# Patient Record
Sex: FEMALE | Race: WHITE | NOT HISPANIC OR LATINO | Employment: UNEMPLOYED | URBAN - METROPOLITAN AREA
[De-identification: names, ages, dates, MRNs, and addresses within clinical notes are randomized per-mention and may not be internally consistent; named-entity substitution may affect disease eponyms.]

---

## 2018-09-18 ENCOUNTER — TELEPHONE (OUTPATIENT)
Dept: SURGERY | Facility: CLINIC | Age: 33
End: 2018-09-18

## 2018-09-18 NOTE — TELEPHONE ENCOUNTER
Spoke with patient and informed her that an appointment is scheduled with Dr. Dominguez on 9/24.  She will bring a disc with the ct scan that was done in [place].

## 2018-09-18 NOTE — TELEPHONE ENCOUNTER
----- Message from Alana Ocampo sent at 9/18/2018  8:47 AM CDT -----  Contact: pt#[phone]  Needs Advice    Reason for call:She has two hernia pushing on stomach and 7cm cyst in lower abdomen,. Pt wants a sooner appt        Communication Preference:callback     Additional Information:

## 2018-09-21 ENCOUNTER — TELEPHONE (OUTPATIENT)
Dept: SURGERY | Facility: CLINIC | Age: 33
End: 2018-09-21

## 2018-09-21 NOTE — TELEPHONE ENCOUNTER
Spoke with patient and informed her that an appointment is scheduled with Dr. Lockett after she sees Dr. Dominguez.

## 2018-09-21 NOTE — TELEPHONE ENCOUNTER
----- Message from Elodia Cantrell RN sent at 9/20/2018  5:31 PM CDT -----  Contact: Pt:[phone]      ----- Message -----  From: Aline Garcia  Sent: 9/20/2018   3:51 PM  To: Angelica VILLEDA Staff    .Patient Returning Call from [facility]    Who Left Message for Patient:Elodia Morrison RN  Communication Preference:[phone]  Additional Information:Pt states she missed a call from  nurse.

## 2018-09-24 ENCOUNTER — OFFICE VISIT (OUTPATIENT)
Dept: SURGERY | Facility: CLINIC | Age: 33
End: 2018-09-24
Payer: COMMERCIAL

## 2018-09-24 VITALS
SYSTOLIC BLOOD PRESSURE: 175 MMHG | DIASTOLIC BLOOD PRESSURE: 104 MMHG | TEMPERATURE: 98 F | BODY MASS INDEX: 38.65 KG/M2 | HEIGHT: 68 IN | HEART RATE: 91 BPM

## 2018-09-24 VITALS
BODY MASS INDEX: 38.65 KG/M2 | HEART RATE: 102 BPM | DIASTOLIC BLOOD PRESSURE: 110 MMHG | HEIGHT: 68 IN | SYSTOLIC BLOOD PRESSURE: 201 MMHG

## 2018-09-24 DIAGNOSIS — K43.5 PARASTOMAL HERNIA WITHOUT OBSTRUCTION OR GANGRENE: ICD-10-CM

## 2018-09-24 DIAGNOSIS — K43.2 RECURRENT INCISIONAL HERNIA: Primary | ICD-10-CM

## 2018-09-24 DIAGNOSIS — K43.9 VENTRAL HERNIA WITHOUT OBSTRUCTION OR GANGRENE: Primary | ICD-10-CM

## 2018-09-24 PROCEDURE — 99999 PR PBB SHADOW E&M-EST. PATIENT-LVL III: CPT | Mod: PBBFAC,,, | Performed by: COLON & RECTAL SURGERY

## 2018-09-24 PROCEDURE — 3008F BODY MASS INDEX DOCD: CPT | Mod: CPTII,S$GLB,, | Performed by: COLON & RECTAL SURGERY

## 2018-09-24 PROCEDURE — 99999 PR PBB SHADOW E&M-EST. PATIENT-LVL III: CPT | Mod: PBBFAC,,, | Performed by: SURGERY

## 2018-09-24 PROCEDURE — 99213 OFFICE O/P EST LOW 20 MIN: CPT | Mod: S$GLB,,, | Performed by: COLON & RECTAL SURGERY

## 2018-09-24 PROCEDURE — 3008F BODY MASS INDEX DOCD: CPT | Mod: CPTII,S$GLB,, | Performed by: SURGERY

## 2018-09-24 PROCEDURE — 99204 OFFICE O/P NEW MOD 45 MIN: CPT | Mod: S$GLB,,, | Performed by: SURGERY

## 2018-09-24 RX ORDER — CYANOCOBALAMIN 1000 UG/ML
1000 INJECTION, SOLUTION INTRAMUSCULAR; SUBCUTANEOUS
COMMUNITY

## 2018-09-24 RX ORDER — OXYCODONE HCL 10 MG/1
10 TABLET, FILM COATED, EXTENDED RELEASE ORAL EVERY 12 HOURS PRN
COMMUNITY
End: 2023-08-03 | Stop reason: CLARIF

## 2018-09-24 NOTE — PROGRESS NOTES
[facility] - General Surgery  General Surgery  History & Physical    Patient Name: Georgette Abrams  MRN: [id number]  Attending Physician: Pastor Lockett MD  Primary Care Provider: Primary Doctor No    Patient information was obtained from patient and past medical records.     Subjective:     Chief Complaint/Reason for Admission: hernia    History of Present Illness:  Patient is a 33 y.o. female presents with painful incisional and parastomal hernias. She has a complicated surgical history. 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 Foley, this was followed by multiple complications and multiple surgeries. She later developed a parastomal hernia which was repair with biologic mesh in 2012. At that time she also had a hysterectomy. She later developed bowel infection and obstruction due to migration of mesh; the mesh was removed at [facility], [place], operated by [facility] in [place] in 2016. She had initially been doing well since then; however, she visited OSH recently and went to ED for infection, worsening pain and development of hernia. Has severe pain, bulging, nausea, and vomiting. Output from ostomy has increased, become more watery. She notices pain in particular when she lifts her two children. She takes 10 mg oxycodone q12hr for pain and 4 mg Zofran PRN nausea.     Current Outpatient Medications on File Prior to Visit   Medication Sig    clonazepam (KLONOPIN) 1 MG tablet Take 1 mg by mouth 2 (two) times daily as needed.      cyanocobalamin 1,000 mcg/mL injection 1,000 mcg every 28 days.    ondansetron (ZOFRAN) 4 MG tablet     ondansetron (ZOFRAN-ODT) 8 MG TbDL Take 4 mg by mouth once.      oxyCODONE (OXYCONTIN) 10 mg 12 hr tablet Take 10 mg by mouth every 12 (twelve) hours as needed for Pain.    progesterone (PROMETRIUM) 100 MG capsule Take 300 mg by mouth once daily.    tizanidine (ZANAFLEX) 2 MG tablet Take 2 mg by mouth every 6 (six) hours as needed.    venlafaxine  (EFFEXOR) 100 MG Tab Take 175 mg by mouth every evening.     [DISCONTINUED] furosemide (LASIX) 20 MG tablet Take 20 mg by mouth 2 (two) times daily.      [DISCONTINUED] hyoscyamine (LEVSIN/SL) 0.125 mg Subl Place 1 tablet (0.125 mg total) under the tongue every 4 (four) hours as needed.    [DISCONTINUED] metaxalone (SKELAXIN) 800 MG tablet Take 800 mg by mouth 3 (three) times daily.    [DISCONTINUED] oxycodone-acetaminophen 5-325 mg (PERCOCET) 5-325 mg per tablet Take 1 tablet by mouth every 4 (four) hours as needed.       No current facility-administered medications on file prior to visit.        Review of patient's allergies indicates:   Allergen Reactions    Hydrocodone-acetaminophen Itching    Levofloxacin Nausea Only    Morphine Other (See Comments)    Sulfa (sulfonamide antibiotics) Nausea Only       Past Medical History:   Diagnosis Date    Anxiety     Familial polyposis     S/P total colectomy      Past Surgical History:   Procedure Laterality Date    Davinci Assisted Bilateral Ovarian Cystectomy, with extension  SEAN   8/2011    HYSTERECTOMY  8/23/2012    DAVINCI     Illeotomy Creation  2009    OOPHORECTOMY  8/23/2012    DAvinci removal with DLH     TOTAL COLECTOMY      Iloanal pouch creation     Family History     Problem Relation (Age of Onset)    Colon cancer Maternal Grandmother    Lung cancer Maternal Grandfather        Tobacco Use    Smoking status: Former Smoker    Smokeless tobacco: Never Used   Substance and Sexual Activity    Alcohol use: Yes     Alcohol/week: 0.0 oz     Comment: seldom    Drug use: No    Sexual activity: Yes     Partners: Male     Birth control/protection: None     Comment: , lives in [place]     Review of Systems   Constitutional: Positive for appetite change and unexpected weight change. Negative for chills and fever.   HENT: Negative for hearing loss and trouble swallowing.    Eyes: Negative for visual disturbance.   Respiratory: Positive for  shortness of breath. Negative for cough.    Cardiovascular: Positive for chest pain and leg swelling. Negative for palpitations.   Gastrointestinal: Positive for abdominal distention, abdominal pain, nausea and vomiting.   Genitourinary: Positive for frequency. Negative for difficulty urinating and hematuria.   Musculoskeletal: Positive for arthralgias.   Skin: Negative for rash.   Neurological: Negative for numbness.     Objective:     Vital Signs (Most Recent):  Temp: 98.2 °F (36.8 °C) (09/24/18 1428)  Pulse: 91 (09/24/18 1428)  BP: (!) 175/104 (09/24/18 1428) Vital Signs (24h Range):  [unfilled]        Body mass index is 38.65 kg/m².    Physical Exam   Constitutional: She is oriented to person, place, and time. She appears well-developed and well-nourished.   HENT:   Head: Normocephalic and atraumatic.   Eyes: Conjunctivae are normal.   Cardiovascular: Normal rate, regular rhythm and normal heart sounds.   Pulmonary/Chest: Effort normal and breath sounds normal.   Abdominal: Soft. She exhibits no distension.       Neurological: She is alert and oriented to person, place, and time.   Skin: Skin is warm and dry.       Significant Labs:  No labs within 24 hours    Significant Diagnostics:  CT abdomen/pelvis -  Multiple midline and parastomal hernias, no signs of obstruction    Assessment/Plan:     Parastomal and incisional hernias  - will obtain records from [facility] in [place] pertaining to 2016 surgery  - will discuss surgical approach with Colorectal Surgery.  Will mosl likely plan on open approach and possible component separation for wide based hernia   - recommend weight loss if possible prior to hernia repair    Esau Gutierrez MD  General Surgery  [facility] - General Surgery    I have personally taken the history and examined this patient and agree with the resident's note as stated above.         Pastor Lockett MD

## 2018-09-24 NOTE — LETTER
September 24, 2018      Subhash Dominguez MD  [address] [facility] [address]           [facility] - General Surgery  [address]  [place] [address]  Phone: [phone]          Patient: Georgette Abrams   MR Number: [id number]   YOB: 1985   Date of Visit: 9/24/2018       Dear Dr. Subhash Dominguez:    Thank you for referring Georgette Abrams to me for evaluation. Attached you will find relevant portions of my assessment and plan of care.    If you have questions, please do not hesitate to call me. I look forward to following Georgette Abrams along with you.    Sincerely,    Pastor Lockett Jr., MD    Enclosure  CC:  No Recipients    If you would like to receive this communication electronically, please contact [email or web address] or [phone] to request more information on [facility] Link access.    For providers and/or their staff who would like to refer a patient to [facility], please contact us through our one-stop-shop provider referral line, [facility] , at [phone].    If you feel you have received this communication in error or would no longer like to receive these types of communications, please e-mail [email or web address]

## 2018-09-30 NOTE — PROGRESS NOTES
Subjective:       Patient ID: Georgette Abrams is a 33 y.o. female.    Chief Complaint: Incisional Hernia    HPI  Established pt.  Known to [facility] from proctocolectomy (polyposis) - failed IPAA - end ileostomy - parastomal hernia repair 2012 - mesh excision 2016.  Hospitalization recently with high volume watery ileostomy output with abdominal pain and n/v.       Review of Systems   Constitutional: Negative for chills and fever.   Respiratory: Negative for cough and shortness of breath.    Cardiovascular: Negative for chest pain and palpitations.   Gastrointestinal: Negative for nausea and vomiting.   Genitourinary: Negative for dysuria and urgency.   Neurological: Negative for seizures and numbness.       Objective:      Physical Exam   Constitutional: She is oriented to person, place, and time. She appears well-developed and well-nourished.   Eyes: Conjunctivae and EOM are normal.   Pulmonary/Chest: Effort normal. No respiratory distress.   Abdominal: Soft. She exhibits no distension.   RLQ ileostomy.  abd - soft nontender   Musculoskeletal: Normal range of motion. She exhibits no edema.   Neurological: She is alert and oriented to person, place, and time.   Skin: Skin is warm and dry.   Psychiatric: She has a normal mood and affect. Her behavior is normal.         CT reviewed - parastomal and ventral hernias.  Assessment:       1. Ventral hernia without obstruction or gangrene    2. Parastomal hernia without obstruction or gangrene        Plan:       I discussed with Ms. Abrams referral to Dr. Lockett for complex/high risk for recurrence hernia - evaluation and treatment.  He is willing to see her same day as this visit.

## 2018-10-03 ENCOUNTER — TELEPHONE (OUTPATIENT)
Dept: SURGERY | Facility: CLINIC | Age: 33
End: 2018-10-03

## 2018-10-03 NOTE — TELEPHONE ENCOUNTER
Spoke with pt and she wanted to know if Dr. Dominguez has talked to Dr. Lockett yet about her care. Informed pt I will send a message to Dr. Dominguez and let her know tomorrow.

## 2018-10-03 NOTE — TELEPHONE ENCOUNTER
----- Message from Aline Garcia sent at 10/3/2018  1:15 PM CDT -----  Contact: Pt:[phone]  .Needs Advice    Reason for call:Pt called and states she would like to speak with the nurse in regards to getting the status of her case.         Communication Preference:Pt:[phone]    Additional Information:

## 2019-01-18 ENCOUNTER — TELEPHONE (OUTPATIENT)
Dept: SURGERY | Facility: CLINIC | Age: 34
End: 2019-01-18

## 2019-01-18 NOTE — TELEPHONE ENCOUNTER
----- Message from Pastor Lockett Jr., MD sent at 1/17/2019  1:34 PM CST -----  Contact: [phone]  We can have her come back in to see us.    ----- Message -----  From: Enid Gunderson RN  Sent: 1/11/2019   4:29 PM  To: Pastor Lockett Jr., MD    In the media tab  ----- Message -----  From: Pastor Lockett Jr., MD  Sent: 1/11/2019   4:02 PM  To: Enid Gunderson RN    Did we get her records from [facility]?    ----- Message -----  From: Enid Gunderson RN  Sent: 1/8/2019   1:09 PM  To: Pastor Lockett Jr., MD    What is the plan for this patient?    ----- Message -----  From: Elodia Cantrell RN  Sent: 1/7/2019   5:32 PM  To: Enid Gunderson RN    This patient was seen by Dr. Lockett back in October.  Can you check with him and see what the plan was for her please?  She said that nobody had called her back with any information.    Thank you,  Elodia Morrison   ----- Message -----  From: Jennifer Salter MA  Sent: 1/7/2019  10:45 AM  To: Angelica VILLEDA Staff    Needs Advice    Reason for call: Pt said that its been several months that she was  told Dr Dominguez was going to speak to a surgeon re: her case and she has not heard back from anyone since then         Communication Preference: [phone]    Additional Information: please call

## 2019-01-24 ENCOUNTER — TELEPHONE (OUTPATIENT)
Dept: SURGERY | Facility: CLINIC | Age: 34
End: 2019-01-24

## 2019-01-24 NOTE — TELEPHONE ENCOUNTER
----- Message from Sharon Agarwal sent at 1/24/2019  3:19 PM CST -----  Contact: pt   Patient Returning Call from [facility]    Who Left Message for Patient: Enid   Communication Preference: can you please call pt at [phone]  Additional Information: none    WOLF

## 2020-12-08 ENCOUNTER — PATIENT MESSAGE (OUTPATIENT)
Dept: WOUND CARE | Facility: CLINIC | Age: 35
End: 2020-12-08

## 2023-06-27 ENCOUNTER — TELEPHONE (OUTPATIENT)
Dept: SURGERY | Facility: CLINIC | Age: 38
End: 2023-06-27
Payer: COMMERCIAL

## 2023-06-27 NOTE — TELEPHONE ENCOUNTER
Pt called stating that she just got out of the hospital and she is having a lot of health challenges at this time and she is looking to reestablish care with CRS and she also needs to have 3 hernias repaired.  Pt is a previous pt of Dr. Foley, but has also seen Dr. Dominguez in the past.  RN will check with Dr. Rudd re: recommendations for hernia repair and message will also be sent to Dr. Dominguez regarding pt having a colostomy revision.  RN will touch base with pt again once she receives some guidance on next steps and arranging all appts needed when she comes to [place].  Pt verbalized understanding to all.

## 2023-07-03 ENCOUNTER — TELEPHONE (OUTPATIENT)
Dept: SURGERY | Facility: CLINIC | Age: 38
End: 2023-07-03
Payer: COMMERCIAL

## 2023-07-03 NOTE — TELEPHONE ENCOUNTER
Called pt to update her after hearing from Dr. Dominguez.  RN let pt know that Dr. Dominguez recommended that I speak with Magaly Tracy and Tobin re: sx for pt.  RN messaged both docs who are out this week to see about next steps for pt.  Pt is aware.  RN also asked pt about getting her records together and her scans placed on a disk before her visit to [facility].  Pt stated that she would go to the hospital herself and ask for them  RN will update pt once she hears back from each doc.  Pt also made aware that Dr. Lockett is out of the office until July 26.  Pt stated that she is hoping to have a sooner appt. with CRS.  Pt verbalized understanding to all.

## 2023-07-11 ENCOUNTER — TELEPHONE (OUTPATIENT)
Dept: SURGERY | Facility: CLINIC | Age: 38
End: 2023-07-11
Payer: COMMERCIAL

## 2023-07-11 NOTE — TELEPHONE ENCOUNTER
Attempted to call pt on Friday 7/7 at 1300 in the afternoon and today to coordinate a visit for her to meet with Dr. Tracy re: her parastomal hernia repairs and ostomy revision.  Left pt a vmail on Friday and today to speak with her.  Left office number for pt to give us a call back when convenient.

## 2023-07-12 ENCOUNTER — TELEPHONE (OUTPATIENT)
Dept: SURGERY | Facility: CLINIC | Age: 38
End: 2023-07-12
Payer: COMMERCIAL

## 2023-07-12 NOTE — TELEPHONE ENCOUNTER
Called pt back to get her scheduled with Dr. Tracy.  Pt stated that she is having a lot of health issues and has been in a lot of pain since discharge and is willing to drive in from [place]. as soon as possible for an appt to discuss her parastomal hernias and ostomy revision.  Pt also stated that she has her hospital records and scans on a disc that she can bring with her.  RN set up an appt for pt to see Dr. Tracy on 7/14 at 2:40pm.  Appt letter will be emailed to pt.  Pt stated that she is very familiar with the location of appt from previous appts with Dr. Foley.  Pt verbalized understanding to all.

## 2023-07-14 ENCOUNTER — LAB VISIT (OUTPATIENT)
Dept: LAB | Facility: HOSPITAL | Age: 38
End: 2023-07-14
Attending: COLON & RECTAL SURGERY
Payer: COMMERCIAL

## 2023-07-14 ENCOUNTER — OFFICE VISIT (OUTPATIENT)
Dept: SURGERY | Facility: CLINIC | Age: 38
End: 2023-07-14
Payer: COMMERCIAL

## 2023-07-14 VITALS
BODY MASS INDEX: 38.65 KG/M2 | HEIGHT: 68 IN | HEART RATE: 78 BPM | DIASTOLIC BLOOD PRESSURE: 87 MMHG | SYSTOLIC BLOOD PRESSURE: 125 MMHG

## 2023-07-14 DIAGNOSIS — K94.13 ILEOSTOMY DYSFUNCTION: ICD-10-CM

## 2023-07-14 DIAGNOSIS — Z83.718 FAMILY HISTORY OF FAMILIAL POLYPOSIS: ICD-10-CM

## 2023-07-14 DIAGNOSIS — K43.0 INCISIONAL HERNIA WITH OBSTRUCTION BUT NO GANGRENE: Primary | ICD-10-CM

## 2023-07-14 DIAGNOSIS — K43.0 INCISIONAL HERNIA WITH OBSTRUCTION BUT NO GANGRENE: ICD-10-CM

## 2023-07-14 LAB
ALBUMIN SERPL BCP-MCNC: 4.5 G/DL (ref 3.5–5.2)
ALP SERPL-CCNC: 46 U/L (ref 55–135)
ALT SERPL W/O P-5'-P-CCNC: 26 U/L (ref 10–44)
ANION GAP SERPL CALC-SCNC: 12 MMOL/L (ref 8–16)
AST SERPL-CCNC: 20 U/L (ref 10–40)
BASOPHILS # BLD AUTO: 0.07 K/UL (ref 0–0.2)
BASOPHILS NFR BLD: 0.8 % (ref 0–1.9)
BILIRUB SERPL-MCNC: 1.3 MG/DL (ref 0.1–1)
BUN SERPL-MCNC: 5 MG/DL (ref 6–20)
CALCIUM SERPL-MCNC: 9.7 MG/DL (ref 8.7–10.5)
CHLORIDE SERPL-SCNC: 103 MMOL/L (ref 95–110)
CO2 SERPL-SCNC: 23 MMOL/L (ref 23–29)
CREAT SERPL-MCNC: 0.8 MG/DL (ref 0.5–1.4)
DIFFERENTIAL METHOD: ABNORMAL
EOSINOPHIL # BLD AUTO: 0.1 K/UL (ref 0–0.5)
EOSINOPHIL NFR BLD: 1.1 % (ref 0–8)
ERYTHROCYTE [DISTWIDTH] IN BLOOD BY AUTOMATED COUNT: 13 % (ref 11.5–14.5)
EST. GFR  (NO RACE VARIABLE): >60 ML/MIN/1.73 M^2
GLUCOSE SERPL-MCNC: 99 MG/DL (ref 70–110)
HCT VFR BLD AUTO: 47.6 % (ref 37–48.5)
HGB BLD-MCNC: 15.8 G/DL (ref 12–16)
IMM GRANULOCYTES # BLD AUTO: 0.04 K/UL (ref 0–0.04)
IMM GRANULOCYTES NFR BLD AUTO: 0.4 % (ref 0–0.5)
LYMPHOCYTES # BLD AUTO: 1.5 K/UL (ref 1–4.8)
LYMPHOCYTES NFR BLD: 16.6 % (ref 18–48)
MCH RBC QN AUTO: 30.6 PG (ref 27–31)
MCHC RBC AUTO-ENTMCNC: 33.2 G/DL (ref 32–36)
MCV RBC AUTO: 92 FL (ref 82–98)
MONOCYTES # BLD AUTO: 0.5 K/UL (ref 0.3–1)
MONOCYTES NFR BLD: 5.7 % (ref 4–15)
NEUTROPHILS # BLD AUTO: 6.7 K/UL (ref 1.8–7.7)
NEUTROPHILS NFR BLD: 75.4 % (ref 38–73)
NRBC BLD-RTO: 0 /100 WBC
PLATELET # BLD AUTO: 248 K/UL (ref 150–450)
PMV BLD AUTO: 11.7 FL (ref 9.2–12.9)
POTASSIUM SERPL-SCNC: 4.1 MMOL/L (ref 3.5–5.1)
PROT SERPL-MCNC: 7.5 G/DL (ref 6–8.4)
RBC # BLD AUTO: 5.16 M/UL (ref 4–5.4)
SODIUM SERPL-SCNC: 138 MMOL/L (ref 136–145)
WBC # BLD AUTO: 8.92 K/UL (ref 3.9–12.7)

## 2023-07-14 PROCEDURE — 1160F RVW MEDS BY RX/DR IN RCRD: CPT | Mod: CPTII,S$GLB,, | Performed by: COLON & RECTAL SURGERY

## 2023-07-14 PROCEDURE — 3008F PR BODY MASS INDEX (BMI) DOCUMENTED: ICD-10-PCS | Mod: CPTII,S$GLB,, | Performed by: COLON & RECTAL SURGERY

## 2023-07-14 PROCEDURE — 4010F PR ACE/ARB THEARPY RXD/TAKEN: ICD-10-PCS | Mod: CPTII,S$GLB,, | Performed by: COLON & RECTAL SURGERY

## 2023-07-14 PROCEDURE — 3079F PR MOST RECENT DIASTOLIC BLOOD PRESSURE 80-89 MM HG: ICD-10-PCS | Mod: CPTII,S$GLB,, | Performed by: COLON & RECTAL SURGERY

## 2023-07-14 PROCEDURE — 99205 OFFICE O/P NEW HI 60 MIN: CPT | Mod: S$GLB,,, | Performed by: COLON & RECTAL SURGERY

## 2023-07-14 PROCEDURE — 4010F ACE/ARB THERAPY RXD/TAKEN: CPT | Mod: CPTII,S$GLB,, | Performed by: COLON & RECTAL SURGERY

## 2023-07-14 PROCEDURE — 1159F MED LIST DOCD IN RCRD: CPT | Mod: CPTII,S$GLB,, | Performed by: COLON & RECTAL SURGERY

## 2023-07-14 PROCEDURE — 99999 PR PBB SHADOW E&M-EST. PATIENT-LVL IV: ICD-10-PCS | Mod: PBBFAC,,, | Performed by: COLON & RECTAL SURGERY

## 2023-07-14 PROCEDURE — 3079F DIAST BP 80-89 MM HG: CPT | Mod: CPTII,S$GLB,, | Performed by: COLON & RECTAL SURGERY

## 2023-07-14 PROCEDURE — 85025 COMPLETE CBC W/AUTO DIFF WBC: CPT | Performed by: COLON & RECTAL SURGERY

## 2023-07-14 PROCEDURE — 80053 COMPREHEN METABOLIC PANEL: CPT | Performed by: COLON & RECTAL SURGERY

## 2023-07-14 PROCEDURE — 36415 COLL VENOUS BLD VENIPUNCTURE: CPT | Performed by: COLON & RECTAL SURGERY

## 2023-07-14 PROCEDURE — 1160F PR REVIEW ALL MEDS BY PRESCRIBER/CLIN PHARMACIST DOCUMENTED: ICD-10-PCS | Mod: CPTII,S$GLB,, | Performed by: COLON & RECTAL SURGERY

## 2023-07-14 PROCEDURE — 99205 PR OFFICE/OUTPT VISIT, NEW, LEVL V, 60-74 MIN: ICD-10-PCS | Mod: S$GLB,,, | Performed by: COLON & RECTAL SURGERY

## 2023-07-14 PROCEDURE — 99999 PR PBB SHADOW E&M-EST. PATIENT-LVL IV: CPT | Mod: PBBFAC,,, | Performed by: COLON & RECTAL SURGERY

## 2023-07-14 PROCEDURE — 3074F SYST BP LT 130 MM HG: CPT | Mod: CPTII,S$GLB,, | Performed by: COLON & RECTAL SURGERY

## 2023-07-14 PROCEDURE — 3008F BODY MASS INDEX DOCD: CPT | Mod: CPTII,S$GLB,, | Performed by: COLON & RECTAL SURGERY

## 2023-07-14 PROCEDURE — 3074F PR MOST RECENT SYSTOLIC BLOOD PRESSURE < 130 MM HG: ICD-10-PCS | Mod: CPTII,S$GLB,, | Performed by: COLON & RECTAL SURGERY

## 2023-07-14 PROCEDURE — 1159F PR MEDICATION LIST DOCUMENTED IN MEDICAL RECORD: ICD-10-PCS | Mod: CPTII,S$GLB,, | Performed by: COLON & RECTAL SURGERY

## 2023-07-14 RX ORDER — LOSARTAN POTASSIUM 100 MG/1
100 TABLET ORAL
COMMUNITY

## 2023-07-14 NOTE — PROGRESS NOTES
CRS Office Visit History and Physical    Referring Md:   Subhash Dominguez Md  [address] [place] [address]    SUBJECTIVE:     Chief Complaint: parastomal hernia    History of Present Illness:  The patient is a new patient to this practice.   She presents with 59 pages of outside hospital records that were reviewed as well as multiple CT scans.  She is had 66 hospital admission since 2009.  Course is as follows:  Patient is a 37 y.o. female presents with parastomal hernia.    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  She therefore underwent repeat exploratory laparotomy with small-bowel resection in June 2010.  Ultimately, she had pouch failure requiring pouch excision in September 2010.  She underwent combined hysterectomy and ventral hernia repair with mesh placement in 2012.  This was complicated by mesh infection requiring mesh excision in 2016.  Her last abdominal operation was in 2016.    Since 2016, she is developed recurrent multiple midline hernias with increasing obstructive symptoms.  She is been hospitalized multiple times.  Normally, she empties her bag 7 times per day without Imodium.  Over the past 14 months, she is had increasing obstructive symptoms frequently requiring her to lay down and press on her hernias or be admitted to the hospital.  Nonsmoker.  Weight has been stable.  Nondiabetic.    She is been seen at multiple outside hospitals including [facility] regarding ventral and parastomal hernia repair but has not been offered repair previously.    Socially, she is going through a divorce.  She does live near her family and has a good social support system at home.  She has anxiety which is well controlled with her home medications.      Review of patient's allergies indicates:   Allergen Reactions    Hydrocodone-acetaminophen Itching    Levofloxacin  "Nausea Only    Morphine Other (See Comments)    Sulfa (sulfonamide antibiotics) Nausea Only       Past Medical History:   Diagnosis Date    Anxiety     Familial polyposis     S/P total colectomy      Past Surgical History:   Procedure Laterality Date    Davinci Assisted Bilateral Ovarian Cystectomy, with extension  SEAN   8/2011    HYSTERECTOMY  8/23/2012    DAVINCI     Illeotomy Creation  2009    OOPHORECTOMY  8/23/2012    DAvinci removal with DLH     TOTAL COLECTOMY      Iloanal pouch creation     Family History   Problem Relation Age of Onset    Colon cancer Maternal Grandmother         near late 60's    Lung cancer Maternal Grandfather         lung cancer     Social History     Tobacco Use    Smoking status: Former    Smokeless tobacco: Never   Substance Use Topics    Alcohol use: Yes     Alcohol/week: 0.0 standard drinks     Comment: seldom    Drug use: No        Review of Systems:  Review of Systems   Constitutional:  Negative for chills, diaphoresis, fever, malaise/fatigue and weight loss.   HENT:  Negative for congestion.    Respiratory:  Negative for shortness of breath.    Cardiovascular:  Negative for chest pain and leg swelling.   Gastrointestinal:  Positive for abdominal pain, blood in stool and diarrhea. Negative for constipation, nausea and vomiting.   Genitourinary:  Negative for dysuria.   Musculoskeletal:  Negative for back pain and myalgias.   Skin:  Negative for rash.   Neurological:  Negative for dizziness and weakness.   Endo/Heme/Allergies:  Does not bruise/bleed easily.   Psychiatric/Behavioral:  Negative for depression. The patient is nervous/anxious.      OBJECTIVE:     Vital Signs (Most Recent)  /87 (BP Location: Left arm, Patient Position: Sitting, BP Method: Small (Automatic))   Pulse 78   Ht 5' 8" (1.727 m)   BMI 38.65 kg/m²     Physical Exam:  General: White female in no distress   Neuro: alert and oriented x 4.  Moves all extremities.     HEENT: no icterus.  Trachea " midline  Respiratory: respirations are even and unlabored  Cardiac: regular rate  Abdomen:  Large midline incision with multiple large hernias that are tense.  Stoma in right lower quadrant is pink.  Lower midline T incision with multiple skin irregularities.  Distended.  Mildly tender.  Extremities: Warm dry and intact  Skin: no rashes  Anorectal:  Deferred    Labs:  H&H 15 and 48.  Albumin 4.5.  Normal renal function.    Imaging:   CT abdomen pelvis 06/25/2023 reviewed and demonstrates right lower quadrant ileostomy with parastomal hernia as well as a left paramedian ventral hernia and a right paramedian supraumbilical hernia.  Small-bowel follow-through on 06/25/2023 demonstrates passage of contrast to the ostomy bag after 2 hours.  CT abdomen pelvis 06/22/2023 reviewed.  Left-sided rectus is intact.  Right-sided rectus with stoma through the inferior portion.  TAR plane appears intact    ASSESSMENT/PLAN:     Diagnoses and all orders for this visit:    Incisional hernia with obstruction but no gangrene  -     Case Request Operating Room: REPAIR, HERNIA, VENTRAL, RECURRENT, ERAS low, REVISION, ILEOSTOMY, LYSIS, ADHESIONS  -     Comprehensive Metabolic Panel; Future  -     CBC Auto Differential; Future    Ileostomy dysfunction  -     Case Request Operating Room: REPAIR, HERNIA, VENTRAL, RECURRENT, ERAS low, REVISION, ILEOSTOMY, LYSIS, ADHESIONS  -     Comprehensive Metabolic Panel; Future  -     CBC Auto Differential; Future    Family history of familial polyposis  -     Case Request Operating Room: REPAIR, HERNIA, VENTRAL, RECURRENT, ERAS low, REVISION, ILEOSTOMY, LYSIS, ADHESIONS  -     Comprehensive Metabolic Panel; Future  -     CBC Auto Differential; Future        37 y.o. female with history of FAP status post failed pouch with multiple complex abdominal wall hernias and a peristomal hernia.  She is having obstructive symptoms with multiple hospitalizations.  Obstructive symptoms interfering with her ability  to function normally.  Although she is a very complex surgical history, medically, she remains active with normal nutrition, no weight change, nonsmoker, nondiabetic.  Discussed that she is certainly at high risk for perioperative complication given her extensive past surgical history.  It appears that she would benefit from complex abdominal wall reconstruction as well as relocation of her ileostomy to the upper abdomen given that she currently has pouch complications from pouching in her lower abdomen due to skin folds.  Discussed this would involve open scar excision, extensive adhesiolysis, takedown of her prior ileostomy, excision of hernias, abdominal wall reconstruction likely with a transverse abdominis release, tunneling of a new ileostomy, and placement of resorbable mesh.  Discussed the risk infection, hernia recurrence, damage to surrounding structures, need for bowel resection, seroma formation, pain.  She unfortunately has limited other options and therefore wishes to proceed surgery.  Consents were signed today and all questions were answered.  She is had significant pain following surgery in the past.  Therefore, we will plan for epidural placement prior to her surgery for pain control.    KRISH Tracy MD, FACS, FASCRS  Staff Surgeon  Colon & Rectal Surgery    Total of 75 minutes was spent on evaluation of past records, CT, planning, discussion with the patient.  Over 50% in face-to-face discussion with the patient.

## 2023-07-14 NOTE — H&P (VIEW-ONLY)
CRS Office Visit History and Physical    Referring Md:   Subhash Dominguez Md  [address] [place] [address]    SUBJECTIVE:     Chief Complaint: parastomal hernia    History of Present Illness:  The patient is a new patient to this practice.   She presents with 59 pages of outside hospital records that were reviewed as well as multiple CT scans.  She is had 66 hospital admission since 2009.  Course is as follows:  Patient is a 37 y.o. female presents with parastomal hernia.    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  She therefore underwent repeat exploratory laparotomy with small-bowel resection in June 2010.  Ultimately, she had pouch failure requiring pouch excision in September 2010.  She underwent combined hysterectomy and ventral hernia repair with mesh placement in 2012.  This was complicated by mesh infection requiring mesh excision in 2016.  Her last abdominal operation was in 2016.    Since 2016, she is developed recurrent multiple midline hernias with increasing obstructive symptoms.  She is been hospitalized multiple times.  Normally, she empties her bag 7 times per day without Imodium.  Over the past 14 months, she is had increasing obstructive symptoms frequently requiring her to lay down and press on her hernias or be admitted to the hospital.  Nonsmoker.  Weight has been stable.  Nondiabetic.    She is been seen at multiple outside hospitals including [place] regarding ventral and parastomal hernia repair but has not been offered repair previously.    Socially, she is going through a divorce.  She does live near her family and has a good social support system at home.  She has anxiety which is well controlled with her home medications.      Review of patient's allergies indicates:   Allergen Reactions    Hydrocodone-acetaminophen Itching    Levofloxacin  "Nausea Only    Morphine Other (See Comments)    Sulfa (sulfonamide antibiotics) Nausea Only       Past Medical History:   Diagnosis Date    Anxiety     Familial polyposis     S/P total colectomy      Past Surgical History:   Procedure Laterality Date    Davinci Assisted Bilateral Ovarian Cystectomy, with extension  SEAN   8/2011    HYSTERECTOMY  8/23/2012    DAVINCI     Illeotomy Creation  2009    OOPHORECTOMY  8/23/2012    DAvinci removal with DLH     TOTAL COLECTOMY      Iloanal pouch creation     Family History   Problem Relation Age of Onset    Colon cancer Maternal Grandmother         near late 60's    Lung cancer Maternal Grandfather         lung cancer     Social History     Tobacco Use    Smoking status: Former    Smokeless tobacco: Never   Substance Use Topics    Alcohol use: Yes     Alcohol/week: 0.0 standard drinks     Comment: seldom    Drug use: No        Review of Systems:  Review of Systems   Constitutional:  Negative for chills, diaphoresis, fever, malaise/fatigue and weight loss.   HENT:  Negative for congestion.    Respiratory:  Negative for shortness of breath.    Cardiovascular:  Negative for chest pain and leg swelling.   Gastrointestinal:  Positive for abdominal pain, blood in stool and diarrhea. Negative for constipation, nausea and vomiting.   Genitourinary:  Negative for dysuria.   Musculoskeletal:  Negative for back pain and myalgias.   Skin:  Negative for rash.   Neurological:  Negative for dizziness and weakness.   Endo/Heme/Allergies:  Does not bruise/bleed easily.   Psychiatric/Behavioral:  Negative for depression. The patient is nervous/anxious.      OBJECTIVE:     Vital Signs (Most Recent)  /87 (BP Location: Left arm, Patient Position: Sitting, BP Method: Small (Automatic))   Pulse 78   Ht 5' 8" (1.727 m)   BMI 38.65 kg/m²     Physical Exam:  General: White female in no distress   Neuro: alert and oriented x 4.  Moves all extremities.     HEENT: no icterus.  Trachea " midline  Respiratory: respirations are even and unlabored  Cardiac: regular rate  Abdomen:  Large midline incision with multiple large hernias that are tense.  Stoma in right lower quadrant is pink.  Lower midline T incision with multiple skin irregularities.  Distended.  Mildly tender.  Extremities: Warm dry and intact  Skin: no rashes  Anorectal:  Deferred    Labs:  H&H 15 and 48.  Albumin 4.5.  Normal renal function.    Imaging:   CT abdomen pelvis 06/25/2023 reviewed and demonstrates right lower quadrant ileostomy with parastomal hernia as well as a left paramedian ventral hernia and a right paramedian supraumbilical hernia.  Small-bowel follow-through on 06/25/2023 demonstrates passage of contrast to the ostomy bag after 2 hours.  CT abdomen pelvis 06/22/2023 reviewed.  Left-sided rectus is intact.  Right-sided rectus with stoma through the inferior portion.  TAR plane appears intact    ASSESSMENT/PLAN:     Diagnoses and all orders for this visit:    Incisional hernia with obstruction but no gangrene  -     Case Request Operating Room: REPAIR, HERNIA, VENTRAL, RECURRENT, ERAS low, REVISION, ILEOSTOMY, LYSIS, ADHESIONS  -     Comprehensive Metabolic Panel; Future  -     CBC Auto Differential; Future    Ileostomy dysfunction  -     Case Request Operating Room: REPAIR, HERNIA, VENTRAL, RECURRENT, ERAS low, REVISION, ILEOSTOMY, LYSIS, ADHESIONS  -     Comprehensive Metabolic Panel; Future  -     CBC Auto Differential; Future    Family history of familial polyposis  -     Case Request Operating Room: REPAIR, HERNIA, VENTRAL, RECURRENT, ERAS low, REVISION, ILEOSTOMY, LYSIS, ADHESIONS  -     Comprehensive Metabolic Panel; Future  -     CBC Auto Differential; Future        37 y.o. female with history of FAP status post failed pouch with multiple complex abdominal wall hernias and a peristomal hernia.  She is having obstructive symptoms with multiple hospitalizations.  Obstructive symptoms interfering with her ability  to function normally.  Although she is a very complex surgical history, medically, she remains active with normal nutrition, no weight change, nonsmoker, nondiabetic.  Discussed that she is certainly at high risk for perioperative complication given her extensive past surgical history.  It appears that she would benefit from complex abdominal wall reconstruction as well as relocation of her ileostomy to the upper abdomen given that she currently has pouch complications from pouching in her lower abdomen due to skin folds.  Discussed this would involve open scar excision, extensive adhesiolysis, takedown of her prior ileostomy, excision of hernias, abdominal wall reconstruction likely with a transverse abdominis release, tunneling of a new ileostomy, and placement of resorbable mesh.  Discussed the risk infection, hernia recurrence, damage to surrounding structures, need for bowel resection, seroma formation, pain.  She unfortunately has limited other options and therefore wishes to proceed surgery.  Consents were signed today and all questions were answered.  She is had significant pain following surgery in the past.  Therefore, we will plan for epidural placement prior to her surgery for pain control.    KRISH Tracy MD, FACS, FASCRS  Staff Surgeon  Colon & Rectal Surgery    Total of 75 minutes was spent on evaluation of past records, CT, planning, discussion with the patient.  Over 50% in face-to-face discussion with the patient.

## 2023-07-17 RX ORDER — GABAPENTIN 300 MG/1
300 CAPSULE ORAL 3 TIMES DAILY
Status: CANCELLED | OUTPATIENT
Start: 2023-07-17

## 2023-07-19 ENCOUNTER — DOCUMENTATION ONLY (OUTPATIENT)
Dept: HEMATOLOGY/ONCOLOGY | Facility: CLINIC | Age: 38
End: 2023-07-19
Payer: COMMERCIAL

## 2023-07-19 NOTE — PROGRESS NOTES
In basket message received re: patient that is looking for lodging options for upcoming surgery. Attempted to call pt., no answer, VM left requesting a return call. Will follow.

## 2023-07-20 ENCOUNTER — DOCUMENTATION ONLY (OUTPATIENT)
Dept: HEMATOLOGY/ONCOLOGY | Facility: CLINIC | Age: 38
End: 2023-07-20
Payer: COMMERCIAL

## 2023-07-20 NOTE — PROGRESS NOTES
Followed up with this patient in regards to lodging options for upcoming procedure. Offered list of local hotels who will offer discount. She declined. She said she was able to work out a reasonable rate with the [facility].

## 2023-08-03 ENCOUNTER — TELEPHONE (OUTPATIENT)
Dept: SURGERY | Facility: CLINIC | Age: 38
End: 2023-08-03
Payer: COMMERCIAL

## 2023-08-03 ENCOUNTER — ANESTHESIA EVENT (OUTPATIENT)
Dept: SURGERY | Facility: HOSPITAL | Age: 38
DRG: 330 | End: 2023-08-03
Payer: COMMERCIAL

## 2023-08-03 NOTE — ANESTHESIA PREPROCEDURE EVALUATION
08/03/2023  Georgette Abrams is a 38 y.o., female.      Pre-op Assessment          Review of Systems  Anesthesia Hx:  Personal Hx of Anesthesia complications, Post-Operative Nausea/Vomiting, with every anesthetic, treatment not known   Social:  Former Smoker    Hepatic/GI:    Familial polyposis  S/P total colectomy      ileostomy   Endocrine:  Obesity / BMI > 30  Psych:   anxiety          Physical Exam  General: Cooperative, Alert and Oriented    Airway:  Mouth Opening: Normal  TM Distance: Normal          Anesthesia Plan  Type of Anesthesia, risks & benefits discussed:    Anesthesia Type: Gen ETT  Intra-op Monitoring Plan: Standard ASA Monitors  Post Op Pain Control Plan: multimodal analgesia  Induction:  IV  Informed Consent: Informed consent signed with the Patient and all parties understand the risks and agree with anesthesia plan.  All questions answered.   ASA Score: 2  Day of Surgery Review of History & Physical: H&P Update referred to the surgeon/provider.    Ready For Surgery From Anesthesia Perspective.     .

## 2023-08-03 NOTE — PRE-PROCEDURE INSTRUCTIONS
PreOp Instructions given:   - Verbal medication information (what to hold and what to take)   - NPO guidelines/Bowel prep instructions given per CRS Dept  - Arrival place directions given; time to be given the day before procedure by the   Surgeon's Office DOSC  - Bathing with antibacterial soap   - Don't wear any jewelry or bring any valuables AM of surgery   - No makeup or moisturizer to face   - No perfume/cologne, powder, lotions or aftershave   Pt. verbalized understanding.   Pt reports h/o PONV    Patient does not know arrival time.  Explained that this information comes from the surgeon's office and if they haven't heard from them by 2 or 3 pm to call the office.  Patient stated an understanding.

## 2023-08-04 ENCOUNTER — TELEPHONE (OUTPATIENT)
Dept: SURGERY | Facility: CLINIC | Age: 38
End: 2023-08-04
Payer: COMMERCIAL

## 2023-08-07 ENCOUNTER — ANESTHESIA (OUTPATIENT)
Dept: SURGERY | Facility: HOSPITAL | Age: 38
DRG: 330 | End: 2023-08-07
Payer: COMMERCIAL

## 2023-08-07 ENCOUNTER — HOSPITAL ENCOUNTER (INPATIENT)
Facility: HOSPITAL | Age: 38
LOS: 9 days | Discharge: HOME HEALTH | DRG: 330 | End: 2023-08-16
Attending: COLON & RECTAL SURGERY | Admitting: COLON & RECTAL SURGERY
Payer: COMMERCIAL

## 2023-08-07 DIAGNOSIS — Z90.49 S/P COLECTOMY: ICD-10-CM

## 2023-08-07 DIAGNOSIS — Z43.2 ATTENTION TO ILEOSTOMY: Primary | ICD-10-CM

## 2023-08-07 DIAGNOSIS — K43.0 INCISIONAL HERNIA WITH OBSTRUCTION BUT NO GANGRENE: ICD-10-CM

## 2023-08-07 DIAGNOSIS — K56.609 SMALL BOWEL OBSTRUCTION: ICD-10-CM

## 2023-08-07 PROBLEM — K94.13 ILEOSTOMY DYSFUNCTION: Status: ACTIVE | Noted: 2023-08-07

## 2023-08-07 LAB
ABO + RH BLD: NORMAL
BLD GP AB SCN CELLS X3 SERPL QL: NORMAL
SPECIMEN OUTDATE: NORMAL

## 2023-08-07 PROCEDURE — D9220A PRA ANESTHESIA: Mod: ANES,,, | Performed by: ANESTHESIOLOGY

## 2023-08-07 PROCEDURE — 44314 REVISION OF ILEOSTOMY: CPT | Mod: 22,,, | Performed by: COLON & RECTAL SURGERY

## 2023-08-07 PROCEDURE — 63600175 PHARM REV CODE 636 W HCPCS: Performed by: NURSE ANESTHETIST, CERTIFIED REGISTERED

## 2023-08-07 PROCEDURE — 71000033 HC RECOVERY, INTIAL HOUR: Performed by: COLON & RECTAL SURGERY

## 2023-08-07 PROCEDURE — 71000015 HC POSTOP RECOV 1ST HR: Performed by: COLON & RECTAL SURGERY

## 2023-08-07 PROCEDURE — 27201423 OPTIME MED/SURG SUP & DEVICES STERILE SUPPLY: Performed by: COLON & RECTAL SURGERY

## 2023-08-07 PROCEDURE — 88302 TISSUE EXAM BY PATHOLOGIST: CPT | Performed by: PATHOLOGY

## 2023-08-07 PROCEDURE — D9220A PRA ANESTHESIA: Mod: CRNA,,, | Performed by: NURSE ANESTHETIST, CERTIFIED REGISTERED

## 2023-08-07 PROCEDURE — 25000003 PHARM REV CODE 250: Performed by: SURGERY

## 2023-08-07 PROCEDURE — 88304 TISSUE EXAM BY PATHOLOGIST: CPT | Mod: 26,,, | Performed by: PATHOLOGY

## 2023-08-07 PROCEDURE — 36000708 HC OR TIME LEV III 1ST 15 MIN: Performed by: COLON & RECTAL SURGERY

## 2023-08-07 PROCEDURE — C1765 ADHESION BARRIER: HCPCS | Performed by: COLON & RECTAL SURGERY

## 2023-08-07 PROCEDURE — 49999: ICD-10-PCS | Mod: ,,, | Performed by: COLON & RECTAL SURGERY

## 2023-08-07 PROCEDURE — 25000003 PHARM REV CODE 250: Performed by: NURSE ANESTHETIST, CERTIFIED REGISTERED

## 2023-08-07 PROCEDURE — 71000016 HC POSTOP RECOV ADDL HR: Performed by: COLON & RECTAL SURGERY

## 2023-08-07 PROCEDURE — 63600175 PHARM REV CODE 636 W HCPCS: Performed by: SURGERY

## 2023-08-07 PROCEDURE — 36000709 HC OR TIME LEV III EA ADD 15 MIN: Performed by: COLON & RECTAL SURGERY

## 2023-08-07 PROCEDURE — C1781 MESH (IMPLANTABLE): HCPCS | Performed by: COLON & RECTAL SURGERY

## 2023-08-07 PROCEDURE — 63600175 PHARM REV CODE 636 W HCPCS: Performed by: ANESTHESIOLOGY

## 2023-08-07 PROCEDURE — 44314 PR REVISION OF ILEOSTOMY,COMPLICATED: ICD-10-PCS | Mod: 22,,, | Performed by: COLON & RECTAL SURGERY

## 2023-08-07 PROCEDURE — D9220A PRA ANESTHESIA: ICD-10-PCS | Mod: CRNA,,, | Performed by: NURSE ANESTHETIST, CERTIFIED REGISTERED

## 2023-08-07 PROCEDURE — 88304 TISSUE EXAM BY PATHOLOGIST: CPT | Performed by: PATHOLOGY

## 2023-08-07 PROCEDURE — 63600175 PHARM REV CODE 636 W HCPCS: Performed by: NURSE PRACTITIONER

## 2023-08-07 PROCEDURE — 88302 PR  SURG PATH,LEVEL II: ICD-10-PCS | Mod: 26,,, | Performed by: PATHOLOGY

## 2023-08-07 PROCEDURE — 88304 PR  SURG PATH,LEVEL III: ICD-10-PCS | Mod: 26,,, | Performed by: PATHOLOGY

## 2023-08-07 PROCEDURE — D9220A PRA ANESTHESIA: ICD-10-PCS | Mod: ANES,,, | Performed by: ANESTHESIOLOGY

## 2023-08-07 PROCEDURE — 86900 BLOOD TYPING SEROLOGIC ABO: CPT | Performed by: NURSE PRACTITIONER

## 2023-08-07 PROCEDURE — 49999 UNLISTED PX ABD PERTM&OMN: CPT | Mod: ,,, | Performed by: COLON & RECTAL SURGERY

## 2023-08-07 PROCEDURE — 36415 COLL VENOUS BLD VENIPUNCTURE: CPT | Performed by: COLON & RECTAL SURGERY

## 2023-08-07 PROCEDURE — 37000009 HC ANESTHESIA EA ADD 15 MINS: Performed by: COLON & RECTAL SURGERY

## 2023-08-07 PROCEDURE — 37000008 HC ANESTHESIA 1ST 15 MINUTES: Performed by: COLON & RECTAL SURGERY

## 2023-08-07 PROCEDURE — 49622 PR REPAIR PARASTOMAL HERNIA INITIAL/RECURR INCARC/STRANG: ICD-10-PCS | Mod: 51,,, | Performed by: COLON & RECTAL SURGERY

## 2023-08-07 PROCEDURE — 25000003 PHARM REV CODE 250: Performed by: NURSE PRACTITIONER

## 2023-08-07 PROCEDURE — 88302 TISSUE EXAM BY PATHOLOGIST: CPT | Mod: 26,,, | Performed by: PATHOLOGY

## 2023-08-07 PROCEDURE — 20600001 HC STEP DOWN PRIVATE ROOM

## 2023-08-07 PROCEDURE — 49622 RPR PARASTOMAL HRNA NCR/STRN: CPT | Mod: 51,,, | Performed by: COLON & RECTAL SURGERY

## 2023-08-07 DEVICE — MESH HERNIA PHASIX 8X10IN RND: Type: IMPLANTABLE DEVICE | Site: ABDOMEN | Status: FUNCTIONAL

## 2023-08-07 DEVICE — BARRIER SEPRAFILM ADHESION: Type: IMPLANTABLE DEVICE | Site: ABDOMEN | Status: FUNCTIONAL

## 2023-08-07 RX ORDER — VENLAFAXINE 75 MG/1
150 TABLET ORAL DAILY
Status: DISCONTINUED | OUTPATIENT
Start: 2023-08-08 | End: 2023-08-16 | Stop reason: HOSPADM

## 2023-08-07 RX ORDER — IBUPROFEN 400 MG/1
800 TABLET ORAL EVERY 8 HOURS
Status: DISCONTINUED | OUTPATIENT
Start: 2023-08-08 | End: 2023-08-16 | Stop reason: HOSPADM

## 2023-08-07 RX ORDER — ACETAMINOPHEN 650 MG/20.3ML
975 LIQUID ORAL
Status: COMPLETED | OUTPATIENT
Start: 2023-08-07 | End: 2023-08-07

## 2023-08-07 RX ORDER — MIDAZOLAM HYDROCHLORIDE 1 MG/ML
INJECTION INTRAMUSCULAR; INTRAVENOUS
Status: DISCONTINUED | OUTPATIENT
Start: 2023-08-07 | End: 2023-08-07

## 2023-08-07 RX ORDER — ACETAMINOPHEN 500 MG
1000 TABLET ORAL EVERY 8 HOURS
Status: DISCONTINUED | OUTPATIENT
Start: 2023-08-08 | End: 2023-08-16 | Stop reason: HOSPADM

## 2023-08-07 RX ORDER — PROCHLORPERAZINE EDISYLATE 5 MG/ML
INJECTION INTRAMUSCULAR; INTRAVENOUS
Status: DISCONTINUED | OUTPATIENT
Start: 2023-08-07 | End: 2023-08-07

## 2023-08-07 RX ORDER — HYDROMORPHONE HCL IN 0.9% NACL 6 MG/30 ML
PATIENT CONTROLLED ANALGESIA SYRINGE INTRAVENOUS CONTINUOUS
Status: DISCONTINUED | OUTPATIENT
Start: 2023-08-07 | End: 2023-08-08

## 2023-08-07 RX ORDER — ONDANSETRON 2 MG/ML
4 INJECTION INTRAMUSCULAR; INTRAVENOUS EVERY 12 HOURS PRN
Status: DISCONTINUED | OUTPATIENT
Start: 2023-08-07 | End: 2023-08-16 | Stop reason: HOSPADM

## 2023-08-07 RX ORDER — LABETALOL HYDROCHLORIDE 5 MG/ML
INJECTION, SOLUTION INTRAVENOUS
Status: DISCONTINUED | OUTPATIENT
Start: 2023-08-07 | End: 2023-08-07

## 2023-08-07 RX ORDER — PROPOFOL 10 MG/ML
VIAL (ML) INTRAVENOUS
Status: DISCONTINUED | OUTPATIENT
Start: 2023-08-07 | End: 2023-08-07

## 2023-08-07 RX ORDER — FENTANYL CITRATE 50 UG/ML
INJECTION, SOLUTION INTRAMUSCULAR; INTRAVENOUS
Status: DISCONTINUED | OUTPATIENT
Start: 2023-08-07 | End: 2023-08-07

## 2023-08-07 RX ORDER — MUPIROCIN 20 MG/G
OINTMENT TOPICAL 2 TIMES DAILY
Status: COMPLETED | OUTPATIENT
Start: 2023-08-07 | End: 2023-08-12

## 2023-08-07 RX ORDER — LIDOCAINE HYDROCHLORIDE ANHYDROUS AND DEXTROSE MONOHYDRATE .8; 5 G/100ML; G/100ML
INJECTION, SOLUTION INTRAVENOUS CONTINUOUS PRN
Status: DISCONTINUED | OUTPATIENT
Start: 2023-08-07 | End: 2023-08-07

## 2023-08-07 RX ORDER — DEXAMETHASONE SODIUM PHOSPHATE 4 MG/ML
INJECTION, SOLUTION INTRA-ARTICULAR; INTRALESIONAL; INTRAMUSCULAR; INTRAVENOUS; SOFT TISSUE
Status: DISCONTINUED | OUTPATIENT
Start: 2023-08-07 | End: 2023-08-07

## 2023-08-07 RX ORDER — TRIPROLIDINE/PSEUDOEPHEDRINE 2.5MG-60MG
600 TABLET ORAL
Status: COMPLETED | OUTPATIENT
Start: 2023-08-07 | End: 2023-08-07

## 2023-08-07 RX ORDER — ONDANSETRON 2 MG/ML
INJECTION INTRAMUSCULAR; INTRAVENOUS
Status: DISCONTINUED | OUTPATIENT
Start: 2023-08-07 | End: 2023-08-07

## 2023-08-07 RX ORDER — SODIUM CHLORIDE 0.9 % (FLUSH) 0.9 %
10 SYRINGE (ML) INJECTION
Status: DISCONTINUED | OUTPATIENT
Start: 2023-08-07 | End: 2023-08-16 | Stop reason: HOSPADM

## 2023-08-07 RX ORDER — SODIUM CHLORIDE 9 MG/ML
INJECTION, SOLUTION INTRAVENOUS CONTINUOUS
Status: DISCONTINUED | OUTPATIENT
Start: 2023-08-07 | End: 2023-08-09

## 2023-08-07 RX ORDER — CLONAZEPAM 0.5 MG/1
0.5 TABLET ORAL 2 TIMES DAILY PRN
Status: DISCONTINUED | OUTPATIENT
Start: 2023-08-07 | End: 2023-08-16 | Stop reason: HOSPADM

## 2023-08-07 RX ORDER — DEXMEDETOMIDINE HYDROCHLORIDE 100 UG/ML
INJECTION, SOLUTION INTRAVENOUS
Status: DISCONTINUED | OUTPATIENT
Start: 2023-08-07 | End: 2023-08-07

## 2023-08-07 RX ORDER — LIDOCAINE HYDROCHLORIDE 10 MG/ML
1 INJECTION, SOLUTION EPIDURAL; INFILTRATION; INTRACAUDAL; PERINEURAL
Status: DISPENSED | OUTPATIENT
Start: 2023-08-07

## 2023-08-07 RX ORDER — ROCURONIUM BROMIDE 10 MG/ML
INJECTION, SOLUTION INTRAVENOUS
Status: DISCONTINUED | OUTPATIENT
Start: 2023-08-07 | End: 2023-08-07

## 2023-08-07 RX ORDER — SCOLOPAMINE TRANSDERMAL SYSTEM 1 MG/1
1 PATCH, EXTENDED RELEASE TRANSDERMAL
Status: ACTIVE | OUTPATIENT
Start: 2023-08-07 | End: 2023-08-10

## 2023-08-07 RX ORDER — HEPARIN SODIUM 5000 [USP'U]/ML
5000 INJECTION, SOLUTION INTRAVENOUS; SUBCUTANEOUS EVERY 8 HOURS
Status: COMPLETED | OUTPATIENT
Start: 2023-08-07 | End: 2023-08-07

## 2023-08-07 RX ORDER — NALOXONE HCL 0.4 MG/ML
0.02 VIAL (ML) INJECTION
Status: DISCONTINUED | OUTPATIENT
Start: 2023-08-07 | End: 2023-08-08

## 2023-08-07 RX ORDER — SODIUM CHLORIDE 9 MG/ML
INJECTION, SOLUTION INTRAVENOUS CONTINUOUS
Status: DISCONTINUED | OUTPATIENT
Start: 2023-08-07 | End: 2023-08-08

## 2023-08-07 RX ORDER — SODIUM CHLORIDE 9 MG/ML
INJECTION, SOLUTION INTRAVENOUS
Status: COMPLETED | OUTPATIENT
Start: 2023-08-07 | End: 2023-08-08

## 2023-08-07 RX ORDER — GABAPENTIN 300 MG/1
300 CAPSULE ORAL
Status: COMPLETED | OUTPATIENT
Start: 2023-08-07 | End: 2023-08-07

## 2023-08-07 RX ORDER — HALOPERIDOL 5 MG/ML
INJECTION INTRAMUSCULAR
Status: DISCONTINUED | OUTPATIENT
Start: 2023-08-07 | End: 2023-08-07

## 2023-08-07 RX ORDER — METRONIDAZOLE 500 MG/100ML
500 INJECTION, SOLUTION INTRAVENOUS
Status: COMPLETED | OUTPATIENT
Start: 2023-08-07 | End: 2023-08-07

## 2023-08-07 RX ORDER — MUPIROCIN 20 MG/G
1 OINTMENT TOPICAL
Status: COMPLETED | OUTPATIENT
Start: 2023-08-07 | End: 2023-08-07

## 2023-08-07 RX ORDER — ACETAMINOPHEN 10 MG/ML
1000 INJECTION, SOLUTION INTRAVENOUS EVERY 8 HOURS
Status: COMPLETED | OUTPATIENT
Start: 2023-08-07 | End: 2023-08-08

## 2023-08-07 RX ORDER — LIDOCAINE HYDROCHLORIDE 10 MG/ML
INJECTION, SOLUTION INTRAVENOUS
Status: DISCONTINUED | OUTPATIENT
Start: 2023-08-07 | End: 2023-08-07

## 2023-08-07 RX ORDER — KETAMINE HCL IN 0.9 % NACL 50 MG/5 ML
SYRINGE (ML) INTRAVENOUS
Status: DISCONTINUED | OUTPATIENT
Start: 2023-08-07 | End: 2023-08-07

## 2023-08-07 RX ORDER — FENTANYL CITRATE 50 UG/ML
25 INJECTION, SOLUTION INTRAMUSCULAR; INTRAVENOUS EVERY 5 MIN PRN
Status: DISCONTINUED | OUTPATIENT
Start: 2023-08-07 | End: 2023-08-07 | Stop reason: HOSPADM

## 2023-08-07 RX ORDER — GABAPENTIN 300 MG/1
300 CAPSULE ORAL 3 TIMES DAILY
Status: DISCONTINUED | OUTPATIENT
Start: 2023-08-07 | End: 2023-08-16 | Stop reason: HOSPADM

## 2023-08-07 RX ADMIN — DEXAMETHASONE SODIUM PHOSPHATE 8 MG: 4 INJECTION, SOLUTION INTRAMUSCULAR; INTRAVENOUS at 12:08

## 2023-08-07 RX ADMIN — SODIUM CHLORIDE, SODIUM ACETATE ANHYDROUS, SODIUM GLUCONATE, POTASSIUM CHLORIDE, AND MAGNESIUM CHLORIDE: 526; 222; 502; 37; 30 INJECTION, SOLUTION INTRAVENOUS at 12:08

## 2023-08-07 RX ADMIN — CEFTRIAXONE SODIUM 2 G: 2 INJECTION, POWDER, FOR SOLUTION INTRAMUSCULAR; INTRAVENOUS at 12:08

## 2023-08-07 RX ADMIN — LIDOCAINE HYDROCHLORIDE ANHYDROUS AND DEXTROSE MONOHYDRATE 0.02 MG/KG/MIN: .8; 5 INJECTION, SOLUTION INTRAVENOUS at 12:08

## 2023-08-07 RX ADMIN — PROCHLORPERAZINE EDISYLATE 2.5 MG: 5 INJECTION INTRAMUSCULAR; INTRAVENOUS at 05:08

## 2023-08-07 RX ADMIN — FENTANYL CITRATE 100 MCG: 50 INJECTION, SOLUTION INTRAMUSCULAR; INTRAVENOUS at 12:08

## 2023-08-07 RX ADMIN — Medication: at 06:08

## 2023-08-07 RX ADMIN — Medication 20 MG: at 12:08

## 2023-08-07 RX ADMIN — FENTANYL CITRATE 50 MCG: 50 INJECTION, SOLUTION INTRAMUSCULAR; INTRAVENOUS at 12:08

## 2023-08-07 RX ADMIN — ONDANSETRON 4 MG: 2 INJECTION INTRAMUSCULAR; INTRAVENOUS at 05:08

## 2023-08-07 RX ADMIN — LIDOCAINE HYDROCHLORIDE 80 MG: 10 INJECTION, SOLUTION INTRAVENOUS at 12:08

## 2023-08-07 RX ADMIN — IBUPROFEN 800 MG: 800 INJECTION INTRAVENOUS at 10:08

## 2023-08-07 RX ADMIN — METRONIDAZOLE 500 MG: 500 INJECTION, SOLUTION INTRAVENOUS at 12:08

## 2023-08-07 RX ADMIN — FENTANYL CITRATE 50 MCG: 50 INJECTION, SOLUTION INTRAMUSCULAR; INTRAVENOUS at 01:08

## 2023-08-07 RX ADMIN — DEXMEDETOMIDINE 12 MCG: 100 INJECTION, SOLUTION, CONCENTRATE INTRAVENOUS at 05:08

## 2023-08-07 RX ADMIN — FENTANYL CITRATE 100 MCG: 50 INJECTION, SOLUTION INTRAMUSCULAR; INTRAVENOUS at 01:08

## 2023-08-07 RX ADMIN — ROCURONIUM BROMIDE 50 MG: 10 INJECTION, SOLUTION INTRAVENOUS at 12:08

## 2023-08-07 RX ADMIN — LABETALOL HYDROCHLORIDE 5 MG: 5 INJECTION, SOLUTION INTRAVENOUS at 01:08

## 2023-08-07 RX ADMIN — SODIUM CHLORIDE: 0.9 INJECTION, SOLUTION INTRAVENOUS at 11:08

## 2023-08-07 RX ADMIN — GABAPENTIN 300 MG: 300 CAPSULE ORAL at 11:08

## 2023-08-07 RX ADMIN — HEPARIN SODIUM 5000 UNITS: 5000 INJECTION INTRAVENOUS; SUBCUTANEOUS at 11:08

## 2023-08-07 RX ADMIN — Medication 30 MG: at 12:08

## 2023-08-07 RX ADMIN — HALOPERIDOL LACTATE 1 MG: 5 INJECTION, SOLUTION INTRAMUSCULAR at 05:08

## 2023-08-07 RX ADMIN — IBUPROFEN 600 MG: 100 SUSPENSION ORAL at 11:08

## 2023-08-07 RX ADMIN — DEXMEDETOMIDINE 8 MCG: 100 INJECTION, SOLUTION, CONCENTRATE INTRAVENOUS at 04:08

## 2023-08-07 RX ADMIN — SUGAMMADEX 200 MG: 100 INJECTION, SOLUTION INTRAVENOUS at 05:08

## 2023-08-07 RX ADMIN — SODIUM CHLORIDE: 9 INJECTION, SOLUTION INTRAVENOUS at 06:08

## 2023-08-07 RX ADMIN — MIDAZOLAM HYDROCHLORIDE 2 MG: 2 INJECTION, SOLUTION INTRAMUSCULAR; INTRAVENOUS at 11:08

## 2023-08-07 RX ADMIN — ROCURONIUM BROMIDE 30 MG: 10 INJECTION, SOLUTION INTRAVENOUS at 02:08

## 2023-08-07 RX ADMIN — MUPIROCIN: 20 OINTMENT TOPICAL at 10:08

## 2023-08-07 RX ADMIN — MUPIROCIN 1 G: 20 OINTMENT TOPICAL at 11:08

## 2023-08-07 RX ADMIN — ROCURONIUM BROMIDE 25 MG: 10 INJECTION, SOLUTION INTRAVENOUS at 04:08

## 2023-08-07 RX ADMIN — ACETAMINOPHEN 1000 MG: 10 INJECTION, SOLUTION INTRAVENOUS at 07:08

## 2023-08-07 RX ADMIN — ACETAMINOPHEN 976.6 MG: 650 SOLUTION ORAL at 11:08

## 2023-08-07 RX ADMIN — ROCURONIUM BROMIDE 20 MG: 10 INJECTION, SOLUTION INTRAVENOUS at 03:08

## 2023-08-07 RX ADMIN — PROPOFOL 200 MG: 10 INJECTION, EMULSION INTRAVENOUS at 12:08

## 2023-08-07 NOTE — TRANSFER OF CARE
"Anesthesia Transfer of Care Note    Patient: Georgette Abrams    Procedure(s) Performed: Procedure(s) (LRB):  REPAIR, HERNIA, VENTRAL, RECURRENT, ERAS low (N/A)  REVISION, ILEOSTOMY (N/A)  LYSIS, ADHESIONS (N/A)    Patient location: PACU    Anesthesia Type: general    Transport from OR: Transported from OR on 6-10 L/min O2 by face mask with adequate spontaneous ventilation    Post pain: adequate analgesia    Post assessment: no apparent anesthetic complications    Post vital signs: stable    Level of consciousness: awake and sedated    Nausea/Vomiting: no nausea/vomiting    Complications: none    Transfer of care protocol was followed      Last vitals:   Visit Vitals  BP (!) 140/79   Pulse 92   Temp 36.8 °C (98.2 °F) (Temporal)   Resp 20   Ht 5' 8" (1.727 m)   Wt 123.9 kg (273 lb 2.4 oz)   LMP 08/07/2012   SpO2 100%   Breastfeeding No   BMI 41.53 kg/m²     "

## 2023-08-07 NOTE — BRIEF OP NOTE
[facility] - Surgery ([facility])  Brief Operative Note    SUMMARY     Surgery Date: 8/7/2023     Surgeon(s) and Role:     * KRISH Houston MD - Primary    Assisting Surgeon: None    Pre-op Diagnosis:  Incisional hernia with obstruction but no gangrene [K43.0]  Ileostomy dysfunction [K94.13]  Family history of familial polyposis [Z83.71]    Post-op Diagnosis:  Post-Op Diagnosis Codes:     * Incisional hernia with obstruction but no gangrene [K43.0]     * Ileostomy dysfunction [K94.13]     * Family history of familial polyposis [Z83.71]    Procedure(s) (LRB):  REPAIR, HERNIA, VENTRAL, RECURRENT, ERAS low (N/A)  REVISION, ILEOSTOMY (N/A)  LYSIS, ADHESIONS (N/A)    Anesthesia: Epidural    Operative Findings: see op note    Estimated Blood Loss: * No values recorded between 8/7/2023 12:21 PM and 8/7/2023  6:02 PM *    Estimated Blood Loss has been documented. See op note         Specimens:   Specimen (24h ago, onward)       Start     Ordered    08/07/23 1721  Specimen to Pathology, Surgery Gastrointestinal tract  Once        Comments: Pre-op Diagnosis: Incisional hernia with obstruction but no gangrene [K43.0]Ileostomy dysfunction [K94.13]Family history of familial polyposis [Z83.71]Procedure(s):REPAIR, HERNIA, VENTRAL, RECURRENT, ERAS lowREVISION, ILEOSTOMYLYSIS, ADHESIONS Number of specimens: 2Name of specimens: 1.) ileostomy- permanent2.) hernia- permanent     References:    Click here for ordering Quick Tip   Question Answer Comment   Procedure Type: Gastrointestinal tract    Specimen Class: Routine/Screening    Which provider would you like to cc? KRISH HOUSTON    Release to patient Immediate        08/07/23 1720                    [id number]

## 2023-08-07 NOTE — ANESTHESIA PROCEDURE NOTES
Intubation    Date/Time: 8/7/2023 12:03 PM    Performed by: Deng Lynch CRNA  Authorized by: Deng Orta MD    Intubation:     Induction:  Intravenous    Intubated:  Postinduction    Mask Ventilation:  Easy mask    Attempts:  1    Attempted By:  CRNA    Method of Intubation:  Video laryngoscopy    Blade:  Stafford 3    Laryngeal View Grade: Grade I - full view of cords      Difficult Airway Encountered?: No      Complications:  None    Airway Device:  Oral endotracheal tube    Airway Device Size:  7.5    Style/Cuff Inflation:  Cuffed (inflated to minimal occlusive pressure)    Tube secured:  22    Secured at:  The lips    Placement Verified By:  Capnometry    Complicating Factors:  None    Findings Post-Intubation:  BS equal bilateral and atraumatic/condition of teeth unchanged

## 2023-08-07 NOTE — OP NOTE
SHANI CHEEK  [id number]  [id number]    OPERATIVE NOTE:    DATE OF OPERATION: 08/07/2023    PREOPERATIVE DIAGNOSIS:  Ileostomy dysfunction.  History of FAP status post total proctocolectomy.  Multiple small-bowel obstructions.  Multiple incisional and parastomal hernias    POSTOPERATIVE DIAGNOSIS: Ileostomy dysfunction.  History of FAP status post total proctocolectomy.  Multiple small-bowel obstructions.  Multiple incisional and parastomal hernias    PROCEDURE PERFORMED:   Ileostomy revision with relocation of the ileostomy and small-bowel resection  Extensive lysis of adhesions  Omental pedicle flap to the pelvis  Ventral hernia repair with retrorectus mesh placement    ATTENDING SURGEON: KRISH Tracy MD    RESIDENT/FELLOW SURGEON: Robert Cruz MD    ANESTHESIA:  General    ESTIMATED BLOOD LOSS:  200 mL    FINDINGS:  1. Large midline incisional hernia as well as parastomal hernia with incarcerated small bowel.  Multiple loops of small bowel densely adherent to pelvis along the sacrum.  Lysis of adhesions was performed in order to divide all of the small bowel loops and free the small bowel from the pelvis.  The incarcerated hernia was reduced.  5 cm of the terminal ileum was resected in order to form a new ostomy.  Incisional hernia x2 and peristomal hernia were repaired.  Prior stoma defect closed in 2 layers with anterior posterior rectus sheath closed individually.  New ostomy was tunneled into the right upper quadrant.  Retrorectus ventral hernia repair was performed with Phasix absorbable mesh placement.  Omental pedicle flap freed and based off of the left gastroepiploic placed down into the pelvis to fill the pelvis to avoid future obstructive symptoms from the small bowel becoming adherent to the sacral promontory    SPECIMENS:   Ileostomy  Hernia sacs    COMPLICATIONS:  None apparent    DRAINS:  Drain above the fascia in the subcutaneous space    DISPOSITION: PACU    CONDITION:  good    INDICATION FOR PROCEDURE:  Georgette Abrams is a 38 y.o. female with history of FAP status post failed proctocolectomy who presents with recurrent small-bowel obstruction, incarcerated parastomal hernia, and ileostomy dysfunction.    DESCRIPTION OF PROCEDURE:   After informed consent was reviewed, the patient was taken to the operating room and transferred to the OR table.  she was placed under general anesthesia.  A gonzalez catheter was sterilely inserted.  Ceftriaxone and flagyl were given for preoperative antibiotics.    She was placed into the supine position with her arms out.  Her prior stoma was sewn closed.  The anterior abdominal wall was prepped and draped in the usual sterile fashion.  A time-out was performed.  Abdomen is entered through her prior midline incision.  Her prior scar was excised.  The abdomen was entered sharply.  There were no adhesions up to the anterior abdominal wall in the upper abdomen.  Dissection then continued inferiorly in order to open the entirety of her abdomen.  Adhesions to the anterior abdominal wall were taken down sharply.  Two midline hernias were encountered and reduced.  Her parastomal hernia was unable to be reduced initially.  Extensive lysis of adhesions was then performed for 2 hours.  All of the adhesions out to the anterior abdominal wall were taken down.  All of the interloop adhesions were then taken down.  There were multiple adhesions to the sacral promontory with a decompressed loop of small bowel into the pelvis.  These were gently freed.  There were no injuries to the small bowel.  All dissection was performed sharply.  With all of the adhesions free to the level of the ostomy, saline was injected around the ostomy and the mucocutaneous junction was incised with electrocautery.  Sharp dissection was then used to come from the ostomy down to the fascia.  The incarcerated loops were freed and gently reduced back into the abdomen.  Further lysis of  adhesions was performed in order to free all the interloop adhesions from the ligament of Treitz to the terminal ileum.  With the pelvis clear, I elected to make an omental pedicle flap in order to fill the pelvis.  The omental pedicle flap was based off the left gastroepiploic.  The right side of the omentum was divided between clamps and ties and mobilized in order to reach the pelvis.  Hemo blast was placed into the pelvis as a topical hemostatic agent 1st.  Once hemostasis was assured, the omental pedicle flap was placed into the pelvis to fill the pelvis.  Attention then turned towards abdominal wall reconstruction.  The rectus muscles on either side were freed from the overlying hernia and all hernia sacs as well as midline scar tissue was excised.  The posterior rectus sheath was divided from the rectus muscle in the retrorectus space was entered.  Dissection continued out laterally to the linea semilunaris.  The prior ileostomy site in the right lower quadrant was closed in layers.  Both the anterior posterior rectus fascia were isolated and closed individually with a 0 PDS running suture in transverse fashion.  The last 5 cm of the ileostomy were isolated and the terminal ileum was divided with a DONALD 75 mm stapler with a blue load.  A new defect was made in the skin and rectus muscle in the right upper quadrant and the terminal ileum was passed through the defect to sit above the level of the skin.  The posterior rectus sheath was then closed with 0 running PDS suture with interrupted 0 PDS internal retention sutures.  There was moderate difficulty getting the posterior layer to close due to the distention of the small bowel.  A 20 cm x 25 cm piece of Phasix mesh was then brought onto the field.  This was oriented and diagonal position and secured with transfascial sutures to the anterior rectus.  A slit was cut into the mesh to accommodate the new ileostomy.  The mesh sat flat against the posterior rectus  sheath.  The anterior rectus sheath was then closed with a #1 looped PDS suture.  The midline came together easily.  Skin and subcutaneous tissues were irrigated.  A 19 round Nav drain was inserted through a left upper quadrant stab incision and placed into the subcutaneous space from all of the dissection from her prior hernias.  3-0 deep dermal sutures were then placed.  The skin was closed with a running 4-0 Vicryl in subcuticular fashion.  The prior ileostomy site was closed with a 3-0 PDS pursestring.  The ileostomy was then matured.  The staple line was removed and the ileostomy was matured in Marian fashion with 3-0 Vicryl sutures.  A Prevena negative pressure wound system was then applied.  The patient tolerated the procedure well.  There were no apparent complications.  All sponge, needle, and instruments counts were correct x 2.  she was then extubated and taken to PACU in stable condition.        KRISH Tracy MD, FACS, FASCRS  Staff Surgeon  Colon & Rectal Surgery

## 2023-08-08 LAB
ANION GAP SERPL CALC-SCNC: 17 MMOL/L (ref 8–16)
ANISOCYTOSIS BLD QL SMEAR: SLIGHT
BASOPHILS # BLD AUTO: 0.04 K/UL (ref 0–0.2)
BASOPHILS NFR BLD: 0.2 % (ref 0–1.9)
BUN SERPL-MCNC: 8 MG/DL (ref 6–20)
CALCIUM SERPL-MCNC: 8.7 MG/DL (ref 8.7–10.5)
CHLORIDE SERPL-SCNC: 104 MMOL/L (ref 95–110)
CO2 SERPL-SCNC: 16 MMOL/L (ref 23–29)
CREAT SERPL-MCNC: 0.8 MG/DL (ref 0.5–1.4)
DIFFERENTIAL METHOD: ABNORMAL
EOSINOPHIL # BLD AUTO: 0 K/UL (ref 0–0.5)
EOSINOPHIL NFR BLD: 0 % (ref 0–8)
ERYTHROCYTE [DISTWIDTH] IN BLOOD BY AUTOMATED COUNT: 13.2 % (ref 11.5–14.5)
EST. GFR  (NO RACE VARIABLE): >60 ML/MIN/1.73 M^2
GLUCOSE SERPL-MCNC: 243 MG/DL (ref 70–110)
HCT VFR BLD AUTO: 45.6 % (ref 37–48.5)
HGB BLD-MCNC: 15.1 G/DL (ref 12–16)
HYPOCHROMIA BLD QL SMEAR: ABNORMAL
IMM GRANULOCYTES # BLD AUTO: 0.16 K/UL (ref 0–0.04)
IMM GRANULOCYTES NFR BLD AUTO: 0.7 % (ref 0–0.5)
LYMPHOCYTES # BLD AUTO: 0.6 K/UL (ref 1–4.8)
LYMPHOCYTES NFR BLD: 2.8 % (ref 18–48)
MAGNESIUM SERPL-MCNC: 1.6 MG/DL (ref 1.6–2.6)
MCH RBC QN AUTO: 30.1 PG (ref 27–31)
MCHC RBC AUTO-ENTMCNC: 33.1 G/DL (ref 32–36)
MCV RBC AUTO: 91 FL (ref 82–98)
MONOCYTES # BLD AUTO: 1.6 K/UL (ref 0.3–1)
MONOCYTES NFR BLD: 7.1 % (ref 4–15)
NEUTROPHILS # BLD AUTO: 20.2 K/UL (ref 1.8–7.7)
NEUTROPHILS NFR BLD: 89.2 % (ref 38–73)
NRBC BLD-RTO: 0 /100 WBC
OVALOCYTES BLD QL SMEAR: ABNORMAL
PHOSPHATE SERPL-MCNC: 2.1 MG/DL (ref 2.7–4.5)
PLATELET # BLD AUTO: 235 K/UL (ref 150–450)
PMV BLD AUTO: 12.1 FL (ref 9.2–12.9)
POIKILOCYTOSIS BLD QL SMEAR: SLIGHT
POLYCHROMASIA BLD QL SMEAR: ABNORMAL
POTASSIUM SERPL-SCNC: 4.1 MMOL/L (ref 3.5–5.1)
RBC # BLD AUTO: 5.01 M/UL (ref 4–5.4)
SODIUM SERPL-SCNC: 137 MMOL/L (ref 136–145)
SPHEROCYTES BLD QL SMEAR: ABNORMAL
WBC # BLD AUTO: 22.67 K/UL (ref 3.9–12.7)

## 2023-08-08 PROCEDURE — 36415 COLL VENOUS BLD VENIPUNCTURE: CPT | Performed by: SURGERY

## 2023-08-08 PROCEDURE — 80048 BASIC METABOLIC PNL TOTAL CA: CPT | Performed by: SURGERY

## 2023-08-08 PROCEDURE — 25000003 PHARM REV CODE 250: Performed by: NURSE PRACTITIONER

## 2023-08-08 PROCEDURE — 25000003 PHARM REV CODE 250: Performed by: SURGERY

## 2023-08-08 PROCEDURE — 63600175 PHARM REV CODE 636 W HCPCS: Performed by: SURGERY

## 2023-08-08 PROCEDURE — 20600001 HC STEP DOWN PRIVATE ROOM

## 2023-08-08 PROCEDURE — 94799 UNLISTED PULMONARY SVC/PX: CPT

## 2023-08-08 PROCEDURE — 83735 ASSAY OF MAGNESIUM: CPT | Performed by: SURGERY

## 2023-08-08 PROCEDURE — C9113 INJ PANTOPRAZOLE SODIUM, VIA: HCPCS | Performed by: SURGERY

## 2023-08-08 PROCEDURE — 99900035 HC TECH TIME PER 15 MIN (STAT)

## 2023-08-08 PROCEDURE — 85025 COMPLETE CBC W/AUTO DIFF WBC: CPT | Performed by: SURGERY

## 2023-08-08 PROCEDURE — 84100 ASSAY OF PHOSPHORUS: CPT | Performed by: SURGERY

## 2023-08-08 RX ORDER — OXYCODONE HYDROCHLORIDE 5 MG/1
5 TABLET ORAL EVERY 4 HOURS PRN
Status: DISCONTINUED | OUTPATIENT
Start: 2023-08-08 | End: 2023-08-10

## 2023-08-08 RX ORDER — MAG HYDROX/ALUMINUM HYD/SIMETH 200-200-20
30 SUSPENSION, ORAL (FINAL DOSE FORM) ORAL EVERY 6 HOURS PRN
Status: DISCONTINUED | OUTPATIENT
Start: 2023-08-08 | End: 2023-08-16 | Stop reason: HOSPADM

## 2023-08-08 RX ORDER — HYDROMORPHONE HYDROCHLORIDE 2 MG/ML
2 INJECTION, SOLUTION INTRAMUSCULAR; INTRAVENOUS; SUBCUTANEOUS
Status: DISCONTINUED | OUTPATIENT
Start: 2023-08-08 | End: 2023-08-08

## 2023-08-08 RX ORDER — METHOCARBAMOL 500 MG/1
1000 TABLET, FILM COATED ORAL 4 TIMES DAILY
Status: DISCONTINUED | OUTPATIENT
Start: 2023-08-08 | End: 2023-08-16 | Stop reason: HOSPADM

## 2023-08-08 RX ORDER — HYDROMORPHONE HYDROCHLORIDE 1 MG/ML
1 INJECTION, SOLUTION INTRAMUSCULAR; INTRAVENOUS; SUBCUTANEOUS
Status: DISCONTINUED | OUTPATIENT
Start: 2023-08-08 | End: 2023-08-08

## 2023-08-08 RX ORDER — SODIUM CHLORIDE 9 MG/ML
INJECTION, SOLUTION INTRAVENOUS CONTINUOUS
Status: DISCONTINUED | OUTPATIENT
Start: 2023-08-08 | End: 2023-08-08

## 2023-08-08 RX ORDER — ENOXAPARIN SODIUM 100 MG/ML
40 INJECTION SUBCUTANEOUS EVERY 12 HOURS
Status: DISCONTINUED | OUTPATIENT
Start: 2023-08-08 | End: 2023-08-16 | Stop reason: HOSPADM

## 2023-08-08 RX ORDER — OXYCODONE HYDROCHLORIDE 10 MG/1
10 TABLET ORAL EVERY 4 HOURS PRN
Status: DISCONTINUED | OUTPATIENT
Start: 2023-08-08 | End: 2023-08-10

## 2023-08-08 RX ORDER — HYDROMORPHONE HYDROCHLORIDE 1 MG/ML
1 INJECTION, SOLUTION INTRAMUSCULAR; INTRAVENOUS; SUBCUTANEOUS
Status: DISCONTINUED | OUTPATIENT
Start: 2023-08-08 | End: 2023-08-10

## 2023-08-08 RX ORDER — CALCIUM CARBONATE 200(500)MG
1000 TABLET,CHEWABLE ORAL 3 TIMES DAILY PRN
Status: DISCONTINUED | OUTPATIENT
Start: 2023-08-08 | End: 2023-08-16 | Stop reason: HOSPADM

## 2023-08-08 RX ORDER — ENOXAPARIN SODIUM 100 MG/ML
40 INJECTION SUBCUTANEOUS EVERY 24 HOURS
Status: DISCONTINUED | OUTPATIENT
Start: 2023-08-08 | End: 2023-08-08

## 2023-08-08 RX ORDER — PANTOPRAZOLE SODIUM 40 MG/10ML
40 INJECTION, POWDER, LYOPHILIZED, FOR SOLUTION INTRAVENOUS DAILY
Status: DISCONTINUED | OUTPATIENT
Start: 2023-08-08 | End: 2023-08-14

## 2023-08-08 RX ADMIN — ONDANSETRON 4 MG: 2 INJECTION INTRAMUSCULAR; INTRAVENOUS at 08:08

## 2023-08-08 RX ADMIN — IBUPROFEN 800 MG: 800 INJECTION INTRAVENOUS at 06:08

## 2023-08-08 RX ADMIN — VENLAFAXINE 150 MG: 75 TABLET ORAL at 05:08

## 2023-08-08 RX ADMIN — METHOCARBAMOL 1000 MG: 500 TABLET ORAL at 01:08

## 2023-08-08 RX ADMIN — IBUPROFEN 800 MG: 400 TABLET ORAL at 09:08

## 2023-08-08 RX ADMIN — ACETAMINOPHEN 1000 MG: 500 TABLET ORAL at 05:08

## 2023-08-08 RX ADMIN — OXYCODONE HYDROCHLORIDE 10 MG: 10 TABLET ORAL at 03:08

## 2023-08-08 RX ADMIN — IBUPROFEN 800 MG: 800 INJECTION INTRAVENOUS at 05:08

## 2023-08-08 RX ADMIN — GABAPENTIN 300 MG: 300 CAPSULE ORAL at 07:08

## 2023-08-08 RX ADMIN — ALUMINUM HYDROXIDE, MAGNESIUM HYDROXIDE, AND SIMETHICONE 30 ML: 200; 200; 20 SUSPENSION ORAL at 09:08

## 2023-08-08 RX ADMIN — HYDROMORPHONE HYDROCHLORIDE 1 MG: 1 INJECTION, SOLUTION INTRAMUSCULAR; INTRAVENOUS; SUBCUTANEOUS at 08:08

## 2023-08-08 RX ADMIN — ENOXAPARIN SODIUM 40 MG: 40 INJECTION SUBCUTANEOUS at 09:08

## 2023-08-08 RX ADMIN — HYDROMORPHONE HYDROCHLORIDE 1 MG: 1 INJECTION, SOLUTION INTRAMUSCULAR; INTRAVENOUS; SUBCUTANEOUS at 10:08

## 2023-08-08 RX ADMIN — METHOCARBAMOL 1000 MG: 500 TABLET ORAL at 09:08

## 2023-08-08 RX ADMIN — MUPIROCIN: 20 OINTMENT TOPICAL at 09:08

## 2023-08-08 RX ADMIN — PANTOPRAZOLE SODIUM 40 MG: 40 INJECTION, POWDER, FOR SOLUTION INTRAVENOUS at 07:08

## 2023-08-08 RX ADMIN — ACETAMINOPHEN 1000 MG: 10 INJECTION, SOLUTION INTRAVENOUS at 10:08

## 2023-08-08 RX ADMIN — OXYCODONE HYDROCHLORIDE 10 MG: 10 TABLET ORAL at 07:08

## 2023-08-08 RX ADMIN — SODIUM CHLORIDE: 9 INJECTION, SOLUTION INTRAVENOUS at 01:08

## 2023-08-08 RX ADMIN — Medication: at 05:08

## 2023-08-08 RX ADMIN — OXYCODONE HYDROCHLORIDE 10 MG: 10 TABLET ORAL at 09:08

## 2023-08-08 RX ADMIN — Medication: at 12:08

## 2023-08-08 RX ADMIN — MUPIROCIN: 20 OINTMENT TOPICAL at 07:08

## 2023-08-08 RX ADMIN — METHOCARBAMOL 1000 MG: 500 TABLET ORAL at 07:08

## 2023-08-08 RX ADMIN — METHOCARBAMOL 1000 MG: 500 TABLET ORAL at 05:08

## 2023-08-08 RX ADMIN — HYDROMORPHONE HYDROCHLORIDE 1 MG: 1 INJECTION, SOLUTION INTRAMUSCULAR; INTRAVENOUS; SUBCUTANEOUS at 03:08

## 2023-08-08 RX ADMIN — ACETAMINOPHEN 1000 MG: 10 INJECTION, SOLUTION INTRAVENOUS at 01:08

## 2023-08-08 RX ADMIN — CLONAZEPAM 0.5 MG: 0.5 TABLET ORAL at 01:08

## 2023-08-08 RX ADMIN — ALUMINUM HYDROXIDE, MAGNESIUM HYDROXIDE, AND SIMETHICONE 30 ML: 200; 200; 20 SUSPENSION ORAL at 05:08

## 2023-08-08 NOTE — NURSING
"0930- Patient c/o "severe" indigestion; MD notified, orders received    1000- assisted top bathroom to void then up to chair, norma well    1250- patient R hand PIV hurting, flushes without difficulty; second IV in RAC also flushes well, no redness or swelling noted, but uncomfortable for patient; midline consult placed; patient would like to transition from PCA to IVP meds as as ETCO2 monitor is very uncomfortable and interfering with her ability to rest, MD notified      1400- PCA to be changed to IVP, however, patient states that 1mg q 3 is "not enough" to control pain; notified MD, awaiting order change  "

## 2023-08-08 NOTE — CONSULTS
"20G 1-3/4" placed to RFA using US guidance.  " Spoke with Mother , [place] and was vomiting , on Sunday no vomiting but is having 3-4 diarrhea stools following milk bottle ,less appetite , felt warm but no fever taken , today taking fluids and brat diet , unsure how to treat .  Discussed with parent expected course of AGE S/S including prevention and S/S of dehydration. Child  may have Pedialyte in small amounts if vomiting, but not for more that 12 hours. After 12 hours introduce Lactofree milk in small amount 1-2 oz's gradually increasing in amounts until no diarrhea for 2-3 days then resume regular milk. May give rice ,cereal , applesauce , brat foods.. Parent to monitor for fever and give only Tylenol either PO or RI every 4 hours  Medication administration is  reviewed . Child is to return to office  if no improvement is noted, has fever that is recurrent or does not improve, or if diarrhea last more than ten days or if has more than ten stools a day.Mother voices understanding PB

## 2023-08-08 NOTE — ASSESSMENT & PLAN NOTE
OR 8/7 ventral hernia repair with repair of ileosotmy    -await return of bowel function, lfd  -continue multi modality for pain control, pca  -encourage ambulation and use of IS  -moni hose and SCD  -prophalactic lovenox and PPI  -dc gonzalez, await void

## 2023-08-08 NOTE — NURSING TRANSFER
Nursing Transfer Note    Nursing Transfer Note        Reason patient is being transferred: post op     Transfer To: 1044    Transfer via bed    Transfer with wound vac and supplies     Transported by transport     Medicines sent: none    Chart send with patient: Yes    Notified: family via text messaging system    Patient reassessed at: 8/7/2023 2000

## 2023-08-08 NOTE — ANESTHESIA POSTPROCEDURE EVALUATION
Anesthesia Post Evaluation    Patient: Georgette Abrams    Procedure(s) Performed: Procedure(s) (LRB):  REPAIR, HERNIA, VENTRAL, RECURRENT, ERAS low (N/A)  REVISION, ILEOSTOMY (N/A)  LYSIS, ADHESIONS (N/A)    Final Anesthesia Type: general      Patient location during evaluation: PACU  Patient participation: Yes- Able to Participate  Level of consciousness: awake and alert and oriented  Post-procedure vital signs: reviewed and stable  Pain management: adequate  Airway patency: patent    PONV status at discharge: No PONV  Anesthetic complications: no      Cardiovascular status: blood pressure returned to baseline and hemodynamically stable  Respiratory status: unassisted, spontaneous ventilation and room air  Hydration status: euvolemic  Follow-up not needed.          Vitals Value Taken Time   /96 08/08/23 1128   Temp 36.3 °C (97.4 °F) 08/08/23 1128   Pulse 109 08/08/23 1128   Resp 26 08/08/23 1212   SpO2 96 % 08/08/23 1128         Event Time   Out of Recovery 18:30:00         Pain/Abhijit Score: Pain Rating Prior to Med Admin: 8 (8/8/2023  9:57 AM)  Abhijit Score: 10 (8/7/2023  6:30 PM)

## 2023-08-08 NOTE — NURSING
[facility] Plan of Care Note  Dx: Incisional Hernia w/ obstruction     Shift Events: PCA stopped, changed to Dilaudid IV 1mg q2hr prn, although when pt was available for 1750 dose, RN held due to pt respirations and intermittent falling asleep.     Goals of Care: Pain control     Neuro: A/Ox4, but has periods of drowsiness     Vital Signs: Stable     Respiratory: Decreased respirations, but on RA, all lung fields clear     Diet: Clears     Is patient tolerating current diet? Yes     GTTS: No     Urine Output/Bowel Movement: No      Drains/Tubes/Tube Feeds (include total output/shift): LLQ Bulb Suction drain & Ileostomy     Lines: L 18 AC, L Hand PIV, R Arm Single Midline     Accuchecks:No     Skin: Abdomen: ML incision and Ileostomy     Fall Risk Score: 13     Activity level? Standby Assist     Any scheduled procedures? No     Any safety concerns? Falls, pain control but also concerned with over-sedation     Other: Family aware of POC

## 2023-08-08 NOTE — PROGRESS NOTES
[facility]  Colorectal Surgery  Progress Note    Patient Name: Georgette Abrams  MRN: [id number]  Admission Date: 8/7/2023  Hospital Length of Stay: 1 days  Attending Physician: KRISH Tracy MD    Subjective:     Interval History: no acute events overnite, adequate pain control with pca, denies n/v, ileosotmy with some output    Post-Op Info:  Procedure(s) (LRB):  REPAIR, HERNIA, VENTRAL, RECURRENT, ERAS low (N/A)  REVISION, ILEOSTOMY (N/A)  LYSIS, ADHESIONS (N/A)   1 Day Post-Op      Medications:  Continuous Infusions:   sodium chloride 0.9%      sodium chloride 0.9% 40 mL/hr at 08/07/23 1855    hydromorphone in 0.9 % NaCl 6 mg/30 ml       Scheduled Meds:   acetaminophen  1,000 mg Oral Q8H    enoxparin  40 mg Subcutaneous Q12H (prophylaxis, 0900/2100)    gabapentin  300 mg Oral TID    ibuprofen  800 mg Intravenous Q8H    Followed by    ibuprofen  800 mg Oral Q8H    methocarbamoL  1,000 mg Oral QID    mupirocin   Nasal BID    pantoprazole  40 mg Intravenous Daily    scopolamine  1 patch Transdermal Q3 Days    venlafaxine  150 mg Oral Daily     PRN Meds:   sodium chloride 0.9%    aluminum-magnesium hydroxide-simethicone    calcium carbonate    clonazePAM    LIDOcaine (PF) 10 mg/ml (1%)    naloxone    ondansetron    oxyCODONE    oxyCODONE    sodium chloride 0.9%        Objective:     Vital Signs (Most Recent):  Temp: 97.4 °F (36.3 °C) (08/08/23 1128)  Pulse: 109 (08/08/23 1128)  Resp: (!) (P) 26 (08/08/23 1212)  BP: (!) 171/96 (08/08/23 1128)  SpO2: 96 % (08/08/23 1128) Vital Signs (24h Range):  Temp:  [97.2 °F (36.2 °C)-98.4 °F (36.9 °C)] 97.4 °F (36.3 °C)  Pulse:  [] 109  Resp:  [18-33] (P) 26  SpO2:  [91 %-100 %] 96 %  BP: (120-171)/(70-96) 171/96     Intake/Output - Last 3 Shifts         08/06 0700 08/07 0659 08/07 0700 08/08 0659 08/08 0700 08/09 0659    I.V. (mL/kg)  1000 (8.1)     IV Piggyback  700     Total Intake(mL/kg)  1700 (13.7)     Urine (mL/kg/hr)  1350      Drains  30     Stool  150     Total Output  1530     Net  +170                     Physical Exam  Vitals and nursing note reviewed.   Constitutional:       Appearance: She is well-developed.   HENT:      Head: Normocephalic.   Eyes:      Pupils: Pupils are equal, round, and reactive to light.   Cardiovascular:      Rate and Rhythm: Normal rate and regular rhythm.      Heart sounds: Normal heart sounds.   Pulmonary:      Effort: Pulmonary effort is normal. No respiratory distress.      Breath sounds: Normal breath sounds. No wheezing or rales.   Abdominal:      Palpations: Abdomen is soft. There is no mass.      Tenderness: There is no guarding or rebound.      Comments: Abd inc line healing well  CONSTANZA bloody drainage  Ileosotmy functional   Skin:     General: Skin is warm and dry.   Neurological:      Mental Status: She is alert and oriented to person, place, and time.                Significant Labs:  BMP (Last 3 Results):   Recent Labs   Lab 08/08/23  0610   *      K 4.1      CO2 16*   BUN 8   CREATININE 0.8   CALCIUM 8.7   MG 1.6     CBC (Last 3 Results):   Recent Labs   Lab 08/08/23  0610   WBC 22.67*   RBC 5.01   HGB 15.1   HCT 45.6      MCV 91   MCH 30.1   MCHC 33.1       Significant Diagnostics:  None    Assessment/Plan:     * Incisional hernia with obstruction but no gangrene  OR 8/7 ventral hernia repair with repair of ileosotmy    -await return of bowel function, lfd  -continue multi modality for pain control, pca  -encourage ambulation and use of IS  -moni hose and SCD  -prophalactic lovenox and PPI  -chela gonzalez, await void          Nell Gaytan NP  Colorectal Surgery  [facility]

## 2023-08-08 NOTE — PT/OT/SLP PROGRESS
Physical Therapy  Consult    Patient Name:  Georgette Abrams   MRN:  [id number]  Admitting Diagnosis:  Incisional hernia with obstruction but no gangrene   Recent Surgery: Procedure(s) (LRB):  REPAIR, HERNIA, VENTRAL, RECURRENT, ERAS low (N/A)  REVISION, ILEOSTOMY (N/A)  LYSIS, ADHESIONS (N/A) 1 Day Post-Op  Admit Date: 8/7/2023  Length of Stay: 1 days    Physical Therapy orders received and acknowledged. Patient not seen today due to pt fatigue. RN stated pt was resting at this time and requested to let pt sleep. Pt ambulated in room and to bathroom with RN this AM with no assist. PT unable to make additional attempts this PM. PT to attempt when Georgette Abrams is medically appropriate for progressive mobility.    Gosia Cantrell, PT, DPT  8/8/2023  Pager: [phone]

## 2023-08-08 NOTE — PROGRESS NOTES
Wound vac alerting for leak. Surgical resident on call at bedside to assess. Seal appears to still be intact per MD. LESLEY.

## 2023-08-08 NOTE — PLAN OF CARE
[facility]  Initial Discharge Assessment       Primary Care Provider: No, Primary Doctor    Admission Diagnosis: Incisional hernia with obstruction but no gangrene [K43.0]  Ileostomy dysfunction [K94.13]  Family history of familial polyposis [Z83.71]  Small bowel obstruction [K56.609]    Admission Date: 8/7/2023  Expected Discharge Date: 8/13/2023    Transition of Care Barriers: None    Payor: [employer] / Plan: [facility] CHOICE PLUS / Product Type: Commercial /     Extended Emergency Contact Information  Primary Emergency Contact: Keyon Abrams  Address: [address]           [place] [id number] [facility]  Home Phone: [phone]  Mobile Phone: [phone]  Relation: Spouse    Discharge Plan A: Home  Discharge Plan B: Home Health (Used [facility]  in the past in [place])      [facility] [place] [place] [facility] [address]  [address] [address]  Phone: [phone] Fax: [phone]    [facility] [place], [place] - [address]  [address] [place] [address]  Phone: [phone] Fax: [phone]      Initial Assessment (most recent)       Adult Discharge Assessment - 08/08/23 1148          Discharge Assessment    Assessment Type Discharge Planning Assessment     Confirmed/corrected address, phone number and insurance Yes     Confirmed Demographics Correct on Facesheet     Source of Information patient     When was your last doctors appointment? 07/18/23   PCP Dr. Les Leigh in [place], [place]    Communicated DANNY with patient/caregiver Date not available/Unable to determine     People in Home child(grayson), dependent   Lives with sons ages 6 &10    Do you expect to return to your current living situation? Yes     Do you have help at home or someone to help you manage your care at home? No     Prior to hospitilization cognitive status: Alert/Oriented     Current cognitive status: Alert/Oriented     Equipment Currently Used at Home none     Readmission  within 30 days? No     Patient currently being followed by outpatient case management? No     Do you currently have service(s) that help you manage your care at home? No     Do you take prescription medications? Yes     Do you have prescription coverage? Yes     Do you have any problems affording any of your prescribed medications? No     Is the patient taking medications as prescribed? yes     Who is going to help you get home at discharge? Parents who are staying a the [facility]     How do you get to doctors appointments? car, drives self     Are you on dialysis? No     Do you take coumadin? No     Discharge Plan A Home     Discharge Plan B Home Health   Used [facility] in the past in [place]    DME Needed Upon Discharge  none     Discharge Plan discussed with: Patient     Transition of Care Barriers None        Physical Activity    On average, how many days per week do you engage in moderate to strenuous exercise (like a brisk walk)? 4 days        Financial Resource Strain    How hard is it for you to pay for the very basics like food, housing, medical care, and heating? Not very hard        Housing Stability    In the last 12 months, was there a time when you were not able to pay the mortgage or rent on time? No     In the last 12 months, was there a time when you did not have a steady place to sleep or slept in a shelter (including now)? No        Transportation Needs    In the past 12 months, has lack of transportation kept you from medical appointments or from getting medications? No     In the past 12 months, has lack of transportation kept you from meetings, work, or from getting things needed for daily living? No        Food Insecurity    Within the past 12 months, you worried that your food would run out before you got the money to buy more. Never true     Within the past 12 months, the food you bought just didn't last and you didn't have money to get more. Never true        Social Connections    In a  typical week, how many times do you talk on the phone with family, friends, or neighbors? Patient refused     How often do you get together with friends or relatives? Patient refused     How often do you attend Restorationist or Jewish services? 1 to 4 times per year     Do you belong to any clubs or organizations such as Restorationist groups, unions, fraternal or athletic groups, or school groups? No     How often do you attend meetings of the clubs or organizations you belong to? Never     Are you , , , , never , or living with a partner?         Alcohol Use    Q1: How often do you have a drink containing alcohol? Patient refused     Q2: How many drinks containing alcohol do you have on a typical day when you are drinking? Patient refused     Q3: How often do you have six or more drinks on one occasion? Patient refused                      Pt lives with her children ages 6 and 10 in [place]. Her parents are staying at the [facility] and plan to transport her home upon dc. She is not on Dialysis or Coumadin.    Sharon Herr RN    [facility]  [phone]

## 2023-08-08 NOTE — SUBJECTIVE & OBJECTIVE
Subjective:     Interval History: no acute events overnite, adequate pain control with pca, denies n/v, ileosotmy with some output    Post-Op Info:  Procedure(s) (LRB):  REPAIR, HERNIA, VENTRAL, RECURRENT, ERAS low (N/A)  REVISION, ILEOSTOMY (N/A)  LYSIS, ADHESIONS (N/A)   1 Day Post-Op      Medications:  Continuous Infusions:   sodium chloride 0.9%      sodium chloride 0.9% 40 mL/hr at 08/07/23 1855    hydromorphone in 0.9 % NaCl 6 mg/30 ml       Scheduled Meds:   acetaminophen  1,000 mg Oral Q8H    enoxparin  40 mg Subcutaneous Q12H (prophylaxis, 0900/2100)    gabapentin  300 mg Oral TID    ibuprofen  800 mg Intravenous Q8H    Followed by    ibuprofen  800 mg Oral Q8H    methocarbamoL  1,000 mg Oral QID    mupirocin   Nasal BID    pantoprazole  40 mg Intravenous Daily    scopolamine  1 patch Transdermal Q3 Days    venlafaxine  150 mg Oral Daily     PRN Meds:   sodium chloride 0.9%    aluminum-magnesium hydroxide-simethicone    calcium carbonate    clonazePAM    LIDOcaine (PF) 10 mg/ml (1%)    naloxone    ondansetron    oxyCODONE    oxyCODONE    sodium chloride 0.9%        Objective:     Vital Signs (Most Recent):  Temp: 97.4 °F (36.3 °C) (08/08/23 1128)  Pulse: 109 (08/08/23 1128)  Resp: (!) (P) 26 (08/08/23 1212)  BP: (!) 171/96 (08/08/23 1128)  SpO2: 96 % (08/08/23 1128) Vital Signs (24h Range):  Temp:  [97.2 °F (36.2 °C)-98.4 °F (36.9 °C)] 97.4 °F (36.3 °C)  Pulse:  [] 109  Resp:  [18-33] (P) 26  SpO2:  [91 %-100 %] 96 %  BP: (120-171)/(70-96) 171/96     Intake/Output - Last 3 Shifts         08/06 0700 08/07 0659 08/07 0700 08/08 0659 08/08 0700 08/09 0659    I.V. (mL/kg)  1000 (8.1)     IV Piggyback  700     Total Intake(mL/kg)  1700 (13.7)     Urine (mL/kg/hr)  1350     Drains  30     Stool  150     Total Output  1530     Net  +170                     Physical Exam  Vitals and nursing note reviewed.   Constitutional:       Appearance: She is well-developed.   HENT:      Head: Normocephalic.   Eyes:       Pupils: Pupils are equal, round, and reactive to light.   Cardiovascular:      Rate and Rhythm: Normal rate and regular rhythm.      Heart sounds: Normal heart sounds.   Pulmonary:      Effort: Pulmonary effort is normal. No respiratory distress.      Breath sounds: Normal breath sounds. No wheezing or rales.   Abdominal:      Palpations: Abdomen is soft. There is no mass.      Tenderness: There is no guarding or rebound.      Comments: Abd inc line healing well  CONSTANZA bloody drainage  Ileosotmy functional   Skin:     General: Skin is warm and dry.   Neurological:      Mental Status: She is alert and oriented to person, place, and time.                Significant Labs:  BMP (Last 3 Results):   Recent Labs   Lab 08/08/23  0610   *      K 4.1      CO2 16*   BUN 8   CREATININE 0.8   CALCIUM 8.7   MG 1.6     CBC (Last 3 Results):   Recent Labs   Lab 08/08/23  0610   WBC 22.67*   RBC 5.01   HGB 15.1   HCT 45.6      MCV 91   MCH 30.1   MCHC 33.1       Significant Diagnostics:  None

## 2023-08-09 PROCEDURE — 63600175 PHARM REV CODE 636 W HCPCS: Performed by: SURGERY

## 2023-08-09 PROCEDURE — 97530 THERAPEUTIC ACTIVITIES: CPT

## 2023-08-09 PROCEDURE — 25000003 PHARM REV CODE 250: Performed by: NURSE PRACTITIONER

## 2023-08-09 PROCEDURE — 25000003 PHARM REV CODE 250: Performed by: SURGERY

## 2023-08-09 PROCEDURE — 97161 PT EVAL LOW COMPLEX 20 MIN: CPT

## 2023-08-09 PROCEDURE — C9113 INJ PANTOPRAZOLE SODIUM, VIA: HCPCS | Performed by: SURGERY

## 2023-08-09 PROCEDURE — 20600001 HC STEP DOWN PRIVATE ROOM

## 2023-08-09 RX ORDER — HYDROMORPHONE HYDROCHLORIDE 1 MG/ML
0.5 INJECTION, SOLUTION INTRAMUSCULAR; INTRAVENOUS; SUBCUTANEOUS ONCE
Status: COMPLETED | OUTPATIENT
Start: 2023-08-09 | End: 2023-08-09

## 2023-08-09 RX ORDER — DEXTROSE MONOHYDRATE, SODIUM CHLORIDE, AND POTASSIUM CHLORIDE 50; 1.49; 4.5 G/1000ML; G/1000ML; G/1000ML
INJECTION, SOLUTION INTRAVENOUS CONTINUOUS
Status: DISCONTINUED | OUTPATIENT
Start: 2023-08-09 | End: 2023-08-14

## 2023-08-09 RX ADMIN — HYDROMORPHONE HYDROCHLORIDE 1 MG: 1 INJECTION, SOLUTION INTRAMUSCULAR; INTRAVENOUS; SUBCUTANEOUS at 02:08

## 2023-08-09 RX ADMIN — MUPIROCIN: 20 OINTMENT TOPICAL at 09:08

## 2023-08-09 RX ADMIN — HYDROMORPHONE HYDROCHLORIDE 0.5 MG: 1 INJECTION, SOLUTION INTRAMUSCULAR; INTRAVENOUS; SUBCUTANEOUS at 07:08

## 2023-08-09 RX ADMIN — GABAPENTIN 300 MG: 300 CAPSULE ORAL at 09:08

## 2023-08-09 RX ADMIN — METHOCARBAMOL 1000 MG: 500 TABLET ORAL at 09:08

## 2023-08-09 RX ADMIN — OXYCODONE HYDROCHLORIDE 10 MG: 10 TABLET ORAL at 09:08

## 2023-08-09 RX ADMIN — HYDROMORPHONE HYDROCHLORIDE 1 MG: 1 INJECTION, SOLUTION INTRAMUSCULAR; INTRAVENOUS; SUBCUTANEOUS at 11:08

## 2023-08-09 RX ADMIN — OXYCODONE HYDROCHLORIDE 10 MG: 10 TABLET ORAL at 01:08

## 2023-08-09 RX ADMIN — VENLAFAXINE 150 MG: 75 TABLET ORAL at 05:08

## 2023-08-09 RX ADMIN — IBUPROFEN 800 MG: 400 TABLET ORAL at 01:08

## 2023-08-09 RX ADMIN — HYDROMORPHONE HYDROCHLORIDE 1 MG: 1 INJECTION, SOLUTION INTRAMUSCULAR; INTRAVENOUS; SUBCUTANEOUS at 05:08

## 2023-08-09 RX ADMIN — ENOXAPARIN SODIUM 40 MG: 40 INJECTION SUBCUTANEOUS at 09:08

## 2023-08-09 RX ADMIN — PANTOPRAZOLE SODIUM 40 MG: 40 INJECTION, POWDER, FOR SOLUTION INTRAVENOUS at 09:08

## 2023-08-09 RX ADMIN — HYDROMORPHONE HYDROCHLORIDE 1 MG: 1 INJECTION, SOLUTION INTRAMUSCULAR; INTRAVENOUS; SUBCUTANEOUS at 07:08

## 2023-08-09 RX ADMIN — CLONAZEPAM 0.5 MG: 0.5 TABLET ORAL at 01:08

## 2023-08-09 RX ADMIN — CALCIUM CARBONATE (ANTACID) CHEW TAB 500 MG 1000 MG: 500 CHEW TAB at 09:08

## 2023-08-09 RX ADMIN — HYDROMORPHONE HYDROCHLORIDE 1 MG: 1 INJECTION, SOLUTION INTRAMUSCULAR; INTRAVENOUS; SUBCUTANEOUS at 04:08

## 2023-08-09 RX ADMIN — METHOCARBAMOL 1000 MG: 500 TABLET ORAL at 01:08

## 2023-08-09 RX ADMIN — HYDROMORPHONE HYDROCHLORIDE 1 MG: 1 INJECTION, SOLUTION INTRAMUSCULAR; INTRAVENOUS; SUBCUTANEOUS at 09:08

## 2023-08-09 RX ADMIN — IBUPROFEN 800 MG: 400 TABLET ORAL at 06:08

## 2023-08-09 RX ADMIN — HYDROMORPHONE HYDROCHLORIDE 1 MG: 1 INJECTION, SOLUTION INTRAMUSCULAR; INTRAVENOUS; SUBCUTANEOUS at 01:08

## 2023-08-09 RX ADMIN — IBUPROFEN 800 MG: 400 TABLET ORAL at 09:08

## 2023-08-09 RX ADMIN — OXYCODONE HYDROCHLORIDE 10 MG: 10 TABLET ORAL at 06:08

## 2023-08-09 RX ADMIN — DEXTROSE, SODIUM CHLORIDE, AND POTASSIUM CHLORIDE: 5; .45; .15 INJECTION INTRAVENOUS at 07:08

## 2023-08-09 RX ADMIN — CLONAZEPAM 0.5 MG: 0.5 TABLET ORAL at 09:08

## 2023-08-09 RX ADMIN — DEXTROSE, SODIUM CHLORIDE, AND POTASSIUM CHLORIDE: 5; .45; .15 INJECTION INTRAVENOUS at 09:08

## 2023-08-09 RX ADMIN — ONDANSETRON 4 MG: 2 INJECTION INTRAMUSCULAR; INTRAVENOUS at 01:08

## 2023-08-09 RX ADMIN — ACETAMINOPHEN 1000 MG: 500 TABLET ORAL at 01:08

## 2023-08-09 RX ADMIN — ACETAMINOPHEN 1000 MG: 500 TABLET ORAL at 09:08

## 2023-08-09 RX ADMIN — ACETAMINOPHEN 1000 MG: 500 TABLET ORAL at 05:08

## 2023-08-09 RX ADMIN — METHOCARBAMOL 1000 MG: 500 TABLET ORAL at 05:08

## 2023-08-09 NOTE — PROGRESS NOTES
[facility]  Colorectal Surgery  Progress Note    Patient Name: Georgette Abrams  MRN: [id number]  Admission Date: 8/7/2023  Hospital Length of Stay: 2 days  Attending Physician: KRISH Tracy MD    Subjective:     Interval History: some emesis last night after oral pain meds, pos for ileosotmy output, pain not controlled with oral meds    Post-Op Info:  Procedure(s) (LRB):  REPAIR, HERNIA, VENTRAL, RECURRENT, ERAS low (N/A)  REVISION, ILEOSTOMY (N/A)  LYSIS, ADHESIONS (N/A)   2 Days Post-Op      Medications:  Continuous Infusions:   dextrose 5 % and 0.45 % NaCl with KCl 20 mEq 75 mL/hr at 08/09/23 0753     Scheduled Meds:   acetaminophen  1,000 mg Oral Q8H    enoxparin  40 mg Subcutaneous Q12H (prophylaxis, 0900/2100)    gabapentin  300 mg Oral TID    ibuprofen  800 mg Oral Q8H    methocarbamoL  1,000 mg Oral QID    mupirocin   Nasal BID    pantoprazole  40 mg Intravenous Daily    scopolamine  1 patch Transdermal Q3 Days    venlafaxine  150 mg Oral Daily     PRN Meds:   aluminum-magnesium hydroxide-simethicone    calcium carbonate    clonazePAM    HYDROmorphone    LIDOcaine (PF) 10 mg/ml (1%)    ondansetron    oxyCODONE    oxyCODONE    sodium chloride 0.9%        Objective:     Vital Signs (Most Recent):  Temp: 98.4 °F (36.9 °C) (08/09/23 0518)  Pulse: 110 (08/09/23 0518)  Resp: 18 (08/09/23 1130)  BP: (!) 158/77 (08/09/23 0518)  SpO2: (!) 94 % (08/09/23 0518) Vital Signs (24h Range):  Temp:  [97.5 °F (36.4 °C)-98.4 °F (36.9 °C)] 98.4 °F (36.9 °C)  Pulse:  [110-120] 110  Resp:  [11-26] 18  SpO2:  [92 %-94 %] 94 %  BP: (137-158)/(77-98) 158/77     Intake/Output - Last 3 Shifts         08/07 0700 08/08 0659 08/08 0700 08/09 0659 08/09 0700  08/10 0659    P.O.  400     I.V. (mL/kg) 1000 (8.1)      IV Piggyback 700      Total Intake(mL/kg) 1700 (13.7) 400 (3.2)     Urine (mL/kg/hr) 1350      Drains 30 115     Stool 150 150     Total Output 1530 265     Net +170 +135            Stool  Occurrence  1 x              Physical Exam  Vitals and nursing note reviewed.   Constitutional:       Appearance: She is well-developed.   HENT:      Head: Normocephalic.   Eyes:      Pupils: Pupils are equal, round, and reactive to light.   Cardiovascular:      Rate and Rhythm: Normal rate and regular rhythm.      Heart sounds: Normal heart sounds.   Pulmonary:      Effort: Pulmonary effort is normal. No respiratory distress.      Breath sounds: Normal breath sounds. No wheezing or rales.   Abdominal:      Palpations: Abdomen is soft. There is no mass.      Tenderness: There is no guarding or rebound.      Comments: Abd inc line healing well  Ileosotmy functional  CONSTANZA serous drainage   Skin:     General: Skin is warm and dry.   Neurological:      Mental Status: She is alert and oriented to person, place, and time.                Significant Labs:  BMP (Last 3 Results):   Recent Labs   Lab 08/08/23  0610   *      K 4.1      CO2 16*   BUN 8   CREATININE 0.8   CALCIUM 8.7   MG 1.6     CBC (Last 3 Results):   Recent Labs   Lab 08/08/23  0610   WBC 22.67*   RBC 5.01   HGB 15.1   HCT 45.6      MCV 91   MCH 30.1   MCHC 33.1       Significant Diagnostics:  None    Assessment/Plan:     * Incisional hernia with obstruction but no gangrene  OR 8/7 ventral hernia repair with repair of ileosotmy    -await return of bowel function,emesis, cld  -continue multi modality for pain control,dc pca, adjust pain management plan  -encourage ambulation and use of IS  -moni hose and SCD  -prophalactic lovenox and PPI  -dc carlos,  jil Gaytan NP  Colorectal Surgery  [facility]

## 2023-08-09 NOTE — SUBJECTIVE & OBJECTIVE
Subjective:     Interval History: some emesis last night after oral pain meds, pos for ileosotmy output, pain not controlled with oral meds    Post-Op Info:  Procedure(s) (LRB):  REPAIR, HERNIA, VENTRAL, RECURRENT, ERAS low (N/A)  REVISION, ILEOSTOMY (N/A)  LYSIS, ADHESIONS (N/A)   2 Days Post-Op      Medications:  Continuous Infusions:   dextrose 5 % and 0.45 % NaCl with KCl 20 mEq 75 mL/hr at 08/09/23 0753     Scheduled Meds:   acetaminophen  1,000 mg Oral Q8H    enoxparin  40 mg Subcutaneous Q12H (prophylaxis, 0900/2100)    gabapentin  300 mg Oral TID    ibuprofen  800 mg Oral Q8H    methocarbamoL  1,000 mg Oral QID    mupirocin   Nasal BID    pantoprazole  40 mg Intravenous Daily    scopolamine  1 patch Transdermal Q3 Days    venlafaxine  150 mg Oral Daily     PRN Meds:   aluminum-magnesium hydroxide-simethicone    calcium carbonate    clonazePAM    HYDROmorphone    LIDOcaine (PF) 10 mg/ml (1%)    ondansetron    oxyCODONE    oxyCODONE    sodium chloride 0.9%        Objective:     Vital Signs (Most Recent):  Temp: 98.4 °F (36.9 °C) (08/09/23 0518)  Pulse: 110 (08/09/23 0518)  Resp: 18 (08/09/23 1130)  BP: (!) 158/77 (08/09/23 0518)  SpO2: (!) 94 % (08/09/23 0518) Vital Signs (24h Range):  Temp:  [97.5 °F (36.4 °C)-98.4 °F (36.9 °C)] 98.4 °F (36.9 °C)  Pulse:  [110-120] 110  Resp:  [11-26] 18  SpO2:  [92 %-94 %] 94 %  BP: (137-158)/(77-98) 158/77     Intake/Output - Last 3 Shifts         08/07 0700  08/08 0659 08/08 0700  08/09 0659 08/09 0700  08/10 0659    P.O.  400     I.V. (mL/kg) 1000 (8.1)      IV Piggyback 700      Total Intake(mL/kg) 1700 (13.7) 400 (3.2)     Urine (mL/kg/hr) 1350      Drains 30 115     Stool 150 150     Total Output 1530 265     Net +170 +135            Stool Occurrence  1 x              Physical Exam  Vitals and nursing note reviewed.   Constitutional:       Appearance: She is well-developed.   HENT:      Head: Normocephalic.   Eyes:      Pupils: Pupils are equal, round, and reactive  to light.   Cardiovascular:      Rate and Rhythm: Normal rate and regular rhythm.      Heart sounds: Normal heart sounds.   Pulmonary:      Effort: Pulmonary effort is normal. No respiratory distress.      Breath sounds: Normal breath sounds. No wheezing or rales.   Abdominal:      Palpations: Abdomen is soft. There is no mass.      Tenderness: There is no guarding or rebound.      Comments: Abd inc line healing well  Ileosotmy functional  CONSTANZA serous drainage   Skin:     General: Skin is warm and dry.   Neurological:      Mental Status: She is alert and oriented to person, place, and time.                Significant Labs:  BMP (Last 3 Results):   Recent Labs   Lab 08/08/23  0610   *      K 4.1      CO2 16*   BUN 8   CREATININE 0.8   CALCIUM 8.7   MG 1.6     CBC (Last 3 Results):   Recent Labs   Lab 08/08/23  0610   WBC 22.67*   RBC 5.01   HGB 15.1   HCT 45.6      MCV 91   MCH 30.1   MCHC 33.1       Significant Diagnostics:  None

## 2023-08-09 NOTE — NURSING
Patient had one episode of emesis during shift change. Patient reported every since she vomited her pain is not tolerable. Pt requests more dilaudid. Pain meds are held d/t low respirations and pt falling asleep. Once patient wakes up she requests more pain meds. Notified on call MD for patient's request although she keeps falling asleep before administering the 1mg dilaudid ordered. Pt states she told the attending she has a high pain level tolerance and 1mg of dilaudid will not work. Pt is resting comfortable in bed without an increase to meds. Patient is able to ambulate to bathroom with stand by assist. Pt requested IV in left hand to be d/c d/t getting new IV in right lower forearm.

## 2023-08-09 NOTE — PLAN OF CARE
Problem: Physical Therapy  Goal: Physical Therapy Goal  Description: Goals to be met by:      Patient will increase functional independence with mobility by performin. Supine to sit with Modified Kimball  2. Sit to supine with Modified Kimball  3. Sit to stand transfer with Modified Kimball  4. Gait  x 300 feet with Modified Kimball using no AD.     Outcome: Ongoing, Progressing   Evaluation completed, initiated plan of care.   Amira Mac, PT  2023

## 2023-08-09 NOTE — ASSESSMENT & PLAN NOTE
OR 8/7 ventral hernia repair with repair of ileosotmy    -await return of bowel function,emesis, cld  -continue multi modality for pain control,dc pca, adjust pain management plan  -encourage ambulation and use of IS  -moni hose and SCD  -prophalactic lovenox and PPI  -dc gonzalez,  voiding

## 2023-08-09 NOTE — PT/OT/SLP EVAL
"Physical Therapy Evaluation    Patient Name:  Georgette Abrams   MRN:  [id number]    Recommendations:     Discharge Recommendations: home   Discharge Equipment Recommendations: none   Barriers to discharge: None    Assessment:     Georgette Abrams is a 38 y.o. female admitted with a medical diagnosis of Incisional hernia with obstruction but no gangrene.  She presents with the following impairments/functional limitations: impaired balance, weakness, impaired endurance, impaired functional mobility, gait instability, pain. The patient presents with post-op pain, but good functional strength. She stood with contact guard assist and ambulated 140' with no AD, but reaching out for external support for stability with gait.     Rehab Prognosis: Good; patient would benefit from acute skilled PT services to address these deficits and reach maximum level of function.    Recent Surgery: Procedure(s) (LRB):  REPAIR, HERNIA, VENTRAL, RECURRENT, ERAS low (N/A)  REVISION, ILEOSTOMY (N/A)  LYSIS, ADHESIONS (N/A) 2 Days Post-Op    Plan:     During this hospitalization, patient to be seen 3 x/week to address the identified rehab impairments via gait training, therapeutic activities, therapeutic exercises, neuromuscular re-education and progress toward the following goals:    Plan of Care Expires:  09/08/23    Subjective     Chief Complaint: "My stomach looks so much better, I can't wait to wear a bikini!"  Patient/Family Comments/goals: return home  Pain/Comfort:  Pain Rating 1: 7/10  Location - Orientation 1: generalized  Location 1: abdomen  Pain Addressed 1: Reposition, Distraction, Pre-medicate for activity  Pain Rating Post-Intervention 1: 7/10    Patients cultural, spiritual, Congregational conflicts given the current situation: no    Living Environment:  The patient lives in [place], [place] with her two young sons (6 and 10 years old) in a [facility], [employer], works as a medium.   Prior to admission, patients level of " function was independent.  Equipment used at home: none.  DME owned (not currently used): none.  Upon discharge, patient will have assistance from parents, family.    Objective:     Communicated with RN prior to session.  Patient found HOB elevated with peripheral IV, wound vac, colostomy, CONSTANZA drain  upon PT entry to room.    General Precautions: Standard, fall  Orthopedic Precautions:N/A   Braces: N/A  Respiratory Status: Room air    Exams:    Cognitive Exam  Patient is A&O x4 and follows 100% of one -step commands    Fine Motor Coordination   -       WNL     Postural Exam Patient presented with the following abnormalities:    -       Rounded shoulders  -       Forward head  -       Kyphosis  -       Posterior pelvic tilt     Sensation    -       Light touch intact NICOL LE   Skin Integrity/Edema     -       Skin integrity: incision dressed, clean and dry  -       Edema: Mild NICOL legs    R LE ROM WNL   R LE Strength WNL   L LE ROM WNL   L LE Strength  WNL          Functional Mobility:    Bed Mobility  Supine to Sit on the L side:  contact guard assist ,increased time, HOB elevated   Transfers Sit to Stand:  stand by assistance from EOB no AD   Gait  Gait Distance: 120 ft with no AD, reaching out for external support  Assistance Level: contact guard assist   Description: wide BALDEMAR, holding onto wall rail and walls in spite of cues, slow deliberate strides, decreased foot clearance          AM-PAC 6 CLICK MOBILITY  Total Score:20       Treatment & Education:  Patient educated on:  -role of therapy  -goals of session  -PT POC  -benefits of out of bed mobility and consequences of immobility  -calling for staff assist to mobilize safely  Patient agreeable to mobilize with therapy.      Gait training: cued for upright posture, cued to avoid reaching out for external support    Patient ambulated to bathroom, urinated on toilet. Stood to brush teeth at sink, no UE support, wide BALDEMAR, weight shifting within BALDEMAR, contact guard  assist.     Patient encouraged to ambulate, sit up in chair 3x/day to prevent deconditioning during hospitalization. Patient verbalized understanding and agreement to mobilize only with RN assist for safety.     The patient is safe to ambulate with RN assist x1.     Patient left up in chair with all lines intact and call button in reach.    GOALS:   Multidisciplinary Problems       Physical Therapy Goals          Problem: Physical Therapy    Goal Priority Disciplines Outcome Goal Variances Interventions   Physical Therapy Goal     PT, PT/OT Ongoing, Progressing     Description: Goals to be met by:      Patient will increase functional independence with mobility by performin. Supine to sit with Modified [place]  2. Sit to supine with Modified [place]  3. Sit to stand transfer with Modified [place]  4. Gait  x 300 feet with Modified [place] using no AD.                          History:     Past Medical History:   Diagnosis Date    Anxiety     Familial polyposis     S/P total colectomy        Past Surgical History:   Procedure Laterality Date    Davinci Assisted Bilateral Ovarian Cystectomy, with extension  SEAN   2011    HYSTERECTOMY  2012    DAVINCI     ILEOSTOMY REVISION N/A 2023    Procedure: REVISION, ILEOSTOMY;  Surgeon: KRISH Tracy MD;  [facility];  Service: Colon and Rectal;  Laterality: N/A;    Illeotomy Creation      LYSIS OF ADHESIONS N/A 2023    Procedure: LYSIS, ADHESIONS;  Surgeon: KRISH Tracy MD;  [facility];  Service: Colon and Rectal;  Laterality: N/A;    OOPHORECTOMY  2012    DAvinci removal with DLH     REPAIR OF RECURRENT VENTRAL HERNIA N/A 2023    Procedure: REPAIR, HERNIA, VENTRAL, RECURRENT, ERAS low;  Surgeon: KRISH Tracy MD;  [facility];  Service: Colon and Rectal;  Laterality: N/A;  w/ mesh and omental pedical flap    TOTAL COLECTOMY      Iloanal pouch creation        Time Tracking:     PT Received On: 08/09/23  PT Start Time: 1005     PT Stop Time: 1030  PT Total Time (min): 25 min     Billable Minutes: Evaluation 10 and Therapeutic Activity 15      08/09/2023

## 2023-08-10 LAB
CRP SERPL-MCNC: 225.1 MG/L (ref 0–8.2)
FINAL PATHOLOGIC DIAGNOSIS: NORMAL
GROSS: NORMAL
Lab: NORMAL

## 2023-08-10 PROCEDURE — 25000003 PHARM REV CODE 250: Performed by: SURGERY

## 2023-08-10 PROCEDURE — 20600001 HC STEP DOWN PRIVATE ROOM

## 2023-08-10 PROCEDURE — 63600175 PHARM REV CODE 636 W HCPCS: Performed by: SURGERY

## 2023-08-10 PROCEDURE — 63600175 PHARM REV CODE 636 W HCPCS: Performed by: COLON & RECTAL SURGERY

## 2023-08-10 PROCEDURE — 86140 C-REACTIVE PROTEIN: CPT | Performed by: SURGERY

## 2023-08-10 PROCEDURE — C9113 INJ PANTOPRAZOLE SODIUM, VIA: HCPCS | Performed by: SURGERY

## 2023-08-10 PROCEDURE — 36415 COLL VENOUS BLD VENIPUNCTURE: CPT | Performed by: SURGERY

## 2023-08-10 RX ORDER — HYDROMORPHONE HYDROCHLORIDE 1 MG/ML
1.5 INJECTION, SOLUTION INTRAMUSCULAR; INTRAVENOUS; SUBCUTANEOUS
Status: DISCONTINUED | OUTPATIENT
Start: 2023-08-10 | End: 2023-08-13

## 2023-08-10 RX ORDER — OXYCODONE HYDROCHLORIDE 10 MG/1
10 TABLET ORAL EVERY 4 HOURS PRN
Status: DISCONTINUED | OUTPATIENT
Start: 2023-08-10 | End: 2023-08-14

## 2023-08-10 RX ADMIN — HYDROMORPHONE HYDROCHLORIDE 1 MG: 1 INJECTION, SOLUTION INTRAMUSCULAR; INTRAVENOUS; SUBCUTANEOUS at 07:08

## 2023-08-10 RX ADMIN — VENLAFAXINE 150 MG: 75 TABLET ORAL at 04:08

## 2023-08-10 RX ADMIN — OXYCODONE HYDROCHLORIDE 15 MG: 10 TABLET ORAL at 12:08

## 2023-08-10 RX ADMIN — ENOXAPARIN SODIUM 40 MG: 40 INJECTION SUBCUTANEOUS at 08:08

## 2023-08-10 RX ADMIN — OXYCODONE HYDROCHLORIDE 10 MG: 10 TABLET ORAL at 05:08

## 2023-08-10 RX ADMIN — OXYCODONE HYDROCHLORIDE 15 MG: 10 TABLET ORAL at 09:08

## 2023-08-10 RX ADMIN — METHOCARBAMOL 1000 MG: 500 TABLET ORAL at 08:08

## 2023-08-10 RX ADMIN — MUPIROCIN: 20 OINTMENT TOPICAL at 08:08

## 2023-08-10 RX ADMIN — HYDROMORPHONE HYDROCHLORIDE 1.5 MG: 1 INJECTION, SOLUTION INTRAMUSCULAR; INTRAVENOUS; SUBCUTANEOUS at 10:08

## 2023-08-10 RX ADMIN — ACETAMINOPHEN 1000 MG: 500 TABLET ORAL at 02:08

## 2023-08-10 RX ADMIN — HYDROMORPHONE HYDROCHLORIDE 1 MG: 1 INJECTION, SOLUTION INTRAMUSCULAR; INTRAVENOUS; SUBCUTANEOUS at 02:08

## 2023-08-10 RX ADMIN — HYDROMORPHONE HYDROCHLORIDE 1.5 MG: 1 INJECTION, SOLUTION INTRAMUSCULAR; INTRAVENOUS; SUBCUTANEOUS at 08:08

## 2023-08-10 RX ADMIN — ACETAMINOPHEN 1000 MG: 500 TABLET ORAL at 10:08

## 2023-08-10 RX ADMIN — METHOCARBAMOL 1000 MG: 500 TABLET ORAL at 04:08

## 2023-08-10 RX ADMIN — HYDROMORPHONE HYDROCHLORIDE 1.5 MG: 1 INJECTION, SOLUTION INTRAMUSCULAR; INTRAVENOUS; SUBCUTANEOUS at 06:08

## 2023-08-10 RX ADMIN — IBUPROFEN 800 MG: 400 TABLET ORAL at 03:08

## 2023-08-10 RX ADMIN — HYDROMORPHONE HYDROCHLORIDE 1.5 MG: 1 INJECTION, SOLUTION INTRAMUSCULAR; INTRAVENOUS; SUBCUTANEOUS at 03:08

## 2023-08-10 RX ADMIN — HYDROMORPHONE HYDROCHLORIDE 1 MG: 1 INJECTION, SOLUTION INTRAMUSCULAR; INTRAVENOUS; SUBCUTANEOUS at 08:08

## 2023-08-10 RX ADMIN — MUPIROCIN: 20 OINTMENT TOPICAL at 09:08

## 2023-08-10 RX ADMIN — HYDROMORPHONE HYDROCHLORIDE 1 MG: 1 INJECTION, SOLUTION INTRAMUSCULAR; INTRAVENOUS; SUBCUTANEOUS at 04:08

## 2023-08-10 RX ADMIN — ACETAMINOPHEN 1000 MG: 500 TABLET ORAL at 06:08

## 2023-08-10 RX ADMIN — PANTOPRAZOLE SODIUM 40 MG: 40 INJECTION, POWDER, FOR SOLUTION INTRAVENOUS at 08:08

## 2023-08-10 RX ADMIN — IBUPROFEN 800 MG: 400 TABLET ORAL at 05:08

## 2023-08-10 RX ADMIN — DEXTROSE, SODIUM CHLORIDE, AND POTASSIUM CHLORIDE: 5; .45; .15 INJECTION INTRAVENOUS at 04:08

## 2023-08-10 RX ADMIN — METHOCARBAMOL 1000 MG: 500 TABLET ORAL at 12:08

## 2023-08-10 RX ADMIN — IBUPROFEN 800 MG: 400 TABLET ORAL at 09:08

## 2023-08-10 RX ADMIN — OXYCODONE HYDROCHLORIDE 15 MG: 10 TABLET ORAL at 04:08

## 2023-08-10 NOTE — CARE UPDATE
RAPID RESPONSE NURSE ROUND       Rounding completed with charge RNWill for tachycardia reports pt stable at this time. No additional concerns verbalized at this time. Instructed to call [phone] for further concerns or assistance.

## 2023-08-10 NOTE — ASSESSMENT & PLAN NOTE
OR 8/7 ventral hernia repair with repair of ileosotmy    -await return of bowel function,emesis, lfd  -continue multi modality for pain control,dc pca, adjust pain management plan  -encourage ambulation and use of IS  -moni hose and SCD  -prophalactic lovenox and PPI  -dc gonzalez,  voiding

## 2023-08-10 NOTE — PLAN OF CARE
[facility] Plan of Care Note  Dx Incisional hernia w/obs repaired    Shift Events none    Goals of Care: pain control    Neuro: A & O x 4    Vital Signs: stable    Respiratory: WDl    Diet: regular / L fiber    Is patient tolerating current diet? yes    GTTS: NS @ 40    Urine Output/Bowel Movement: WDL independently    Drains/Tubes/Tube Feeds (include total output/shift): colostomy & CONSTANZA    Lines: RFA 20g LAC 18g    Accuchecks: none    Skin: clear other than incisions    Fall Risk Score: 13    Activity level? independent    Any scheduled procedures? none    Any safety concerns? none    Other: none    Biopsy Photograph Reviewed: Yes

## 2023-08-10 NOTE — SUBJECTIVE & OBJECTIVE
Subjective:     Interval History: no acute events overnite, no n/v, still complaining of pain, ileosotmy functional    Post-Op Info:  Procedure(s) (LRB):  REPAIR, HERNIA, VENTRAL, RECURRENT, ERAS low (N/A)  REVISION, ILEOSTOMY (N/A)  LYSIS, ADHESIONS (N/A)   3 Days Post-Op      Medications:  Continuous Infusions:   dextrose 5 % and 0.45 % NaCl with KCl 20 mEq 40 mL/hr at 08/10/23 1006     Scheduled Meds:   acetaminophen  1,000 mg Oral Q8H    enoxparin  40 mg Subcutaneous Q12H (prophylaxis, 0900/2100)    gabapentin  300 mg Oral TID    ibuprofen  800 mg Oral Q8H    methocarbamoL  1,000 mg Oral QID    mupirocin   Nasal BID    pantoprazole  40 mg Intravenous Daily    scopolamine  1 patch Transdermal Q3 Days    venlafaxine  150 mg Oral Daily     PRN Meds:   aluminum-magnesium hydroxide-simethicone    calcium carbonate    clonazePAM    HYDROmorphone    LIDOcaine (PF) 10 mg/ml (1%)    ondansetron    oxyCODONE    oxyCODONE    sodium chloride 0.9%        Objective:     Vital Signs (Most Recent):  Temp: 97.5 °F (36.4 °C) (08/10/23 1136)  Pulse: 109 (08/10/23 1136)  Resp: 18 (08/10/23 1136)  BP: (!) 142/73 (08/10/23 1136)  SpO2: (!) 93 % (08/10/23 1136) Vital Signs (24h Range):  Temp:  [97.3 °F (36.3 °C)-98 °F (36.7 °C)] 97.5 °F (36.4 °C)  Pulse:  [] 109  Resp:  [16-22] 18  SpO2:  [93 %-99 %] 93 %  BP: (139-168)/(73-94) 142/73     Intake/Output - Last 3 Shifts         08/08 0700  08/09 0659 08/09 0700  08/10 0659 08/10 0700 08/11 0659    P.O. 400      I.V. (mL/kg)       IV Piggyback       Total Intake(mL/kg) 400 (3.2)      Urine (mL/kg/hr)       Drains 115 130     Stool 150 0     Total Output 265 130     Net +135 -130            Stool Occurrence 1 x 1 x              Physical Exam  Vitals and nursing note reviewed.   Constitutional:       Appearance: She is well-developed.   HENT:      Head: Normocephalic.   Eyes:      Pupils: Pupils are equal, round, and reactive to light.   Cardiovascular:      Rate and Rhythm:  Normal rate and regular rhythm.      Heart sounds: Normal heart sounds.   Pulmonary:      Effort: Pulmonary effort is normal. No respiratory distress.      Breath sounds: Normal breath sounds. No wheezing or rales.   Abdominal:      Palpations: Abdomen is soft. There is no mass.      Tenderness: There is no guarding or rebound.      Comments: Abd inc line healing well  CONSTANZA serous drainage  Ileosotmy functional   Skin:     General: Skin is warm and dry.   Neurological:      Mental Status: She is alert and oriented to person, place, and time.                Significant Labs:  BMP (Last 3 Results):   Recent Labs   Lab 08/08/23  0610   *      K 4.1      CO2 16*   BUN 8   CREATININE 0.8   CALCIUM 8.7   MG 1.6     CBC (Last 3 Results):   Recent Labs   Lab 08/08/23  0610   WBC 22.67*   RBC 5.01   HGB 15.1   HCT 45.6      MCV 91   MCH 30.1   MCHC 33.1       Significant Diagnostics:  None

## 2023-08-10 NOTE — PROGRESS NOTES
[facility]  Colorectal Surgery  Progress Note    Patient Name: Georgette Abrams  MRN: [id number]  Admission Date: 8/7/2023  Hospital Length of Stay: 3 days  Attending Physician: KRISH Tracy MD    Subjective:     Interval History: no acute events overnite, no n/v, still complaining of pain, ileosotmy functional    Post-Op Info:  Procedure(s) (LRB):  REPAIR, HERNIA, VENTRAL, RECURRENT, ERAS low (N/A)  REVISION, ILEOSTOMY (N/A)  LYSIS, ADHESIONS (N/A)   3 Days Post-Op      Medications:  Continuous Infusions:   dextrose 5 % and 0.45 % NaCl with KCl 20 mEq 40 mL/hr at 08/10/23 1006     Scheduled Meds:   acetaminophen  1,000 mg Oral Q8H    enoxparin  40 mg Subcutaneous Q12H (prophylaxis, 0900/2100)    gabapentin  300 mg Oral TID    ibuprofen  800 mg Oral Q8H    methocarbamoL  1,000 mg Oral QID    mupirocin   Nasal BID    pantoprazole  40 mg Intravenous Daily    scopolamine  1 patch Transdermal Q3 Days    venlafaxine  150 mg Oral Daily     PRN Meds:   aluminum-magnesium hydroxide-simethicone    calcium carbonate    clonazePAM    HYDROmorphone    LIDOcaine (PF) 10 mg/ml (1%)    ondansetron    oxyCODONE    oxyCODONE    sodium chloride 0.9%        Objective:     Vital Signs (Most Recent):  Temp: 97.5 °F (36.4 °C) (08/10/23 1136)  Pulse: 109 (08/10/23 1136)  Resp: 18 (08/10/23 1136)  BP: (!) 142/73 (08/10/23 1136)  SpO2: (!) 93 % (08/10/23 1136) Vital Signs (24h Range):  Temp:  [97.3 °F (36.3 °C)-98 °F (36.7 °C)] 97.5 °F (36.4 °C)  Pulse:  [] 109  Resp:  [16-22] 18  SpO2:  [93 %-99 %] 93 %  BP: (139-168)/(73-94) 142/73     Intake/Output - Last 3 Shifts         08/08 0700  08/09 0659 08/09 0700  08/10 0659 08/10 0700 08/11 0659    P.O. 400      I.V. (mL/kg)       IV Piggyback       Total Intake(mL/kg) 400 (3.2)      Urine (mL/kg/hr)       Drains 115 130     Stool 150 0     Total Output 265 130     Net +135 -130            Stool Occurrence 1 x 1 x              Physical Exam  Vitals and  nursing note reviewed.   Constitutional:       Appearance: She is well-developed.   HENT:      Head: Normocephalic.   Eyes:      Pupils: Pupils are equal, round, and reactive to light.   Cardiovascular:      Rate and Rhythm: Normal rate and regular rhythm.      Heart sounds: Normal heart sounds.   Pulmonary:      Effort: Pulmonary effort is normal. No respiratory distress.      Breath sounds: Normal breath sounds. No wheezing or rales.   Abdominal:      Palpations: Abdomen is soft. There is no mass.      Tenderness: There is no guarding or rebound.      Comments: Abd inc line healing well  CONSTANZA serous drainage  Ileosotmy functional   Skin:     General: Skin is warm and dry.   Neurological:      Mental Status: She is alert and oriented to person, place, and time.                Significant Labs:  BMP (Last 3 Results):   Recent Labs   Lab 08/08/23  0610   *      K 4.1      CO2 16*   BUN 8   CREATININE 0.8   CALCIUM 8.7   MG 1.6     CBC (Last 3 Results):   Recent Labs   Lab 08/08/23  0610   WBC 22.67*   RBC 5.01   HGB 15.1   HCT 45.6      MCV 91   MCH 30.1   MCHC 33.1       Significant Diagnostics:  None    Assessment/Plan:     * Incisional hernia with obstruction but no gangrene  OR 8/7 ventral hernia repair with repair of ileosotmy    -await return of bowel function,emesis, lfd  -continue multi modality for pain control,dc pca, adjust pain management plan  -encourage ambulation and use of IS  -moni hose and SCD  -prophalactic lovenox and PPI  -dc gonzalez,  jil Gaytan NP  Colorectal Surgery  [facility]

## 2023-08-11 LAB — CRP SERPL-MCNC: 186.1 MG/L (ref 0–8.2)

## 2023-08-11 PROCEDURE — 36415 COLL VENOUS BLD VENIPUNCTURE: CPT | Performed by: SURGERY

## 2023-08-11 PROCEDURE — 63600175 PHARM REV CODE 636 W HCPCS: Performed by: SURGERY

## 2023-08-11 PROCEDURE — 97116 GAIT TRAINING THERAPY: CPT | Mod: CQ

## 2023-08-11 PROCEDURE — 86140 C-REACTIVE PROTEIN: CPT | Performed by: SURGERY

## 2023-08-11 PROCEDURE — 63600175 PHARM REV CODE 636 W HCPCS: Performed by: COLON & RECTAL SURGERY

## 2023-08-11 PROCEDURE — C1751 CATH, INF, PER/CENT/MIDLINE: HCPCS

## 2023-08-11 PROCEDURE — 76937 US GUIDE VASCULAR ACCESS: CPT

## 2023-08-11 PROCEDURE — 36410 VNPNXR 3YR/> PHY/QHP DX/THER: CPT

## 2023-08-11 PROCEDURE — 20600001 HC STEP DOWN PRIVATE ROOM

## 2023-08-11 PROCEDURE — 25000003 PHARM REV CODE 250: Performed by: SURGERY

## 2023-08-11 PROCEDURE — C9113 INJ PANTOPRAZOLE SODIUM, VIA: HCPCS | Performed by: SURGERY

## 2023-08-11 PROCEDURE — 97530 THERAPEUTIC ACTIVITIES: CPT | Mod: CQ

## 2023-08-11 RX ORDER — LOSARTAN POTASSIUM 50 MG/1
100 TABLET ORAL DAILY
Status: DISCONTINUED | OUTPATIENT
Start: 2023-08-11 | End: 2023-08-16 | Stop reason: HOSPADM

## 2023-08-11 RX ORDER — PANTOPRAZOLE SODIUM 40 MG/1
40 TABLET, DELAYED RELEASE ORAL DAILY
Status: CANCELLED | OUTPATIENT
Start: 2023-08-12

## 2023-08-11 RX ADMIN — IBUPROFEN 800 MG: 400 TABLET ORAL at 02:08

## 2023-08-11 RX ADMIN — VENLAFAXINE 150 MG: 75 TABLET ORAL at 05:08

## 2023-08-11 RX ADMIN — METHOCARBAMOL 1000 MG: 500 TABLET ORAL at 05:08

## 2023-08-11 RX ADMIN — ACETAMINOPHEN 1000 MG: 500 TABLET ORAL at 07:08

## 2023-08-11 RX ADMIN — ACETAMINOPHEN 1000 MG: 500 TABLET ORAL at 09:08

## 2023-08-11 RX ADMIN — ONDANSETRON 4 MG: 2 INJECTION INTRAMUSCULAR; INTRAVENOUS at 04:08

## 2023-08-11 RX ADMIN — OXYCODONE HYDROCHLORIDE 15 MG: 10 TABLET ORAL at 02:08

## 2023-08-11 RX ADMIN — METHOCARBAMOL 1000 MG: 500 TABLET ORAL at 12:08

## 2023-08-11 RX ADMIN — METHOCARBAMOL 1000 MG: 500 TABLET ORAL at 09:08

## 2023-08-11 RX ADMIN — OXYCODONE HYDROCHLORIDE 15 MG: 10 TABLET ORAL at 11:08

## 2023-08-11 RX ADMIN — MUPIROCIN: 20 OINTMENT TOPICAL at 08:08

## 2023-08-11 RX ADMIN — HYDROMORPHONE HYDROCHLORIDE 1.5 MG: 1 INJECTION, SOLUTION INTRAMUSCULAR; INTRAVENOUS; SUBCUTANEOUS at 08:08

## 2023-08-11 RX ADMIN — HYDROMORPHONE HYDROCHLORIDE 1.5 MG: 1 INJECTION, SOLUTION INTRAMUSCULAR; INTRAVENOUS; SUBCUTANEOUS at 07:08

## 2023-08-11 RX ADMIN — ENOXAPARIN SODIUM 40 MG: 40 INJECTION SUBCUTANEOUS at 09:08

## 2023-08-11 RX ADMIN — OXYCODONE HYDROCHLORIDE 15 MG: 10 TABLET ORAL at 09:08

## 2023-08-11 RX ADMIN — HYDROMORPHONE HYDROCHLORIDE 1.5 MG: 1 INJECTION, SOLUTION INTRAMUSCULAR; INTRAVENOUS; SUBCUTANEOUS at 05:08

## 2023-08-11 RX ADMIN — LOSARTAN POTASSIUM 100 MG: 50 TABLET, FILM COATED ORAL at 12:08

## 2023-08-11 RX ADMIN — METHOCARBAMOL 1000 MG: 500 TABLET ORAL at 08:08

## 2023-08-11 RX ADMIN — DEXTROSE, SODIUM CHLORIDE, AND POTASSIUM CHLORIDE: 5; .45; .15 INJECTION INTRAVENOUS at 04:08

## 2023-08-11 RX ADMIN — MUPIROCIN: 20 OINTMENT TOPICAL at 09:08

## 2023-08-11 RX ADMIN — IBUPROFEN 800 MG: 400 TABLET ORAL at 09:08

## 2023-08-11 RX ADMIN — HYDROMORPHONE HYDROCHLORIDE 1.5 MG: 1 INJECTION, SOLUTION INTRAMUSCULAR; INTRAVENOUS; SUBCUTANEOUS at 12:08

## 2023-08-11 RX ADMIN — ENOXAPARIN SODIUM 40 MG: 40 INJECTION SUBCUTANEOUS at 08:08

## 2023-08-11 RX ADMIN — HYDROMORPHONE HYDROCHLORIDE 1.5 MG: 1 INJECTION, SOLUTION INTRAMUSCULAR; INTRAVENOUS; SUBCUTANEOUS at 04:08

## 2023-08-11 RX ADMIN — OXYCODONE HYDROCHLORIDE 15 MG: 10 TABLET ORAL at 03:08

## 2023-08-11 RX ADMIN — HYDROMORPHONE HYDROCHLORIDE 1.5 MG: 1 INJECTION, SOLUTION INTRAMUSCULAR; INTRAVENOUS; SUBCUTANEOUS at 02:08

## 2023-08-11 RX ADMIN — OXYCODONE HYDROCHLORIDE 15 MG: 10 TABLET ORAL at 06:08

## 2023-08-11 RX ADMIN — ACETAMINOPHEN 1000 MG: 500 TABLET ORAL at 02:08

## 2023-08-11 RX ADMIN — IBUPROFEN 800 MG: 400 TABLET ORAL at 06:08

## 2023-08-11 RX ADMIN — PANTOPRAZOLE SODIUM 40 MG: 40 INJECTION, POWDER, FOR SOLUTION INTRAVENOUS at 08:08

## 2023-08-11 RX ADMIN — HYDROMORPHONE HYDROCHLORIDE 1.5 MG: 1 INJECTION, SOLUTION INTRAMUSCULAR; INTRAVENOUS; SUBCUTANEOUS at 10:08

## 2023-08-11 NOTE — PROGRESS NOTES
[facility]  Colorectal Surgery  Progress Note    Patient Name: Georgette Abrams  MRN: [id number]  Admission Date: 8/7/2023  Hospital Length of Stay: 4 days  Attending Physician: KRISH Tracy MD    Subjective:     Interval History: RADHA, she reports today has been the best day from a pain control standpoint but still rates pain at a 6/10. Tolerating PO, minimal intake from poor appetite    Post-Op Info:  Procedure(s) (LRB):  REPAIR, HERNIA, VENTRAL, RECURRENT, ERAS low (N/A)  REVISION, ILEOSTOMY (N/A)  LYSIS, ADHESIONS (N/A)   4 Days Post-Op      Medications:  Continuous Infusions:   dextrose 5 % and 0.45 % NaCl with KCl 20 mEq 40 mL/hr at 08/10/23 1639     Scheduled Meds:   acetaminophen  1,000 mg Oral Q8H    enoxparin  40 mg Subcutaneous Q12H (prophylaxis, 0900/2100)    gabapentin  300 mg Oral TID    ibuprofen  800 mg Oral Q8H    methocarbamoL  1,000 mg Oral QID    mupirocin   Nasal BID    pantoprazole  40 mg Intravenous Daily    venlafaxine  150 mg Oral Daily     PRN Meds:   aluminum-magnesium hydroxide-simethicone    calcium carbonate    clonazePAM    HYDROmorphone    LIDOcaine (PF) 10 mg/ml (1%)    ondansetron    oxyCODONE    oxyCODONE    sodium chloride 0.9%        Objective:     Vital Signs (Most Recent):  Temp: 97.7 °F (36.5 °C) (08/11/23 0734)  Pulse: 98 (08/11/23 0734)  Resp: 16 (08/11/23 0814)  BP: (!) 141/91 (08/11/23 0734)  SpO2: (!) 94 % (08/11/23 0734) Vital Signs (24h Range):  Temp:  [97.3 °F (36.3 °C)-98 °F (36.7 °C)] 97.7 °F (36.5 °C)  Pulse:  [] 98  Resp:  [16-19] 16  SpO2:  [89 %-96 %] 94 %  BP: (141-182)/(73-91) 141/91     Intake/Output - Last 3 Shifts         08/09 0700  08/10 0659 08/10 0700  08/11 0659 08/11 0700 08/12 0659    P.O.  240     Total Intake(mL/kg)  240 (1.9)     Urine (mL/kg/hr)  0 (0)     Drains 130 70     Stool 0      Total Output 130 70     Net -130 +170            Urine Occurrence  5 x     Stool Occurrence 1 x               Physical  Exam  Nursing note reviewed.   Constitutional:       Appearance: She is well-developed. She is obese.   HENT:      Head: Normocephalic and atraumatic.      Nose: Nose normal.   Eyes:      Conjunctiva/sclera: Conjunctivae normal.   Cardiovascular:      Rate and Rhythm: Normal rate and regular rhythm.      Heart sounds: Normal heart sounds.   Pulmonary:      Effort: Pulmonary effort is normal.      Breath sounds: Normal breath sounds.   Abdominal:      Palpations: Abdomen is soft.      Comments: Wound vac to surgical incision. Ostomy functional   Musculoskeletal:         General: Normal range of motion.      Cervical back: Normal range of motion and neck supple.   Skin:     General: Skin is warm and dry.      Capillary Refill: Capillary refill takes less than 2 seconds.   Neurological:      Mental Status: She is alert and oriented to person, place, and time.   Psychiatric:         Behavior: Behavior normal.         Judgment: Judgment normal.          Significant Labs:  BMP:   Recent Labs   Lab 08/08/23  0610   *      K 4.1      CO2 16*   BUN 8   CREATININE 0.8   CALCIUM 8.7   MG 1.6     CBC:   Recent Labs   Lab 08/08/23  0610   WBC 22.67*   RBC 5.01   HGB 15.1   HCT 45.6      MCV 91   MCH 30.1   MCHC 33.1     CMP:   Recent Labs   Lab 08/08/23  0610   *   CALCIUM 8.7      K 4.1   CO2 16*      BUN 8   CREATININE 0.8       Significant Diagnostics:  None    Assessment/Plan:     * Incisional hernia with obstruction but no gangrene  OR 8/7 ventral hernia repair with repair of ileosotmy    -continue multi modality for pain control  -encourage ambulation and use of IS  -mnoi hose and SCD  -prophalactic lovenox and PPI  - voiding          Rhina Saldana NP  Colorectal Surgery  [facility]

## 2023-08-11 NOTE — NURSING
[facility] Plan of Care Note  Dx Final diagnoses:  [K56.609] Small bowel obstruction     Shift Events: midline placed, attempting to get pain under control    Goals of Care: pain control, vac management, safety     Neuro: A&O4    Vital Signs: VSS    Respiratory: RA    Diet: low fiber    Is patient tolerating current diet? yes    GTTS: D5 1/2 NS kcl 20meq @ 40mL/hr    Urine Output/Bowel Movement: urine output adequate, ileostomy     Drains/Tubes/Tube Feeds (include total output/shift): ileostomy, wound vac (0mL output) CONSTANZA (see flowsheet)    Lines: midline       Accuchecks:none    Skin: wound vac     Fall Risk Score: 11    Activity level? OOB sba     Any scheduled procedures? no    Any safety concerns? no    Other: n/a

## 2023-08-11 NOTE — PT/OT/SLP PROGRESS
Physical Therapy Treatment    Patient Name:  Georgette Abrams   MRN:  [id number]    Recommendations:     Discharge Recommendations: home  Discharge Equipment Recommendations: none  Barriers to discharge: None    Assessment:     Georgette Abrams is a 38 y.o. female admitted with a medical diagnosis of Incisional hernia with obstruction but no gangrene.  She presents with the following impairments/functional limitations: impaired balance, weakness, impaired endurance, impaired functional mobility, gait instability, pain .. Pt Progressing with PT Intervention. Pt Progressing with improving gait distance. Pt would continue to benefit from skilled PT to address overall functional mobility, goals , and to return to functional baseline.  Goals remain appropriate.    Rehab Prognosis: Good; patient would benefit from acute skilled PT services to address these deficits and reach maximum level of function.    Recent Surgery: Procedure(s) (LRB):  REPAIR, HERNIA, VENTRAL, RECURRENT, ERAS low (N/A)  REVISION, ILEOSTOMY (N/A)  LYSIS, ADHESIONS (N/A) 4 Days Post-Op    Plan:     During this hospitalization, patient to be seen 3 x/week to address the identified rehab impairments via gait training, therapeutic activities, therapeutic exercises, neuromuscular re-education and progress toward the following goals:    Plan of Care Expires:  09/08/23    Subjective     Chief Complaint: abd pain   Patient/Family Comments/goals: It hurts when I cough  Pain/Comfort:  Pain Rating 1: 7/10  Location - Orientation 1: generalized  Location 1: abdomen  Pain Addressed 1: Reposition, Distraction, Pre-medicate for activity      Objective:     Communicated with RN prior to session.  Patient found HOB elevated with  (peripheral IV; wound vac; colostomy; CONSTANZA drain) upon PT entry to room.     General Precautions: Standard, fall  Orthopedic Precautions: N/A  Braces: N/A  Respiratory Status: Room air     Functional Mobility:  Bed Mobility:     Rolling  Left:  stand by assistance  Scooting: contact guard assistance  Supine to Sit: contact guard assistance  Transfers:     Sit to Stand:  stand by assistance with no AD  Gait: pt ambulated 170 ft with no aD SBA/CGA.reaching out for external support  Pt demo  wide BALDEMAR, holding onto wall rail and walls in spite of cues, slow marge decreased foot clearance    AM-PAC 6 CLICK MOBILITY  Turning over in bed (including adjusting bedclothes, sheets and blankets)?: 4  Sitting down on and standing up from a chair with arms (e.g., wheelchair, bedside commode, etc.): 4  Moving from lying on back to sitting on the side of the bed?: 3  Moving to and from a bed to a chair (including a wheelchair)?: 3  Need to walk in hospital room?: 3  Climbing 3-5 steps with a railing?: 3  Basic Mobility Total Score: 20       Treatment & Education:  Therapist provided instruction and educated of  patient on progress, safety,d/c,PT POC,   proper body mechanics, energy conservation, and fall prevention strategies during tasks listed above, on the effects of prolonged immobility and the importance of performing OOB activity and exercises to promote healing and reduce recovery time      Updated white board with appropriate PT mobility information for medical team notification     Donned an extra gown     Call nursing/pct to transfer to chair/use bathroom. Pt stated understanding      Bedside table in front of patient and area set up for function, convenience, and safety. RN aware of patient's mobility needs and status. Questions/concerns addressed within PTA scope of practice; patient  with no further questions. Time was provided for active listening, discussion of health disposition, and discussion of safe discharge. Pt?verbalized?agreement .    Patient left up in chair with all lines intact, call button in reach, nsg notified, and family present..    GOALS:   Multidisciplinary Problems       Physical Therapy Goals          Problem: Physical Therapy     Goal Priority Disciplines Outcome Goal Variances Interventions   Physical Therapy Goal     PT, PT/OT Ongoing, Progressing     Description: Goals to be met by:      Patient will increase functional independence with mobility by performin. Supine to sit with Modified [place]  2. Sit to supine with Modified [place]  3. Sit to stand transfer with Modified [place]  4. Gait  x 300 feet with Modified [place] using no AD.                          Time Tracking:     PT Received On: 23  PT Start Time: 1032      PT Stop Time: 1058  PT Total Time (min): 25 min     Billable Minutes: Gait Training 15 and Therapeutic Activity 10    Treatment Type: Treatment  PT/PTA: PTA     Number of PTA visits since last PT visit: 2023

## 2023-08-11 NOTE — SUBJECTIVE & OBJECTIVE
Subjective:     Interval History: RADHA, she reports today has been the best day from a pain control standpoint but still rates pain at a 6/10. Tolerating PO, minimal intake from poor appetite    Post-Op Info:  Procedure(s) (LRB):  REPAIR, HERNIA, VENTRAL, RECURRENT, ERAS low (N/A)  REVISION, ILEOSTOMY (N/A)  LYSIS, ADHESIONS (N/A)   4 Days Post-Op      Medications:  Continuous Infusions:   dextrose 5 % and 0.45 % NaCl with KCl 20 mEq 40 mL/hr at 08/10/23 1639     Scheduled Meds:   acetaminophen  1,000 mg Oral Q8H    enoxparin  40 mg Subcutaneous Q12H (prophylaxis, 0900/2100)    gabapentin  300 mg Oral TID    ibuprofen  800 mg Oral Q8H    methocarbamoL  1,000 mg Oral QID    mupirocin   Nasal BID    pantoprazole  40 mg Intravenous Daily    venlafaxine  150 mg Oral Daily     PRN Meds:   aluminum-magnesium hydroxide-simethicone    calcium carbonate    clonazePAM    HYDROmorphone    LIDOcaine (PF) 10 mg/ml (1%)    ondansetron    oxyCODONE    oxyCODONE    sodium chloride 0.9%        Objective:     Vital Signs (Most Recent):  Temp: 97.7 °F (36.5 °C) (08/11/23 0734)  Pulse: 98 (08/11/23 0734)  Resp: 16 (08/11/23 0814)  BP: (!) 141/91 (08/11/23 0734)  SpO2: (!) 94 % (08/11/23 0734) Vital Signs (24h Range):  Temp:  [97.3 °F (36.3 °C)-98 °F (36.7 °C)] 97.7 °F (36.5 °C)  Pulse:  [] 98  Resp:  [16-19] 16  SpO2:  [89 %-96 %] 94 %  BP: (141-182)/(73-91) 141/91     Intake/Output - Last 3 Shifts         08/09 0700  08/10 0659 08/10 0700  08/11 0659 08/11 0700  08/12 0659    P.O.  240     Total Intake(mL/kg)  240 (1.9)     Urine (mL/kg/hr)  0 (0)     Drains 130 70     Stool 0      Total Output 130 70     Net -130 +170            Urine Occurrence  5 x     Stool Occurrence 1 x               Physical Exam  Nursing note reviewed.   Constitutional:       Appearance: She is well-developed. She is obese.   HENT:      Head: Normocephalic and atraumatic.      Nose: Nose normal.   Eyes:      Conjunctiva/sclera: Conjunctivae normal.    Cardiovascular:      Rate and Rhythm: Normal rate and regular rhythm.      Heart sounds: Normal heart sounds.   Pulmonary:      Effort: Pulmonary effort is normal.      Breath sounds: Normal breath sounds.   Abdominal:      Palpations: Abdomen is soft.      Comments: Wound vac to surgical incision. Ostomy functional   Musculoskeletal:         General: Normal range of motion.      Cervical back: Normal range of motion and neck supple.   Skin:     General: Skin is warm and dry.      Capillary Refill: Capillary refill takes less than 2 seconds.   Neurological:      Mental Status: She is alert and oriented to person, place, and time.   Psychiatric:         Behavior: Behavior normal.         Judgment: Judgment normal.          Significant Labs:  BMP:   Recent Labs   Lab 08/08/23  0610   *      K 4.1      CO2 16*   BUN 8   CREATININE 0.8   CALCIUM 8.7   MG 1.6     CBC:   Recent Labs   Lab 08/08/23  0610   WBC 22.67*   RBC 5.01   HGB 15.1   HCT 45.6      MCV 91   MCH 30.1   MCHC 33.1     CMP:   Recent Labs   Lab 08/08/23  0610   *   CALCIUM 8.7      K 4.1   CO2 16*      BUN 8   CREATININE 0.8       Significant Diagnostics:  None

## 2023-08-11 NOTE — CONSULTS
RACHID placed 18g, 10 cm Midline in left basilic vein using u/s guidance.  Max dwell date 9/9/2023.  Lot # [id number]     MIDLINE inserted for long term PVA

## 2023-08-11 NOTE — PLAN OF CARE
[facility] Plan of Care Note    Dx: Incisional hernia repair     Shift Events: None     Goals of Care: Pain control     Neuro: AAO     Vital Signs: SBP elevated     Respiratory: RA     Diet: Low fiber/ low residue     Is patient tolerating current diet? yes     GTTS: D5 1/2 NS + K     Urine Output/Bowel Movement: WDL independently     Drains/Tubes/Tube Feeds (include total output/shift): ileostomy, CONSTANZA x1     Lines: LAC 18g     Accuchecks: None     Skin: WDL ex incisions, ileostomy     Fall Risk Score: Per charting     Activity level? Independent     Any scheduled procedures? None     Any safety concerns? None     Other: None

## 2023-08-11 NOTE — ASSESSMENT & PLAN NOTE
OR 8/7 ventral hernia repair with repair of ileosotmy    -continue multi modality for pain control  -encourage ambulation and use of IS  -moni hose and SCD  -prophalactic lovenox and PPI  - voiding

## 2023-08-11 NOTE — PLAN OF CARE
08/11/23 1606   Discharge Reassessment   Assessment Type Discharge Planning Reassessment   Did the patient's condition or plan change since previous assessment? No   Discharge Plan discussed with: Patient   Discharge Plan A Home with family   Discharge Plan B Home Health   DME Needed Upon Discharge  none   Transition of Care Barriers None   Why the patient remains in the hospital Requires continued medical care   Post-Acute Status   Discharge Delays None known at this time     Pt plans to go home. She has dependent children. Family will transport her home.    Sharon Herr, RN    [facility]  [phone]

## 2023-08-12 LAB — CRP SERPL-MCNC: 117.8 MG/L (ref 0–8.2)

## 2023-08-12 PROCEDURE — 86140 C-REACTIVE PROTEIN: CPT | Performed by: SURGERY

## 2023-08-12 PROCEDURE — 36415 COLL VENOUS BLD VENIPUNCTURE: CPT | Performed by: SURGERY

## 2023-08-12 PROCEDURE — 20600001 HC STEP DOWN PRIVATE ROOM

## 2023-08-12 PROCEDURE — 63600175 PHARM REV CODE 636 W HCPCS: Performed by: COLON & RECTAL SURGERY

## 2023-08-12 PROCEDURE — C9113 INJ PANTOPRAZOLE SODIUM, VIA: HCPCS | Performed by: SURGERY

## 2023-08-12 PROCEDURE — 63600175 PHARM REV CODE 636 W HCPCS: Performed by: SURGERY

## 2023-08-12 PROCEDURE — 25000003 PHARM REV CODE 250: Performed by: SURGERY

## 2023-08-12 RX ADMIN — METHOCARBAMOL 1000 MG: 500 TABLET ORAL at 05:08

## 2023-08-12 RX ADMIN — IBUPROFEN 800 MG: 400 TABLET ORAL at 01:08

## 2023-08-12 RX ADMIN — HYDROMORPHONE HYDROCHLORIDE 1.5 MG: 1 INJECTION, SOLUTION INTRAMUSCULAR; INTRAVENOUS; SUBCUTANEOUS at 06:08

## 2023-08-12 RX ADMIN — ACETAMINOPHEN 1000 MG: 500 TABLET ORAL at 09:08

## 2023-08-12 RX ADMIN — OXYCODONE HYDROCHLORIDE 15 MG: 10 TABLET ORAL at 05:08

## 2023-08-12 RX ADMIN — MUPIROCIN: 20 OINTMENT TOPICAL at 09:08

## 2023-08-12 RX ADMIN — HYDROMORPHONE HYDROCHLORIDE 1.5 MG: 1 INJECTION, SOLUTION INTRAMUSCULAR; INTRAVENOUS; SUBCUTANEOUS at 01:08

## 2023-08-12 RX ADMIN — METHOCARBAMOL 1000 MG: 500 TABLET ORAL at 01:08

## 2023-08-12 RX ADMIN — HYDROMORPHONE HYDROCHLORIDE 1.5 MG: 1 INJECTION, SOLUTION INTRAMUSCULAR; INTRAVENOUS; SUBCUTANEOUS at 08:08

## 2023-08-12 RX ADMIN — VENLAFAXINE 150 MG: 75 TABLET ORAL at 05:08

## 2023-08-12 RX ADMIN — IBUPROFEN 800 MG: 400 TABLET ORAL at 10:08

## 2023-08-12 RX ADMIN — HYDROMORPHONE HYDROCHLORIDE 1.5 MG: 1 INJECTION, SOLUTION INTRAMUSCULAR; INTRAVENOUS; SUBCUTANEOUS at 12:08

## 2023-08-12 RX ADMIN — ENOXAPARIN SODIUM 40 MG: 40 INJECTION SUBCUTANEOUS at 09:08

## 2023-08-12 RX ADMIN — ACETAMINOPHEN 1000 MG: 500 TABLET ORAL at 01:08

## 2023-08-12 RX ADMIN — ACETAMINOPHEN 1000 MG: 500 TABLET ORAL at 05:08

## 2023-08-12 RX ADMIN — LOSARTAN POTASSIUM 100 MG: 50 TABLET, FILM COATED ORAL at 08:08

## 2023-08-12 RX ADMIN — IBUPROFEN 800 MG: 400 TABLET ORAL at 05:08

## 2023-08-12 RX ADMIN — HYDROMORPHONE HYDROCHLORIDE 1.5 MG: 1 INJECTION, SOLUTION INTRAMUSCULAR; INTRAVENOUS; SUBCUTANEOUS at 10:08

## 2023-08-12 RX ADMIN — HYDROMORPHONE HYDROCHLORIDE 1.5 MG: 1 INJECTION, SOLUTION INTRAMUSCULAR; INTRAVENOUS; SUBCUTANEOUS at 02:08

## 2023-08-12 RX ADMIN — PANTOPRAZOLE SODIUM 40 MG: 40 INJECTION, POWDER, FOR SOLUTION INTRAVENOUS at 08:08

## 2023-08-12 RX ADMIN — METHOCARBAMOL 1000 MG: 500 TABLET ORAL at 08:08

## 2023-08-12 RX ADMIN — OXYCODONE HYDROCHLORIDE 15 MG: 10 TABLET ORAL at 09:08

## 2023-08-12 RX ADMIN — ENOXAPARIN SODIUM 40 MG: 40 INJECTION SUBCUTANEOUS at 08:08

## 2023-08-12 RX ADMIN — HYDROMORPHONE HYDROCHLORIDE 1.5 MG: 1 INJECTION, SOLUTION INTRAMUSCULAR; INTRAVENOUS; SUBCUTANEOUS at 04:08

## 2023-08-12 RX ADMIN — HYDROMORPHONE HYDROCHLORIDE 1.5 MG: 1 INJECTION, SOLUTION INTRAMUSCULAR; INTRAVENOUS; SUBCUTANEOUS at 03:08

## 2023-08-12 RX ADMIN — OXYCODONE HYDROCHLORIDE 15 MG: 10 TABLET ORAL at 10:08

## 2023-08-12 RX ADMIN — OXYCODONE HYDROCHLORIDE 15 MG: 10 TABLET ORAL at 01:08

## 2023-08-12 RX ADMIN — DEXTROSE, SODIUM CHLORIDE, AND POTASSIUM CHLORIDE: 5; .45; .15 INJECTION INTRAVENOUS at 05:08

## 2023-08-12 NOTE — NURSING
[facility] Plan of Care Note  Dx:  Incisional Heria w/Obstruction     Shift Events:  Requiring frequent medication for pain control.  Ambulated in hallway.       Goals of Care:  Pain control     Neuro:  AAOx4              Vital Signs:  VSS       Respiratory:  Clear, room air     Diet: Low Fiber/low residue     Is patient tolerating current diet?  Yes     GTTS: D5 1/2 NS w/20 KCL @ 40 mL/hr     Urine Output/Bowel Movement: Voids/Ileostomy    Drains/Tubes/Tube Feeds (include total output/shift):  Ileostomy/Wound vac abd     Lines: L midline     Accuchecks:  None     Skin: Incision Abd, generalized bruising     Fall Risk Score:  11    Activity:  Up adlib

## 2023-08-12 NOTE — SUBJECTIVE & OBJECTIVE
Subjective:     Interval History: RADHA. Says she is having more abdominal soreness than pain this am. Tolerating diet without n/v but says she is not eating very much. Ileostomy with 50cc recorded output.     Post-Op Info:  Procedure(s) (LRB):  REPAIR, HERNIA, VENTRAL, RECURRENT, ERAS low (N/A)  REVISION, ILEOSTOMY (N/A)  LYSIS, ADHESIONS (N/A)   5 Days Post-Op      Medications:  Continuous Infusions:   dextrose 5 % and 0.45 % NaCl with KCl 20 mEq 40 mL/hr at 08/11/23 1600     Scheduled Meds:   acetaminophen  1,000 mg Oral Q8H    enoxparin  40 mg Subcutaneous Q12H (prophylaxis, 0900/2100)    gabapentin  300 mg Oral TID    ibuprofen  800 mg Oral Q8H    losartan  100 mg Oral Daily    methocarbamoL  1,000 mg Oral QID    mupirocin   Nasal BID    pantoprazole  40 mg Intravenous Daily    venlafaxine  150 mg Oral Daily     PRN Meds:   aluminum-magnesium hydroxide-simethicone    calcium carbonate    clonazePAM    HYDROmorphone    LIDOcaine (PF) 10 mg/ml (1%)    ondansetron    oxyCODONE    oxyCODONE    sodium chloride 0.9%        Objective:     Vital Signs (Most Recent):  Temp: 97.9 °F (36.6 °C) (08/12/23 0734)  Pulse: 86 (08/12/23 0734)  Resp: 18 (08/12/23 0734)  BP: 138/76 (08/12/23 0734)  SpO2: 96 % (08/12/23 0734) Vital Signs (24h Range):  Temp:  [97.6 °F (36.4 °C)-98 °F (36.7 °C)] 97.9 °F (36.6 °C)  Pulse:  [] 86  Resp:  [16-20] 18  SpO2:  [94 %-99 %] 96 %  BP: (133-188)/(73-85) 138/76     Intake/Output - Last 3 Shifts         08/10 0700  08/11 0659 08/11 0700  08/12 0659 08/12 0700  08/13 0659    P.O. 240 444     I.V. (mL/kg)  160 (1.3)     Total Intake(mL/kg) 240 (1.9) 604 (4.9)     Urine (mL/kg/hr) 0 (0) 0 (0)     Drains 70 110     Stool  50     Total Output 70 160     Net +170 +444            Urine Occurrence 5 x 2 x     Stool Occurrence  0 x              Physical Exam  Nursing note reviewed.   Constitutional:       Appearance: She is well-developed. She is obese.   HENT:      Head: Normocephalic and  atraumatic.      Nose: Nose normal.   Eyes:      Conjunctiva/sclera: Conjunctivae normal.   Cardiovascular:      Rate and Rhythm: Normal rate and regular rhythm.      Heart sounds: Normal heart sounds.   Pulmonary:      Effort: Pulmonary effort is normal.      Breath sounds: Normal breath sounds.   Abdominal:      Palpations: Abdomen is soft.      Comments: Prevena to surgical incision. Ostomy functional   Musculoskeletal:         General: Normal range of motion.      Cervical back: Normal range of motion and neck supple.   Skin:     General: Skin is warm and dry.      Capillary Refill: Capillary refill takes less than 2 seconds.   Neurological:      Mental Status: She is alert and oriented to person, place, and time.   Psychiatric:         Behavior: Behavior normal.         Judgment: Judgment normal.          Significant Labs:  BMP:   Recent Labs   Lab 08/08/23  0610   *      K 4.1      CO2 16*   BUN 8   CREATININE 0.8   CALCIUM 8.7   MG 1.6       CBC:   Recent Labs   Lab 08/08/23  0610   WBC 22.67*   RBC 5.01   HGB 15.1   HCT 45.6      MCV 91   MCH 30.1   MCHC 33.1       CMP:   Recent Labs   Lab 08/08/23  0610   *   CALCIUM 8.7      K 4.1   CO2 16*      BUN 8   CREATININE 0.8         Significant Diagnostics:  None

## 2023-08-12 NOTE — PLAN OF CARE
Problem: Fall Injury Risk  Goal: Absence of Fall and Fall-Related Injury  Outcome: Ongoing, Progressing  Intervention: Identify and Manage Contributors  Flowsheets (Taken 8/12/2023 1018)  Self-Care Promotion:   independence encouraged   BADL personal objects within reach  Medication Review/Management: medications reviewed     Problem: Pain Acute  Goal: Acceptable Pain Control and Functional Ability  Outcome: Ongoing, Progressing  Intervention: Develop Pain Management Plan  Flowsheets (Taken 8/12/2023 1019)  Pain Management Interventions: pain management plan reviewed with patient/caregiver  Intervention: Prevent or Manage Pain  Flowsheets (Taken 8/12/2023 1019)  Medication Review/Management: medications reviewed

## 2023-08-12 NOTE — ASSESSMENT & PLAN NOTE
OR 8/7 ventral hernia repair with repair of ileostomy    - multimodal pain control  - dvt ppx  - prn nausea meds  - continue trending CRP  - continue to monitor ostomy output  - LRD

## 2023-08-12 NOTE — PROGRESS NOTES
[facility]  Colorectal Surgery  Progress Note    Patient Name: Georgette Abrams  MRN: [id number]  Admission Date: 8/7/2023  Hospital Length of Stay: 5 days  Attending Physician: KRISH Tracy MD    Subjective:     Interval History: NAEON. Says she is having more abdominal soreness than pain this am. Tolerating diet without n/v but says she is not eating very much. Ileostomy with 50cc recorded output.     Post-Op Info:  Procedure(s) (LRB):  REPAIR, HERNIA, VENTRAL, RECURRENT, ERAS low (N/A)  REVISION, ILEOSTOMY (N/A)  LYSIS, ADHESIONS (N/A)   5 Days Post-Op      Medications:  Continuous Infusions:   dextrose 5 % and 0.45 % NaCl with KCl 20 mEq 40 mL/hr at 08/11/23 1600     Scheduled Meds:   acetaminophen  1,000 mg Oral Q8H    enoxparin  40 mg Subcutaneous Q12H (prophylaxis, 0900/2100)    gabapentin  300 mg Oral TID    ibuprofen  800 mg Oral Q8H    losartan  100 mg Oral Daily    methocarbamoL  1,000 mg Oral QID    mupirocin   Nasal BID    pantoprazole  40 mg Intravenous Daily    venlafaxine  150 mg Oral Daily     PRN Meds:   aluminum-magnesium hydroxide-simethicone    calcium carbonate    clonazePAM    HYDROmorphone    LIDOcaine (PF) 10 mg/ml (1%)    ondansetron    oxyCODONE    oxyCODONE    sodium chloride 0.9%        Objective:     Vital Signs (Most Recent):  Temp: 97.9 °F (36.6 °C) (08/12/23 0734)  Pulse: 86 (08/12/23 0734)  Resp: 18 (08/12/23 0734)  BP: 138/76 (08/12/23 0734)  SpO2: 96 % (08/12/23 0734) Vital Signs (24h Range):  Temp:  [97.6 °F (36.4 °C)-98 °F (36.7 °C)] 97.9 °F (36.6 °C)  Pulse:  [] 86  Resp:  [16-20] 18  SpO2:  [94 %-99 %] 96 %  BP: (133-188)/(73-85) 138/76     Intake/Output - Last 3 Shifts         08/10 0700  08/11 0659 08/11 0700 08/12 0659 08/12 0700 08/13 0659    P.O. 240 444     I.V. (mL/kg)  160 (1.3)     Total Intake(mL/kg) 240 (1.9) 604 (4.9)     Urine (mL/kg/hr) 0 (0) 0 (0)     Drains 70 110     Stool  50     Total Output 70 160     Net +170  +444            Urine Occurrence 5 x 2 x     Stool Occurrence  0 x              Physical Exam  Nursing note reviewed.   Constitutional:       Appearance: She is well-developed. She is obese.   HENT:      Head: Normocephalic and atraumatic.      Nose: Nose normal.   Eyes:      Conjunctiva/sclera: Conjunctivae normal.   Cardiovascular:      Rate and Rhythm: Normal rate and regular rhythm.      Heart sounds: Normal heart sounds.   Pulmonary:      Effort: Pulmonary effort is normal.      Breath sounds: Normal breath sounds.   Abdominal:      Palpations: Abdomen is soft.      Comments: Prevena to surgical incision. Ostomy functional   Musculoskeletal:         General: Normal range of motion.      Cervical back: Normal range of motion and neck supple.   Skin:     General: Skin is warm and dry.      Capillary Refill: Capillary refill takes less than 2 seconds.   Neurological:      Mental Status: She is alert and oriented to person, place, and time.   Psychiatric:         Behavior: Behavior normal.         Judgment: Judgment normal.          Significant Labs:  BMP:   Recent Labs   Lab 08/08/23  0610   *      K 4.1      CO2 16*   BUN 8   CREATININE 0.8   CALCIUM 8.7   MG 1.6       CBC:   Recent Labs   Lab 08/08/23  0610   WBC 22.67*   RBC 5.01   HGB 15.1   HCT 45.6      MCV 91   MCH 30.1   MCHC 33.1       CMP:   Recent Labs   Lab 08/08/23  0610   *   CALCIUM 8.7      K 4.1   CO2 16*      BUN 8   CREATININE 0.8         Significant Diagnostics:  None    Assessment/Plan:     * Incisional hernia with obstruction but no gangrene  OR 8/7 ventral hernia repair with repair of ileostomy    - multimodal pain control  - dvt ppx  - prn nausea meds  - continue trending CRP  - continue to monitor ostomy output  - LRD      Ora Pierre MD  Colorectal Surgery  [facility]

## 2023-08-12 NOTE — NURSING
[facility] Plan of Care Note  Dx small bowel obstruction    Shift Events n/a    Goals of Care: pain control    Neuro: AAOx4    Vital Signs: stable    Respiratory: room air    Diet: low fiber low residue    Is patient tolerating current diet? yes    GTTS: D51/2NS with 20KCL @40    Urine Output/Bowel Movement:     Drains/Tubes/Tube Feeds (include total output/shift): ileostomy had 50ml output left CONSTANZA drain had 70ml output provera wound vac    Lines: midline      Accuchecks:n/a    Skin: incision     Fall Risk Score: 13    Activity level? independent    Any scheduled procedures? N/a    Any safety concerns? N/a    Other: pain control

## 2023-08-13 LAB — CRP SERPL-MCNC: 69.4 MG/L (ref 0–8.2)

## 2023-08-13 PROCEDURE — 63600175 PHARM REV CODE 636 W HCPCS: Performed by: COLON & RECTAL SURGERY

## 2023-08-13 PROCEDURE — 63600175 PHARM REV CODE 636 W HCPCS: Performed by: SURGERY

## 2023-08-13 PROCEDURE — C9113 INJ PANTOPRAZOLE SODIUM, VIA: HCPCS | Performed by: SURGERY

## 2023-08-13 PROCEDURE — 86140 C-REACTIVE PROTEIN: CPT | Performed by: SURGERY

## 2023-08-13 PROCEDURE — 20600001 HC STEP DOWN PRIVATE ROOM

## 2023-08-13 PROCEDURE — 36415 COLL VENOUS BLD VENIPUNCTURE: CPT | Performed by: SURGERY

## 2023-08-13 PROCEDURE — 63600175 PHARM REV CODE 636 W HCPCS: Performed by: STUDENT IN AN ORGANIZED HEALTH CARE EDUCATION/TRAINING PROGRAM

## 2023-08-13 PROCEDURE — 25000003 PHARM REV CODE 250: Performed by: SURGERY

## 2023-08-13 RX ORDER — HYDROMORPHONE HYDROCHLORIDE 1 MG/ML
1.5 INJECTION, SOLUTION INTRAMUSCULAR; INTRAVENOUS; SUBCUTANEOUS
Status: DISCONTINUED | OUTPATIENT
Start: 2023-08-13 | End: 2023-08-14

## 2023-08-13 RX ORDER — HYDROMORPHONE HYDROCHLORIDE 1 MG/ML
1.5 INJECTION, SOLUTION INTRAMUSCULAR; INTRAVENOUS; SUBCUTANEOUS EVERY 4 HOURS PRN
Status: DISCONTINUED | OUTPATIENT
Start: 2023-08-13 | End: 2023-08-13

## 2023-08-13 RX ADMIN — OXYCODONE HYDROCHLORIDE 15 MG: 10 TABLET ORAL at 05:08

## 2023-08-13 RX ADMIN — HYDROMORPHONE HYDROCHLORIDE 1.5 MG: 1 INJECTION, SOLUTION INTRAMUSCULAR; INTRAVENOUS; SUBCUTANEOUS at 09:08

## 2023-08-13 RX ADMIN — OXYCODONE HYDROCHLORIDE 15 MG: 10 TABLET ORAL at 09:08

## 2023-08-13 RX ADMIN — METHOCARBAMOL 1000 MG: 500 TABLET ORAL at 09:08

## 2023-08-13 RX ADMIN — OXYCODONE HYDROCHLORIDE 15 MG: 10 TABLET ORAL at 07:08

## 2023-08-13 RX ADMIN — METHOCARBAMOL 1000 MG: 500 TABLET ORAL at 01:08

## 2023-08-13 RX ADMIN — IBUPROFEN 800 MG: 400 TABLET ORAL at 05:08

## 2023-08-13 RX ADMIN — VENLAFAXINE 150 MG: 75 TABLET ORAL at 04:08

## 2023-08-13 RX ADMIN — LOSARTAN POTASSIUM 100 MG: 50 TABLET, FILM COATED ORAL at 09:08

## 2023-08-13 RX ADMIN — DEXTROSE, SODIUM CHLORIDE, AND POTASSIUM CHLORIDE: 5; .45; .15 INJECTION INTRAVENOUS at 06:08

## 2023-08-13 RX ADMIN — GABAPENTIN 300 MG: 300 CAPSULE ORAL at 09:08

## 2023-08-13 RX ADMIN — OXYCODONE HYDROCHLORIDE 15 MG: 10 TABLET ORAL at 03:08

## 2023-08-13 RX ADMIN — ACETAMINOPHEN 1000 MG: 500 TABLET ORAL at 02:08

## 2023-08-13 RX ADMIN — HYDROMORPHONE HYDROCHLORIDE 1.5 MG: 1 INJECTION, SOLUTION INTRAMUSCULAR; INTRAVENOUS; SUBCUTANEOUS at 01:08

## 2023-08-13 RX ADMIN — HYDROMORPHONE HYDROCHLORIDE 1.5 MG: 1 INJECTION, SOLUTION INTRAMUSCULAR; INTRAVENOUS; SUBCUTANEOUS at 10:08

## 2023-08-13 RX ADMIN — IBUPROFEN 800 MG: 400 TABLET ORAL at 09:08

## 2023-08-13 RX ADMIN — HYDROMORPHONE HYDROCHLORIDE 1.5 MG: 1 INJECTION, SOLUTION INTRAMUSCULAR; INTRAVENOUS; SUBCUTANEOUS at 07:08

## 2023-08-13 RX ADMIN — IBUPROFEN 800 MG: 400 TABLET ORAL at 01:08

## 2023-08-13 RX ADMIN — METHOCARBAMOL 1000 MG: 500 TABLET ORAL at 04:08

## 2023-08-13 RX ADMIN — HYDROMORPHONE HYDROCHLORIDE 1.5 MG: 1 INJECTION, SOLUTION INTRAMUSCULAR; INTRAVENOUS; SUBCUTANEOUS at 04:08

## 2023-08-13 RX ADMIN — ENOXAPARIN SODIUM 40 MG: 40 INJECTION SUBCUTANEOUS at 09:08

## 2023-08-13 RX ADMIN — ACETAMINOPHEN 1000 MG: 500 TABLET ORAL at 10:08

## 2023-08-13 RX ADMIN — GABAPENTIN 300 MG: 300 CAPSULE ORAL at 03:08

## 2023-08-13 RX ADMIN — HYDROMORPHONE HYDROCHLORIDE 1.5 MG: 1 INJECTION, SOLUTION INTRAMUSCULAR; INTRAVENOUS; SUBCUTANEOUS at 12:08

## 2023-08-13 RX ADMIN — PANTOPRAZOLE SODIUM 40 MG: 40 INJECTION, POWDER, FOR SOLUTION INTRAVENOUS at 09:08

## 2023-08-13 RX ADMIN — ACETAMINOPHEN 1000 MG: 500 TABLET ORAL at 06:08

## 2023-08-13 RX ADMIN — HYDROMORPHONE HYDROCHLORIDE 1.5 MG: 1 INJECTION, SOLUTION INTRAMUSCULAR; INTRAVENOUS; SUBCUTANEOUS at 03:08

## 2023-08-13 NOTE — SUBJECTIVE & OBJECTIVE
Subjective:     Interval History: NAEON. Primary complaint is muscle cramping. She is tolerating diet without n/v but is still not eating much. Ileostomy with 50cc recorded output.     Post-Op Info:  Procedure(s) (LRB):  REPAIR, HERNIA, VENTRAL, RECURRENT, ERAS low (N/A)  REVISION, ILEOSTOMY (N/A)  LYSIS, ADHESIONS (N/A)   6 Days Post-Op      Medications:  Continuous Infusions:   dextrose 5 % and 0.45 % NaCl with KCl 20 mEq 40 mL/hr at 08/12/23 1746     Scheduled Meds:   acetaminophen  1,000 mg Oral Q8H    enoxparin  40 mg Subcutaneous Q12H (prophylaxis, 0900/2100)    gabapentin  300 mg Oral TID    ibuprofen  800 mg Oral Q8H    losartan  100 mg Oral Daily    methocarbamoL  1,000 mg Oral QID    pantoprazole  40 mg Intravenous Daily    venlafaxine  150 mg Oral Daily     PRN Meds:   aluminum-magnesium hydroxide-simethicone    calcium carbonate    clonazePAM    HYDROmorphone    LIDOcaine (PF) 10 mg/ml (1%)    ondansetron    oxyCODONE    oxyCODONE    sodium chloride 0.9%        Objective:     Vital Signs (Most Recent):  Temp: 98.1 °F (36.7 °C) (08/13/23 0800)  Pulse: 87 (08/13/23 0800)  Resp: 18 (08/13/23 0800)  BP: 120/61 (08/13/23 0800)  SpO2: 95 % (08/13/23 0800) Vital Signs (24h Range):  Temp:  [97.3 °F (36.3 °C)-98.4 °F (36.9 °C)] 98.1 °F (36.7 °C)  Pulse:  [] 87  Resp:  [18-20] 18  SpO2:  [91 %-98 %] 95 %  BP: (120-160)/(59-73) 120/61     Intake/Output - Last 3 Shifts         08/11 0700  08/12 0659 08/12 0700 08/13 0659 08/13 0700  08/14 0659    P.O. 444      I.V. (mL/kg) 160 (1.3)      Total Intake(mL/kg) 604 (4.9)      Urine (mL/kg/hr) 0 (0) 0 (0)     Drains 110 70     Stool 50 50     Total Output 160 120     Net +444 -120            Urine Occurrence 2 x 4 x     Stool Occurrence 0 x 0 x              Physical Exam  Nursing note reviewed.   Constitutional:       Appearance: She is well-developed. She is obese.   HENT:      Head: Normocephalic and atraumatic.      Nose: Nose normal.   Eyes:       Conjunctiva/sclera: Conjunctivae normal.   Cardiovascular:      Rate and Rhythm: Normal rate and regular rhythm.      Heart sounds: Normal heart sounds.   Pulmonary:      Effort: Pulmonary effort is normal.      Breath sounds: Normal breath sounds.   Abdominal:      Palpations: Abdomen is soft.      Comments: Prevena to surgical incision. Ostomy functional   Musculoskeletal:         General: Normal range of motion.      Cervical back: Normal range of motion and neck supple.   Skin:     General: Skin is warm and dry.      Capillary Refill: Capillary refill takes less than 2 seconds.   Neurological:      Mental Status: She is alert and oriented to person, place, and time.   Psychiatric:         Behavior: Behavior normal.         Judgment: Judgment normal.          Significant Labs:  BMP:   Recent Labs   Lab 08/08/23  0610   *      K 4.1      CO2 16*   BUN 8   CREATININE 0.8   CALCIUM 8.7   MG 1.6       CBC:   Recent Labs   Lab 08/08/23  0610   WBC 22.67*   RBC 5.01   HGB 15.1   HCT 45.6      MCV 91   MCH 30.1   MCHC 33.1       CMP:   Recent Labs   Lab 08/08/23  0610   *   CALCIUM 8.7      K 4.1   CO2 16*      BUN 8   CREATININE 0.8         Significant Diagnostics:  None

## 2023-08-13 NOTE — ASSESSMENT & PLAN NOTE
OR 8/7 ventral hernia repair with repair of ileostomy    - multimodal pain control; will try to start weaning opioids  - dvt ppx  - prn nausea meds  - continue trending CRP  - continue to monitor ostomy output  - LRD

## 2023-08-13 NOTE — NURSING
[facility] Plan of Care Note  Dx small bowel obstruction     Shift Events n/a     Goals of Care: pain control     Neuro: AAOx4     Vital Signs: stable     Respiratory: room air     Diet: low fiber low residue     Is patient tolerating current diet? yes     GTTS: D51/2NS with 20KCL @40     Urine Output/Bowel Movement:      Drains/Tubes/Tube Feeds (include total output/shift): ileostomy had 50ml output left CONSTANZA drain had 30ml output provera wound vac     Lines: midline        Accuchecks:n/a     Skin: incision      Fall Risk Score: 13     Activity level? independent     Any scheduled procedures? N/a     Any safety concerns? N/a     Other: pain control

## 2023-08-13 NOTE — NURSING
[facility] Plan of Care Note  Dx small bowel obstruction     Shift Events n/a     Goals of Care: pain control     Neuro: AAOx4     Vital Signs: stable     Respiratory: room air     Diet: low fiber low residue     Is patient tolerating current diet? yes     GTTS: D51/2NS with 20KCL @40     Urine Output/Bowel Movement:      Drains/Tubes/Tube Feeds (include total output/shift): ileostomy-no measurable output left CONSTANZA drain 70 mls wound vac     Lines: midline and transverse abd with wound vac        Accuchecks:n/a     Skin: incision      Fall Risk Score: see flowsheet     Activity level? independent     Any scheduled procedures? N/a

## 2023-08-13 NOTE — PROGRESS NOTES
[facility]  Colorectal Surgery  Progress Note    Patient Name: Georgette Abrams  MRN: [id number]  Admission Date: 8/7/2023  Hospital Length of Stay: 6 days  Attending Physician: KRISH Tracy MD    Subjective:     Interval History: NAEON. Primary complaint is muscle cramping. She is tolerating diet without n/v but is still not eating much. Ileostomy with 50cc recorded output.     Post-Op Info:  Procedure(s) (LRB):  REPAIR, HERNIA, VENTRAL, RECURRENT, ERAS low (N/A)  REVISION, ILEOSTOMY (N/A)  LYSIS, ADHESIONS (N/A)   6 Days Post-Op      Medications:  Continuous Infusions:   dextrose 5 % and 0.45 % NaCl with KCl 20 mEq 40 mL/hr at 08/12/23 1746     Scheduled Meds:   acetaminophen  1,000 mg Oral Q8H    enoxparin  40 mg Subcutaneous Q12H (prophylaxis, 0900/2100)    gabapentin  300 mg Oral TID    ibuprofen  800 mg Oral Q8H    losartan  100 mg Oral Daily    methocarbamoL  1,000 mg Oral QID    pantoprazole  40 mg Intravenous Daily    venlafaxine  150 mg Oral Daily     PRN Meds:   aluminum-magnesium hydroxide-simethicone    calcium carbonate    clonazePAM    HYDROmorphone    LIDOcaine (PF) 10 mg/ml (1%)    ondansetron    oxyCODONE    oxyCODONE    sodium chloride 0.9%        Objective:     Vital Signs (Most Recent):  Temp: 98.1 °F (36.7 °C) (08/13/23 0800)  Pulse: 87 (08/13/23 0800)  Resp: 18 (08/13/23 0800)  BP: 120/61 (08/13/23 0800)  SpO2: 95 % (08/13/23 0800) Vital Signs (24h Range):  Temp:  [97.3 °F (36.3 °C)-98.4 °F (36.9 °C)] 98.1 °F (36.7 °C)  Pulse:  [] 87  Resp:  [18-20] 18  SpO2:  [91 %-98 %] 95 %  BP: (120-160)/(59-73) 120/61     Intake/Output - Last 3 Shifts         08/11 0700 08/12 0659 08/12 0700 08/13 0659 08/13 0700 08/14 0659    P.O. 444      I.V. (mL/kg) 160 (1.3)      Total Intake(mL/kg) 604 (4.9)      Urine (mL/kg/hr) 0 (0) 0 (0)     Drains 110 70     Stool 50 50     Total Output 160 120     Net +444 -120            Urine Occurrence 2 x 4 x     Stool Occurrence  0 x 0 x              Physical Exam  Nursing note reviewed.   Constitutional:       Appearance: She is well-developed. She is obese.   HENT:      Head: Normocephalic and atraumatic.      Nose: Nose normal.   Eyes:      Conjunctiva/sclera: Conjunctivae normal.   Cardiovascular:      Rate and Rhythm: Normal rate and regular rhythm.      Heart sounds: Normal heart sounds.   Pulmonary:      Effort: Pulmonary effort is normal.      Breath sounds: Normal breath sounds.   Abdominal:      Palpations: Abdomen is soft.      Comments: Prevena to surgical incision. Ostomy functional   Musculoskeletal:         General: Normal range of motion.      Cervical back: Normal range of motion and neck supple.   Skin:     General: Skin is warm and dry.      Capillary Refill: Capillary refill takes less than 2 seconds.   Neurological:      Mental Status: She is alert and oriented to person, place, and time.   Psychiatric:         Behavior: Behavior normal.         Judgment: Judgment normal.          Significant Labs:  BMP:   Recent Labs   Lab 08/08/23  0610   *      K 4.1      CO2 16*   BUN 8   CREATININE 0.8   CALCIUM 8.7   MG 1.6       CBC:   Recent Labs   Lab 08/08/23  0610   WBC 22.67*   RBC 5.01   HGB 15.1   HCT 45.6      MCV 91   MCH 30.1   MCHC 33.1       CMP:   Recent Labs   Lab 08/08/23  0610   *   CALCIUM 8.7      K 4.1   CO2 16*      BUN 8   CREATININE 0.8         Significant Diagnostics:  None    Assessment/Plan:     * Incisional hernia with obstruction but no gangrene  OR 8/7 ventral hernia repair with repair of ileostomy    - multimodal pain control; will try to start weaning opioids  - dvt ppx  - prn nausea meds  - continue trending CRP  - continue to monitor ostomy output  - LRD          Ora Pierre MD  Colorectal Surgery  [facility]

## 2023-08-14 LAB — CRP SERPL-MCNC: 73.9 MG/L (ref 0–8.2)

## 2023-08-14 PROCEDURE — 86140 C-REACTIVE PROTEIN: CPT | Performed by: SURGERY

## 2023-08-14 PROCEDURE — 25000003 PHARM REV CODE 250: Performed by: SURGERY

## 2023-08-14 PROCEDURE — 25000003 PHARM REV CODE 250: Performed by: COLON & RECTAL SURGERY

## 2023-08-14 PROCEDURE — 63600175 PHARM REV CODE 636 W HCPCS: Performed by: SURGERY

## 2023-08-14 PROCEDURE — 63600175 PHARM REV CODE 636 W HCPCS: Performed by: STUDENT IN AN ORGANIZED HEALTH CARE EDUCATION/TRAINING PROGRAM

## 2023-08-14 PROCEDURE — 63600175 PHARM REV CODE 636 W HCPCS: Performed by: COLON & RECTAL SURGERY

## 2023-08-14 PROCEDURE — 36415 COLL VENOUS BLD VENIPUNCTURE: CPT | Performed by: SURGERY

## 2023-08-14 PROCEDURE — 97116 GAIT TRAINING THERAPY: CPT | Mod: CQ

## 2023-08-14 PROCEDURE — C9113 INJ PANTOPRAZOLE SODIUM, VIA: HCPCS | Performed by: SURGERY

## 2023-08-14 PROCEDURE — 20600001 HC STEP DOWN PRIVATE ROOM

## 2023-08-14 RX ORDER — OXYCODONE HYDROCHLORIDE 10 MG/1
20 TABLET ORAL EVERY 4 HOURS PRN
Status: DISCONTINUED | OUTPATIENT
Start: 2023-08-14 | End: 2023-08-16 | Stop reason: HOSPADM

## 2023-08-14 RX ORDER — PANTOPRAZOLE SODIUM 40 MG/1
40 TABLET, DELAYED RELEASE ORAL DAILY
Status: DISCONTINUED | OUTPATIENT
Start: 2023-08-15 | End: 2023-08-16 | Stop reason: HOSPADM

## 2023-08-14 RX ORDER — HYDROMORPHONE HYDROCHLORIDE 2 MG/ML
2 INJECTION, SOLUTION INTRAMUSCULAR; INTRAVENOUS; SUBCUTANEOUS
Status: DISCONTINUED | OUTPATIENT
Start: 2023-08-14 | End: 2023-08-16 | Stop reason: HOSPADM

## 2023-08-14 RX ADMIN — HYDROMORPHONE HYDROCHLORIDE 2 MG: 2 INJECTION, SOLUTION INTRAMUSCULAR; INTRAVENOUS; SUBCUTANEOUS at 11:08

## 2023-08-14 RX ADMIN — ACETAMINOPHEN 1000 MG: 500 TABLET ORAL at 10:08

## 2023-08-14 RX ADMIN — HYDROMORPHONE HYDROCHLORIDE 1.5 MG: 1 INJECTION, SOLUTION INTRAMUSCULAR; INTRAVENOUS; SUBCUTANEOUS at 12:08

## 2023-08-14 RX ADMIN — METHOCARBAMOL 1000 MG: 500 TABLET ORAL at 08:08

## 2023-08-14 RX ADMIN — HYDROMORPHONE HYDROCHLORIDE 1.5 MG: 1 INJECTION, SOLUTION INTRAMUSCULAR; INTRAVENOUS; SUBCUTANEOUS at 08:08

## 2023-08-14 RX ADMIN — LOSARTAN POTASSIUM 100 MG: 50 TABLET, FILM COATED ORAL at 08:08

## 2023-08-14 RX ADMIN — GABAPENTIN 300 MG: 300 CAPSULE ORAL at 02:08

## 2023-08-14 RX ADMIN — IBUPROFEN 800 MG: 400 TABLET ORAL at 09:08

## 2023-08-14 RX ADMIN — OXYCODONE HYDROCHLORIDE 15 MG: 10 TABLET ORAL at 10:08

## 2023-08-14 RX ADMIN — IBUPROFEN 800 MG: 400 TABLET ORAL at 05:08

## 2023-08-14 RX ADMIN — GABAPENTIN 300 MG: 300 CAPSULE ORAL at 08:08

## 2023-08-14 RX ADMIN — OXYCODONE HYDROCHLORIDE 15 MG: 10 TABLET ORAL at 02:08

## 2023-08-14 RX ADMIN — METHOCARBAMOL 1000 MG: 500 TABLET ORAL at 09:08

## 2023-08-14 RX ADMIN — VENLAFAXINE 150 MG: 75 TABLET ORAL at 05:08

## 2023-08-14 RX ADMIN — IBUPROFEN 800 MG: 400 TABLET ORAL at 02:08

## 2023-08-14 RX ADMIN — HYDROMORPHONE HYDROCHLORIDE 2 MG: 2 INJECTION, SOLUTION INTRAMUSCULAR; INTRAVENOUS; SUBCUTANEOUS at 07:08

## 2023-08-14 RX ADMIN — ENOXAPARIN SODIUM 40 MG: 40 INJECTION SUBCUTANEOUS at 09:08

## 2023-08-14 RX ADMIN — ACETAMINOPHEN 1000 MG: 500 TABLET ORAL at 05:08

## 2023-08-14 RX ADMIN — ENOXAPARIN SODIUM 40 MG: 40 INJECTION SUBCUTANEOUS at 08:08

## 2023-08-14 RX ADMIN — PANTOPRAZOLE SODIUM 40 MG: 40 INJECTION, POWDER, FOR SOLUTION INTRAVENOUS at 08:08

## 2023-08-14 RX ADMIN — METHOCARBAMOL 1000 MG: 500 TABLET ORAL at 05:08

## 2023-08-14 RX ADMIN — METHOCARBAMOL 1000 MG: 500 TABLET ORAL at 02:08

## 2023-08-14 RX ADMIN — HYDROMORPHONE HYDROCHLORIDE 1.5 MG: 1 INJECTION, SOLUTION INTRAMUSCULAR; INTRAVENOUS; SUBCUTANEOUS at 05:08

## 2023-08-14 RX ADMIN — OXYCODONE HYDROCHLORIDE 15 MG: 10 TABLET ORAL at 04:08

## 2023-08-14 RX ADMIN — HYDROMORPHONE HYDROCHLORIDE 2 MG: 2 INJECTION, SOLUTION INTRAMUSCULAR; INTRAVENOUS; SUBCUTANEOUS at 03:08

## 2023-08-14 RX ADMIN — ACETAMINOPHEN 1000 MG: 500 TABLET ORAL at 01:08

## 2023-08-14 RX ADMIN — OXYCODONE HYDROCHLORIDE 20 MG: 10 TABLET ORAL at 09:08

## 2023-08-14 NOTE — PLAN OF CARE
[facility]  Discharge Reassessment    Primary Care Provider: Coretta, Primary Doctor    Expected Discharge Date: 8/16/2023    Patient is not medically ready for discharge at this time.       Interval History: no cute events overnite, still requesting IV dilaudid for pain control, does not want to change to oral   Post-Op Info:  Procedure(s) (LRB):  REPAIR, HERNIA, VENTRAL, RECURRENT, ERAS low (N/A)  REVISION, ILEOSTOMY (N/A)  LYSIS, ADHESIONS (N/A)   7 Days Post-Op        Reassessment (most recent)       Discharge Reassessment - 08/14/23 1830          Discharge Reassessment    Assessment Type Discharge Planning Reassessment     Did the patient's condition or plan change since previous assessment? No     Discharge Plan A Home with family     Discharge Plan B Home Health     DME Needed Upon Discharge  none     Transition of Care Barriers None     Why the patient remains in the hospital Requires continued medical care

## 2023-08-14 NOTE — PROGRESS NOTES
[facility]  Colorectal Surgery  Progress Note    Patient Name: Georgette Abrams  MRN: [id number]  Admission Date: 8/7/2023  Hospital Length of Stay: 7 days  Attending Physician: KRISH Tracy MD    Subjective:     Interval History: no cute events overnite, still requesting IV dilaudid for pain control, does not want to change to oral   Post-Op Info:  Procedure(s) (LRB):  REPAIR, HERNIA, VENTRAL, RECURRENT, ERAS low (N/A)  REVISION, ILEOSTOMY (N/A)  LYSIS, ADHESIONS (N/A)   7 Days Post-Op      Medications:  Continuous Infusions:   dextrose 5 % and 0.45 % NaCl with KCl 20 mEq 40 mL/hr at 08/13/23 1806     Scheduled Meds:   acetaminophen  1,000 mg Oral Q8H    enoxparin  40 mg Subcutaneous Q12H (prophylaxis, 0900/2100)    gabapentin  300 mg Oral TID    ibuprofen  800 mg Oral Q8H    losartan  100 mg Oral Daily    methocarbamoL  1,000 mg Oral QID    pantoprazole  40 mg Intravenous Daily    venlafaxine  150 mg Oral Daily     PRN Meds:   aluminum-magnesium hydroxide-simethicone    calcium carbonate    clonazePAM    HYDROmorphone    LIDOcaine (PF) 10 mg/ml (1%)    ondansetron    oxyCODONE    oxyCODONE    sodium chloride 0.9%        Objective:     Vital Signs (Most Recent):  Temp: 97.9 °F (36.6 °C) (08/14/23 0728)  Pulse: 81 (08/14/23 0728)  Resp: 16 (08/14/23 1032)  BP: (!) 142/71 (08/14/23 0842)  SpO2: 98 % (08/14/23 0728) Vital Signs (24h Range):  Temp:  [97.4 °F (36.3 °C)-98.4 °F (36.9 °C)] 97.9 °F (36.6 °C)  Pulse:  [] 81  Resp:  [16-20] 16  SpO2:  [97 %-100 %] 98 %  BP: (142-165)/(60-80) 142/71     Intake/Output - Last 3 Shifts         08/12 0700  08/13 0659 08/13 0700 08/14 0659 08/14 0700  08/15 0659    P.O.  960     I.V. (mL/kg)       Total Intake(mL/kg)  960 (7.7)     Urine (mL/kg/hr) 0 (0) 0 (0)     Drains 70 90     Stool 50 0     Total Output 120 90     Net -120 +870            Urine Occurrence 4 x 3 x     Stool Occurrence 0 x 2 x 0 x             Physical Exam  Vitals and  nursing note reviewed.   Constitutional:       Appearance: She is well-developed.   HENT:      Head: Normocephalic.   Eyes:      Pupils: Pupils are equal, round, and reactive to light.   Cardiovascular:      Rate and Rhythm: Normal rate and regular rhythm.      Heart sounds: Normal heart sounds.   Pulmonary:      Effort: Pulmonary effort is normal. No respiratory distress.      Breath sounds: Normal breath sounds. No wheezing or rales.   Abdominal:      Palpations: Abdomen is soft. There is no mass.      Tenderness: There is no guarding or rebound.      Comments: Abd inc line healing well   Skin:     General: Skin is warm and dry.   Neurological:      Mental Status: She is alert and oriented to person, place, and time.            Significant Labs:  BMP (Last 3 Results):   Recent Labs   Lab 08/08/23  0610   *      K 4.1      CO2 16*   BUN 8   CREATININE 0.8   CALCIUM 8.7   MG 1.6     CBC (Last 3 Results):   Recent Labs   Lab 08/08/23  0610   WBC 22.67*   RBC 5.01   HGB 15.1   HCT 45.6      MCV 91   MCH 30.1   MCHC 33.1       Significant Diagnostics:  None    Assessment/Plan:     * Incisional hernia with obstruction but no gangrene  OR 8/7 ventral hernia repair with repair of ileostomy    - multimodal pain control; will try to start weaning opioids, only wants IV dilaudid, will have Dr. Tracy discuss with pt  - dvt ppx  - prn nausea meds  - continue trending CRP  - continue to monitor ostomy output  - LRD          Nell Gaytan NP  Colorectal Surgery  [facility]

## 2023-08-14 NOTE — PT/OT/SLP PROGRESS
Physical Therapy Treatment    Patient Name:  Georgette Abrams   MRN:  [id number]    Recommendations:     Discharge Recommendations: other (see comments)  Discharge Equipment Recommendations: none  Barriers to discharge: None    Assessment:     Georgette Abrams is a 38 y.o. female admitted with a medical diagnosis of Incisional hernia with obstruction but no gangrene.  She presents with the following impairments/functional limitations: impaired balance, weakness, impaired endurance, impaired functional mobility, gait instability, pain . Pt continues to remain motivated and cooperative with treatment session. Pt Progressing with PT Intervention. Pt would continue to benefit from skilled PT to address overall functional mobility, goals , and to return to functional baseline.  Goals remain appropriate.      Rehab Prognosis: Good; patient would benefit from acute skilled PT services to address these deficits and reach maximum level of function.    Recent Surgery: Procedure(s) (LRB):  REPAIR, HERNIA, VENTRAL, RECURRENT, ERAS low (N/A)  REVISION, ILEOSTOMY (N/A)  LYSIS, ADHESIONS (N/A) 7 Days Post-Op    Plan:     During this hospitalization, patient to be seen 3 x/week to address the identified rehab impairments via gait training, therapeutic activities, therapeutic exercises, neuromuscular re-education and progress toward the following goals:    Plan of Care Expires:  09/08/23    Subjective     Chief Complaint: abd soreness  Patient/Family Comments/goals: I am walking more now  Pain/Comfort:  Pain Rating 1: 5/10  Location - Orientation 1: generalized  Location 1: abdomen  Pain Addressed 1: Reposition, Distraction, Cessation of Activity      Objective:     Communicated with RN prior to session.  Patient found ambulatory in room/mendoza with  (peripheral IV; wound vac; colostomy; CONSTANZA drain) upon PT entry to room.     General Precautions: Standard, fall  Orthopedic Precautions: N/A  Braces: N/A  Respiratory Status: Room  air     Functional Mobility:  Transfers:     Sit to Stand:  stand by assistance with no AD  Gait: pt ambulated 100 ft with no aD SBA/CGA.reaching out for external support  Pt demo  wide [place], holding onto wall rail and walls in spite of cues, slow marge decreased foot clearance      AM-PAC 6 CLICK MOBILITY  Turning over in bed (including adjusting bedclothes, sheets and blankets)?: 4  Sitting down on and standing up from a chair with arms (e.g., wheelchair, bedside commode, etc.): 4  Moving from lying on back to sitting on the side of the bed?: 3  Moving to and from a bed to a chair (including a wheelchair)?: 3  Need to walk in hospital room?: 3  Climbing 3-5 steps with a railing?: 3  Basic Mobility Total Score: 20       Treatment & Education:  Therapist provided instruction and educated of  patient on progress, safety,d/c,PT POC,   proper body mechanics, energy conservation, and fall prevention strategies during tasks listed above, on the effects of prolonged immobility and the importance of performing OOB activity and exercises to promote healing and reduce recovery time        Updated white board with appropriate PT mobility information for medical team notification      Donned an extra gown      Call nursing/pct to transfer to chair/use bathroom. Pt stated understanding        Bedside table in front of patient and area set up for function, convenience, and safety. RN aware of patient's mobility needs and status. Questions/concerns addressed within PTA scope of practice; patient  with no further questions. Time was provided for active listening, discussion of health disposition, and discussion of safe discharge. Pt?verbalized?agreement .     Patient left up in chair with all lines intact, call button in reach, nsg notified, and family present..    GOALS:   Multidisciplinary Problems       Physical Therapy Goals          Problem: Physical Therapy    Goal Priority Disciplines Outcome Goal Variances Interventions    Physical Therapy Goal     PT, PT/OT Ongoing, Progressing     Description: Goals to be met by:      Patient will increase functional independence with mobility by performin. Supine to sit with Modified [place]  2. Sit to supine with Modified [place]  3. Sit to stand transfer with Modified [place]  4. Gait  x 300 feet with Modified [place] using no AD.                          Time Tracking:     PT Received On: 23  PT Start Time: 1140     PT Stop Time: 1154  PT Total Time (min): 14 min     Billable Minutes: Gait Training 14    Treatment Type: Treatment  PT/PTA: PTA     Number of PTA visits since last PT visit: 2     2023

## 2023-08-14 NOTE — PLAN OF CARE
Problem: Adult Inpatient Plan of Care  Goal: Plan of Care Review  8/14/2023 0626 by Isabel Kay RN  Outcome: Ongoing, Progressing  8/14/2023 0626 by Isabel Kay RN  Outcome: Ongoing, Progressing  Goal: Patient-Specific Goal (Individualized)  8/14/2023 0626 by Isabel Kay RN  Outcome: Ongoing, Progressing  8/14/2023 0626 by Isabel Kay RN  Outcome: Ongoing, Progressing  Goal: Absence of Hospital-Acquired Illness or Injury  8/14/2023 0626 by Isabel Kay RN  Outcome: Ongoing, Progressing  8/14/2023 0626 by Isabel Kay RN  Outcome: Ongoing, Progressing  Goal: Optimal Comfort and Wellbeing  8/14/2023 0626 by Isabel Kay RN  Outcome: Ongoing, Progressing  8/14/2023 0626 by Isabel Kay RN  Outcome: Ongoing, Progressing  Goal: Readiness for Transition of Care  8/14/2023 0626 by Isabel Kay RN  Outcome: Ongoing, Progressing  8/14/2023 0626 by Isabel Kay RN  Outcome: Ongoing, Progressing     Problem: Bariatric Environmental Safety  Goal: Safety Maintained with Care  8/14/2023 0626 by Isabel Kay RN  Outcome: Ongoing, Progressing  8/14/2023 0626 by Isabel Kay RN  Outcome: Ongoing, Progressing     Problem: Infection  Goal: Absence of Infection Signs and Symptoms  8/14/2023 0626 by Isabel Kay RN  Outcome: Ongoing, Progressing  8/14/2023 0626 by Isabel Kay RN  Outcome: Ongoing, Progressing     Problem: Fall Injury Risk  Goal: Absence of Fall and Fall-Related Injury  8/14/2023 0626 by Isabel Kay RN  Outcome: Ongoing, Progressing  8/14/2023 0626 by Isabel Kay RN  Outcome: Ongoing, Progressing     Problem: Pain Acute  Goal: Acceptable Pain Control and Functional Ability  8/14/2023 0626 by Isabel Kay RN  Outcome: Ongoing, Progressing  8/14/2023 0626 by Isabel Kay RN  Outcome: Ongoing, Progressing

## 2023-08-14 NOTE — ASSESSMENT & PLAN NOTE
OR 8/7 ventral hernia repair with repair of ileostomy    - multimodal pain control; will try to start weaning opioids, only wants IV dilaudid, will have Dr. Tracy discuss with pt  - dvt ppx  - prn nausea meds  - continue trending CRP  - continue to monitor ostomy output  - LRD

## 2023-08-14 NOTE — SUBJECTIVE & OBJECTIVE
Subjective:     Interval History: no cute events overnite, still requesting IV dilaudid for pain control, does not want to change to oral   Post-Op Info:  Procedure(s) (LRB):  REPAIR, HERNIA, VENTRAL, RECURRENT, ERAS low (N/A)  REVISION, ILEOSTOMY (N/A)  LYSIS, ADHESIONS (N/A)   7 Days Post-Op      Medications:  Continuous Infusions:   dextrose 5 % and 0.45 % NaCl with KCl 20 mEq 40 mL/hr at 08/13/23 1806     Scheduled Meds:   acetaminophen  1,000 mg Oral Q8H    enoxparin  40 mg Subcutaneous Q12H (prophylaxis, 0900/2100)    gabapentin  300 mg Oral TID    ibuprofen  800 mg Oral Q8H    losartan  100 mg Oral Daily    methocarbamoL  1,000 mg Oral QID    pantoprazole  40 mg Intravenous Daily    venlafaxine  150 mg Oral Daily     PRN Meds:   aluminum-magnesium hydroxide-simethicone    calcium carbonate    clonazePAM    HYDROmorphone    LIDOcaine (PF) 10 mg/ml (1%)    ondansetron    oxyCODONE    oxyCODONE    sodium chloride 0.9%        Objective:     Vital Signs (Most Recent):  Temp: 97.9 °F (36.6 °C) (08/14/23 0728)  Pulse: 81 (08/14/23 0728)  Resp: 16 (08/14/23 1032)  BP: (!) 142/71 (08/14/23 0842)  SpO2: 98 % (08/14/23 0728) Vital Signs (24h Range):  Temp:  [97.4 °F (36.3 °C)-98.4 °F (36.9 °C)] 97.9 °F (36.6 °C)  Pulse:  [] 81  Resp:  [16-20] 16  SpO2:  [97 %-100 %] 98 %  BP: (142-165)/(60-80) 142/71     Intake/Output - Last 3 Shifts         08/12 0700 08/13 0659 08/13 0700 08/14 0659 08/14 0700  08/15 0659    P.O.  960     I.V. (mL/kg)       Total Intake(mL/kg)  960 (7.7)     Urine (mL/kg/hr) 0 (0) 0 (0)     Drains 70 90     Stool 50 0     Total Output 120 90     Net -120 +870            Urine Occurrence 4 x 3 x     Stool Occurrence 0 x 2 x 0 x             Physical Exam  Vitals and nursing note reviewed.   Constitutional:       Appearance: She is well-developed.   HENT:      Head: Normocephalic.   Eyes:      Pupils: Pupils are equal, round, and reactive to light.   Cardiovascular:      Rate and Rhythm: Normal  rate and regular rhythm.      Heart sounds: Normal heart sounds.   Pulmonary:      Effort: Pulmonary effort is normal. No respiratory distress.      Breath sounds: Normal breath sounds. No wheezing or rales.   Abdominal:      Palpations: Abdomen is soft. There is no mass.      Tenderness: There is no guarding or rebound.      Comments: Abd inc line healing well   Skin:     General: Skin is warm and dry.   Neurological:      Mental Status: She is alert and oriented to person, place, and time.            Significant Labs:  BMP (Last 3 Results):   Recent Labs   Lab 08/08/23  0610   *      K 4.1      CO2 16*   BUN 8   CREATININE 0.8   CALCIUM 8.7   MG 1.6     CBC (Last 3 Results):   Recent Labs   Lab 08/08/23  0610   WBC 22.67*   RBC 5.01   HGB 15.1   HCT 45.6      MCV 91   MCH 30.1   MCHC 33.1       Significant Diagnostics:  None

## 2023-08-14 NOTE — NURSING
[facility] Plan of Care Note  Dx small bowel obstruction     Shift Events n/a     Goals of Care: pain control     Neuro: AAOx4     Vital Signs: stable     Respiratory: room air     Diet: low fiber low residue     Is patient tolerating current diet? yes     GTTS: n/a     Urine Output/Bowel Movement:      Drains/Tubes/Tube Feeds (include total output/shift): ileostomy-no measurable output left CONSTANZA drain 50 mls wound vac d/c'd     Lines: midline and transverse abd DENNISE        Accuchecks:n/a     Skin: incision      Fall Risk Score: see flowsheet     Activity level? independent     Any scheduled procedures? N/a

## 2023-08-15 LAB — CRP SERPL-MCNC: 55.9 MG/L (ref 0–8.2)

## 2023-08-15 PROCEDURE — 63600175 PHARM REV CODE 636 W HCPCS: Performed by: COLON & RECTAL SURGERY

## 2023-08-15 PROCEDURE — 25000003 PHARM REV CODE 250: Performed by: SURGERY

## 2023-08-15 PROCEDURE — 20600001 HC STEP DOWN PRIVATE ROOM

## 2023-08-15 PROCEDURE — 86140 C-REACTIVE PROTEIN: CPT | Performed by: SURGERY

## 2023-08-15 PROCEDURE — 25000003 PHARM REV CODE 250: Performed by: NURSE PRACTITIONER

## 2023-08-15 PROCEDURE — 94761 N-INVAS EAR/PLS OXIMETRY MLT: CPT

## 2023-08-15 PROCEDURE — 36415 COLL VENOUS BLD VENIPUNCTURE: CPT | Performed by: SURGERY

## 2023-08-15 PROCEDURE — 25000003 PHARM REV CODE 250: Performed by: COLON & RECTAL SURGERY

## 2023-08-15 PROCEDURE — 97116 GAIT TRAINING THERAPY: CPT | Mod: CQ

## 2023-08-15 PROCEDURE — 63600175 PHARM REV CODE 636 W HCPCS: Performed by: SURGERY

## 2023-08-15 RX ORDER — FLUCONAZOLE 150 MG/1
150 TABLET ORAL ONCE
Status: COMPLETED | OUTPATIENT
Start: 2023-08-15 | End: 2023-08-15

## 2023-08-15 RX ADMIN — IBUPROFEN 800 MG: 400 TABLET ORAL at 09:08

## 2023-08-15 RX ADMIN — IBUPROFEN 800 MG: 400 TABLET ORAL at 02:08

## 2023-08-15 RX ADMIN — HYDROMORPHONE HYDROCHLORIDE 2 MG: 2 INJECTION, SOLUTION INTRAMUSCULAR; INTRAVENOUS; SUBCUTANEOUS at 06:08

## 2023-08-15 RX ADMIN — IBUPROFEN 800 MG: 400 TABLET ORAL at 05:08

## 2023-08-15 RX ADMIN — ENOXAPARIN SODIUM 40 MG: 40 INJECTION SUBCUTANEOUS at 08:08

## 2023-08-15 RX ADMIN — GABAPENTIN 300 MG: 300 CAPSULE ORAL at 08:08

## 2023-08-15 RX ADMIN — METHOCARBAMOL 1000 MG: 500 TABLET ORAL at 10:08

## 2023-08-15 RX ADMIN — METHOCARBAMOL 1000 MG: 500 TABLET ORAL at 02:08

## 2023-08-15 RX ADMIN — ACETAMINOPHEN 1000 MG: 500 TABLET ORAL at 05:08

## 2023-08-15 RX ADMIN — ENOXAPARIN SODIUM 40 MG: 40 INJECTION SUBCUTANEOUS at 09:08

## 2023-08-15 RX ADMIN — HYDROMORPHONE HYDROCHLORIDE 2 MG: 2 INJECTION, SOLUTION INTRAMUSCULAR; INTRAVENOUS; SUBCUTANEOUS at 10:08

## 2023-08-15 RX ADMIN — OXYCODONE HYDROCHLORIDE 20 MG: 10 TABLET ORAL at 05:08

## 2023-08-15 RX ADMIN — VENLAFAXINE 150 MG: 75 TABLET ORAL at 05:08

## 2023-08-15 RX ADMIN — ACETAMINOPHEN 1000 MG: 500 TABLET ORAL at 10:08

## 2023-08-15 RX ADMIN — METHOCARBAMOL 1000 MG: 500 TABLET ORAL at 05:08

## 2023-08-15 RX ADMIN — PANTOPRAZOLE SODIUM 40 MG: 40 TABLET, DELAYED RELEASE ORAL at 08:08

## 2023-08-15 RX ADMIN — METHOCARBAMOL 1000 MG: 500 TABLET ORAL at 08:08

## 2023-08-15 RX ADMIN — HYDROMORPHONE HYDROCHLORIDE 2 MG: 2 INJECTION, SOLUTION INTRAMUSCULAR; INTRAVENOUS; SUBCUTANEOUS at 03:08

## 2023-08-15 RX ADMIN — ACETAMINOPHEN 1000 MG: 500 TABLET ORAL at 02:08

## 2023-08-15 RX ADMIN — HYDROMORPHONE HYDROCHLORIDE 2 MG: 2 INJECTION, SOLUTION INTRAMUSCULAR; INTRAVENOUS; SUBCUTANEOUS at 07:08

## 2023-08-15 RX ADMIN — OXYCODONE HYDROCHLORIDE 20 MG: 10 TABLET ORAL at 10:08

## 2023-08-15 RX ADMIN — OXYCODONE HYDROCHLORIDE 20 MG: 10 TABLET ORAL at 02:08

## 2023-08-15 RX ADMIN — GABAPENTIN 300 MG: 300 CAPSULE ORAL at 02:08

## 2023-08-15 RX ADMIN — OXYCODONE HYDROCHLORIDE 20 MG: 10 TABLET ORAL at 01:08

## 2023-08-15 RX ADMIN — FLUCONAZOLE 150 MG: 150 TABLET ORAL at 10:08

## 2023-08-15 RX ADMIN — LOSARTAN POTASSIUM 100 MG: 50 TABLET, FILM COATED ORAL at 08:08

## 2023-08-15 RX ADMIN — OXYCODONE HYDROCHLORIDE 20 MG: 10 TABLET ORAL at 08:08

## 2023-08-15 RX ADMIN — HYDROMORPHONE HYDROCHLORIDE 2 MG: 2 INJECTION, SOLUTION INTRAMUSCULAR; INTRAVENOUS; SUBCUTANEOUS at 02:08

## 2023-08-15 NOTE — SUBJECTIVE & OBJECTIVE
Subjective:     Interval History: no acute events overnite, requiring an increase in IV dilauid for pain control, norma diet, VS stable, appears to be comfortable  Post-Op Info:  Procedure(s) (LRB):  REPAIR, HERNIA, VENTRAL, RECURRENT, ERAS low (N/A)  REVISION, ILEOSTOMY (N/A)  LYSIS, ADHESIONS (N/A)   8 Days Post-Op      Medications:  Continuous Infusions:  Scheduled Meds:   acetaminophen  1,000 mg Oral Q8H    enoxparin  40 mg Subcutaneous Q12H (prophylaxis, 0900/2100)    gabapentin  300 mg Oral TID    ibuprofen  800 mg Oral Q8H    losartan  100 mg Oral Daily    methocarbamoL  1,000 mg Oral QID    pantoprazole  40 mg Oral Daily    venlafaxine  150 mg Oral Daily     PRN Meds:   aluminum-magnesium hydroxide-simethicone    calcium carbonate    clonazePAM    HYDROmorphone    LIDOcaine (PF) 10 mg/ml (1%)    ondansetron    oxyCODONE    oxyCODONE    sodium chloride 0.9%        Objective:     Vital Signs (Most Recent):  Temp: 97.6 °F (36.4 °C) (08/15/23 0803)  Pulse: 74 (08/15/23 0803)  Resp: 16 (08/15/23 1053)  BP: 122/75 (08/15/23 0814)  SpO2: (!) 94 % (08/15/23 0945) Vital Signs (24h Range):  Temp:  [97.5 °F (36.4 °C)-97.8 °F (36.6 °C)] 97.6 °F (36.4 °C)  Pulse:  [74-94] 74  Resp:  [16-20] 16  SpO2:  [93 %-100 %] 94 %  BP: (122-171)/(61-86) 122/75     Intake/Output - Last 3 Shifts         08/13 0700  08/14 0659 08/14 0700  08/15 0659 08/15 0700  08/16 0659    P.O. 960 1080     Total Intake(mL/kg) 960 (7.7) 1080 (8.7)     Urine (mL/kg/hr) 0 (0) 0 (0)     Drains 90 100     Stool 0 0     Total Output 90 100     Net +870 +980            Urine Occurrence 3 x 3 x     Stool Occurrence 2 x 1 x 0 x             Physical Exam  Vitals and nursing note reviewed.   Constitutional:       Appearance: She is well-developed.   HENT:      Head: Normocephalic.   Eyes:      Pupils: Pupils are equal, round, and reactive to light.   Cardiovascular:      Rate and Rhythm: Normal rate and regular rhythm.      Heart sounds: Normal heart sounds.    Pulmonary:      Effort: Pulmonary effort is normal. No respiratory distress.      Breath sounds: Normal breath sounds. No wheezing or rales.   Abdominal:      Palpations: Abdomen is soft. There is no mass.      Tenderness: There is no guarding or rebound.      Comments: Inc line healign well  Ileostomy functiional  CONSTANZA serous drainage   Skin:     General: Skin is warm and dry.   Neurological:      Mental Status: She is alert and oriented to person, place, and time.                    Significant Diagnostics:  None

## 2023-08-15 NOTE — CONSULTS
[facility]  Wound Care    Patient Name:  Georgette Abrmas   MRN:  [id number]  Date: 8/15/2023  Diagnosis: Incisional hernia with obstruction but no gangrene    History:     Past Medical History:   Diagnosis Date    Anxiety     Familial polyposis     S/P total colectomy        Social History     Socioeconomic History    Marital status:    Tobacco Use    Smoking status: Former     Current packs/day: 0.00    Smokeless tobacco: Never   Substance and Sexual Activity    Alcohol use: Yes     Alcohol/week: 0.0 standard drinks of alcohol     Comment: seldom    Drug use: No    Sexual activity: Yes     Partners: Male     Birth control/protection: None     Comment: , lives in [place]     Social Determinants of Health     Financial Resource Strain: Low Risk  (8/8/2023)    Overall Financial Resource Strain (CARDIA)     Difficulty of Paying Living Expenses: Not very hard   Food Insecurity: No Food Insecurity (8/8/2023)    Hunger Vital Sign     Worried About Running Out of Food in the Last Year: Never true     Ran Out of Food in the Last Year: Never true   Transportation Needs: No Transportation Needs (8/8/2023)    PRAPARE - Transportation     Lack of Transportation (Medical): No     Lack of Transportation (Non-Medical): No   Physical Activity: Unknown (8/8/2023)    Exercise Vital Sign     Days of Exercise per Week: 4 days   Social Connections: Unknown (8/8/2023)    Social Connection and Isolation Panel [NHANES]     Frequency of Communication with Friends and Family: Patient refused     Frequency of Social Gatherings with Friends and Family: Patient refused     Attends Hoah[employer] Services: 1 to 4 times per year     Active Member of Clubs or Organizations: No     Attends Club or Organization Meetings: Never     Marital Status:    Housing Stability: Unknown (8/8/2023)    Housing Stability Vital Sign     Unable to Pay for Housing in the Last Year: No     Unstable Housing in the Last Year: No        Precautions:     Allergies as of 07/14/2023 - Reviewed 07/14/2023   Allergen Reaction Noted    Hydrocodone-acetaminophen Itching 08/14/2012    Levofloxacin Nausea Only 08/14/2012    Morphine Other (See Comments) 08/14/2012    Sulfa (sulfonamide antibiotics) Nausea Only 08/14/2012       WO Assessment Details/Treatment     Patient seen for wound care consultation for ostomy.   Reviewed chart for this encounter.   See Flow Sheet for findings.    Pt just getting out of shower at time of my arrival, agreeable to care at this time. Pt asked for dressing change to ostomy takedown site and CONSTANZA drain - both dressings removed. Cleansed takedown site w/ NS and patted dry, applied clean island boarder dressing. Split gauze dressing placed to L abd CONSTANZA drain. Extra ostomy supplies given to pt, no needs w/ stoma care at this time. Pt independent.       Nursing to continue care.  Nursing to maintain pressure injury prevention interventions.           08/15/23 1001   WOCN Assessment   WOCN Total Time (mins) 25   Visit Date 08/15/23   Visit Time 1001   Consult Type New   WOCN Speciality Wound   Intervention assessed;changed;applied;chart review;coordination of care   Teaching on-going

## 2023-08-15 NOTE — NURSING
[facility] Plan of Care Note  Dx small bowel obstruction     Shift Events n/a     Goals of Care: pain control     Neuro: AAOx4     Vital Signs: stable     Respiratory: room air     Diet: low fiber low residue     Is patient tolerating current diet? yes     GTTS: n/a     Urine Output/Bowel Movement: adequate urine output     Drains/Tubes/Tube Feeds (include total output/shift): ileostomy-50 ml left CONSTANZA drain 50 mls      Lines: midline and transverse abd DENNISE        Accuchecks:n/a     Skin: incision      Fall Risk Score: see flowsheet     Activity level? independent     Any scheduled procedures? N/a

## 2023-08-15 NOTE — PT/OT/SLP PROGRESS
Physical Therapy Treatment    Patient Name:  Georgette Abrams   MRN:  [id number]    Recommendations:     Discharge Recommendations: other (see comments)  Discharge Equipment Recommendations: none  Barriers to discharge: None    Assessment:     Georgette Abrams is a 38 y.o. female admitted with a medical diagnosis of Incisional hernia with obstruction but no gangrene.  She presents with the following impairments/functional limitations: impaired balance, weakness, impaired endurance, impaired functional mobility, gait instability, pain .Pt  tolerated treatment  well and is progressing  with mobility.  Patient remains appropriate for continued skilled services within the acute environment and goals remain appropriate.      Rehab Prognosis: Good; patient would benefit from acute skilled PT services to address these deficits and reach maximum level of function.    Recent Surgery: Procedure(s) (LRB):  REPAIR, HERNIA, VENTRAL, RECURRENT, ERAS low (N/A)  REVISION, ILEOSTOMY (N/A)  LYSIS, ADHESIONS (N/A) 8 Days Post-Op    Plan:     During this hospitalization, patient to be seen 3 x/week to address the identified rehab impairments via gait training, therapeutic activities, therapeutic exercises, neuromuscular re-education and progress toward the following goals:    Plan of Care Expires:  09/08/23    Subjective     Chief Complaint: abd soreness  Patient/Family Comments/goals: I am going to go take a shower  Pain/Comfort:  Pain Rating 1: 5/10  Location - Orientation 1: generalized  Location 1: abdomen  Pain Addressed 1: Reposition, Distraction, Cessation of Activity      Objective:     Communicated with RN prior to session.  Patient found HOB elevated with  (peripheral IV; wound vac; colostomy; CONSTANZA drain) upon PT entry to room.     General Precautions: Standard, fall  Orthopedic Precautions: N/A  Braces: N/A  Respiratory Status: Room air     Functional Mobility:  Bed Mobility:     Rolling Left:  stand by  assistance  Scooting: supervision  Supine to Sit: stand by assistance  Transfers:     Sit to Stand:  stand by assistance with no AD  Gait: pt ambulated 250 ft with no SD SBA.reaching out for external support  Pt demo  wide BALDE[place], holding onto wall rail and walls, slow marge decreased foot clearance    AM-PAC 6 CLICK MOBILITY  Turning over in bed (including adjusting bedclothes, sheets and blankets)?: 4  Sitting down on and standing up from a chair with arms (e.g., wheelchair, bedside commode, etc.): 4  Moving from lying on back to sitting on the side of the bed?: 3  Moving to and from a bed to a chair (including a wheelchair)?: 3  Need to walk in hospital room?: 3  Climbing 3-5 steps with a railing?: 3  Basic Mobility Total Score: 20       Treatment & Education:  Therapist provided instruction and educated of  patient on progress, safety,d/c,PT POC,   proper body mechanics, energy conservation, and fall prevention strategies during tasks listed above, on the effects of prolonged immobility and the importance of performing OOB activity and exercises to promote healing and reduce recovery time        Updated white board with appropriate PT mobility information for medical team notification  Call nursing/pct to transfer to chair/use bathroom. Pt stated understanding        Bedside table in front of patient and area set up for function, convenience, and safety. RN aware of patient's mobility needs and status. Questions/concerns addressed within PTA scope of practice; patient  with no further questions. Time was provided for active listening, discussion of health disposition, and discussion of safe discharge. Pt?verbalized?agreement .     Patient left up in chair with all lines intact, call button in reach, nsg notified    GOALS:   Multidisciplinary Problems       Physical Therapy Goals          Problem: Physical Therapy    Goal Priority Disciplines Outcome Goal Variances Interventions   Physical Therapy Goal     PT,  PT/OT Ongoing, Progressing     Description: Goals to be met by:      Patient will increase functional independence with mobility by performin. Supine to sit with Modified [place]  2. Sit to supine with Modified [place]  3. Sit to stand transfer with Modified [place]  4. Gait  x 300 feet with Modified [place] using no AD.                          Time Tracking:     PT Received On: 08/15/23  PT Start Time: 856     PT Stop Time: 909  PT Total Time (min): 13 min     Billable Minutes: Gait Training 13    Treatment Type: Treatment  PT/PTA: PTA     Number of PTA visits since last PT visit: 3     08/15/2023

## 2023-08-15 NOTE — PROGRESS NOTES
[facility]  Colorectal Surgery  Progress Note    Patient Name: Georgette Abrams  MRN: [id number]  Admission Date: 8/7/2023  Hospital Length of Stay: 8 days  Attending Physician: KRISH Tracy MD    Subjective:     Interval History: no acute events overnite, requiring an increase in IV dilauid for pain control, norma diet, VS stable, appears to be comfortable  Post-Op Info:  Procedure(s) (LRB):  REPAIR, HERNIA, VENTRAL, RECURRENT, ERAS low (N/A)  REVISION, ILEOSTOMY (N/A)  LYSIS, ADHESIONS (N/A)   8 Days Post-Op      Medications:  Continuous Infusions:  Scheduled Meds:   acetaminophen  1,000 mg Oral Q8H    enoxparin  40 mg Subcutaneous Q12H (prophylaxis, 0900/2100)    gabapentin  300 mg Oral TID    ibuprofen  800 mg Oral Q8H    losartan  100 mg Oral Daily    methocarbamoL  1,000 mg Oral QID    pantoprazole  40 mg Oral Daily    venlafaxine  150 mg Oral Daily     PRN Meds:   aluminum-magnesium hydroxide-simethicone    calcium carbonate    clonazePAM    HYDROmorphone    LIDOcaine (PF) 10 mg/ml (1%)    ondansetron    oxyCODONE    oxyCODONE    sodium chloride 0.9%        Objective:     Vital Signs (Most Recent):  Temp: 97.6 °F (36.4 °C) (08/15/23 0803)  Pulse: 74 (08/15/23 0803)  Resp: 16 (08/15/23 1053)  BP: 122/75 (08/15/23 0814)  SpO2: (!) 94 % (08/15/23 0945) Vital Signs (24h Range):  Temp:  [97.5 °F (36.4 °C)-97.8 °F (36.6 °C)] 97.6 °F (36.4 °C)  Pulse:  [74-94] 74  Resp:  [16-20] 16  SpO2:  [93 %-100 %] 94 %  BP: (122-171)/(61-86) 122/75     Intake/Output - Last 3 Shifts         08/13 0700  08/14 0659 08/14 0700  08/15 0659 08/15 0700  08/16 0659    P.O. 960 1080     Total Intake(mL/kg) 960 (7.7) 1080 (8.7)     Urine (mL/kg/hr) 0 (0) 0 (0)     Drains 90 100     Stool 0 0     Total Output 90 100     Net +870 +980            Urine Occurrence 3 x 3 x     Stool Occurrence 2 x 1 x 0 x             Physical Exam  Vitals and nursing note reviewed.   Constitutional:       Appearance: She is  well-developed.   HENT:      Head: Normocephalic.   Eyes:      Pupils: Pupils are equal, round, and reactive to light.   Cardiovascular:      Rate and Rhythm: Normal rate and regular rhythm.      Heart sounds: Normal heart sounds.   Pulmonary:      Effort: Pulmonary effort is normal. No respiratory distress.      Breath sounds: Normal breath sounds. No wheezing or rales.   Abdominal:      Palpations: Abdomen is soft. There is no mass.      Tenderness: There is no guarding or rebound.      Comments: Inc line healign well  Ileostomy functiional  CONSTANZA serous drainage   Skin:     General: Skin is warm and dry.   Neurological:      Mental Status: She is alert and oriented to person, place, and time.                    Significant Diagnostics:  None    Assessment/Plan:     * Incisional hernia with obstruction but no gangrene  OR 8/7 ventral hernia repair with repair of ileostomy    - multimodal pain control; will try to start weaning opioids, only wants IV dilaudid, will have Dr. Tracy discuss with pt  - dvt ppx  - prn nausea meds  - continue trending CRP  - continue to monitor ostomy output  - LRD          Nell Gaytan NP  Colorectal Surgery  [facility]

## 2023-08-15 NOTE — PLAN OF CARE
[facility] Plan of Care Note  Dx Incisional hernia with obstruction but no gangrene    Shift Events N/A    Goals of Care: Pain management    Neuro: AOx4    Vital Signs: VSS    Respiratory: RA    Diet: Low fiber low residue    Is patient tolerating current diet? yes    GTTS: N/A    Urine Output/Bowel Movement: Up to bathroom    Drains/Tubes/Tube Feeds (include total output/shift): CONSTANZA drain, ileostomy    Lines: midline LFA      Accuchecks:N/A    Skin: midline incision DENNISE    Fall Risk Score: YES    Activity level? Independent    Any scheduled procedures? no

## 2023-08-16 VITALS
SYSTOLIC BLOOD PRESSURE: 137 MMHG | DIASTOLIC BLOOD PRESSURE: 76 MMHG | HEART RATE: 85 BPM | BODY MASS INDEX: 41.39 KG/M2 | HEIGHT: 68 IN | OXYGEN SATURATION: 97 % | WEIGHT: 273.13 LBS | TEMPERATURE: 98 F | RESPIRATION RATE: 16 BRPM

## 2023-08-16 LAB — CRP SERPL-MCNC: 51.2 MG/L (ref 0–8.2)

## 2023-08-16 PROCEDURE — 63600175 PHARM REV CODE 636 W HCPCS: Performed by: SURGERY

## 2023-08-16 PROCEDURE — 25000003 PHARM REV CODE 250: Performed by: NURSE PRACTITIONER

## 2023-08-16 PROCEDURE — 63600175 PHARM REV CODE 636 W HCPCS: Performed by: COLON & RECTAL SURGERY

## 2023-08-16 PROCEDURE — 25000003 PHARM REV CODE 250: Performed by: SURGERY

## 2023-08-16 PROCEDURE — 97530 THERAPEUTIC ACTIVITIES: CPT | Mod: CQ

## 2023-08-16 PROCEDURE — 25000003 PHARM REV CODE 250: Performed by: COLON & RECTAL SURGERY

## 2023-08-16 PROCEDURE — 36415 COLL VENOUS BLD VENIPUNCTURE: CPT | Performed by: SURGERY

## 2023-08-16 PROCEDURE — 86140 C-REACTIVE PROTEIN: CPT | Performed by: SURGERY

## 2023-08-16 RX ORDER — GABAPENTIN 300 MG/1
300 CAPSULE ORAL 3 TIMES DAILY
Qty: 90 CAPSULE | Refills: 0 | Status: SHIPPED | OUTPATIENT
Start: 2023-08-16 | End: 2023-09-26

## 2023-08-16 RX ORDER — IBUPROFEN 800 MG/1
800 TABLET ORAL EVERY 8 HOURS
Qty: 30 TABLET | Refills: 3 | Status: SHIPPED | OUTPATIENT
Start: 2023-08-16 | End: 2023-08-31 | Stop reason: SDUPTHER

## 2023-08-16 RX ORDER — OXYCODONE HYDROCHLORIDE 20 MG/1
20 TABLET ORAL EVERY 4 HOURS PRN
Qty: 30 TABLET | Refills: 0 | Status: SHIPPED | OUTPATIENT
Start: 2023-08-16 | End: 2023-09-06

## 2023-08-16 RX ORDER — METHOCARBAMOL 750 MG/1
750 TABLET, FILM COATED ORAL 4 TIMES DAILY
Qty: 40 TABLET | Refills: 0 | Status: SHIPPED | OUTPATIENT
Start: 2023-08-16 | End: 2023-08-25 | Stop reason: SDUPTHER

## 2023-08-16 RX ADMIN — LOSARTAN POTASSIUM 100 MG: 50 TABLET, FILM COATED ORAL at 08:08

## 2023-08-16 RX ADMIN — HYDROMORPHONE HYDROCHLORIDE 2 MG: 2 INJECTION, SOLUTION INTRAMUSCULAR; INTRAVENOUS; SUBCUTANEOUS at 03:08

## 2023-08-16 RX ADMIN — ENOXAPARIN SODIUM 40 MG: 40 INJECTION SUBCUTANEOUS at 08:08

## 2023-08-16 RX ADMIN — OXYCODONE HYDROCHLORIDE 20 MG: 10 TABLET ORAL at 08:08

## 2023-08-16 RX ADMIN — HYDROMORPHONE HYDROCHLORIDE 2 MG: 2 INJECTION, SOLUTION INTRAMUSCULAR; INTRAVENOUS; SUBCUTANEOUS at 10:08

## 2023-08-16 RX ADMIN — IBUPROFEN 800 MG: 400 TABLET ORAL at 05:08

## 2023-08-16 RX ADMIN — HYDROMORPHONE HYDROCHLORIDE 2 MG: 2 INJECTION, SOLUTION INTRAMUSCULAR; INTRAVENOUS; SUBCUTANEOUS at 12:08

## 2023-08-16 RX ADMIN — ACETAMINOPHEN 1000 MG: 500 TABLET ORAL at 10:08

## 2023-08-16 RX ADMIN — PANTOPRAZOLE SODIUM 40 MG: 40 TABLET, DELAYED RELEASE ORAL at 08:08

## 2023-08-16 RX ADMIN — HYDROMORPHONE HYDROCHLORIDE 2 MG: 2 INJECTION, SOLUTION INTRAMUSCULAR; INTRAVENOUS; SUBCUTANEOUS at 06:08

## 2023-08-16 RX ADMIN — OXYCODONE HYDROCHLORIDE 20 MG: 10 TABLET ORAL at 02:08

## 2023-08-16 RX ADMIN — METHOCARBAMOL 1000 MG: 500 TABLET ORAL at 08:08

## 2023-08-16 NOTE — HOSPITAL COURSE
Georgette Abrams is a 38 y.o. female with history of FAP status post failed proctocolectomy who presents with recurrent small-bowel obstruction, incarcerated parastomal hernia, and ileostomy dysfunction.  She had a somewhat prolonged hospital stay, mostly due to pain control.  Initially Post op she required a PCA pump for pain control for several days, diet was advanced and ileosotmy functional.  Difficult to transition from PCA to oral pain meds, rrequired several more days of IV  dilaudid along with oxy 20 mgm.  Several discussion with pt in regards to pain control and it seemed she had unrealistic goals of pain control (I do not want to feel any pain).  On day of discharge pt is norma regular diet, inc line healing well, ileosotomy functional, ambulating in [place]za without difficulty, adequate pain control with oral medication, VS stable and afebrile.   Home with CONSTANZA drain in place, home health ordered and fu with Dr. Tracy 2 weeks for poss removal of CONSTANZA drain

## 2023-08-16 NOTE — PT/OT/SLP PROGRESS
Physical Therapy Treatment    Patient Name:  Georgette Abrams   MRN:  [id number]    Recommendations:     Discharge Recommendations: other (see comments)  Discharge Equipment Recommendations: none  Barriers to discharge: None    Assessment:     Georgette Abrams is a 38 y.o. female admitted with a medical diagnosis of Incisional hernia with obstruction but no gangrene.  She presents with the following impairments/functional limitations: impaired balance, weakness, impaired endurance, impaired functional mobility, gait instability, pain . Treatment session focused on education, HEP and discussion of safe discharge. Patient remains appropriate for continued skilled services within the acute environment and goals remain appropriate.    Rehab Prognosis: Good; patient would benefit from acute skilled PT services to address these deficits and reach maximum level of function.    Recent Surgery: Procedure(s) (LRB):  REPAIR, HERNIA, VENTRAL, RECURRENT, ERAS low (N/A)  REVISION, ILEOSTOMY (N/A)  LYSIS, ADHESIONS (N/A) 9 Days Post-Op    Plan:     During this hospitalization, patient to be seen 3 x/week to address the identified rehab impairments via gait training, therapeutic activities, therapeutic exercises, neuromuscular re-education and progress toward the following goals:    Plan of Care Expires:  09/08/23    Subjective     Patient/Family Comments/goals: I am leaving today  Pain/Comfort:  Pain Rating 1:  (not rated)  Location - Orientation 1: generalized  Location 1: abdomen      Objective:     Communicated with RN prior to session.  Patient found HOB elevated with  (peripheral IV;  colostomy; CONSTANZA drain) upon PT entry to room.     General Precautions: Standard, fall  Orthopedic Precautions: N/A  Braces: N/A  Respiratory Status: Room air     Functional Mobility:  Pt declined mobility due to just getting back from bathroom and awaiting hospital d/c this date      AM-PAC 6 CLICK MOBILITY  Turning over in bed (including  adjusting bedclothes, sheets and blankets)?: 4  Sitting down on and standing up from a chair with arms (e.g., wheelchair, bedside commode, etc.): 4  Moving from lying on back to sitting on the side of the bed?: 3  Moving to and from a bed to a chair (including a wheelchair)?: 3  Need to walk in hospital room?: 3  Climbing 3-5 steps with a railing?: 3  Basic Mobility Total Score: 20       Treatment & Education:  Therapist provided instruction and educated of  patient on progress, safety,d/c,PT POC,   proper body mechanics, energy conservation, and fall prevention strategies during tasks listed above, on the effects of prolonged immobility and the importance of performing OOB activity and exercises to promote healing and reduce recovery time        Updated white board with appropriate PT mobility information for medical team notification  Call nursing/pct to transfer to chair/use bathroom. Pt stated understanding        Bedside table in front of patient and area set up for function, convenience, and safety. RN aware of patient's mobility needs and status. Questions/concerns addressed within PTA scope of practice; patient  with no further questions. Time was provided for active listening, discussion of health disposition, and discussion of safe discharge. Pt?verbalized?agreement   Pt issued and instructed to perform  supine and seated HEP 2-3 times daily, with verabal understanding      Patient left HOB elevated with all lines intact, call button in reach, and nsg notified..    GOALS:   Multidisciplinary Problems       Physical Therapy Goals          Problem: Physical Therapy    Goal Priority Disciplines Outcome Goal Variances Interventions   Physical Therapy Goal     PT, PT/OT Ongoing, Progressing     Description: Goals to be met by:      Patient will increase functional independence with mobility by performin. Supine to sit with Modified [place]  2. Sit to supine with Modified [place]  3. Sit to  stand transfer with Modified [place]  4. Gait  x 300 feet with Modified [place] using no AD.                          Time Tracking:     PT Received On: 08/16/23  PT Start Time: 1022     PT Stop Time: 1045  PT Total Time (min): 23 min     Billable Minutes: Therapeutic Activity 23    Treatment Type: Treatment  PT/PTA: PTA     Number of PTA visits since last PT visit: 4     08/16/2023   no

## 2023-08-16 NOTE — DISCHARGE SUMMARY
[facility]  Colorectal Surgery  Discharge Summary      Patient Name: Georgette Abrams  MRN: [id number]  Admission Date: 8/7/2023  Hospital Length of Stay: 9 days  Discharge Date and Time: 8/16/2023 12:20 PM  Attending Physician: Coretta att. providers found   Discharging Provider: Nell Gaytan NP  Primary Care Provider: Coretta, Primary Doctor     HPI:  No notes on file    Procedure(s) (LRB):  REPAIR, HERNIA, VENTRAL, RECURRENT, ERAS low (N/A)  REVISION, ILEOSTOMY (N/A)  LYSIS, ADHESIONS (N/A)     Hospital Course:  Georgette Abrams is a 38 y.o. female with history of FAP status post failed proctocolectomy who presents with recurrent small-bowel obstruction, incarcerated parastomal hernia, and ileostomy dysfunction.  She had a somewhat prolonged hospital stay, mostly due to pain control.  Initially Post op she required a PCA pump for pain control for several days, diet was advanced and ileosotmy functional.  Difficult to transition from PCA to oral pain meds, rrequired several more days of IV  dilaudid along with oxy 20 mgm.  Several discussion with pt in regards to pain control and it seemed she had unrealistic goals of pain control (I do not want to feel any pain).  On day of discharge pt is norma regular diet, inc line healing well, ileosotomy functional, ambulating in [place] without difficulty, adequate pain control with oral medication, VS stable and afebrile.   Home with CONSTANZA drain in place, home health ordered and fu with Dr. Houston 2 weeks for poss removal of CONSTANZA drain          Goals of Care Treatment Preferences:  Code Status: Full Code      Consults (From admission, onward)        Status Ordering Provider     Inpatient consult to Midline team  Once        Provider:  (Not yet assigned)    Completed KRISH HOUSTON     Inpatient consult to Midline team  Once        Provider:  (Not yet assigned)    Completed KRISH HOUSTON          Significant Diagnostic Studies: Labs: BMP: No results for  "input(s): "GLU", "NA", "K", "CL", "CO2", "BUN", "CREATININE", "CALCIUM", "MG" in the last 48 hours. and CBC No results for input(s): "WBC", "HGB", "HCT", "PLT" in the last 48 hours.    Pending Diagnostic Studies:     None        Final Active Diagnoses:    Diagnosis Date Noted POA    PRINCIPAL PROBLEM:  Incisional hernia with obstruction but no gangrene [K43.0] 08/07/2023 Yes    Ileostomy dysfunction [K94.13] 08/07/2023 Yes      Problems Resolved During this Admission:      Discharged Condition: good    Disposition: Home-Health Care [facility]    Follow Up:   Follow-up Information     KRISH Tracy MD Follow up in 2 week(s).    Specialty: Colon and Rectal Surgery  Contact information:  [facility] [facility] [address]  [phone]                       Patient Instructions:      Diet Adult Regular   Order Comments: Low fiber, no fresh fruit or fresh vegetables, no nuts, grapes, popcorn or raisins     Lifting restrictions   Order Comments: No lifting anything greater than 5 pounds     No dressing needed   Order Comments: May shower, no tub bath     Notify your health care provider if you experience any of the following:  temperature >100.4     Notify your health care provider if you experience any of the following:  persistent nausea and vomiting or diarrhea     Notify your health care provider if you experience any of the following:  severe uncontrolled pain     Notify your health care provider if you experience any of the following:  redness, tenderness, or signs of infection (pain, swelling, redness, odor or green/yellow discharge around incision site)     Notify your health care provider if you experience any of the following:  difficulty breathing or increased cough     Notify your health care provider if you experience any of the following:  severe persistent headache     Notify your health care provider if you experience any of the following:  worsening rash     Notify your health care provider " if you experience any of the following:  persistent dizziness, light-headedness, or visual disturbances     Notify your health care provider if you experience any of the following:  increased confusion or weakness     Medications:  Reconciled Home Medications:      Medication List      START taking these medications    gabapentin 300 MG capsule  Commonly known as: NEURONTIN  Take 1 capsule (300 mg total) by mouth 3 (three) times daily.     ibuprofen 800 MG tablet  Commonly known as: ADVIL,MOTRIN  Take 1 tablet (800 mg total) by mouth every 8 (eight) hours.     methocarbamoL 750 MG Tab  Commonly known as: ROBAXIN  Take 1 tablet (750 mg total) by mouth 4 (four) times daily. for 10 days     oxyCODONE 20 mg Tab immediate release tablet  Commonly known as: ROXICODONE  Take 1 tablet (20 mg total) by mouth every 4 (four) hours as needed for Pain.        CONTINUE taking these medications    clonazePAM 1 MG tablet  Commonly known as: KlonoPIN  Take 1 mg by mouth 2 (two) times daily as needed.     cyanocobalamin 1,000 mcg/mL injection  1,000 mcg twice a week.     losartan 100 MG tablet  Commonly known as: COZAAR  Take 100 mg by mouth.     ondansetron 4 MG tablet  Commonly known as: ZOFRAN  Take 4 mg by mouth every 6 (six) hours as needed for Nausea.     ondansetron 8 MG Tbdl  Commonly known as: ZOFRAN-ODT  Take 4 mg by mouth once.     progesterone 100 MG capsule  Commonly known as: PROMETRIUM  Take 400 mg by mouth every evening.     venlafaxine 100 MG Tab  Commonly known as: EFFEXOR  Take 150 mg by mouth Daily.            Nell Gaytan NP  Colorectal Surgery  [facility]

## 2023-08-16 NOTE — PLAN OF CARE
.[facility] Plan of Care Note  Dx: Hernia repair and ileostomy revision with relocation.    Shift Events:  None    Goals of Care:  Pain control    Neuro: AAOx4    Vital Signs:  stable    Respiratory: WDL    Diet: low fiber diet    Is patient tolerating current diet?  YES    GTTS: None    Urine Output/Bowel Movement:  recorded on file.    Drains/Tubes/Tube Feeds (include total output/shift): Ileostomy and Left CONSTANZA drain     Lines:  Left Midline      Accuchecks: None    Skin: Midline incision, dry and intact.    Fall Risk Score: 11    Activity level? Independent    Any scheduled procedures? None    Any safety concerns? Fall Risk    Other:  None        Problem: Pain Acute  Goal: Acceptable Pain Control and Functional Ability  Outcome: Ongoing, Progressing     Problem: Fall Injury Risk  Goal: Absence of Fall and Fall-Related Injury  Outcome: Ongoing, Progressing     Problem: Adult Inpatient Plan of Care  Goal: Optimal Comfort and Wellbeing  Outcome: Ongoing, Progressing     Problem: Adult Inpatient Plan of Care  Goal: Absence of Hospital-Acquired Illness or Injury  Outcome: Ongoing, Progressing     Problem: Adult Inpatient Plan of Care  Goal: Patient-Specific Goal (Individualized)  Outcome: Ongoing, Progressing     Problem: Adult Inpatient Plan of Care  Goal: Plan of Care Review  Outcome: Ongoing, Progressing

## 2023-08-16 NOTE — PLAN OF CARE
Gordon Aquino - [facility]      HOME HEALTH ORDERS  FACE TO FACE ENCOUNTER    Patient Name: Georgette Abrams  YOB: 1985    PCP: Coretta, Primary Doctor   PCP Address: None  PCP Phone Number: None  PCP Fax: None    Encounter Date: 7/14/23    Admit to Home Health    Diagnoses:  Active Hospital Problems    Diagnosis  POA    *Incisional hernia with obstruction but no gangrene [K43.0]  Yes    Ileostomy dysfunction [K94.13]  Yes      Resolved Hospital Problems   No resolved problems to display.       Follow Up Appointments:  Future Appointments   Date Time Provider Department Center   8/25/2023  3:40 PM KRISH Tracy MD [facility]       Allergies:  Review of patient's allergies indicates:   Allergen Reactions    Levofloxacin Nausea And Vomiting and Rash    Morphine Anaphylaxis, Hives, Shortness Of Breath and Hallucinations           Sulfa (sulfonamide antibiotics) Nausea And Vomiting and Rash    Opioids - morphine analogues     Hydrocodone-acetaminophen Itching       Medications: Review discharge medications with patient and family and provide education.    Current Facility-Administered Medications   Medication Dose Route Frequency Provider Last Rate Last Admin    acetaminophen tablet 1,000 mg  1,000 mg Oral Q8H Robert Cruz MD   1,000 mg at 08/15/23 1753    aluminum-magnesium hydroxide-simethicone 200-200-20 mg/5 mL suspension 30 mL  30 mL Oral Q6H PRN Robert Cruz MD   30 mL at 08/08/23 1711    calcium carbonate 200 mg calcium (500 mg) chewable tablet 1,000 mg  1,000 mg Oral TID PRN Nell Gaytan NP   1,000 mg at 08/09/23 0916    clonazePAM tablet 0.5 mg  0.5 mg Oral BID PRN Robert Cruz MD   0.5 mg at 08/09/23 2108    enoxaparin injection 40 mg  40 mg Subcutaneous Q12H (prophylaxis, 0900/2100) Robert Cruz MD   40 mg at 08/16/23 0840    gabapentin capsule 300 mg  300 mg Oral TID Robert Cruz MD   300 mg at 08/16/23 0839    HYDROmorphone (PF) injection 2 mg  2 mg  Intravenous Q3H PRN KRISH Tracy MD   2 mg at 08/16/23 0634    ibuprofen tablet 800 mg  800 mg Oral Q8H Robert Cruz MD   800 mg at 08/16/23 0525    LIDOcaine (PF) 10 mg/ml (1%) injection 10 mg  1 mL Intradermal On Call Procedure Rhina Saldana NP        losartan tablet 100 mg  100 mg Oral Daily Robert Cruz MD   100 mg at 08/16/23 0839    methocarbamoL tablet 1,000 mg  1,000 mg Oral QID Robret Cruz MD   1,000 mg at 08/16/23 0839    ondansetron injection 4 mg  4 mg Intravenous Q12H PRN Robert Cruz MD   4 mg at 08/11/23 1600    oxyCODONE immediate release tablet 15 mg  15 mg Oral Q4H PRN KRISH Tracy MD        oxyCODONE immediate release tablet Tab 20 mg  20 mg Oral Q4H PRN KRISH Tracy MD   20 mg at 08/16/23 0839    pantoprazole EC tablet 40 mg  40 mg Oral Daily Nell Gaytan NP   40 mg at 08/16/23 0839    sodium chloride 0.9% flush 10 mL  10 mL Intra-Catheter PRN Robert Cruz MD        venlafaxine tablet 150 mg  150 mg Oral Daily Robert Cruz MD   150 mg at 08/15/23 1759     Current Discharge Medication List        START taking these medications    Details   gabapentin (NEURONTIN) 300 MG capsule Take 1 capsule (300 mg total) by mouth 3 (three) times daily.  Qty: 90 capsule, Refills: 0      ibuprofen (ADVIL,MOTRIN) 800 MG tablet Take 1 tablet (800 mg total) by mouth every 8 (eight) hours.  Qty: 30 tablet, Refills: 3      methocarbamoL (ROBAXIN) 750 MG Tab Take 1 tablet (750 mg total) by mouth 4 (four) times daily. for 10 days  Qty: 40 tablet, Refills: 0      oxyCODONE (ROXICODONE) 20 mg Tab immediate release tablet Take 1 tablet (20 mg total) by mouth every 4 (four) hours as needed for Pain.  Qty: 30 tablet, Refills: 0           CONTINUE these medications which have NOT CHANGED    Details   clonazepam (KLONOPIN) 1 MG tablet Take 1 mg by mouth 2 (two) times daily as needed.        cyanocobalamin 1,000 mcg/mL injection 1,000 mcg twice a week.       losartan (COZAAR) 100 MG tablet Take 100 mg by mouth.      ondansetron (ZOFRAN) 4 MG tablet Take 4 mg by mouth every 6 (six) hours as needed for Nausea.      ondansetron (ZOFRAN-ODT) 8 MG TbDL Take 4 mg by mouth once.        progesterone (PROMETRIUM) 100 MG capsule Take 400 mg by mouth every evening.      venlafaxine (EFFEXOR) 100 MG Tab Take 150 mg by mouth Daily.               I have seen and examined this patient within the last 30 days. My clinical findings that support the need for the home health skilled services and home bound status are the following:no   Weakness/numbness causing balance and gait disturbance due to Surgery making it taxing to leave home.     Diet:   regular diet    Labs:  na    Referrals/ Consults  Physical Therapy to evaluate and treat. Evaluate for home safety and equipment needs; Establish/upgrade home exercise program. Perform / instruct on therapeutic exercises, gait training, transfer training, and Range of Motion.  Occupational Therapy to evaluate and treat. Evaluate home environment for safety and equipment needs. Perform/Instruct on transfers, ADL training, ROM, and therapeutic exercises.   to evaluate for community resources/long-range planning.    Activities:   no strenuous exercise for 6 weeks    Nursing:   Agency to admit patient within 24 hours of hospital discharge unless specified on physician order or at patient request    SN to complete comprehensive assessment including routine vital signs. Instruct on disease process and s/s of complications to report to MD. Review/verify medication list sent home with the patient at time of discharge  and instruct patient/caregiver as needed. Frequency may be adjusted depending on start of care date.     Skilled nurse to perform up to 3 visits PRN for symptoms related to diagnosis    Notify MD if SBP > 160 or < 90; DBP > 90 or < 50; HR > 120 or < 50; Temp > 101; O2 < 88%; Other:     Ok to schedule additional visits based  on staff availability and patient request on consecutive days within the home health episode.    When multiple disciplines ordered:    Start of Care occurs on Sunday - Wednesday schedule remaining discipline evaluations as ordered on separate consecutive days following the start of care.    Thursday SOC -schedule subsequent evaluations Friday and Monday the following week.     Friday - Saturday SOC - schedule subsequent discipline evaluations on consecutive days starting Monday of the following week.    For all post-discharge communication and subsequent orders please contact patient's primary care physician. If unable to reach primary care physician or do not receive response within 30 minutes, please contact Dr. Tracy for clinical staff order clarification    Miscellaneous   Colostomy Care:  Instruct patient/caregiver to empty bag when full and PRN., Change and clean site every 48 hours, and Monitor skin integrity.    Home Health Aide:  Nursing Three times weekly, Physical Therapy Three times weekly, and Occupational Therapy Three times weekly    Wound Care Orders  yes:  Surgical Wound:  Location: abd  CONSTANZA drain in place:  keep insertion site clean and dry, empty at least daily and keep record of output  Routine ileostomy care    Consult ET nurse        Continue ostomy care and education    I certify that this patient is confined to her home and needs intermittent skilled nursing care, physical therapy, and occupational therapy.

## 2023-08-25 ENCOUNTER — OFFICE VISIT (OUTPATIENT)
Dept: SURGERY | Facility: CLINIC | Age: 38
End: 2023-08-25
Payer: COMMERCIAL

## 2023-08-25 VITALS
HEART RATE: 85 BPM | BODY MASS INDEX: 41.53 KG/M2 | HEIGHT: 68 IN | DIASTOLIC BLOOD PRESSURE: 80 MMHG | SYSTOLIC BLOOD PRESSURE: 137 MMHG

## 2023-08-25 DIAGNOSIS — K43.0 INCISIONAL HERNIA WITH OBSTRUCTION BUT NO GANGRENE: Primary | ICD-10-CM

## 2023-08-25 DIAGNOSIS — K94.13 ILEOSTOMY DYSFUNCTION: ICD-10-CM

## 2023-08-25 DIAGNOSIS — Z83.718 FAMILY HISTORY OF FAMILIAL POLYPOSIS: ICD-10-CM

## 2023-08-25 PROCEDURE — 1159F PR MEDICATION LIST DOCUMENTED IN MEDICAL RECORD: ICD-10-PCS | Mod: CPTII,S$GLB,, | Performed by: COLON & RECTAL SURGERY

## 2023-08-25 PROCEDURE — 99999 PR PBB SHADOW E&M-EST. PATIENT-LVL III: ICD-10-PCS | Mod: PBBFAC,,, | Performed by: COLON & RECTAL SURGERY

## 2023-08-25 PROCEDURE — 4010F ACE/ARB THERAPY RXD/TAKEN: CPT | Mod: CPTII,S$GLB,, | Performed by: COLON & RECTAL SURGERY

## 2023-08-25 PROCEDURE — 3079F PR MOST RECENT DIASTOLIC BLOOD PRESSURE 80-89 MM HG: ICD-10-PCS | Mod: CPTII,S$GLB,, | Performed by: COLON & RECTAL SURGERY

## 2023-08-25 PROCEDURE — 1160F RVW MEDS BY RX/DR IN RCRD: CPT | Mod: CPTII,S$GLB,, | Performed by: COLON & RECTAL SURGERY

## 2023-08-25 PROCEDURE — 99024 POSTOP FOLLOW-UP VISIT: CPT | Mod: S$GLB,,, | Performed by: COLON & RECTAL SURGERY

## 2023-08-25 PROCEDURE — 99024 PR POST-OP FOLLOW-UP VISIT: ICD-10-PCS | Mod: S$GLB,,, | Performed by: COLON & RECTAL SURGERY

## 2023-08-25 PROCEDURE — 4010F PR ACE/ARB THEARPY RXD/TAKEN: ICD-10-PCS | Mod: CPTII,S$GLB,, | Performed by: COLON & RECTAL SURGERY

## 2023-08-25 PROCEDURE — 3008F PR BODY MASS INDEX (BMI) DOCUMENTED: ICD-10-PCS | Mod: CPTII,S$GLB,, | Performed by: COLON & RECTAL SURGERY

## 2023-08-25 PROCEDURE — 3079F DIAST BP 80-89 MM HG: CPT | Mod: CPTII,S$GLB,, | Performed by: COLON & RECTAL SURGERY

## 2023-08-25 PROCEDURE — 3075F SYST BP GE 130 - 139MM HG: CPT | Mod: CPTII,S$GLB,, | Performed by: COLON & RECTAL SURGERY

## 2023-08-25 PROCEDURE — 3008F BODY MASS INDEX DOCD: CPT | Mod: CPTII,S$GLB,, | Performed by: COLON & RECTAL SURGERY

## 2023-08-25 PROCEDURE — 3075F PR MOST RECENT SYSTOLIC BLOOD PRESS GE 130-139MM HG: ICD-10-PCS | Mod: CPTII,S$GLB,, | Performed by: COLON & RECTAL SURGERY

## 2023-08-25 PROCEDURE — 99999 PR PBB SHADOW E&M-EST. PATIENT-LVL III: CPT | Mod: PBBFAC,,, | Performed by: COLON & RECTAL SURGERY

## 2023-08-25 PROCEDURE — 1160F PR REVIEW ALL MEDS BY PRESCRIBER/CLIN PHARMACIST DOCUMENTED: ICD-10-PCS | Mod: CPTII,S$GLB,, | Performed by: COLON & RECTAL SURGERY

## 2023-08-25 PROCEDURE — 1159F MED LIST DOCD IN RCRD: CPT | Mod: CPTII,S$GLB,, | Performed by: COLON & RECTAL SURGERY

## 2023-08-25 RX ORDER — METHOCARBAMOL 750 MG/1
750 TABLET, FILM COATED ORAL 4 TIMES DAILY
Qty: 40 TABLET | Refills: 0 | Status: SHIPPED | OUTPATIENT
Start: 2023-08-25 | End: 2023-09-04

## 2023-08-25 RX ORDER — OXYCODONE HYDROCHLORIDE 15 MG/1
15 TABLET ORAL EVERY 4 HOURS PRN
Qty: 30 TABLET | Refills: 0 | Status: SHIPPED | OUTPATIENT
Start: 2023-08-25 | End: 2023-08-31 | Stop reason: SDUPTHER

## 2023-08-31 ENCOUNTER — TELEPHONE (OUTPATIENT)
Dept: SURGERY | Facility: CLINIC | Age: 38
End: 2023-08-31
Payer: COMMERCIAL

## 2023-08-31 RX ORDER — OXYCODONE HYDROCHLORIDE 15 MG/1
15 TABLET ORAL EVERY 4 HOURS PRN
Qty: 30 TABLET | Refills: 0 | Status: SHIPPED | OUTPATIENT
Start: 2023-08-31 | End: 2023-09-08 | Stop reason: SDUPTHER

## 2023-08-31 RX ORDER — IBUPROFEN 800 MG/1
800 TABLET ORAL EVERY 8 HOURS
Qty: 30 TABLET | Refills: 3 | Status: SHIPPED | OUTPATIENT
Start: 2023-08-31

## 2023-08-31 NOTE — TELEPHONE ENCOUNTER
Spoke with patient. Reports that she called in this morning for pain medication, bottom of incision has opened up. Phone line and computer went down. Waited 5 minutes and called patient from a separate line. Patient states that incision opening is small and is leaking fluid that smells. She says it is red. Both of her parents have COVID so her help is limited. Instructed patient to keep area clean and dry. Monitor for increasing redness, fever or green discharge. Patient stated understanding. Informed patient that pain medication was sent in. Requested patient send photo via portal. Patient states that she is not tech savvy but will try.

## 2023-09-06 ENCOUNTER — PATIENT MESSAGE (OUTPATIENT)
Dept: SURGERY | Facility: CLINIC | Age: 38
End: 2023-09-06
Payer: COMMERCIAL

## 2023-09-06 DIAGNOSIS — K43.0 INCISIONAL HERNIA WITH OBSTRUCTION BUT NO GANGRENE: Primary | ICD-10-CM

## 2023-09-06 RX ORDER — METHOCARBAMOL 500 MG/1
500 TABLET, FILM COATED ORAL 4 TIMES DAILY
Qty: 40 TABLET | Refills: 0 | Status: ON HOLD | OUTPATIENT
Start: 2023-09-06 | End: 2023-10-12 | Stop reason: HOSPADM

## 2023-09-06 RX ORDER — OXYCODONE HYDROCHLORIDE 15 MG/1
15 TABLET ORAL EVERY 4 HOURS PRN
Qty: 30 TABLET | Refills: 0 | OUTPATIENT
Start: 2023-09-06

## 2023-09-06 RX ORDER — METHOCARBAMOL 750 MG/1
750 TABLET, FILM COATED ORAL EVERY 4 HOURS PRN
Qty: 40 TABLET | Refills: 0 | OUTPATIENT
Start: 2023-09-06 | End: 2023-09-16

## 2023-09-06 RX ORDER — OXYCODONE HYDROCHLORIDE 15 MG/1
15 TABLET ORAL EVERY 4 HOURS PRN
Qty: 30 TABLET | Refills: 0 | Status: SHIPPED | OUTPATIENT
Start: 2023-09-06 | End: 2023-09-19 | Stop reason: SDUPTHER

## 2023-09-06 RX ORDER — AMOXICILLIN AND CLAVULANATE POTASSIUM 875; 125 MG/1; MG/1
1 TABLET, FILM COATED ORAL EVERY 12 HOURS
Qty: 20 TABLET | Refills: 0 | Status: ON HOLD | OUTPATIENT
Start: 2023-09-06 | End: 2023-09-12 | Stop reason: SDUPTHER

## 2023-09-06 NOTE — TELEPHONE ENCOUNTER
Spoke with patient regarding post operative pain and incisional swelling. Patient states that her incision has opened up and is draining and she is having more pain. Refill requests sent to NP for approval. Appointment scheduled for Friday for Dr. Tracy to evaluate wound. Instructed patient to send picture via portal. Patient verbalizes understanding to all.

## 2023-09-06 NOTE — TELEPHONE ENCOUNTER
----- Message from Ayush Henderson sent at 9/6/2023 10:38 AM CDT -----  Regarding: appt  Contact: @[phone]  Pt calling in regards to needing appt asap due to incision is painful and she is not feeling good at all ... Pls call and [phone]

## 2023-09-08 ENCOUNTER — OFFICE VISIT (OUTPATIENT)
Dept: SURGERY | Facility: CLINIC | Age: 38
End: 2023-09-08
Payer: COMMERCIAL

## 2023-09-08 ENCOUNTER — HOSPITAL ENCOUNTER (OUTPATIENT)
Dept: RADIOLOGY | Facility: HOSPITAL | Age: 38
Discharge: HOME | DRG: 921 | End: 2023-09-08
Attending: COLON & RECTAL SURGERY
Payer: COMMERCIAL

## 2023-09-08 VITALS
BODY MASS INDEX: 41.53 KG/M2 | HEIGHT: 68 IN | HEART RATE: 116 BPM | SYSTOLIC BLOOD PRESSURE: 159 MMHG | DIASTOLIC BLOOD PRESSURE: 88 MMHG

## 2023-09-08 DIAGNOSIS — R10.30 LOWER ABDOMINAL PAIN: ICD-10-CM

## 2023-09-08 DIAGNOSIS — K43.0 INCISIONAL HERNIA WITH OBSTRUCTION BUT NO GANGRENE: ICD-10-CM

## 2023-09-08 DIAGNOSIS — R10.30 LOWER ABDOMINAL PAIN: Primary | ICD-10-CM

## 2023-09-08 PROCEDURE — 74177 CT ABD & PELVIS W/CONTRAST: CPT | Mod: TC

## 2023-09-08 PROCEDURE — 4010F PR ACE/ARB THEARPY RXD/TAKEN: ICD-10-PCS | Mod: CPTII,S$GLB,, | Performed by: COLON & RECTAL SURGERY

## 2023-09-08 PROCEDURE — 99999 PR PBB SHADOW E&M-EST. PATIENT-LVL III: CPT | Mod: PBBFAC,,, | Performed by: COLON & RECTAL SURGERY

## 2023-09-08 PROCEDURE — 74177 CT ABDOMEN PELVIS WITH CONTRAST: ICD-10-PCS | Mod: 26,,, | Performed by: RADIOLOGY

## 2023-09-08 PROCEDURE — 3079F DIAST BP 80-89 MM HG: CPT | Mod: CPTII,S$GLB,, | Performed by: COLON & RECTAL SURGERY

## 2023-09-08 PROCEDURE — 1160F PR REVIEW ALL MEDS BY PRESCRIBER/CLIN PHARMACIST DOCUMENTED: ICD-10-PCS | Mod: CPTII,S$GLB,, | Performed by: COLON & RECTAL SURGERY

## 2023-09-08 PROCEDURE — 1159F MED LIST DOCD IN RCRD: CPT | Mod: CPTII,S$GLB,, | Performed by: COLON & RECTAL SURGERY

## 2023-09-08 PROCEDURE — 74177 CT ABD & PELVIS W/CONTRAST: CPT | Mod: 26,,, | Performed by: RADIOLOGY

## 2023-09-08 PROCEDURE — 99999 PR PBB SHADOW E&M-EST. PATIENT-LVL III: ICD-10-PCS | Mod: PBBFAC,,, | Performed by: COLON & RECTAL SURGERY

## 2023-09-08 PROCEDURE — 4010F ACE/ARB THERAPY RXD/TAKEN: CPT | Mod: CPTII,S$GLB,, | Performed by: COLON & RECTAL SURGERY

## 2023-09-08 PROCEDURE — 1159F PR MEDICATION LIST DOCUMENTED IN MEDICAL RECORD: ICD-10-PCS | Mod: CPTII,S$GLB,, | Performed by: COLON & RECTAL SURGERY

## 2023-09-08 PROCEDURE — 1160F RVW MEDS BY RX/DR IN RCRD: CPT | Mod: CPTII,S$GLB,, | Performed by: COLON & RECTAL SURGERY

## 2023-09-08 PROCEDURE — 3079F PR MOST RECENT DIASTOLIC BLOOD PRESSURE 80-89 MM HG: ICD-10-PCS | Mod: CPTII,S$GLB,, | Performed by: COLON & RECTAL SURGERY

## 2023-09-08 PROCEDURE — 3077F PR MOST RECENT SYSTOLIC BLOOD PRESSURE >= 140 MM HG: ICD-10-PCS | Mod: CPTII,S$GLB,, | Performed by: COLON & RECTAL SURGERY

## 2023-09-08 PROCEDURE — 3077F SYST BP >= 140 MM HG: CPT | Mod: CPTII,S$GLB,, | Performed by: COLON & RECTAL SURGERY

## 2023-09-08 PROCEDURE — A9698 NON-RAD CONTRAST MATERIALNOC: HCPCS | Performed by: COLON & RECTAL SURGERY

## 2023-09-08 PROCEDURE — 99024 PR POST-OP FOLLOW-UP VISIT: ICD-10-PCS | Mod: S$GLB,,, | Performed by: COLON & RECTAL SURGERY

## 2023-09-08 PROCEDURE — 3008F BODY MASS INDEX DOCD: CPT | Mod: CPTII,S$GLB,, | Performed by: COLON & RECTAL SURGERY

## 2023-09-08 PROCEDURE — 25500020 PHARM REV CODE 255: Performed by: COLON & RECTAL SURGERY

## 2023-09-08 PROCEDURE — 99024 POSTOP FOLLOW-UP VISIT: CPT | Mod: S$GLB,,, | Performed by: COLON & RECTAL SURGERY

## 2023-09-08 PROCEDURE — 3008F PR BODY MASS INDEX (BMI) DOCUMENTED: ICD-10-PCS | Mod: CPTII,S$GLB,, | Performed by: COLON & RECTAL SURGERY

## 2023-09-08 RX ORDER — OXYCODONE HYDROCHLORIDE 15 MG/1
15 TABLET ORAL EVERY 4 HOURS PRN
Qty: 30 TABLET | Refills: 0 | Status: SHIPPED | OUTPATIENT
Start: 2023-09-08 | End: 2023-10-18

## 2023-09-08 RX ADMIN — BARIUM SULFATE 450 ML: 20 SUSPENSION ORAL at 06:09

## 2023-09-08 NOTE — PROGRESS NOTES
CRS Office Visit Postop    Referring Md:   No referring provider defined for this encounter.    SUBJECTIVE:     Chief Complaint: parastomal hernia    History of Present Illness:     Course is as follows:  Patient is a 38 y.o. female presents with parastomal hernia.  She has had 66 hospital admission since 2009.  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  She therefore underwent repeat exploratory laparotomy with small-bowel resection in June 2010.  Ultimately, she had pouch failure requiring pouch excision in September 2010.  She underwent combined hysterectomy and ventral hernia repair with mesh placement in 2012.  This was complicated by mesh infection requiring mesh excision in 2016.  Her last abdominal operation was in 2016.    Since 2016, she is developed recurrent multiple midline hernias with increasing obstructive symptoms.  She is been hospitalized multiple times.  Normally, she empties her bag 7 times per day without Imodium.  Over the past 14 months, she is had increasing obstructive symptoms frequently requiring her to lay down and press on her hernias or be admitted to the hospital.  Nonsmoker.  Weight has been stable.  Nondiabetic.    She is been seen at multiple outside hospitals including [facility] regarding ventral and parastomal hernia repair but has not been offered repair previously.    Socially, she is going through a divorce.  She does live near her family and has a good social support system at home.  She has anxiety which is well controlled with her home medications.    8/7/2023:  Ileostomy revision with relocation of the ileostomy and small-bowel resection  Extensive lysis of adhesions  Omental pedicle flap to the pelvis  Ventral hernia repair with retrorectus mesh placement    FINDINGS:  1. Large midline incisional hernia as well as parastomal hernia with  incarcerated small bowel.  Multiple loops of small bowel densely adherent to pelvis along the sacrum.  Lysis of adhesions was performed in order to divide all of the small bowel loops and free the small bowel from the pelvis.  The incarcerated hernia was reduced.  5 cm of the terminal ileum was resected in order to form a new ostomy.  Incisional hernia x2 and peristomal hernia were repaired.  Prior stoma defect closed in 2 layers with anterior posterior rectus sheath closed individually.  New ostomy was tunneled into the right upper quadrant.  Retrorectus ventral hernia repair was performed with Phasix absorbable mesh placement.  Omental pedicle flap freed and based off of the left gastroepiploic placed down into the pelvis to fill the pelvis to avoid future obstructive symptoms from the small bowel becoming adherent to the sacral promontory     Current status:  8/25/23: presents for first followup visit.  Eager to have the drain removed.  Putting out 2-3 oz per day.  Complains of abdominal pain and cramping.  No issues with her incision or drainage through his incision.  New ostomy working well.  9/8/23:  Presents for evaluation for fatigue.  Lower part of her incision with slight skin separation with mostly serous drainage.  Low-grade fevers at home.  Pain on the left side of her abdomen.  Ostomy working well.    Review of Systems:  Review of Systems   Constitutional:  Negative for chills, diaphoresis, fever, malaise/fatigue and weight loss.   HENT:  Negative for congestion.    Respiratory:  Negative for shortness of breath.    Cardiovascular:  Negative for chest pain and leg swelling.   Gastrointestinal:  Positive for abdominal pain, blood in stool and diarrhea. Negative for constipation, nausea and vomiting.   Genitourinary:  Negative for dysuria.   Musculoskeletal:  Negative for back pain and myalgias.   Skin:  Negative for rash.   Neurological:  Negative for dizziness and weakness.   Endo/Heme/Allergies:  Does  "not bruise/bleed easily.   Psychiatric/Behavioral:  Negative for depression. The patient is nervous/anxious.        OBJECTIVE:     Vital Signs (Most Recent)  BP (!) 159/88 (BP Location: Left arm, Patient Position: Sitting)   Pulse (!) 116   Ht 5' 8" (1.727 m)   LMP 08/07/2012   BMI 41.53 kg/m²     Physical Exam:  General: White female in no distress   Neuro: alert and oriented x 4.  Moves all extremities.     HEENT: no icterus.  Trachea midline  Respiratory: respirations are even and unlabored  Cardiac: regular rate  Abdomen:  Large midline incision healing well.  Prior stoma site in the right lower abdomen is healing well.  No recurrent hernia.  New ostomy in the right upper quadrant is pink and protrudes above the level of the skin.  Abdomen is soft.  Small amount of skin separation on the inferior aspect of her incision.  Extremities: Warm dry and intact  Skin: no rashes  Anorectal:  Deferred    Labs:  H&H 15 and 48.  Albumin 4.5.  Normal renal function.    Imaging:   CT abdomen pelvis 06/25/2023 reviewed and demonstrates right lower quadrant ileostomy with parastomal hernia as well as a left paramedian ventral hernia and a right paramedian supraumbilical hernia.  Small-bowel follow-through on 06/25/2023 demonstrates passage of contrast to the ostomy bag after 2 hours.  CT abdomen pelvis 06/22/2023 reviewed.  Left-sided rectus is intact.  Right-sided rectus with stoma through the inferior portion.  TAR plane appears intact    ASSESSMENT/PLAN:     Diagnoses and all orders for this visit:    Lower abdominal pain  -     CT Abdomen Pelvis With Contrast; Future  -     oxyCODONE (ROXICODONE) 15 MG Tab; Take 1 tablet (15 mg total) by mouth every 4 (four) hours as needed for Pain.    Incisional hernia with obstruction but no gangrene  -     CBC Auto Differential; Future  -     C-REACTIVE PROTEIN; Future  -     Comprehensive Metabolic Panel; Future            38 y.o. female with history of FAP status post failed pouch " with multiple complex abdominal wall hernias and a peristomal hernia.  She is having obstructive symptoms with multiple hospitalizations.  Obstructive symptoms interfering with her ability to function normally.  Although she is a very complex surgical history, medically, she remains active with normal nutrition, no weight change, nonsmoker, nondiabetic.  Discussed that she is certainly at high risk for perioperative complication given her extensive past surgical history.    She underwent abdominal wall reconstruction with reciting of her ileostomy on 08/07/2023.  She presents today for increasing fatigue.  She overall appears ill.  Will plan to recheck her labs as well as get a CT of her abdomen and pelvis.  Will follow up with her afterwards.    KRISH Tracy MD, FACS, FASCRS  Staff Surgeon  Colon & Rectal Surgery

## 2023-09-09 ENCOUNTER — HOSPITAL ENCOUNTER (INPATIENT)
Facility: HOSPITAL | Age: 38
LOS: 2 days | Discharge: HOME | DRG: 921 | End: 2023-09-12
Attending: EMERGENCY MEDICINE | Admitting: COLON & RECTAL SURGERY
Payer: COMMERCIAL

## 2023-09-09 DIAGNOSIS — R10.32 LEFT LOWER QUADRANT ABDOMINAL PAIN: Primary | ICD-10-CM

## 2023-09-09 DIAGNOSIS — R18.8 ABDOMINAL FLUID COLLECTION: ICD-10-CM

## 2023-09-09 LAB
ALBUMIN SERPL BCP-MCNC: 3.4 G/DL (ref 3.5–5.2)
ALP SERPL-CCNC: 104 U/L (ref 55–135)
ALT SERPL W/O P-5'-P-CCNC: 19 U/L (ref 10–44)
ANION GAP SERPL CALC-SCNC: 14 MMOL/L (ref 8–16)
AST SERPL-CCNC: 14 U/L (ref 10–40)
BACTERIA #/AREA URNS AUTO: ABNORMAL /HPF
BASOPHILS # BLD AUTO: 0.04 K/UL (ref 0–0.2)
BASOPHILS NFR BLD: 0.6 % (ref 0–1.9)
BILIRUB SERPL-MCNC: 0.7 MG/DL (ref 0.1–1)
BILIRUB UR QL STRIP: NEGATIVE
BUN SERPL-MCNC: 6 MG/DL (ref 6–30)
BUN SERPL-MCNC: 7 MG/DL (ref 6–20)
CALCIUM SERPL-MCNC: 9.5 MG/DL (ref 8.7–10.5)
CHLORIDE SERPL-SCNC: 101 MMOL/L (ref 95–110)
CHLORIDE SERPL-SCNC: 104 MMOL/L (ref 95–110)
CLARITY UR REFRACT.AUTO: ABNORMAL
CO2 SERPL-SCNC: 22 MMOL/L (ref 23–29)
COLOR UR AUTO: ABNORMAL
CREAT SERPL-MCNC: 0.6 MG/DL (ref 0.5–1.4)
CREAT SERPL-MCNC: 0.7 MG/DL (ref 0.5–1.4)
CRP SERPL-MCNC: 100.2 MG/L (ref 0–8.2)
DIFFERENTIAL METHOD: ABNORMAL
EOSINOPHIL # BLD AUTO: 0.1 K/UL (ref 0–0.5)
EOSINOPHIL NFR BLD: 1.6 % (ref 0–8)
ERYTHROCYTE [DISTWIDTH] IN BLOOD BY AUTOMATED COUNT: 12.7 % (ref 11.5–14.5)
EST. GFR  (NO RACE VARIABLE): >60 ML/MIN/1.73 M^2
GLUCOSE SERPL-MCNC: 125 MG/DL (ref 70–110)
GLUCOSE SERPL-MCNC: 129 MG/DL (ref 70–110)
GLUCOSE UR QL STRIP: ABNORMAL
HCT VFR BLD AUTO: 35.8 % (ref 37–48.5)
HCT VFR BLD CALC: 35 %PCV (ref 36–54)
HCV AB SERPL QL IA: NORMAL
HGB BLD-MCNC: 11 G/DL (ref 12–16)
HGB UR QL STRIP: ABNORMAL
HIV 1+2 AB+HIV1 P24 AG SERPL QL IA: NORMAL
HYALINE CASTS UR QL AUTO: 0 /LPF
IMM GRANULOCYTES # BLD AUTO: 0.02 K/UL (ref 0–0.04)
IMM GRANULOCYTES NFR BLD AUTO: 0.3 % (ref 0–0.5)
KETONES UR QL STRIP: ABNORMAL
LEUKOCYTE ESTERASE UR QL STRIP: ABNORMAL
LIPASE SERPL-CCNC: 12 U/L (ref 4–60)
LYMPHOCYTES # BLD AUTO: 1.1 K/UL (ref 1–4.8)
LYMPHOCYTES NFR BLD: 15.8 % (ref 18–48)
MCH RBC QN AUTO: 28.7 PG (ref 27–31)
MCHC RBC AUTO-ENTMCNC: 30.7 G/DL (ref 32–36)
MCV RBC AUTO: 94 FL (ref 82–98)
MICROSCOPIC COMMENT: ABNORMAL
MONOCYTES # BLD AUTO: 0.8 K/UL (ref 0.3–1)
MONOCYTES NFR BLD: 11.1 % (ref 4–15)
NEUTROPHILS # BLD AUTO: 5 K/UL (ref 1.8–7.7)
NEUTROPHILS NFR BLD: 70.6 % (ref 38–73)
NITRITE UR QL STRIP: NEGATIVE
NRBC BLD-RTO: 0 /100 WBC
PH UR STRIP: 6 [PH] (ref 5–8)
PLATELET # BLD AUTO: 357 K/UL (ref 150–450)
PMV BLD AUTO: 11 FL (ref 9.2–12.9)
POC IONIZED CALCIUM: 1.16 MMOL/L (ref 1.06–1.42)
POC TCO2 (MEASURED): 27 MMOL/L (ref 23–29)
POTASSIUM BLD-SCNC: 4.1 MMOL/L (ref 3.5–5.1)
POTASSIUM SERPL-SCNC: 4.2 MMOL/L (ref 3.5–5.1)
PROT SERPL-MCNC: 7.4 G/DL (ref 6–8.4)
PROT UR QL STRIP: ABNORMAL
RBC # BLD AUTO: 3.83 M/UL (ref 4–5.4)
RBC #/AREA URNS AUTO: 0 /HPF (ref 0–4)
SAMPLE: ABNORMAL
SODIUM BLD-SCNC: 138 MMOL/L (ref 136–145)
SODIUM SERPL-SCNC: 140 MMOL/L (ref 136–145)
SP GR UR STRIP: >1.03 (ref 1–1.03)
URN SPEC COLLECT METH UR: ABNORMAL
WBC # BLD AUTO: 7.04 K/UL (ref 3.9–12.7)
WBC #/AREA URNS AUTO: 8 /HPF (ref 0–5)

## 2023-09-09 PROCEDURE — 63600175 PHARM REV CODE 636 W HCPCS: Performed by: STUDENT IN AN ORGANIZED HEALTH CARE EDUCATION/TRAINING PROGRAM

## 2023-09-09 PROCEDURE — 25000003 PHARM REV CODE 250: Performed by: STUDENT IN AN ORGANIZED HEALTH CARE EDUCATION/TRAINING PROGRAM

## 2023-09-09 PROCEDURE — 85025 COMPLETE CBC W/AUTO DIFF WBC: CPT | Performed by: EMERGENCY MEDICINE

## 2023-09-09 PROCEDURE — 99285 EMERGENCY DEPT VISIT HI MDM: CPT

## 2023-09-09 PROCEDURE — 94761 N-INVAS EAR/PLS OXIMETRY MLT: CPT

## 2023-09-09 PROCEDURE — 82565 ASSAY OF CREATININE: CPT

## 2023-09-09 PROCEDURE — 96375 TX/PRO/DX INJ NEW DRUG ADDON: CPT

## 2023-09-09 PROCEDURE — 83690 ASSAY OF LIPASE: CPT | Performed by: EMERGENCY MEDICINE

## 2023-09-09 PROCEDURE — 87389 HIV-1 AG W/HIV-1&-2 AB AG IA: CPT | Performed by: PHYSICIAN ASSISTANT

## 2023-09-09 PROCEDURE — 96367 TX/PROPH/DG ADDL SEQ IV INF: CPT

## 2023-09-09 PROCEDURE — 96374 THER/PROPH/DIAG INJ IV PUSH: CPT | Mod: 59

## 2023-09-09 PROCEDURE — G0378 HOSPITAL OBSERVATION PER HR: HCPCS

## 2023-09-09 PROCEDURE — 80047 BASIC METABLC PNL IONIZED CA: CPT

## 2023-09-09 PROCEDURE — 36410 VNPNXR 3YR/> PHY/QHP DX/THER: CPT

## 2023-09-09 PROCEDURE — C1751 CATH, INF, PER/CENT/MIDLINE: HCPCS

## 2023-09-09 PROCEDURE — 96365 THER/PROPH/DIAG IV INF INIT: CPT

## 2023-09-09 PROCEDURE — 63600175 PHARM REV CODE 636 W HCPCS: Performed by: EMERGENCY MEDICINE

## 2023-09-09 PROCEDURE — 86140 C-REACTIVE PROTEIN: CPT | Performed by: EMERGENCY MEDICINE

## 2023-09-09 PROCEDURE — 81001 URINALYSIS AUTO W/SCOPE: CPT | Performed by: EMERGENCY MEDICINE

## 2023-09-09 PROCEDURE — 86803 HEPATITIS C AB TEST: CPT | Performed by: PHYSICIAN ASSISTANT

## 2023-09-09 PROCEDURE — 80053 COMPREHEN METABOLIC PANEL: CPT | Performed by: EMERGENCY MEDICINE

## 2023-09-09 RX ORDER — OXYCODONE HYDROCHLORIDE 5 MG/1
5 TABLET ORAL EVERY 4 HOURS PRN
Status: DISCONTINUED | OUTPATIENT
Start: 2023-09-09 | End: 2023-09-11

## 2023-09-09 RX ORDER — METRONIDAZOLE 500 MG/100ML
500 INJECTION, SOLUTION INTRAVENOUS
Status: DISCONTINUED | OUTPATIENT
Start: 2023-09-10 | End: 2023-09-11

## 2023-09-09 RX ORDER — TALC
6 POWDER (GRAM) TOPICAL NIGHTLY PRN
Status: DISCONTINUED | OUTPATIENT
Start: 2023-09-09 | End: 2023-09-12 | Stop reason: HOSPADM

## 2023-09-09 RX ORDER — SODIUM CHLORIDE 0.9 % (FLUSH) 0.9 %
10 SYRINGE (ML) INJECTION
Status: DISCONTINUED | OUTPATIENT
Start: 2023-09-09 | End: 2023-09-12 | Stop reason: HOSPADM

## 2023-09-09 RX ORDER — SODIUM CHLORIDE 0.9 % (FLUSH) 0.9 %
10 SYRINGE (ML) INJECTION EVERY 6 HOURS
Status: DISCONTINUED | OUTPATIENT
Start: 2023-09-09 | End: 2023-09-12 | Stop reason: HOSPADM

## 2023-09-09 RX ORDER — ONDANSETRON 8 MG/1
8 TABLET, ORALLY DISINTEGRATING ORAL ONCE
Status: COMPLETED | OUTPATIENT
Start: 2023-09-09 | End: 2023-09-09

## 2023-09-09 RX ORDER — DEXTROSE MONOHYDRATE, SODIUM CHLORIDE, AND POTASSIUM CHLORIDE 50; 1.49; 2.25 G/1000ML; G/1000ML; G/1000ML
INJECTION, SOLUTION INTRAVENOUS CONTINUOUS
Status: DISCONTINUED | OUTPATIENT
Start: 2023-09-09 | End: 2023-09-11

## 2023-09-09 RX ORDER — GABAPENTIN 300 MG/1
300 CAPSULE ORAL 3 TIMES DAILY
Status: DISCONTINUED | OUTPATIENT
Start: 2023-09-09 | End: 2023-09-10

## 2023-09-09 RX ORDER — ACETAMINOPHEN 500 MG
1000 TABLET ORAL EVERY 8 HOURS
Status: DISCONTINUED | OUTPATIENT
Start: 2023-09-09 | End: 2023-09-12 | Stop reason: HOSPADM

## 2023-09-09 RX ORDER — OXYCODONE HYDROCHLORIDE 10 MG/1
10 TABLET ORAL EVERY 4 HOURS PRN
Status: DISCONTINUED | OUTPATIENT
Start: 2023-09-09 | End: 2023-09-11

## 2023-09-09 RX ORDER — HYDROMORPHONE HYDROCHLORIDE 1 MG/ML
0.5 INJECTION, SOLUTION INTRAMUSCULAR; INTRAVENOUS; SUBCUTANEOUS
Status: COMPLETED | OUTPATIENT
Start: 2023-09-09 | End: 2023-09-09

## 2023-09-09 RX ORDER — DIPHENHYDRAMINE HYDROCHLORIDE 50 MG/ML
25 INJECTION INTRAMUSCULAR; INTRAVENOUS EVERY 6 HOURS PRN
Status: DISCONTINUED | OUTPATIENT
Start: 2023-09-10 | End: 2023-09-12 | Stop reason: HOSPADM

## 2023-09-09 RX ORDER — METHOCARBAMOL 500 MG/1
500 TABLET, FILM COATED ORAL 4 TIMES DAILY
Status: DISCONTINUED | OUTPATIENT
Start: 2023-09-09 | End: 2023-09-12 | Stop reason: HOSPADM

## 2023-09-09 RX ORDER — DIPHENHYDRAMINE HCL 25 MG
25 CAPSULE ORAL EVERY 6 HOURS PRN
Status: DISCONTINUED | OUTPATIENT
Start: 2023-09-09 | End: 2023-09-09

## 2023-09-09 RX ORDER — VENLAFAXINE 75 MG/1
150 TABLET ORAL DAILY
Status: DISCONTINUED | OUTPATIENT
Start: 2023-09-10 | End: 2023-09-12 | Stop reason: HOSPADM

## 2023-09-09 RX ORDER — ONDANSETRON 2 MG/ML
8 INJECTION INTRAMUSCULAR; INTRAVENOUS EVERY 8 HOURS PRN
Status: DISCONTINUED | OUTPATIENT
Start: 2023-09-09 | End: 2023-09-12

## 2023-09-09 RX ORDER — ONDANSETRON 2 MG/ML
4 INJECTION INTRAMUSCULAR; INTRAVENOUS
Status: COMPLETED | OUTPATIENT
Start: 2023-09-09 | End: 2023-09-09

## 2023-09-09 RX ADMIN — OXYCODONE HYDROCHLORIDE 5 MG: 5 TABLET ORAL at 06:09

## 2023-09-09 RX ADMIN — HYDROMORPHONE HYDROCHLORIDE 0.5 MG: 1 INJECTION, SOLUTION INTRAMUSCULAR; INTRAVENOUS; SUBCUTANEOUS at 05:09

## 2023-09-09 RX ADMIN — METHOCARBAMOL 500 MG: 500 TABLET ORAL at 10:09

## 2023-09-09 RX ADMIN — ONDANSETRON 8 MG: 8 TABLET, ORALLY DISINTEGRATING ORAL at 08:09

## 2023-09-09 RX ADMIN — PIPERACILLIN AND TAZOBACTAM 4.5 G: 4; .5 INJECTION, POWDER, LYOPHILIZED, FOR SOLUTION INTRAVENOUS; PARENTERAL at 10:09

## 2023-09-09 RX ADMIN — ACETAMINOPHEN 1000 MG: 500 TABLET ORAL at 10:09

## 2023-09-09 RX ADMIN — ONDANSETRON 4 MG: 2 INJECTION INTRAMUSCULAR; INTRAVENOUS at 05:09

## 2023-09-09 RX ADMIN — OXYCODONE HYDROCHLORIDE 10 MG: 10 TABLET ORAL at 10:09

## 2023-09-09 RX ADMIN — METRONIDAZOLE 500 MG: 500 INJECTION, SOLUTION INTRAVENOUS at 11:09

## 2023-09-09 RX ADMIN — OXYCODONE HYDROCHLORIDE 10 MG: 10 TABLET ORAL at 07:09

## 2023-09-09 RX ADMIN — DEXTROSE MONOHYDRATE, SODIUM CHLORIDE, AND POTASSIUM CHLORIDE: 50; 2.25; 1.49 INJECTION, SOLUTION INTRAVENOUS at 09:09

## 2023-09-09 NOTE — ED PROVIDER NOTES
Encounter Date: 9/9/2023       History     Chief Complaint   Patient presents with    Abnormal Ct Scan     Needs a CT with contrast post abdominal surgery 8/7, was unable to get it across the street, they usually put midlines in here.     38-year-old female past medical history of familial adenomatosis polyposis status post multiple surgeries and complicated abdominal history presents today from Colorectal surgery Clinic for CT scan.  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  She underwent combined hysterectomy and ventral hernia repair with mesh placement 2012.  This was complicated by mesh infection requiring mesh excision in 2016.  In 2016 she developed recurrent multiple midline hernias with increasing obstructive symptoms.        Review of patient's allergies indicates:   Allergen Reactions    Levofloxacin Nausea And Vomiting and Rash    Morphine Anaphylaxis, Hives, Shortness Of Breath and Hallucinations           Piperacillin-tazobactam Rash    Sulfa (sulfonamide antibiotics) Nausea And Vomiting and Rash    Opioids - morphine analogues     Hydrocodone-acetaminophen Itching     Past Medical History:   Diagnosis Date    Anxiety     Familial polyposis     S/P total colectomy      Past Surgical History:   Procedure Laterality Date    Davinci Assisted Bilateral Ovarian Cystectomy, with extension  SEAN   8/2011    HYSTERECTOMY  8/23/2012    DAVINCI     ILEOSTOMY REVISION N/A 8/7/2023    Procedure: REVISION, ILEOSTOMY;  Surgeon: KRISH Tracy MD;  Location: [facility] OR [address] [place] [address];  Service: Colon and Rectal;  Laterality: N/A;    Illeotomy Creation  2009    LYSIS OF ADHESIONS N/A 8/7/2023    Procedure: LYSIS, ADHESIONS;  Surgeon: KRISH Tracy MD;  Location: [facility] OR [address] [place] [address];   Service: Colon and Rectal;  Laterality: N/A;    OOPHORECTOMY  8/23/2012    DAvinci removal with DLH     REPAIR OF RECURRENT VENTRAL HERNIA N/A 8/7/2023    Procedure: REPAIR, HERNIA, VENTRAL, RECURRENT, ERAS low;  Surgeon: KRISH Tracy MD;  Location: [facility] OR 2ND FLR;  Service: Colon and Rectal;  Laterality: N/A;  w/ mesh and omental pedical flap    TOTAL COLECTOMY      Iloanal pouch creation     Family History   Problem Relation Age of Onset    Colon cancer Maternal Grandmother         near late 60's    Lung cancer Maternal Grandfather         lung cancer     Social History     Tobacco Use    Smoking status: Former    Smokeless tobacco: Never   Substance Use Topics    Alcohol use: Not Currently     Comment: seldom    Drug use: No     Review of Systems    Physical Exam     Initial Vitals [09/09/23 1346]   BP Pulse Resp Temp SpO2   (!) 193/100 104 16 98 °F (36.7 °C) 100 %      MAP       --         Physical Exam    Nursing note and vitals reviewed.  Constitutional: She is not diaphoretic. No distress.   Obese   HENT:   Head: Normocephalic and atraumatic.   Nose: Nose normal.   Eyes: EOM are normal. No scleral icterus.   Neck: Neck supple.   Normal range of motion.  Cardiovascular:  Normal rate, regular rhythm, normal heart sounds and intact distal pulses.     Exam reveals no gallop and no friction rub.       No murmur heard.  Pulmonary/Chest: Breath sounds normal. No stridor. No respiratory distress. She has no wheezes. She has no rhonchi. She has no rales.   Abdominal: Abdomen is soft. Bowel sounds are normal. She exhibits no distension (Diffuse). There is abdominal tenderness.   Large midline incision healing well.  No obvious hernia.  Ostomy in the right upper quadrant is pink with good output.  Abdomen is soft but diffusely tender worse in the left lower quadrant.  No peritonitis.  Small amount of skin separation on the inferior aspect of her incision see images. There is no rebound and no guarding.    Musculoskeletal:         General: Normal range of motion.      Cervical back: Normal range of motion and neck supple.     Neurological: She is alert and oriented to person, place, and time. She has normal strength. No cranial nerve deficit or sensory deficit.   Skin: Skin is warm and dry. Capillary refill takes less than 2 seconds. No rash noted.   Psychiatric: She has a normal mood and affect.         ED Course   Procedures  Labs Reviewed   CBC W/ AUTO DIFFERENTIAL - Abnormal; Notable for the following components:       Result Value    RBC 3.83 (*)     Hemoglobin 11.0 (*)     Hematocrit 35.8 (*)     MCHC 30.7 (*)     Lymph % 15.8 (*)     All other components within normal limits   COMPREHENSIVE METABOLIC PANEL - Abnormal; Notable for the following components:    CO2 22 (*)     Glucose 125 (*)     Albumin 3.4 (*)     All other components within normal limits   URINALYSIS, REFLEX TO URINE CULTURE - Abnormal; Notable for the following components:    Color, UA Orange (*)     Specific Gravity, UA >1.030 (*)     Protein, UA 1+ (*)     Glucose, UA Trace (*)     Ketones, UA 1+ (*)     Occult Blood UA Trace (*)     Leukocytes, UA 2+ (*)     All other components within normal limits    Narrative:     Specimen Source->Urine   C-REACTIVE PROTEIN - Abnormal; Notable for the following components:    .2 (*)     All other components within normal limits    Narrative:     add on crp per  /order#[id number] @ 09/09/2023  17:13    URINALYSIS MICROSCOPIC - Abnormal; Notable for the following components:    WBC, UA 8 (*)     All other components within normal limits    Narrative:     Specimen Source->Urine   ISTAT PROCEDURE - Abnormal; Notable for the following components:    POC Glucose 129 (*)     POC Hematocrit 35 (*)     All other components within normal limits   HIV 1 / 2 ANTIBODY    Narrative:     Release to patient->Immediate   HEPATITIS C ANTIBODY    Narrative:     Release to patient->Immediate   LIPASE    C-REACTIVE PROTEIN   ISTAT CHEM8          Imaging Results    None          Medications   sodium chloride 0.9% bolus 1,000 mL 1,000 mL (has no administration in time range)   sodium chloride 0.9% flush 10 mL (has no administration in time range)   melatonin tablet 6 mg (has no administration in time range)   acetaminophen tablet 1,000 mg (1,000 mg Oral Not Given 9/10/23 0508)   ondansetron injection 8 mg (8 mg Intravenous Given 9/10/23 0029)   methocarbamoL tablet 500 mg (500 mg Oral Given 9/10/23 0913)   venlafaxine tablet 150 mg (has no administration in time range)   oxyCODONE immediate release tablet 5 mg (5 mg Oral Given 9/9/23 1806)   oxyCODONE immediate release tablet Tab 10 mg (10 mg Oral Given 9/10/23 0704)   sodium chloride 0.9% flush 10 mL (10 mLs Intravenous Given 9/10/23 0511)     And   sodium chloride 0.9% flush 10 mL (has no administration in time range)   dextrose 5 % and 0.2 % NaCl with KCl 20 mEq infusion ( Intravenous Verify Only 9/10/23 0621)   cefTRIAXone (ROCEPHIN) 2 g in dextrose 5 % in water (D5W) 100 mL IVPB (MB+) (0 g Intravenous Stopped 9/10/23 0159)   metronidazole IVPB 500 mg (500 mg Intravenous New Bag 9/10/23 0916)   diphenhydrAMINE injection 25 mg (50 mg Intravenous Given 9/10/23 1008)   ibuprofen tablet 600 mg (600 mg Oral Given 9/10/23 0509)   HYDROmorphone injection 0.5 mg (0.5 mg Intravenous Given 9/10/23 0910)   enoxaparin injection 40 mg (has no administration in time range)   ondansetron injection 4 mg (4 mg Intravenous Given 9/9/23 1716)   HYDROmorphone injection 0.5 mg (0.5 mg Intravenous Given 9/9/23 1720)   HYDROmorphone injection 0.5 mg (0.5 mg Intravenous Given 9/9/23 1720)   ondansetron disintegrating tablet 8 mg (8 mg Oral Given 9/9/23 2003)   midazolam (VERSED) 1 mg/mL injection (1 mg  Given 9/10/23 1016)     Medical Decision Making  Admit to Colorectal surgery for further management of intra-abdominal postop fluid collections.    Amount and/or Complexity of Data  Reviewed  Labs: ordered.    Risk  Prescription drug management.               ED Course as of 09/10/23 1048   Sat Sep 09, 2023   1607 Impression:     Two large fluid collections 1 in the anterior abdominal wall the 2nd in the subcutaneous fat with measurements as above.  Fluid appears multiloculated with associated soft tissue stranding.  No air to confirm the presence of an abscess.     Postop colectomy. Presacral fluid and soft tissue stranding noted.  Some air in fluid just above the anal verge may be in a rectal remnant though correlation with surgical history would be helpful to exclude abscess.     Patchy consolidation the lingula/left lower lobe.     This report was flagged in Epic as abnormal.        Electronically signed by: Alfonzo Iverson MD  Date:                                            09/09/2023  Time:                                           08:56 [BD]   7609 38-year-old female past medical history of familial adenomatous polyposis with multiple intra-abdominal surgeries most recently had ileostomy revision with relocation of the ileostomy and small-bowel resection as well as extensive lysis of adhesions, ventral hernia repair with retrorectus mesh placement on 08/07/2023 by Dr. Perez presents today with abnormal CT.  CT showed 2 large multiloculated fluid collections with associated soft tissue stranding.  Labs also showed elevated CRP.  Abdominal exam with diffuse abdominal tenderness. Will repeat labs and consult colorectal surgery. [BD]   0863 Discussed case with colorectal surgery who will come down to evaluate patient. [BD]      ED Course User Index  [BD] Benny Carrillo MD                    Clinical Impression:   Final diagnoses:  [R18.8] Abdominal fluid collection  [R10.32] Left lower quadrant abdominal pain (Primary)        ED Disposition Condition    Observation                 Benny Carrillo MD  09/10/23 1044

## 2023-09-09 NOTE — CONSULTS
GENERAL SURGERY  Consultation      REASON FOR CONSULT:  Abdominal pain, subjective fevers.      SUBJECTIVE:     HISTORY OF PRESENT ILLNESS:  Georgette Abrams is a 38 y.o. female well known to the colorectal surgery department.     She has had 66 hospital admission since 2009.  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  She therefore underwent repeat exploratory laparotomy with small-bowel resection in June 2010.  Ultimately, she had pouch failure requiring pouch excision in September 2010.  She underwent combined hysterectomy and ventral hernia repair with mesh placement in 2012.  This was complicated by mesh infection requiring mesh excision in 2016.     She then developed multiple midline hernias with obstructive symptoms. She underwent a Ileostomy revision with relocation of the ileostomy and small-bowel resection, and a ventral hernia repair with retrorectus mesh placement.     She was discharged from the hospital on 8/16 and since then she has seen Dr. Tracy twice in clinic. Last time was on 9/8/23. At that time, she was complaining of subjective fevers and abdominal pain so a CT scan was ordered.     CT showed Two large fluid collections 1 in the anterior abdominal wall the 2nd in the subcutaneous fat, CRP was 74.5. Labs showed a normal white count, no left shift, normal Cr/     Today she presented to the ED for weakness, abdominal pain, and dehydration. Colorectal surgery was consulted for evaluation.       MEDICATIONS:  Home Medications:  Current Facility-Administered Medications on File Prior to Encounter   Medication Dose Route Frequency Provider Last Rate Last Admin    [COMPLETED] barium sulfate (READI-CAT) suspension 450 mL  450 mL Oral ONCE PRN KRISH Tracy MD   450 mL at 09/08/23 0597    LIDOcaine (PF) 10 mg/ml (1%) injection 10 mg  1 mL Intradermal On Call  Procedure Rhina Saldana NP         Current Outpatient Medications on File Prior to Encounter   Medication Sig Dispense Refill    amoxicillin-clavulanate 875-125mg (AUGMENTIN) 875-125 mg per tablet Take 1 tablet by mouth every 12 (twelve) hours. for 10 days 20 tablet 0    clonazepam (KLONOPIN) 1 MG tablet Take 1 mg by mouth 2 (two) times daily as needed.        cyanocobalamin 1,000 mcg/mL injection 1,000 mcg twice a week.      ibuprofen (ADVIL,MOTRIN) 800 MG tablet Take 1 tablet (800 mg total) by mouth every 8 (eight) hours. 30 tablet 3    losartan (COZAAR) 100 MG tablet Take 100 mg by mouth.      methocarbamoL (ROBAXIN) 500 MG Tab Take 1 tablet (500 mg total) by mouth 4 (four) times daily. 40 tablet 0    ondansetron (ZOFRAN) 4 MG tablet Take 4 mg by mouth every 6 (six) hours as needed for Nausea.      ondansetron (ZOFRAN-ODT) 8 MG TbDL Take 4 mg by mouth once.        oxyCODONE (ROXICODONE) 15 MG Tab Take 1 tablet (15 mg total) by mouth every 4 (four) hours as needed for Pain. 30 tablet 0    oxyCODONE (ROXICODONE) 15 MG Tab Take 1 tablet (15 mg total) by mouth every 4 (four) hours as needed for Pain. 30 tablet 0    progesterone (PROMETRIUM) 100 MG capsule Take 400 mg by mouth every evening.      venlafaxine (EFFEXOR) 100 MG Tab Take 150 mg by mouth Daily.      gabapentin (NEURONTIN) 300 MG capsule Take 1 capsule (300 mg total) by mouth 3 (three) times daily. (Patient not taking: Reported on 8/25/2023) 90 capsule 0     Inpatient Medications:  Infusions:  PRN Medications:    ALLERGIES:    Review of patient's allergies indicates:   Allergen Reactions    Levofloxacin Nausea And Vomiting and Rash    Morphine Anaphylaxis, Hives, Shortness Of Breath and Hallucinations           Sulfa (sulfonamide antibiotics) Nausea And Vomiting and Rash    Opioids - morphine analogues     Hydrocodone-acetaminophen Itching       PAST MEDICAL HISTORY:    Past Medical History:   Diagnosis Date    Anxiety     Familial polyposis      S/P total colectomy        SURGICAL HISTORY:  Past Surgical History:   Procedure Laterality Date    Davinci Assisted Bilateral Ovarian Cystectomy, with extension  SEAN   8/2011    HYSTERECTOMY  8/23/2012    DAVINCI     ILEOSTOMY REVISION N/A 8/7/2023    Procedure: REVISION, ILEOSTOMY;  Surgeon: KRISH Tracy MD;  Location: [facility] OR 2ND FLR;  Service: Colon and Rectal;  Laterality: N/A;    Illeotomy Creation  2009    LYSIS OF ADHESIONS N/A 8/7/2023    Procedure: LYSIS, ADHESIONS;  Surgeon: KIRSH Tracy MD;  Location: [facility] OR 2ND FLR;  Service: Colon and Rectal;  Laterality: N/A;    OOPHORECTOMY  8/23/2012    DAvinci removal with DLH     REPAIR OF RECURRENT VENTRAL HERNIA N/A 8/7/2023    Procedure: REPAIR, HERNIA, VENTRAL, RECURRENT, ERAS low;  Surgeon: KRISH Tracy MD;  Location: [facility] OR 2ND FLR;  Service: Colon and Rectal;  Laterality: N/A;  w/ mesh and omental pedical flap    TOTAL COLECTOMY      Iloanal pouch creation       FAMILY HISTORY:  Family History   Problem Relation Age of Onset    Colon cancer Maternal Grandmother         near late 60's    Lung cancer Maternal Grandfather         lung cancer       SOCIAL HISTORY:  Social History     Tobacco Use    Smoking status: Former    Smokeless tobacco: Never   Substance Use Topics    Alcohol use: Not Currently     Comment: seldom    Drug use: No        REVIEW OF SYSTEMS:  A 10-point review of systems is negative except for the above mentioned in the HPI.    OBJECTIVE:     Most Recent Vitals:  Temp: 99.1 °F (37.3 °C) (09/09/23 1507)  Pulse: 94 (09/09/23 1507)  Resp: 18 (09/09/23 1507)  BP: (!) 149/75 (09/09/23 1507)  SpO2: 98 % (09/09/23 1507)    24-Hour Vitals:  Temp:  [98 °F (36.7 °C)-99.1 °F (37.3 °C)]   Pulse:  []   Resp:  [16-18]   BP: (149-193)/()   SpO2:  [98 %-100 %]     24-Hour I&O:No intake or output data in the 24 hours ending 09/09/23 1640    PHYSICAL EXAM:  AAO, NAD, well developed and well nourished.  Head  "normocephalic, atraumatic.  Trachea midline, neck supple.  Respirations unlabored with good inspiratory effort.  Heart regular rate and rhythm.  Abdomen soft, obese, ostomy working, incisions with some minor drainage, nondistended, nontender to palpation.      LABORATORY VALUES:  Recent Labs   Lab 09/08/23  1552 09/09/23  1545 09/09/23  1550   WBC 8.06 7.04  --    HGB 11.6* 11.0*  --    HCT 36.1* 35.8* 35*    357  --      Recent Labs   Lab 09/08/23  1552 09/09/23  1545    140   K 4.0 4.2    104   CO2 24 22*   BUN 4* 7   CREATININE 0.7 0.7   * 125*   CALCIUM 9.6 9.5   AST 16 14   ALT 24 19   ALKPHOS 112 104   BILITOT 0.7 0.7   PROT 7.3 7.4   ALBUMIN 3.4* 3.4*   LIPASE  --  12   CRP 74.5*  --    No results for input(s): "INR", "PTT", "LABHEPA", "LACTATE", "TROPONINI", "CPK", "CPKMB", "MB", "BNP" in the last 72 hours.No results for input(s): "PH", "PCO2", "PO2", "HCO3" in the last 72 hours.    DIAGNOSTIC STUDIES:  CT: Reviewed    ASSESSMENT:     Georgette Abrams is a 38 y.o. female well known to the colorectal surgery department. Presented to the ED complaining of nausea, abdominal pain and pressure, and subjective fevers. CT scan showed two fluid collections, one in the retrorectus space and the other one in the subq space. Colon and rectal surgery was consulted for evaluation.       PLAN:  No acute surgical intervention indicated at this time.  Will admit patient for observation  Start Zosyn  Will ask IR to drain fluid collection since patient is so symptomatic. Infected seroma?   Trend CRP  NPO at [place]  Clears for now  Discussed with staff       Thank you, and please call back if/when CRS can be of any further assistance in the future care of this patient.      Burke Hernandez MD        "

## 2023-09-09 NOTE — ED TRIAGE NOTES
Patient reports to the ED for worsening dehydration and abdominal pain 10/10 around her ileostomy site. Pt had ileostomy revision on August 7, and is currently being followed by GI. Ileostomy was placed for FAP. GI told patient to come to the ER if the pain and weakness continued. Pt had previous CT scan done, two fluid pockets were shown on CT scan. Pt. Was told by GI doctor to be sent to the ED. Pt. Has history of hernia, fluid pockets and abscesses.

## 2023-09-09 NOTE — ED NOTES
Assumed care of patient. Pt. Roomed in 8. Placed in gown and non skid socks. Pt placed on cardiac monitor, continuous pulse ox, and cycling blood pressures. Bed placed in low locked position, side rails up x2, call bell is within reach. Warm blanket and pillow provided to patient. Will continue to monitor.

## 2023-09-10 LAB
ALBUMIN SERPL BCP-MCNC: 3.4 G/DL (ref 3.5–5.2)
ALP SERPL-CCNC: 105 U/L (ref 55–135)
ALT SERPL W/O P-5'-P-CCNC: 19 U/L (ref 10–44)
ANION GAP SERPL CALC-SCNC: 12 MMOL/L (ref 8–16)
APPEARANCE FLD: CLEAR
AST SERPL-CCNC: 11 U/L (ref 10–40)
BASOPHILS # BLD AUTO: 0.03 K/UL (ref 0–0.2)
BASOPHILS NFR BLD: 0.4 % (ref 0–1.9)
BILIRUB SERPL-MCNC: 0.7 MG/DL (ref 0.1–1)
BODY FLD TYPE: NORMAL
BUN SERPL-MCNC: 6 MG/DL (ref 6–20)
CALCIUM SERPL-MCNC: 9.7 MG/DL (ref 8.7–10.5)
CHLORIDE SERPL-SCNC: 101 MMOL/L (ref 95–110)
CO2 SERPL-SCNC: 24 MMOL/L (ref 23–29)
COLOR FLD: YELLOW
CREAT SERPL-MCNC: 0.7 MG/DL (ref 0.5–1.4)
CRP SERPL-MCNC: 121.5 MG/L (ref 0–8.2)
DIFFERENTIAL METHOD: ABNORMAL
EOSINOPHIL # BLD AUTO: 0.1 K/UL (ref 0–0.5)
EOSINOPHIL NFR BLD: 1.4 % (ref 0–8)
ERYTHROCYTE [DISTWIDTH] IN BLOOD BY AUTOMATED COUNT: 12.7 % (ref 11.5–14.5)
EST. GFR  (NO RACE VARIABLE): >60 ML/MIN/1.73 M^2
GLUCOSE SERPL-MCNC: 114 MG/DL (ref 70–110)
GRAM STN SPEC: NORMAL
GRAM STN SPEC: NORMAL
HCT VFR BLD AUTO: 39.4 % (ref 37–48.5)
HGB BLD-MCNC: 12.3 G/DL (ref 12–16)
IMM GRANULOCYTES # BLD AUTO: 0.03 K/UL (ref 0–0.04)
IMM GRANULOCYTES NFR BLD AUTO: 0.4 % (ref 0–0.5)
LYMPHOCYTES # BLD AUTO: 1.1 K/UL (ref 1–4.8)
LYMPHOCYTES NFR BLD: 15.7 % (ref 18–48)
LYMPHOCYTES NFR FLD MANUAL: 20 %
MAGNESIUM SERPL-MCNC: 1.8 MG/DL (ref 1.6–2.6)
MCH RBC QN AUTO: 28.6 PG (ref 27–31)
MCHC RBC AUTO-ENTMCNC: 31.2 G/DL (ref 32–36)
MCV RBC AUTO: 92 FL (ref 82–98)
MESOTHL CELL NFR FLD MANUAL: 6 %
MONOCYTES # BLD AUTO: 0.7 K/UL (ref 0.3–1)
MONOCYTES NFR BLD: 9.8 % (ref 4–15)
MONOS+MACROS NFR FLD MANUAL: 42 %
NEUTROPHILS # BLD AUTO: 5.1 K/UL (ref 1.8–7.7)
NEUTROPHILS NFR BLD: 72.3 % (ref 38–73)
NEUTROPHILS NFR FLD MANUAL: 32 %
NRBC BLD-RTO: 0 /100 WBC
PHOSPHATE SERPL-MCNC: 3.4 MG/DL (ref 2.7–4.5)
PLATELET # BLD AUTO: 376 K/UL (ref 150–450)
PMV BLD AUTO: 10.9 FL (ref 9.2–12.9)
POTASSIUM SERPL-SCNC: 4 MMOL/L (ref 3.5–5.1)
PROT SERPL-MCNC: 7.5 G/DL (ref 6–8.4)
RBC # BLD AUTO: 4.3 M/UL (ref 4–5.4)
SODIUM SERPL-SCNC: 137 MMOL/L (ref 136–145)
WBC # BLD AUTO: 7.01 K/UL (ref 3.9–12.7)
WBC # FLD: 96 /CU MM

## 2023-09-10 PROCEDURE — 89051 BODY FLUID CELL COUNT: CPT | Performed by: COLON & RECTAL SURGERY

## 2023-09-10 PROCEDURE — 25000003 PHARM REV CODE 250

## 2023-09-10 PROCEDURE — 87205 SMEAR GRAM STAIN: CPT | Performed by: COLON & RECTAL SURGERY

## 2023-09-10 PROCEDURE — 63600175 PHARM REV CODE 636 W HCPCS

## 2023-09-10 PROCEDURE — 99222 PR INITIAL HOSPITAL CARE,LEVL II: ICD-10-PCS | Mod: 25,,, | Performed by: STUDENT IN AN ORGANIZED HEALTH CARE EDUCATION/TRAINING PROGRAM

## 2023-09-10 PROCEDURE — 97161 PT EVAL LOW COMPLEX 20 MIN: CPT

## 2023-09-10 PROCEDURE — 97530 THERAPEUTIC ACTIVITIES: CPT

## 2023-09-10 PROCEDURE — 97116 GAIT TRAINING THERAPY: CPT

## 2023-09-10 PROCEDURE — 87070 CULTURE OTHR SPECIMN AEROBIC: CPT | Performed by: COLON & RECTAL SURGERY

## 2023-09-10 PROCEDURE — 84100 ASSAY OF PHOSPHORUS: CPT | Performed by: STUDENT IN AN ORGANIZED HEALTH CARE EDUCATION/TRAINING PROGRAM

## 2023-09-10 PROCEDURE — 87116 MYCOBACTERIA CULTURE: CPT | Performed by: COLON & RECTAL SURGERY

## 2023-09-10 PROCEDURE — 97165 OT EVAL LOW COMPLEX 30 MIN: CPT

## 2023-09-10 PROCEDURE — 63600175 PHARM REV CODE 636 W HCPCS: Performed by: STUDENT IN AN ORGANIZED HEALTH CARE EDUCATION/TRAINING PROGRAM

## 2023-09-10 PROCEDURE — 96375 TX/PRO/DX INJ NEW DRUG ADDON: CPT

## 2023-09-10 PROCEDURE — 20600001 HC STEP DOWN PRIVATE ROOM

## 2023-09-10 PROCEDURE — 83735 ASSAY OF MAGNESIUM: CPT | Performed by: STUDENT IN AN ORGANIZED HEALTH CARE EDUCATION/TRAINING PROGRAM

## 2023-09-10 PROCEDURE — 99222 1ST HOSP IP/OBS MODERATE 55: CPT | Mod: 25,,, | Performed by: STUDENT IN AN ORGANIZED HEALTH CARE EDUCATION/TRAINING PROGRAM

## 2023-09-10 PROCEDURE — 97110 THERAPEUTIC EXERCISES: CPT

## 2023-09-10 PROCEDURE — 96366 THER/PROPH/DIAG IV INF ADDON: CPT

## 2023-09-10 PROCEDURE — A4216 STERILE WATER/SALINE, 10 ML: HCPCS | Performed by: COLON & RECTAL SURGERY

## 2023-09-10 PROCEDURE — 87206 SMEAR FLUORESCENT/ACID STAI: CPT | Performed by: COLON & RECTAL SURGERY

## 2023-09-10 PROCEDURE — 87102 FUNGUS ISOLATION CULTURE: CPT | Performed by: COLON & RECTAL SURGERY

## 2023-09-10 PROCEDURE — 96365 THER/PROPH/DIAG IV INF INIT: CPT | Mod: 59

## 2023-09-10 PROCEDURE — 85025 COMPLETE CBC W/AUTO DIFF WBC: CPT | Performed by: STUDENT IN AN ORGANIZED HEALTH CARE EDUCATION/TRAINING PROGRAM

## 2023-09-10 PROCEDURE — 86140 C-REACTIVE PROTEIN: CPT | Performed by: STUDENT IN AN ORGANIZED HEALTH CARE EDUCATION/TRAINING PROGRAM

## 2023-09-10 PROCEDURE — 87075 CULTR BACTERIA EXCEPT BLOOD: CPT | Performed by: COLON & RECTAL SURGERY

## 2023-09-10 PROCEDURE — 25000003 PHARM REV CODE 250: Performed by: COLON & RECTAL SURGERY

## 2023-09-10 PROCEDURE — 96376 TX/PRO/DX INJ SAME DRUG ADON: CPT

## 2023-09-10 PROCEDURE — 80053 COMPREHEN METABOLIC PANEL: CPT | Performed by: STUDENT IN AN ORGANIZED HEALTH CARE EDUCATION/TRAINING PROGRAM

## 2023-09-10 PROCEDURE — 25000003 PHARM REV CODE 250: Performed by: STUDENT IN AN ORGANIZED HEALTH CARE EDUCATION/TRAINING PROGRAM

## 2023-09-10 RX ORDER — HYDROMORPHONE HYDROCHLORIDE 1 MG/ML
0.5 INJECTION, SOLUTION INTRAMUSCULAR; INTRAVENOUS; SUBCUTANEOUS EVERY 4 HOURS PRN
Status: DISCONTINUED | OUTPATIENT
Start: 2023-09-10 | End: 2023-09-11

## 2023-09-10 RX ORDER — HYDROMORPHONE HYDROCHLORIDE 1 MG/ML
1 INJECTION, SOLUTION INTRAMUSCULAR; INTRAVENOUS; SUBCUTANEOUS EVERY 6 HOURS PRN
Status: DISCONTINUED | OUTPATIENT
Start: 2023-09-10 | End: 2023-09-10

## 2023-09-10 RX ORDER — ENOXAPARIN SODIUM 100 MG/ML
40 INJECTION SUBCUTANEOUS EVERY 24 HOURS
Status: DISCONTINUED | OUTPATIENT
Start: 2023-09-10 | End: 2023-09-12 | Stop reason: HOSPADM

## 2023-09-10 RX ORDER — HYDROMORPHONE HYDROCHLORIDE 1 MG/ML
1 INJECTION, SOLUTION INTRAMUSCULAR; INTRAVENOUS; SUBCUTANEOUS EVERY 4 HOURS PRN
Status: DISCONTINUED | OUTPATIENT
Start: 2023-09-10 | End: 2023-09-10

## 2023-09-10 RX ORDER — MIDAZOLAM HYDROCHLORIDE 1 MG/ML
INJECTION INTRAMUSCULAR; INTRAVENOUS
Status: COMPLETED | OUTPATIENT
Start: 2023-09-10 | End: 2023-09-10

## 2023-09-10 RX ORDER — IBUPROFEN 600 MG/1
600 TABLET ORAL EVERY 6 HOURS
Status: DISCONTINUED | OUTPATIENT
Start: 2023-09-10 | End: 2023-09-12 | Stop reason: HOSPADM

## 2023-09-10 RX ADMIN — Medication 10 ML: at 12:09

## 2023-09-10 RX ADMIN — IBUPROFEN 600 MG: 600 TABLET ORAL at 11:09

## 2023-09-10 RX ADMIN — HYDROMORPHONE HYDROCHLORIDE 0.5 MG: 1 INJECTION, SOLUTION INTRAMUSCULAR; INTRAVENOUS; SUBCUTANEOUS at 06:09

## 2023-09-10 RX ADMIN — HYDROMORPHONE HYDROCHLORIDE 0.5 MG: 1 INJECTION, SOLUTION INTRAMUSCULAR; INTRAVENOUS; SUBCUTANEOUS at 09:09

## 2023-09-10 RX ADMIN — MIDAZOLAM HYDROCHLORIDE 1 MG: 2 INJECTION, SOLUTION INTRAMUSCULAR; INTRAVENOUS at 10:09

## 2023-09-10 RX ADMIN — ENOXAPARIN SODIUM 40 MG: 40 INJECTION SUBCUTANEOUS at 04:09

## 2023-09-10 RX ADMIN — HYDROMORPHONE HYDROCHLORIDE 1 MG: 1 INJECTION, SOLUTION INTRAMUSCULAR; INTRAVENOUS; SUBCUTANEOUS at 04:09

## 2023-09-10 RX ADMIN — DIPHENHYDRAMINE HYDROCHLORIDE 25 MG: 50 INJECTION, SOLUTION INTRAMUSCULAR; INTRAVENOUS at 12:09

## 2023-09-10 RX ADMIN — DIPHENHYDRAMINE HYDROCHLORIDE 25 MG: 50 INJECTION, SOLUTION INTRAMUSCULAR; INTRAVENOUS at 09:09

## 2023-09-10 RX ADMIN — ACETAMINOPHEN 1000 MG: 500 TABLET ORAL at 01:09

## 2023-09-10 RX ADMIN — IBUPROFEN 600 MG: 600 TABLET ORAL at 05:09

## 2023-09-10 RX ADMIN — DIPHENHYDRAMINE HYDROCHLORIDE 50 MG: 50 INJECTION, SOLUTION INTRAMUSCULAR; INTRAVENOUS at 10:09

## 2023-09-10 RX ADMIN — OXYCODONE HYDROCHLORIDE 10 MG: 10 TABLET ORAL at 08:09

## 2023-09-10 RX ADMIN — ONDANSETRON 8 MG: 2 INJECTION INTRAMUSCULAR; INTRAVENOUS at 12:09

## 2023-09-10 RX ADMIN — HYDROMORPHONE HYDROCHLORIDE 1 MG: 1 INJECTION, SOLUTION INTRAMUSCULAR; INTRAVENOUS; SUBCUTANEOUS at 12:09

## 2023-09-10 RX ADMIN — CEFTRIAXONE 2 G: 2 INJECTION, POWDER, FOR SOLUTION INTRAMUSCULAR; INTRAVENOUS at 01:09

## 2023-09-10 RX ADMIN — Medication 10 ML: at 05:09

## 2023-09-10 RX ADMIN — IBUPROFEN 600 MG: 600 TABLET ORAL at 12:09

## 2023-09-10 RX ADMIN — METHOCARBAMOL 500 MG: 500 TABLET ORAL at 09:09

## 2023-09-10 RX ADMIN — OXYCODONE HYDROCHLORIDE 10 MG: 10 TABLET ORAL at 07:09

## 2023-09-10 RX ADMIN — METRONIDAZOLE 500 MG: 500 INJECTION, SOLUTION INTRAVENOUS at 09:09

## 2023-09-10 RX ADMIN — METHOCARBAMOL 500 MG: 500 TABLET ORAL at 01:09

## 2023-09-10 RX ADMIN — METHOCARBAMOL 500 MG: 500 TABLET ORAL at 08:09

## 2023-09-10 RX ADMIN — VENLAFAXINE 150 MG: 75 TABLET ORAL at 04:09

## 2023-09-10 RX ADMIN — OXYCODONE HYDROCHLORIDE 10 MG: 10 TABLET ORAL at 04:09

## 2023-09-10 RX ADMIN — ONDANSETRON 8 MG: 2 INJECTION INTRAMUSCULAR; INTRAVENOUS at 05:09

## 2023-09-10 RX ADMIN — ACETAMINOPHEN 1000 MG: 500 TABLET ORAL at 09:09

## 2023-09-10 RX ADMIN — DEXTROSE MONOHYDRATE, SODIUM CHLORIDE, AND POTASSIUM CHLORIDE: 50; 2.25; 1.49 INJECTION, SOLUTION INTRAVENOUS at 01:09

## 2023-09-10 RX ADMIN — OXYCODONE HYDROCHLORIDE 10 MG: 10 TABLET ORAL at 11:09

## 2023-09-10 RX ADMIN — HYDROMORPHONE HYDROCHLORIDE 0.5 MG: 1 INJECTION, SOLUTION INTRAMUSCULAR; INTRAVENOUS; SUBCUTANEOUS at 01:09

## 2023-09-10 RX ADMIN — METRONIDAZOLE 500 MG: 500 INJECTION, SOLUTION INTRAVENOUS at 04:09

## 2023-09-10 RX ADMIN — METHOCARBAMOL 500 MG: 500 TABLET ORAL at 04:09

## 2023-09-10 NOTE — PLAN OF CARE
[facility] Plan of Care Note  Dx Abdominal fluid collection    Shift Events :  Tearful pain management issues at beginning of shift/ patient refused lab draw earlier md is aware lab to return [phone]    Goals of Care: pain management    Neuro: A OX4     Vital Signs: STABLE    Respiratory: RA    Diet: NPO     Is patient tolerating current diet?     GTTS: D51/2 20 k at 100 ml/hr    Urine Output/Bowel Movement: ileostomy rt abdominal 09/09/2023/adequate urine out put    Drains/Tubes/Tube Feeds (include total output/shift): none    Lines: l upper midline 09/09/2023 20 g rt fa      Accuchecks:none    Skin: mid ABDOMINAL HEALING INCISION OPEN SPOT ABD FOLD     Fall Risk Score: 8    Activity level? AD SANGITA    Any scheduled procedures? NOT YET(POSSIBLE IR?)    Any safety concerns? FALLS    Other:NONE

## 2023-09-10 NOTE — PLAN OF CARE
Problem: Adult Inpatient Plan of Care  Goal: Plan of Care Review  Outcome: Ongoing, Progressing  Goal: Patient-Specific Goal (Individualized)  Outcome: Ongoing, Progressing  Goal: Absence of Hospital-Acquired Illness or Injury  Outcome: Ongoing, Progressing  Goal: Optimal Comfort and Wellbeing  Outcome: Ongoing, Progressing  Goal: Readiness for Transition of Care  Outcome: Ongoing, Progressing     Problem: Infection  Goal: Absence of Infection Signs and Symptoms  Outcome: Ongoing, Progressing     Problem: Bariatric Environmental Safety  Goal: Safety Maintained with Care  Outcome: Ongoing, Progressing     Problem: Pain Acute  Goal: Acceptable Pain Control and Functional Ability  Outcome: Ongoing, Progressing     Problem: Bariatric Environmental Safety  Goal: Safety Maintained with Care  Outcome: Ongoing, Progressing

## 2023-09-10 NOTE — PROCEDURES
"  Pre Op Diagnosis: anterior abdominal wall fluid collections  Post Op Diagnosis: Same    Procedure: 2 drain placements    Procedure performed by: Jessica    Written Informed Consent Obtained: Yes  Specimen Removed: YES 250cc  Estimated Blood Loss: Minimal    Findings:   Successful placement 2 anterior abdominal wall drains.    Patient tolerated procedure well.    Colby Lopez MD ([place])  Interventional Radiology  [phone]      "

## 2023-09-10 NOTE — NURSING
Nurses Note -- 4 Eyes      9/10/2023   5:35 AM      Skin assessed during: Admit      [] No Altered Skin Integrity Present    []Prevention Measures Documented      [] Yes- Altered Skin Integrity Present or Discovered   [x] LDA Added if Not in Epic (Describe Wound)   [] New Altered Skin Integrity was Present on Admit and Documented in LDA   [] Wound Image Taken    Wound Care Consulted? Yes    Attending Nurse:  Jennifer Garcia RN    Second RN/Staff Member:   Afua Goode RN

## 2023-09-10 NOTE — CONSULTS
Inpatient Radiology Pre-procedure Note    History of Present Illness:  Georgette Abrams is a 38 y.o. female who presents for Drain placement.    Admission H&P reviewed.  Past Medical History:   Diagnosis Date    Anxiety     Familial polyposis     S/P total colectomy      Past Surgical History:   Procedure Laterality Date    Davinci Assisted Bilateral Ovarian Cystectomy, with extension  SEAN   8/2011    HYSTERECTOMY  8/23/2012    DAVINCI     ILEOSTOMY REVISION N/A 8/7/2023    Procedure: REVISION, ILEOSTOMY;  Surgeon: KRISH Tracy MD;  Location: [facility] OR 2ND FLR;  Service: Colon and Rectal;  Laterality: N/A;    Illeotomy Creation  2009    LYSIS OF ADHESIONS N/A 8/7/2023    Procedure: LYSIS, ADHESIONS;  Surgeon: KRISH Tracy MD;  Location: [facility] OR 2ND FLR;  Service: Colon and Rectal;  Laterality: N/A;    OOPHORECTOMY  8/23/2012    DAvinci removal with DLH     REPAIR OF RECURRENT VENTRAL HERNIA N/A 8/7/2023    Procedure: REPAIR, HERNIA, VENTRAL, RECURRENT, ERAS low;  Surgeon: KRISH Tracy MD;  Location: [facility] OR 2ND FLR;  Service: Colon and Rectal;  Laterality: N/A;  w/ mesh and omental pedical flap    TOTAL COLECTOMY      Iloanal pouch creation       Review of Systems:   As documented in primary team H&P    Home Meds:   Prior to Admission medications    Medication Sig Start Date End Date Taking? Authorizing Provider   amoxicillin-clavulanate 875-125mg (AUGMENTIN) 875-125 mg per tablet Take 1 tablet by mouth every 12 (twelve) hours. for 10 days 9/6/23 9/16/23 Yes KRISH Tracy MD   clonazepam (KLONOPIN) 1 MG tablet Take 1 mg by mouth 2 (two) times daily as needed.     Yes Provider, Historical   cyanocobalamin 1,000 mcg/mL injection 1,000 mcg twice a week.   Yes Provider, Historical   ibuprofen (ADVIL,MOTRIN) 800 MG tablet Take 1 tablet (800 mg total) by mouth every 8 (eight) hours. 8/31/23  Yes KRISH Tracy MD   losartan (COZAAR) 100 MG tablet Take 100 mg by mouth.   Yes  Provider, Historical   methocarbamoL (ROBAXIN) 500 MG Tab Take 1 tablet (500 mg total) by mouth 4 (four) times daily. 9/6/23  Yes Nell Gaytan NP   ondansetron (ZOFRAN) 4 MG tablet Take 4 mg by mouth every 6 (six) hours as needed for Nausea. 9/6/12  Yes Provider, Historical   ondansetron (ZOFRAN-ODT) 8 MG TbDL Take 4 mg by mouth once.     Yes Provider, Historical   oxyCODONE (ROXICODONE) 15 MG Tab Take 1 tablet (15 mg total) by mouth every 4 (four) hours as needed for Pain. 9/6/23  Yes KRISH Tracy MD   oxyCODONE (ROXICODONE) 15 MG Tab Take 1 tablet (15 mg total) by mouth every 4 (four) hours as needed for Pain. 9/8/23  Yes KRISH Tracy MD   progesterone (PROMETRIUM) 100 MG capsule Take 400 mg by mouth every evening.   Yes Provider, Historical   venlafaxine (EFFEXOR) 100 MG Tab Take 150 mg by mouth Daily.   Yes Provider, Historical   gabapentin (NEURONTIN) 300 MG capsule Take 1 capsule (300 mg total) by mouth 3 (three) times daily.  Patient not taking: Reported on 8/25/2023 8/16/23 8/15/24  Nell Gaytan NP     Scheduled Meds:    acetaminophen  1,000 mg Oral Q8H    cefTRIAXone (ROCEPHIN) IVPB  2 g Intravenous Q24H    enoxparin  40 mg Subcutaneous Q24H (prophylaxis, 1700)    ibuprofen  600 mg Oral Q6H    methocarbamoL  500 mg Oral QID    metronidazole  500 mg Intravenous Q8H    sodium chloride 0.9%  10 mL Intravenous Q6H    venlafaxine  150 mg Oral Daily     Continuous Infusions:    dextrose 5 % and 0.2 % NaCl with KCl 20 mEq 100 mL/hr at 09/10/23 0621     PRN Meds:diphenhydrAMINE, HYDROmorphone, melatonin, ondansetron, oxyCODONE, oxyCODONE, sodium chloride 0.9%, Flushing PICC/Midline Protocol **AND** sodium chloride 0.9% **AND** sodium chloride 0.9%  Anticoagulants/Antiplatelets: no anticoagulation    Allergies:   Review of patient's allergies indicates:   Allergen Reactions    Levofloxacin Nausea And Vomiting and Rash    Morphine Anaphylaxis, Hives, Shortness Of Breath and  "Hallucinations           Piperacillin-tazobactam Rash    Sulfa (sulfonamide antibiotics) Nausea And Vomiting and Rash    Opioids - morphine analogues     Hydrocodone-acetaminophen Itching     Sedation Hx: have not been any systemic reactions    Labs:  No results for input(s): "INR", "PT", "PTT" in the last 168 hours.    Recent Labs   Lab 09/10/23  0858   WBC 7.01   HGB 12.3   HCT 39.4   MCV 92         Recent Labs   Lab 09/10/23  0858   *      K 4.0      CO2 24   BUN 6   CREATININE 0.7   CALCIUM 9.7   MG 1.8   ALT 19   AST 11   ALBUMIN 3.4*   BILITOT 0.7         Vitals:  Temp: 96.8 °F (36 °C) (09/10/23 0910)  Pulse: 97 (09/10/23 0910)  Resp: 18 (09/10/23 0910)  BP: (!) 182/94 (09/10/23 0910)  SpO2: (!) 94 % (09/10/23 0910)     Physical Exam:  ASA: 2  Mallampati: 2    General: anxious  Mental Status: alert and oriented to person, place and time  HEENT: normocephalic, atraumatic  Chest: unlabored breathing  Heart: regular heart rate  Abdomen: nondistended  Extremity: moves all extremities    Plan: 2 drain placements of anterior abdominal wall collections  Sedation Plan: up to moderate    Colby Lopez MD ([place])  Interventional Radiology          "

## 2023-09-10 NOTE — PLAN OF CARE
[facility]  Initial Discharge Assessment       Primary Care Provider: No, Primary Doctor    Admission Diagnosis: Abdominal fluid collection [R18.8]  Left lower quadrant abdominal pain [R10.32]    Admission Date: 9/9/2023  Expected Discharge Date:     Transition of Care Barriers: None    Payor: [employer] / Plan: [facility] CHOICE PLUS / Product Type: Commercial /     Extended Emergency Contact Information  Primary Emergency Contact: Delroy Craig  Mobile Phone: [phone]  Relation: Father  Preferred language: English   needed? No  Secondary Emergency Contact: Stacia Craig  Mobile Phone: [phone]  Relation: Mother  Preferred language: English   needed? No    Discharge Plan A: Home  Discharge Plan B: Home with family      [facility] [address] [place] [address]  [address] [place], [place] [id number]  Phone: [phone] Fax: [phone]    [id number] [place] [facility] - [address] AT [facility] [address] [place] [id number]  Phone: [phone] Fax: [phone]    SW met with patient at bedside to complete discharge planning assessment.  Patient alert and oriented xs 4.  Patient verified all demographic information on facesheet is correct.  Patient verified PCP is Dr. Les Leigh (not in system).  Patient verified primary health insurance is [facility] Plus.  Patient with NO home health or DME.  Patient with NO POA or Living Will.  Patient not on dialysis or medication coumadin.  Patient with 30 day admission.  Patient with no financial issues at this time.  Patient family will provide OR MAY NEED transportation upon discharge from facility.  Patient independent with ADLs, live with 2 minor children, drives self.      Initial Assessment (most recent)       Adult Discharge Assessment - 09/10/23 6795          Discharge Assessment    Assessment Type Discharge Planning Assessment     Confirmed/corrected address, phone number and  insurance Yes     Confirmed Demographics Correct on Facesheet     Source of Information patient     Communicated DANNY with patient/caregiver Date not available/Unable to determine     People in Home child(grayson), dependent     Facility Arrived From: home     Do you expect to return to your current living situation? Yes     Do you have help at home or someone to help you manage your care at home? Yes     Who are your caregiver(s) and their phone number(s)? parents     Prior to hospitilization cognitive status: Alert/Oriented     Current cognitive status: Alert/Oriented     Equipment Currently Used at Home none     Readmission within 30 days? Yes     Patient currently being followed by outpatient case management? No     Do you currently have service(s) that help you manage your care at home? No     Do you take prescription medications? Yes     Do you have prescription coverage? Yes     Do you have any problems affording any of your prescribed medications? No     Is the patient taking medications as prescribed? yes     Who is going to help you get home at discharge? parents     How do you get to doctors appointments? car, drives self     Are you on dialysis? No     Do you take coumadin? No     DME Needed Upon Discharge  none     Discharge Plan discussed with: Patient     Transition of Care Barriers None     Discharge Plan A Home     Discharge Plan B Home with family

## 2023-09-10 NOTE — ED NOTES
Colon and Rectal team made aware of the IV needing to be pulled, Medical team shared to give patient Oral Fluids and PO pain meds until IV is obtained by the Midline team.

## 2023-09-10 NOTE — ED NOTES
Patient reports that IV site is painful and that she cannot bear the pain anymore and she would like the IV removed. Pt. Aware that this is the only access we have right now. Pt. Aware of this.

## 2023-09-10 NOTE — NURSING
Patient arrived via stretcher to room.  Oriented patient to call bell, room, unit  patient stated she understood the information and the fall contract she signed as well.  Alert oriented x4.  C/o discomfort to abdomen.  Continue to monitor.

## 2023-09-10 NOTE — PT/OT/SLP EVAL
Occupational Therapy   Co-Evaluation/Tx    Name: Georgette Abrams  MRN: [id number]  Admitting Diagnosis: <principal problem not specified>  Recent Surgery: * No surgery found *      Recommendations:     Discharge Recommendations: other (see comments)  Discharge Equipment Recommendations:  other (see comments)  Barriers to discharge:  None    Assessment:     Georgette Abrams is a 38 y.o. female with a medical diagnosis of <principal problem not specified>.  She presents with c/o pain however tolerated ~200 of fx'l mob in the hallway. Performance deficits affecting function: weakness, impaired endurance, pain, decreased safety awareness, impaired skin, impaired self care skills.      Rehab Prognosis: Fair; patient would benefit from acute skilled OT services to address these deficits and reach maximum level of function.       Plan:     Patient to be seen 3 x/week to address the above listed problems via self-care/home management, neuromuscular re-education, therapeutic activities, therapeutic exercises  Plan of Care Expires: 10/10/23  Plan of Care Reviewed with: patient    Subjective     Chief Complaint: Pain at abdomen  Patient/Family Comments/goals: Get better and get home     Occupational Profile:  Living Environment: With sons  ages 6 and 10 in [facility], with a ERIC  Previous level of function: Ind in all ADLs and mobility  Roles and Routines: Mother and friend   Equipment Used at Home: none  Assistance upon Discharge: Friend until end of the this year    Pain/Comfort:       Patients cultural, spiritual, Orthodoxy conflicts given the current situation: no    Objective:     Communicated with:  Patient found ambulatory in room/mendoza with CONSTANZA drain (Witnessed pt adjusting her CONSTANZA drains to suction) upon OT entry to room.    General Precautions: Standard, fall  Orthopedic Precautions: N/A  Braces: N/A  Respiratory Status: Room air    Occupational Performance:    Bed Mobility:   Patient found standing in  room    Functional Mobility/Transfers:  Patient completed Sit <> Stand Transfer with supervision  with  no assistive device   Patient completed from sofa in room > bedside Chair Transfer using Step Transfer technique with stand by assistance with no assistive device  Functional Mobility: Pt demonstrated functional mobility training to simulate household ~200 with no AD with stand by assistance and no LOB and good visual search and navigation strategies.     Activities of Daily Living:  Politely decline all further ADLs    Cognitive/Visual Perceptual:  Cognitive/Psychosocial Skills:     -       Oriented to: Person, Place, and Situation   -       Follows Commands/attention:Follows multistep  commands  -       Communication: clear/fluent  -       Memory: No Deficits noted  -       Safety awareness/insight to disability: impaired   -       Mood/Affect/Coping skills/emotional control: Appropriate to situation    Physical Exam:  Balance:  Static Sitting: independent   Dynamic Sitting: independent  Static Standing: Supervision  Dynamic Standing: Supervision  Upper Extremity Range of Motion:     -       Right Upper Extremity: WFL  -       Left Upper Extremity: WFL  Upper Extremity Strength:    -       Right Upper Extremity: WFL  -       Left Upper Extremity: WFL   Strength:    -       Right Upper Extremity: WFL  -       Left Upper Extremity: WFL  Gross motor coordination:   WFL    AMPAC 6 Click ADL:  AMPAC Total Score:      Treatment & Education:  BUE MMT and ROM  3 sit to stand   Standing at EOB ~45 sec   ~200 ft fx'l mob endurance  Pt educated on role of occupational therapy, POC, and safety during ADLs and functional mobility. Pt and OT discussed importance of safe, continued mobility to optimize daily living skills. Pt verbalized understanding. Pt given instruction to call for medical staff/nurse for assistance.   PT present for coeval due to pt's multiple medical comorbidities and functional/cognition deficits  requiring two skilled therapists to appropriately progress pt's musculoskeletal strength, neuromuscular control, and endurance while taking into consideration medical acuity and pt safety.    Patient left HOB elevated with all lines intact and call button in reach    GOALS:   Multidisciplinary Problems       Occupational Therapy Goals          Problem: Occupational Therapy    Goal Priority Disciplines Outcome Interventions   Occupational Therapy Goal     OT, PT/OT Ongoing, Progressing    Description: Goals to be met by: 10/10/23     Patient will increase functional independence with ADLs by performing:    UE Dressing with [place].  LE Dressing with [place].  Grooming while standing at sink with [place].  Toileting from toilet with [place] for hygiene and clothing management.   Toilet transfer to toilet with [place].                         History:     Past Medical History:   Diagnosis Date    Anxiety     Familial polyposis     S/P total colectomy          Past Surgical History:   Procedure Laterality Date    Davinci Assisted Bilateral Ovarian Cystectomy, with extension  SEAN   8/2011    HYSTERECTOMY  8/23/2012    DAVINCI     ILEOSTOMY REVISION N/A 8/7/2023    Procedure: REVISION, ILEOSTOMY;  Surgeon: KRISH Tracy MD;  Location: [facility] 2[facility] F[facility];  Service: Colon and Rectal;  Laterality: N/A;    Illeotomy Creation  2009    LYSIS OF ADHESIONS N/A 8/7/2023    Procedure: LYSIS, ADHESIONS;  Surgeon: KRISH Tracy MD;  Location: [facility] OR 2ND FLR;  Service: Colon and Rectal;  Laterality: N/A;    OOPHORECTOMY  8/23/2012    DAvinci removal with DLH     REPAIR OF RECURRENT VENTRAL HERNIA N/A 8/7/2023    Procedure: REPAIR, HERNIA, VENTRAL, RECURRENT, ERAS low;  Surgeon: KRISH Tracy MD;  Location: [facility] 2[facility]LR;  Service: Colon and Rectal;  Laterality: N/A;  w/ mesh and omental pedical flap    TOTAL COLECTOMY      Iloanal pouch creation       Time Tracking:     OT Date of  Treatment: 09/10/23  OT Start Time: 1440  OT Stop Time: 1511  OT Total Time (min): 31 min    Billable Minutes:Evaluation 8  Therapeutic Activity 14  Therapeutic Exercise 9    9/11/2023

## 2023-09-10 NOTE — PROGRESS NOTES
[facility]  Colorectal Surgery  Progress Note    Patient Name: Georgette Abrams  MRN: [id number]  Admission Date: 9/9/2023  Hospital Length of Stay: 0 days  Attending Physician: Sharon Rose MD    Subjective:     Interval History:   Allergic reaction to zosyn  Morning labs pending   IR consulted for drainage of fluid collections  No fever    Post-Op Info:  * No surgery found *          Medications:  Continuous Infusions:   dextrose 5 % and 0.2 % NaCl with KCl 20 mEq 100 mL/hr at 09/10/23 0621     Scheduled Meds:   acetaminophen  1,000 mg Oral Q8H    cefTRIAXone (ROCEPHIN) IVPB  2 g Intravenous Q24H    ibuprofen  600 mg Oral Q6H    methocarbamoL  500 mg Oral QID    metronidazole  500 mg Intravenous Q8H    sodium chloride 0.9%  10 mL Intravenous Q6H    venlafaxine  150 mg Oral Daily     PRN Meds:   diphenhydrAMINE    HYDROmorphone    melatonin    ondansetron    oxyCODONE    oxyCODONE    sodium chloride 0.9%    sodium chloride 0.9%        Objective:     Vital Signs (Most Recent):  Temp: 96.5 °F (35.8 °C) (09/10/23 0430)  Pulse: 72 (09/10/23 0430)  Resp: 18 (09/10/23 0704)  BP: 131/79 (09/10/23 0430)  SpO2: (!) 94 % (09/10/23 0430) Vital Signs (24h Range):  Temp:  [96.5 °F (35.8 °C)-99.1 °F (37.3 °C)] 96.5 °F (35.8 °C)  Pulse:  [] 72  Resp:  [16-20] 18  SpO2:  [94 %-100 %] 94 %  BP: (131-193)/() 131/79     Intake/Output - Last 3 Shifts         09/08 0700  09/09 0659 09/09 0700  09/10 0659 09/10 0700  09/11 0659    I.V. (mL/kg)  747.8 (6)     IV Piggyback  219.9     Total Intake(mL/kg)  967.7 (7.7)     Net  +967.7            Urine Occurrence  1 x     Stool Occurrence  0 x              Physical Exam  AAO, NAD, well developed and well nourished.  Head normocephalic, atraumatic.  Trachea midline, neck supple.  Respirations unlabored with good inspiratory effort.  Heart regular rate and rhythm.  Abdomen soft, obese, ostomy working, incision with some minor drainage and the bottom,  nondistended, nontender to palpation.       Significant Labs:  BMP:   Recent Labs   Lab 09/09/23  1545   *      K 4.2      CO2 22*   BUN 7   CREATININE 0.7   CALCIUM 9.5     CBC (Last 3 Results):   Recent Labs   Lab 09/08/23  1552 09/09/23  1545 09/09/23  1550   WBC 8.06 7.04  --    RBC 3.93* 3.83*  --    HGB 11.6* 11.0*  --    HCT 36.1* 35.8* 35*    357  --    MCV 92 94  --    MCH 29.5 28.7  --    MCHC 32.1 30.7*  --        Significant Diagnostics:  I have reviewed all pertinent imaging results/findings within the past 24 hours.    Assessment/Plan:     S/P colectomy  38 y.o. female well known to the colorectal surgery department. Presented to the ED complaining of nausea, abdominal pain and pressure, and subjective fevers. CT scan showed two fluid collections, one in the retrorectus space and the other one in the subq space. Colon and rectal surgery was consulted for evaluation.         PLAN:   No acute surgical intervention indicated at this time.   Continue broad spectrum abx    IR consult. Infected seroma?    Trend CRP   NPO at MN   Encourage ambulation   DVT prophylaxis   Home meds          Burke Hernandez MD  Colorectal Surgery  [facility]

## 2023-09-10 NOTE — SUBJECTIVE & OBJECTIVE
Subjective:     Interval History:   Allergic reaction to zosyn  Morning labs pending   IR consulted for drainage of fluid collections  No fever    Post-Op Info:  * No surgery found *          Medications:  Continuous Infusions:   dextrose 5 % and 0.2 % NaCl with KCl 20 mEq 100 mL/hr at 09/10/23 0621     Scheduled Meds:   acetaminophen  1,000 mg Oral Q8H    cefTRIAXone (ROCEPHIN) IVPB  2 g Intravenous Q24H    ibuprofen  600 mg Oral Q6H    methocarbamoL  500 mg Oral QID    metronidazole  500 mg Intravenous Q8H    sodium chloride 0.9%  10 mL Intravenous Q6H    venlafaxine  150 mg Oral Daily     PRN Meds:   diphenhydrAMINE    HYDROmorphone    melatonin    ondansetron    oxyCODONE    oxyCODONE    sodium chloride 0.9%    sodium chloride 0.9%        Objective:     Vital Signs (Most Recent):  Temp: 96.5 °F (35.8 °C) (09/10/23 0430)  Pulse: 72 (09/10/23 0430)  Resp: 18 (09/10/23 0704)  BP: 131/79 (09/10/23 0430)  SpO2: (!) 94 % (09/10/23 0430) Vital Signs (24h Range):  Temp:  [96.5 °F (35.8 °C)-99.1 °F (37.3 °C)] 96.5 °F (35.8 °C)  Pulse:  [] 72  Resp:  [16-20] 18  SpO2:  [94 %-100 %] 94 %  BP: (131-193)/() 131/79     Intake/Output - Last 3 Shifts         09/08 0700  09/09 0659 09/09 0700  09/10 0659 09/10 0700  09/11 0659    I.V. (mL/kg)  747.8 (6)     IV Piggyback  219.9     Total Intake(mL/kg)  967.7 (7.7)     Net  +967.7            Urine Occurrence  1 x     Stool Occurrence  0 x              Physical Exam  AAO, NAD, well developed and well nourished.  Head normocephalic, atraumatic.  Trachea midline, neck supple.  Respirations unlabored with good inspiratory effort.  Heart regular rate and rhythm.  Abdomen soft, obese, ostomy working, incision with some minor drainage and the bottom, nondistended, nontender to palpation.       Significant Labs:  BMP:   Recent Labs   Lab 09/09/23  1545   *      K 4.2      CO2 22*   BUN 7   CREATININE 0.7   CALCIUM 9.5     CBC (Last 3 Results):   Recent  Labs   Lab 09/08/23  1552 09/09/23  1545 09/09/23  1550   WBC 8.06 7.04  --    RBC 3.93* 3.83*  --    HGB 11.6* 11.0*  --    HCT 36.1* 35.8* 35*    357  --    MCV 92 94  --    MCH 29.5 28.7  --    MCHC 32.1 30.7*  --        Significant Diagnostics:  I have reviewed all pertinent imaging results/findings within the past 24 hours.

## 2023-09-10 NOTE — ASSESSMENT & PLAN NOTE
38 y.o. female well known to the colorectal surgery department. Presented to the ED complaining of nausea, abdominal pain and pressure, and subjective fevers. CT scan showed two fluid collections, one in the retrorectus space and the other one in the subq space. Colon and rectal surgery was consulted for evaluation.         PLAN:   No acute surgical intervention indicated at this time.   Continue broad spectrum abx    IR consult. Infected seroma?    Trend CRP   NPO at MN   Encourage ambulation   DVT prophylaxis   Home meds

## 2023-09-10 NOTE — ED NOTES
PO fluids provided to patient per medical team, as well as patient was given PO pain medications.

## 2023-09-11 LAB
ALBUMIN SERPL BCP-MCNC: 3 G/DL (ref 3.5–5.2)
ALP SERPL-CCNC: 93 U/L (ref 55–135)
ALT SERPL W/O P-5'-P-CCNC: 13 U/L (ref 10–44)
ANION GAP SERPL CALC-SCNC: 14 MMOL/L (ref 8–16)
AST SERPL-CCNC: 8 U/L (ref 10–40)
BASOPHILS # BLD AUTO: 0.04 K/UL (ref 0–0.2)
BASOPHILS NFR BLD: 0.8 % (ref 0–1.9)
BILIRUB SERPL-MCNC: 0.2 MG/DL (ref 0.1–1)
BUN SERPL-MCNC: 6 MG/DL (ref 6–20)
CALCIUM SERPL-MCNC: 9 MG/DL (ref 8.7–10.5)
CHLORIDE SERPL-SCNC: 103 MMOL/L (ref 95–110)
CO2 SERPL-SCNC: 22 MMOL/L (ref 23–29)
CREAT SERPL-MCNC: 0.7 MG/DL (ref 0.5–1.4)
CRP SERPL-MCNC: 89.5 MG/L (ref 0–8.2)
DIFFERENTIAL METHOD: ABNORMAL
EOSINOPHIL # BLD AUTO: 0.3 K/UL (ref 0–0.5)
EOSINOPHIL NFR BLD: 5.4 % (ref 0–8)
ERYTHROCYTE [DISTWIDTH] IN BLOOD BY AUTOMATED COUNT: 12.6 % (ref 11.5–14.5)
EST. GFR  (NO RACE VARIABLE): >60 ML/MIN/1.73 M^2
GLUCOSE SERPL-MCNC: 116 MG/DL (ref 70–110)
HCT VFR BLD AUTO: 35.4 % (ref 37–48.5)
HGB BLD-MCNC: 11 G/DL (ref 12–16)
IMM GRANULOCYTES # BLD AUTO: 0.02 K/UL (ref 0–0.04)
IMM GRANULOCYTES NFR BLD AUTO: 0.4 % (ref 0–0.5)
LYMPHOCYTES # BLD AUTO: 1.3 K/UL (ref 1–4.8)
LYMPHOCYTES NFR BLD: 25.5 % (ref 18–48)
MAGNESIUM SERPL-MCNC: 1.8 MG/DL (ref 1.6–2.6)
MCH RBC QN AUTO: 28.9 PG (ref 27–31)
MCHC RBC AUTO-ENTMCNC: 31.1 G/DL (ref 32–36)
MCV RBC AUTO: 93 FL (ref 82–98)
MONOCYTES # BLD AUTO: 0.7 K/UL (ref 0.3–1)
MONOCYTES NFR BLD: 13.7 % (ref 4–15)
NEUTROPHILS # BLD AUTO: 2.7 K/UL (ref 1.8–7.7)
NEUTROPHILS NFR BLD: 54.2 % (ref 38–73)
NRBC BLD-RTO: 0 /100 WBC
PHOSPHATE SERPL-MCNC: 3.8 MG/DL (ref 2.7–4.5)
PLATELET # BLD AUTO: 335 K/UL (ref 150–450)
PMV BLD AUTO: 11 FL (ref 9.2–12.9)
POCT GLUCOSE: 127 MG/DL (ref 70–110)
POTASSIUM SERPL-SCNC: 4 MMOL/L (ref 3.5–5.1)
PROT SERPL-MCNC: 6.6 G/DL (ref 6–8.4)
RBC # BLD AUTO: 3.8 M/UL (ref 4–5.4)
SODIUM SERPL-SCNC: 139 MMOL/L (ref 136–145)
WBC # BLD AUTO: 4.98 K/UL (ref 3.9–12.7)

## 2023-09-11 PROCEDURE — 63600175 PHARM REV CODE 636 W HCPCS

## 2023-09-11 PROCEDURE — 36415 COLL VENOUS BLD VENIPUNCTURE: CPT | Performed by: STUDENT IN AN ORGANIZED HEALTH CARE EDUCATION/TRAINING PROGRAM

## 2023-09-11 PROCEDURE — 63600175 PHARM REV CODE 636 W HCPCS: Performed by: STUDENT IN AN ORGANIZED HEALTH CARE EDUCATION/TRAINING PROGRAM

## 2023-09-11 PROCEDURE — 25000003 PHARM REV CODE 250

## 2023-09-11 PROCEDURE — 25000003 PHARM REV CODE 250: Performed by: STUDENT IN AN ORGANIZED HEALTH CARE EDUCATION/TRAINING PROGRAM

## 2023-09-11 PROCEDURE — A4216 STERILE WATER/SALINE, 10 ML: HCPCS | Performed by: COLON & RECTAL SURGERY

## 2023-09-11 PROCEDURE — 25000003 PHARM REV CODE 250: Performed by: COLON & RECTAL SURGERY

## 2023-09-11 PROCEDURE — 83735 ASSAY OF MAGNESIUM: CPT | Performed by: STUDENT IN AN ORGANIZED HEALTH CARE EDUCATION/TRAINING PROGRAM

## 2023-09-11 PROCEDURE — 85025 COMPLETE CBC W/AUTO DIFF WBC: CPT | Performed by: STUDENT IN AN ORGANIZED HEALTH CARE EDUCATION/TRAINING PROGRAM

## 2023-09-11 PROCEDURE — 20600001 HC STEP DOWN PRIVATE ROOM

## 2023-09-11 PROCEDURE — 80053 COMPREHEN METABOLIC PANEL: CPT | Performed by: STUDENT IN AN ORGANIZED HEALTH CARE EDUCATION/TRAINING PROGRAM

## 2023-09-11 PROCEDURE — 63600175 PHARM REV CODE 636 W HCPCS: Performed by: COLON & RECTAL SURGERY

## 2023-09-11 PROCEDURE — 84100 ASSAY OF PHOSPHORUS: CPT | Performed by: STUDENT IN AN ORGANIZED HEALTH CARE EDUCATION/TRAINING PROGRAM

## 2023-09-11 PROCEDURE — 86140 C-REACTIVE PROTEIN: CPT | Performed by: STUDENT IN AN ORGANIZED HEALTH CARE EDUCATION/TRAINING PROGRAM

## 2023-09-11 RX ORDER — OXYCODONE HYDROCHLORIDE 10 MG/1
10 TABLET ORAL EVERY 4 HOURS PRN
Status: DISCONTINUED | OUTPATIENT
Start: 2023-09-11 | End: 2023-09-11

## 2023-09-11 RX ORDER — HYDROMORPHONE HYDROCHLORIDE 1 MG/ML
1 INJECTION, SOLUTION INTRAMUSCULAR; INTRAVENOUS; SUBCUTANEOUS
Status: DISCONTINUED | OUTPATIENT
Start: 2023-09-11 | End: 2023-09-12

## 2023-09-11 RX ORDER — HYDROMORPHONE HYDROCHLORIDE 1 MG/ML
1 INJECTION, SOLUTION INTRAMUSCULAR; INTRAVENOUS; SUBCUTANEOUS EVERY 4 HOURS PRN
Status: DISCONTINUED | OUTPATIENT
Start: 2023-09-11 | End: 2023-09-11

## 2023-09-11 RX ORDER — OXYCODONE HYDROCHLORIDE 10 MG/1
20 TABLET ORAL EVERY 4 HOURS PRN
Status: DISCONTINUED | OUTPATIENT
Start: 2023-09-11 | End: 2023-09-12 | Stop reason: HOSPADM

## 2023-09-11 RX ORDER — AMOXICILLIN AND CLAVULANATE POTASSIUM 875; 125 MG/1; MG/1
1 TABLET, FILM COATED ORAL EVERY 12 HOURS
Status: DISCONTINUED | OUTPATIENT
Start: 2023-09-11 | End: 2023-09-12 | Stop reason: HOSPADM

## 2023-09-11 RX ADMIN — METHOCARBAMOL 500 MG: 500 TABLET ORAL at 09:09

## 2023-09-11 RX ADMIN — METHOCARBAMOL 500 MG: 500 TABLET ORAL at 01:09

## 2023-09-11 RX ADMIN — IBUPROFEN 600 MG: 600 TABLET ORAL at 05:09

## 2023-09-11 RX ADMIN — OXYCODONE HYDROCHLORIDE 20 MG: 10 TABLET ORAL at 10:09

## 2023-09-11 RX ADMIN — DEXTROSE MONOHYDRATE, SODIUM CHLORIDE, AND POTASSIUM CHLORIDE: 50; 2.25; 1.49 INJECTION, SOLUTION INTRAVENOUS at 12:09

## 2023-09-11 RX ADMIN — Medication 10 ML: at 06:09

## 2023-09-11 RX ADMIN — Medication 10 ML: at 12:09

## 2023-09-11 RX ADMIN — ENOXAPARIN SODIUM 40 MG: 40 INJECTION SUBCUTANEOUS at 05:09

## 2023-09-11 RX ADMIN — IBUPROFEN 600 MG: 600 TABLET ORAL at 12:09

## 2023-09-11 RX ADMIN — ONDANSETRON 8 MG: 2 INJECTION INTRAMUSCULAR; INTRAVENOUS at 04:09

## 2023-09-11 RX ADMIN — AMOXICILLIN AND CLAVULANATE POTASSIUM 1 TABLET: 875; 125 TABLET, FILM COATED ORAL at 09:09

## 2023-09-11 RX ADMIN — HYDROMORPHONE HYDROCHLORIDE 0.5 MG: 1 INJECTION, SOLUTION INTRAMUSCULAR; INTRAVENOUS; SUBCUTANEOUS at 12:09

## 2023-09-11 RX ADMIN — HYDROMORPHONE HYDROCHLORIDE 1 MG: 1 INJECTION, SOLUTION INTRAMUSCULAR; INTRAVENOUS; SUBCUTANEOUS at 01:09

## 2023-09-11 RX ADMIN — OXYCODONE HYDROCHLORIDE 10 MG: 10 TABLET ORAL at 02:09

## 2023-09-11 RX ADMIN — OXYCODONE HYDROCHLORIDE 20 MG: 10 TABLET ORAL at 03:09

## 2023-09-11 RX ADMIN — ONDANSETRON 8 MG: 2 INJECTION INTRAMUSCULAR; INTRAVENOUS at 03:09

## 2023-09-11 RX ADMIN — ACETAMINOPHEN 1000 MG: 500 TABLET ORAL at 09:09

## 2023-09-11 RX ADMIN — OXYCODONE HYDROCHLORIDE 20 MG: 10 TABLET ORAL at 11:09

## 2023-09-11 RX ADMIN — Medication 10 ML: at 05:09

## 2023-09-11 RX ADMIN — OXYCODONE HYDROCHLORIDE 15 MG: 10 TABLET ORAL at 07:09

## 2023-09-11 RX ADMIN — HYDROMORPHONE HYDROCHLORIDE 0.5 MG: 1 INJECTION, SOLUTION INTRAMUSCULAR; INTRAVENOUS; SUBCUTANEOUS at 04:09

## 2023-09-11 RX ADMIN — IBUPROFEN 600 MG: 600 TABLET ORAL at 11:09

## 2023-09-11 RX ADMIN — Medication 10 ML: at 11:09

## 2023-09-11 RX ADMIN — VENLAFAXINE 150 MG: 75 TABLET ORAL at 05:09

## 2023-09-11 RX ADMIN — CEFTRIAXONE 2 G: 2 INJECTION, POWDER, FOR SOLUTION INTRAMUSCULAR; INTRAVENOUS at 01:09

## 2023-09-11 RX ADMIN — METRONIDAZOLE 500 MG: 500 INJECTION, SOLUTION INTRAVENOUS at 12:09

## 2023-09-11 RX ADMIN — ACETAMINOPHEN 1000 MG: 500 TABLET ORAL at 05:09

## 2023-09-11 RX ADMIN — HYDROMORPHONE HYDROCHLORIDE 1 MG: 1 INJECTION, SOLUTION INTRAMUSCULAR; INTRAVENOUS; SUBCUTANEOUS at 09:09

## 2023-09-11 RX ADMIN — METHOCARBAMOL 500 MG: 500 TABLET ORAL at 05:09

## 2023-09-11 RX ADMIN — HYDROMORPHONE HYDROCHLORIDE 1 MG: 1 INJECTION, SOLUTION INTRAMUSCULAR; INTRAVENOUS; SUBCUTANEOUS at 05:09

## 2023-09-11 RX ADMIN — ACETAMINOPHEN 1000 MG: 500 TABLET ORAL at 01:09

## 2023-09-11 NOTE — PT/OT/SLP EVAL
"Physical Therapy Co-Evaluation    Patient Name:  Georgette Abrams   MRN:  [id number]    Recommendations:     Discharge Recommendations: other (see comments)   Discharge Equipment Recommendations: none   Barriers to discharge: None    Assessment:     Georgette Abrams is a 38 y.o. female admitted with a medical diagnosis of <principal problem not specified>.  She presents with the following impairments/functional limitations: weakness, impaired endurance, impaired self care skills, impaired functional mobility, pain. Pt concerned with her pain medication not adequately controlling her pain and reporting they "are going backwards" as she is not receiving the amount she does at home. Pt eager to feel better and return home to her children.    Rehab Prognosis: Good; patient would benefit from acute skilled PT services to address these deficits and reach maximum level of function.    Recent Surgery: * No surgery found *      Plan:     During this hospitalization, patient to be seen 3 x/week to address the identified rehab impairments via gait training, therapeutic activities, therapeutic exercises, neuromuscular re-education and progress toward the following goals:    Plan of Care Expires:  10/10/23    Subjective     Chief Complaint: none verbalized  Patient/Family Comments/goals: return to PLOF  Pain/Comfort:  Pain Rating 1: 7/10  Location 1: abdomen  Pain Addressed 1: Reposition, Distraction  Pain Rating Post-Intervention 1: 7/10    Patients cultural, spiritual, Sikh conflicts given the current situation: no    Living Environment:  Pt lives with her friend and her 2 boys (ages 6 and 10) y/o in a [facility] with [facility].  Prior to admission, patients level of function was independent for all functional mobility and ADLs.  Equipment used at home: none.  DME owned (not currently used):  unknown .  Upon discharge, patient will have assistance from friend until the end of the year.    Objective:     Communicated with RN " prior to session.  Patient found ambulatory in room with peripheral IV, CONSTANZA drain, colostomy (Witnessed pt adjusting her CONSTANZA drains to suction)  upon PT entry to room.    General Precautions: Standard, fall  Orthopedic Precautions:N/A   Braces: N/A  Respiratory Status: Room air    Exams:  Cognitive Exam:  Patient is oriented to Person, Place, Time, and Situation  Gross Motor Coordination:  WFL  Postural Exam:  Patient presented with the following abnormalities:    -       Rounded shoulders  Sensation:  denies numbness/tingling  RLE ROM: WFL  RLE Strength: WFL  LLE ROM: WFL  LLE Strength: WFL    Functional Mobility:  Bed Mobility:   N/T, pt found up in room  Transfers:     Sit to Stand:  supervision with no AD  Gait: ~200 ft, Supervision - SBA, no AD;  pt limited by pain and having to return to room. Pt becoming mildly unsteady with increased pain.  Balance:  Static Sitting: independent   Dynamic Sitting: independent  Static Standing: Supervision  Dynamic Standing: Supervision      AM-PAC 6 CLICK MOBILITY  Total Score:18       Treatment & Education:  Patient educated on role of therapy, goals of session, and benefits of mobilizing.   Discussed PT plan of care during hospitalization.   Patient educated on calling for assistance.   Patient educated on how their diagnosis impacts their mobility within PT scope of practice.   All questions answered within PT scope of practice.    Co-eval performed to appropriately and safely assess patient's strength and endurance while facilitating functional tasks in addition to accommodating for patient's activity/pain tolerance.    Patient left up in chair with all lines intact and call button in reach.    GOALS:   Multidisciplinary Problems       Physical Therapy Goals          Problem: Physical Therapy    Goal Priority Disciplines Outcome Goal Variances Interventions   Physical Therapy Goal     PT, PT/OT Ongoing, Progressing     Description: Goals to be met by: 9/24/2023     Patient  will increase functional independence with mobility by performin. Supine to sit with [place]  2. Sit to supine with [place]  3. Sit to stand transfer with [place]  4. Bed to chair transfer with [place] using No Assistive Device  5. Gait  x 200 feet with [place] using No Assistive Device.   6. Lower extremity exercise program x10 reps per handout, with independence                         History:     Past Medical History:   Diagnosis Date    Anxiety     Familial polyposis     S/P total colectomy        Past Surgical History:   Procedure Laterality Date    Davinci Assisted Bilateral Ovarian Cystectomy, with extension  SEAN   2011    HYSTERECTOMY  2012    DAVINCI     ILEOSTOMY REVISION N/A 2023    Procedure: REVISION, ILEOSTOMY;  Surgeon: KRISH Tracy MD;  Location: [facility] OR 2ND FLR;  Service: Colon and Rectal;  Laterality: N/A;    Illeotomy Creation      LYSIS OF ADHESIONS N/A 2023    Procedure: LYSIS, ADHESIONS;  Surgeon: KRISH Tracy MD;  Location: [facility] OR 2ND FLR;  Service: Colon and Rectal;  Laterality: N/A;    OOPHORECTOMY  2012    DAvinci removal with [facility]H     REPAIR OF RECURRENT VENTRAL HERNIA N/A 2023    Procedure: REPAIR, HERNIA, VENTRAL, RECURRENT, ERAS low;  Surgeon: KRISH Tracy MD;  Location: [facility] OR 2ND FLR;  Service: Colon and Rectal;  Laterality: N/A;  w/ mesh and omental pedical flap    TOTAL COLECTOMY      Iloanal pouch creation       Time Tracking:     PT Received On: 09/10/23  PT Start Time: 1440     PT Stop Time: 1511  PT Total Time (min): 31 min     Billable Minutes: Evaluation 8, Gait Training 15, and Therapeutic Activity 8      09/10/2023

## 2023-09-11 NOTE — CONSULTS
"Patient seen for wound care consultation.   Reviewed chart for this encounter.   See Flow Sheet for findings.    Pt said she felt "shaky" stated she "pressed the call button several times, no one came yet". She asked for some OJ and crackers, peanut butter, WC reported to bedside nurse, he said ok for her to have whatever she likes, WC provided crackers, peanut butter, OJ and turkey sandwich. Pt said she did not have a wound and refused assessment.     Nursing to continue care.  Nursing to maintain pressure injury prevention interventions.    WC sign off--Please re-consult if needed.     "

## 2023-09-11 NOTE — PLAN OF CARE
[facility] Plan of Care Note  Dx Abdominal fluid collection     Shift Events : Pain management, IV fluids,  Drain Care, Ostomy Care, Safety     Goals of Care:  Pain management, IV fluids,  Drain Care, Ostomy Care, Safety      Neuro: AAOX4      Vital Signs: STABLE     Respiratory: Room AIr     Diet: Regular Diet     Is patient tolerating current diet? yes     GTTS: D51/2 20 k at 100 ml/hr     Urine Output/Bowel Movement: ileostomy rt abdominal 09/09/2023/adequate urine out put     Drains/Tubes/Tube Feeds (include total output/shift): JPx2 to LLQ     Lines: L upper midline 09/09/2023 and 20 g rt fa        Accuchecks:none     Skin: mid ABDOMINAL HEALING INCISION OPEN SPOT ABD FOLD      Fall Risk Score: 8     Activity level? AD SANGITA     Any scheduled procedures? NA     Any safety concerns? FALLS     Other:NONE

## 2023-09-11 NOTE — PLAN OF CARE
Problem: Physical Therapy  Goal: Physical Therapy Goal  Description: Goals to be met by: 2023     Patient will increase functional independence with mobility by performin. Supine to sit with [place]  2. Sit to supine with [place]  3. Sit to stand transfer with [place]  4. Bed to chair transfer with [place] using No Assistive Device  5. Gait  x 200 feet with [place] using No Assistive Device.   6. Lower extremity exercise program x10 reps per handout, with independence    Outcome: Ongoing, Progressing

## 2023-09-11 NOTE — PLAN OF CARE
Pt. Evaluated and goals established     Problem: Occupational Therapy  Goal: Occupational Therapy Goal  Description: Goals to be met by: 10/10/23     Patient will increase functional independence with ADLs by performing:    UE Dressing with [place].  LE Dressing with [place].  Grooming while standing at sink with [place].  Toileting from toilet with [place] for hygiene and clothing management.   Toilet transfer to toilet with [place].    Outcome: Ongoing, Progressing

## 2023-09-11 NOTE — ASSESSMENT & PLAN NOTE
38 y.o. female well known to the colorectal surgery department. Presented to the ED complaining of nausea, abdominal pain and pressure, and subjective fevers. CT scan showed two fluid collections, one in the retrorectus space and the other one in the subq space. Colon and rectal surgery was consulted for evaluation.         PLAN:   No acute surgical intervention indicated at this time.   Augmentin    Trend CRP   Regular diet    Encourage ambulation   DVT prophylaxis   Home meds    Dispo: Home today

## 2023-09-11 NOTE — PLAN OF CARE
Problem: Adult Inpatient Plan of Care  Goal: Plan of Care Review  Outcome: Ongoing, Progressing  Goal: Patient-Specific Goal (Individualized)  Outcome: Ongoing, Progressing  Goal: Absence of Hospital-Acquired Illness or Injury  Outcome: Ongoing, Progressing  Goal: Optimal Comfort and Wellbeing  Outcome: Ongoing, Progressing  Goal: Readiness for Transition of Care  Outcome: Ongoing, Progressing     Problem: Infection  Goal: Absence of Infection Signs and Symptoms  Outcome: Ongoing, Progressing     Problem: Bariatric Environmental Safety  Goal: Safety Maintained with Care  Outcome: Ongoing, Progressing     Problem: Pain Acute  Goal: Acceptable Pain Control and Functional Ability  Outcome: Ongoing, Progressing     Problem: Impaired Wound Healing  Goal: Optimal Wound Healing  Outcome: Ongoing, Progressing

## 2023-09-11 NOTE — PROGRESS NOTES
[facility]  Colorectal Surgery  Progress Note    Patient Name: Georgette Abrams  MRN: [id number]  Admission Date: 9/9/2023  Hospital Length of Stay: 1 days  Attending Physician: Sharon Rose MD    Subjective:     Interval History:   Minimal drain output   Thin output   Pending morning labs   No organism seen in gram stain       Post-Op Info:  * No surgery found *          Medications:  Continuous Infusions:      Scheduled Meds:   acetaminophen  1,000 mg Oral Q8H    amoxicillin-clavulanate 875-125mg  1 tablet Oral Q12H    enoxparin  40 mg Subcutaneous Q24H (prophylaxis, 1700)    ibuprofen  600 mg Oral Q6H    methocarbamoL  500 mg Oral QID    sodium chloride 0.9%  10 mL Intravenous Q6H    venlafaxine  150 mg Oral Daily     PRN Meds:   diphenhydrAMINE    HYDROmorphone    melatonin    ondansetron    oxyCODONE    oxyCODONE    sodium chloride 0.9%    sodium chloride 0.9%        Objective:     Vital Signs (Most Recent):  Temp: 97.6 °F (36.4 °C) (09/11/23 0458)  Pulse: 92 (09/11/23 0458)  Resp: 18 (09/11/23 0701)  BP: 118/60 (09/11/23 0458)  SpO2: 97 % (09/11/23 0458) Vital Signs (24h Range):  Temp:  [96.8 °F (36 °C)-97.8 °F (36.6 °C)] 97.6 °F (36.4 °C)  Pulse:  [] 92  Resp:  [15-18] 18  SpO2:  [94 %-100 %] 97 %  BP: (116-182)/() 118/60     Intake/Output - Last 3 Shifts         09/09 0700  09/10 0659 09/10 0700  09/11 0659 09/11 0700 09/12 0659    P.O.  720     I.V. (mL/kg) 747.8 (6)      IV Piggyback 219.9      Total Intake(mL/kg) 967.7 (7.7) 720 (5.8)     Urine (mL/kg/hr)  0 (0)     Drains  75     Other  230     Stool  0     Total Output  305     Net +967.7 +415            Urine Occurrence 1 x 3 x     Stool Occurrence 0 x 3 x              Physical Exam  AAO, NAD, well developed and well nourished.  Head normocephalic, atraumatic.  Trachea midline, neck supple.  Respirations unlabored with good inspiratory effort.  Heart regular rate and rhythm.  Abdomen soft, obese, ostomy  working, incision with some minor drainage and the bottom, drains thin, nondistended, nontender to palpation.       Significant Labs:  BMP:   Recent Labs   Lab 09/10/23  0858   *      K 4.0      CO2 24   BUN 6   CREATININE 0.7   CALCIUM 9.7   MG 1.8       CBC (Last 3 Results):   Recent Labs   Lab 09/08/23  1552 09/09/23  1545 09/09/23  1550 09/10/23  0858   WBC 8.06 7.04  --  7.01   RBC 3.93* 3.83*  --  4.30   HGB 11.6* 11.0*  --  12.3   HCT 36.1* 35.8* 35* 39.4    357  --  376   MCV 92 94  --  92   MCH 29.5 28.7  --  28.6   MCHC 32.1 30.7*  --  31.2*         Significant Diagnostics:  I have reviewed all pertinent imaging results/findings within the past 24 hours.    Assessment/Plan:     S/P colectomy  38 y.o. female well known to the colorectal surgery department. Presented to the ED complaining of nausea, abdominal pain and pressure, and subjective fevers. CT scan showed two fluid collections, one in the retrorectus space and the other one in the subq space. Colon and rectal surgery was consulted for evaluation.         PLAN:   No acute surgical intervention indicated at this time.   Augmentin    Trend CRP   Regular diet    Encourage ambulation   DVT prophylaxis   Home meds    Dispo: Home today           Burke Hernandez MD  Colorectal Surgery  [facility]

## 2023-09-11 NOTE — NURSING
Patient is refusing to allow staff to place her on Telemetry monitor.  Patient states that she does not feel like wearing heart monitor and that she will discuss this with MD in AM.  Educated patient on the importance of complying with MD orders, patient continues to refuse

## 2023-09-11 NOTE — NURSING
Call placed to On-Call MD, Dr. Garcia.  Informed MD that patient is requesting an increase in either PO pain medication or IV pain medication, stating pain is not being Management.  Patient is stating she is receiving Oxycodone 10 mg PO in hospital but was on 15 mg Oxycodone at home.  MD stated to inform patient that No changes will be made to her Pain medication and that she is also receiving Dilaudid 0.5 mg IVP for breakthrough medication in addition to Oxycodone 10 mg PO. No new orders at this time.

## 2023-09-11 NOTE — SUBJECTIVE & OBJECTIVE
Subjective:     Interval History:   Minimal drain output   Thin output   Pending morning labs   No organism seen in gram stain       Post-Op Info:  * No surgery found *          Medications:  Continuous Infusions:      Scheduled Meds:   acetaminophen  1,000 mg Oral Q8H    amoxicillin-clavulanate 875-125mg  1 tablet Oral Q12H    enoxparin  40 mg Subcutaneous Q24H (prophylaxis, 1700)    ibuprofen  600 mg Oral Q6H    methocarbamoL  500 mg Oral QID    sodium chloride 0.9%  10 mL Intravenous Q6H    venlafaxine  150 mg Oral Daily     PRN Meds:   diphenhydrAMINE    HYDROmorphone    melatonin    ondansetron    oxyCODONE    oxyCODONE    sodium chloride 0.9%    sodium chloride 0.9%        Objective:     Vital Signs (Most Recent):  Temp: 97.6 °F (36.4 °C) (09/11/23 0458)  Pulse: 92 (09/11/23 0458)  Resp: 18 (09/11/23 0701)  BP: 118/60 (09/11/23 0458)  SpO2: 97 % (09/11/23 0458) Vital Signs (24h Range):  Temp:  [96.8 °F (36 °C)-97.8 °F (36.6 °C)] 97.6 °F (36.4 °C)  Pulse:  [] 92  Resp:  [15-18] 18  SpO2:  [94 %-100 %] 97 %  BP: (116-182)/() 118/60     Intake/Output - Last 3 Shifts         09/09 0700  09/10 0659 09/10 0700  09/11 0659 09/11 0700 09/12 0659    P.O.  720     I.V. (mL/kg) 747.8 (6)      IV Piggyback 219.9      Total Intake(mL/kg) 967.7 (7.7) 720 (5.8)     Urine (mL/kg/hr)  0 (0)     Drains  75     Other  230     Stool  0     Total Output  305     Net +967.7 +415            Urine Occurrence 1 x 3 x     Stool Occurrence 0 x 3 x              Physical Exam  AAO, NAD, well developed and well nourished.  Head normocephalic, atraumatic.  Trachea midline, neck supple.  Respirations unlabored with good inspiratory effort.  Heart regular rate and rhythm.  Abdomen soft, obese, ostomy working, incision with some minor drainage and the bottom, drains thin, nondistended, nontender to palpation.       Significant Labs:  BMP:   Recent Labs   Lab 09/10/23  0858   *      K 4.0      CO2 24   BUN 6    CREATININE 0.7   CALCIUM 9.7   MG 1.8       CBC (Last 3 Results):   Recent Labs   Lab 09/08/23  1552 09/09/23  1545 09/09/23  1550 09/10/23  0858   WBC 8.06 7.04  --  7.01   RBC 3.93* 3.83*  --  4.30   HGB 11.6* 11.0*  --  12.3   HCT 36.1* 35.8* 35* 39.4    357  --  376   MCV 92 94  --  92   MCH 29.5 28.7  --  28.6   MCHC 32.1 30.7*  --  31.2*         Significant Diagnostics:  I have reviewed all pertinent imaging results/findings within the past 24 hours.

## 2023-09-11 NOTE — PLAN OF CARE
[facility] Plan of Care Note  Dx Abdominal fluid collection     Shift Events :  Went to IR placed two CONSTANZA drains to LLQ     Goals of Care: pain management ABX therapy     Neuro: A OX4      Vital Signs: STABLE     Respiratory: RA     Diet: NPO      Is patient tolerating current diet? Reg     GTTS: D51/2 20 k at 100 ml/hr     Urine Output/Bowel Movement: ileostomy rt abdominal 09/09/2023/adequate urine out put     Drains/Tubes/Tube Feeds (include total output/shift): JPx2     Lines: l upper midline 09/09/2023 20 g rt fa        Accuchecks:none     Skin: mid ABDOMINAL HEALING INCISION OPEN SPOT ABD FOLD      Fall Risk Score: 8     Activity level? AD SANGITA     Any scheduled procedures? NA     Any safety concerns? FALLS     Other:NONE

## 2023-09-12 VITALS
HEART RATE: 72 BPM | BODY MASS INDEX: 41.83 KG/M2 | RESPIRATION RATE: 20 BRPM | SYSTOLIC BLOOD PRESSURE: 145 MMHG | DIASTOLIC BLOOD PRESSURE: 73 MMHG | HEIGHT: 68 IN | WEIGHT: 276 LBS | OXYGEN SATURATION: 94 % | TEMPERATURE: 97 F

## 2023-09-12 PROCEDURE — 63600175 PHARM REV CODE 636 W HCPCS: Performed by: COLON & RECTAL SURGERY

## 2023-09-12 PROCEDURE — 25000003 PHARM REV CODE 250: Performed by: COLON & RECTAL SURGERY

## 2023-09-12 PROCEDURE — 97530 THERAPEUTIC ACTIVITIES: CPT

## 2023-09-12 PROCEDURE — 25000003 PHARM REV CODE 250

## 2023-09-12 PROCEDURE — 97530 THERAPEUTIC ACTIVITIES: CPT | Mod: CQ

## 2023-09-12 PROCEDURE — A4216 STERILE WATER/SALINE, 10 ML: HCPCS | Performed by: COLON & RECTAL SURGERY

## 2023-09-12 PROCEDURE — 63600175 PHARM REV CODE 636 W HCPCS: Performed by: STUDENT IN AN ORGANIZED HEALTH CARE EDUCATION/TRAINING PROGRAM

## 2023-09-12 PROCEDURE — 25000003 PHARM REV CODE 250: Performed by: NURSE PRACTITIONER

## 2023-09-12 PROCEDURE — 25000003 PHARM REV CODE 250: Performed by: STUDENT IN AN ORGANIZED HEALTH CARE EDUCATION/TRAINING PROGRAM

## 2023-09-12 RX ORDER — AMOXICILLIN AND CLAVULANATE POTASSIUM 875; 125 MG/1; MG/1
1 TABLET, FILM COATED ORAL EVERY 12 HOURS
Qty: 8 TABLET | Refills: 0 | Status: SHIPPED | OUTPATIENT
Start: 2023-09-12 | End: 2023-09-16

## 2023-09-12 RX ORDER — FLUCONAZOLE 200 MG/1
200 TABLET ORAL ONCE
Status: COMPLETED | OUTPATIENT
Start: 2023-09-12 | End: 2023-09-12

## 2023-09-12 RX ORDER — ONDANSETRON 2 MG/ML
8 INJECTION INTRAMUSCULAR; INTRAVENOUS EVERY 8 HOURS
Status: DISCONTINUED | OUTPATIENT
Start: 2023-09-12 | End: 2023-09-12 | Stop reason: HOSPADM

## 2023-09-12 RX ADMIN — HYDROMORPHONE HYDROCHLORIDE 1 MG: 1 INJECTION, SOLUTION INTRAMUSCULAR; INTRAVENOUS; SUBCUTANEOUS at 10:09

## 2023-09-12 RX ADMIN — Medication 10 ML: at 12:09

## 2023-09-12 RX ADMIN — METHOCARBAMOL 500 MG: 500 TABLET ORAL at 12:09

## 2023-09-12 RX ADMIN — OXYCODONE HYDROCHLORIDE 20 MG: 10 TABLET ORAL at 09:09

## 2023-09-12 RX ADMIN — HYDROMORPHONE HYDROCHLORIDE 1 MG: 1 INJECTION, SOLUTION INTRAMUSCULAR; INTRAVENOUS; SUBCUTANEOUS at 12:09

## 2023-09-12 RX ADMIN — HYDROMORPHONE HYDROCHLORIDE 1 MG: 1 INJECTION, SOLUTION INTRAMUSCULAR; INTRAVENOUS; SUBCUTANEOUS at 05:09

## 2023-09-12 RX ADMIN — ACETAMINOPHEN 1000 MG: 500 TABLET ORAL at 02:09

## 2023-09-12 RX ADMIN — OXYCODONE HYDROCHLORIDE 20 MG: 10 TABLET ORAL at 03:09

## 2023-09-12 RX ADMIN — Medication 10 ML: at 11:09

## 2023-09-12 RX ADMIN — VENLAFAXINE 150 MG: 75 TABLET ORAL at 05:09

## 2023-09-12 RX ADMIN — OXYCODONE HYDROCHLORIDE 20 MG: 10 TABLET ORAL at 02:09

## 2023-09-12 RX ADMIN — METHOCARBAMOL 500 MG: 500 TABLET ORAL at 09:09

## 2023-09-12 RX ADMIN — FLUCONAZOLE 200 MG: 200 TABLET ORAL at 11:09

## 2023-09-12 RX ADMIN — AMOXICILLIN AND CLAVULANATE POTASSIUM 1 TABLET: 875; 125 TABLET, FILM COATED ORAL at 09:09

## 2023-09-12 RX ADMIN — IBUPROFEN 600 MG: 600 TABLET ORAL at 11:09

## 2023-09-12 RX ADMIN — ONDANSETRON 8 MG: 2 INJECTION INTRAMUSCULAR; INTRAVENOUS at 02:09

## 2023-09-12 NOTE — PT/OT/SLP PROGRESS
Occupational Therapy   Treatment    Name: Georgette Abrams  MRN: [id number]  Admitting Diagnosis:  S/P colectomy       Recommendations:     Discharge Recommendations: other (see comments)  Discharge Equipment Recommendations:  none  Barriers to discharge:  None    Assessment:     Georgette Abrams is a 38 y.o. female with a medical diagnosis of S/P colectomy. Performance deficits affecting function are weakness, impaired endurance, impaired self care skills, impaired functional mobility, gait instability, impaired balance, pain. Patient tolerated treatment session and motivated to participate in therapy. Patient would benefit from continued skilled acute OT 3x/wk to improve functional mobility, increase independence with ADLs, and address established goals prior to discharge.    Rehab Prognosis:  Good; patient would benefit from acute skilled OT services to address these deficits and reach maximum level of function.       Plan:     Patient to be seen 3 x/week to address the above listed problems via self-care/home management, therapeutic activities, therapeutic exercises, neuromuscular re-education  Plan of Care Expires: 10/10/23  Plan of Care Reviewed with: patient    Subjective     Patient/Family Comments/goals: patient agreed to therapy  Pain/Comfort:  Pain Rating 1:  (patient did not rate)  Location - Side 1: Bilateral  Location - Orientation 1: generalized  Location 1: abdomen  Pain Addressed 1: Reposition, Distraction  Pain Rating Post-Intervention 1:  (patient did not rate)    Objective:     Communicated with: NSG prior to session.  Patient found up in chair with CONSTANZA drain, colostomy, peripheral IV upon OT entry to room.    General Precautions: Standard, fall    Orthopedic Precautions:N/A  Braces: N/A  Respiratory Status: Room air     Occupational Performance:     Functional Mobility/Transfers:  Patient completed Sit <> Stand Transfer with modified independence  with  no assistive device   Patient  completed Toilet Transfer bedside chair<>toilet with functional ambulation technique with supervision with  no AD. Patient ambulated around bathroom and organizing towels for shower later and around room prior to returning to bedside chair with supervision and no AD.      [facility] 6 Click ADL: 20    Patient left up in chair with all lines intact, call button in reach, and all needs met.     GOALS:   Multidisciplinary Problems       Occupational Therapy Goals          Problem: Occupational Therapy    Goal Priority Disciplines Outcome Interventions   Occupational Therapy Goal     OT, PT/OT Ongoing, Progressing    Description: Goals to be met by: 10/10/23     Patient will increase functional independence with ADLs by performing:    UE Dressing with [place].  LE Dressing with [place].  Grooming while standing at sink with [place].  Toileting from toilet with [place] for hygiene and clothing management.   Toilet transfer to toilet with [place].                         Time Tracking:     OT Date of Treatment: 09/12/23  OT Start Time: 1416  OT Stop Time: 1430  OT Total Time (min): 14 min    Billable Minutes:Therapeutic Activity 14               9/12/2023

## 2023-09-12 NOTE — PLAN OF CARE
[facility] Plan of Care Note  Dx Abdominal fluid collection     Shift Events : two CONSTANZA drains to LLQ     Goals of Care: pain management ABX therapy     Neuro: A OX4      Vital Signs: STABLE     Respiratory: RA     Diet: REG     Is patient tolerating current diet? Reg     GTTS: na     Urine Output/Bowel Movement: ileostomy rt abdominal/adequate urine out put     Drains/Tubes/Tube Feeds (include total output/shift): JPx2     Lines: l upper midline 09/09/2023 20 g rt fa        Accuchecks:none     Skin: mid ABDOMINAL HEALING INCISION OPEN SPOT ABD FOLD      Fall Risk Score: 8     Activity level? AD SANGITA     Any scheduled procedures? NA     Any safety concerns? FALLS     Other:NONE

## 2023-09-12 NOTE — PLAN OF CARE
Explained detailed notice of discharge and (HINN-12) letter information to patient. Patient refused to sign. Patient states her parents will pick her up in the morning.

## 2023-09-12 NOTE — PLAN OF CARE
Problem: Adult Inpatient Plan of Care  Goal: Plan of Care Review  Outcome: Met  Goal: Patient-Specific Goal (Individualized)  Outcome: Met  Goal: Absence of Hospital-Acquired Illness or Injury  Outcome: Met  Goal: Optimal Comfort and Wellbeing  Outcome: Met  Goal: Readiness for Transition of Care  Outcome: Met     Problem: Infection  Goal: Absence of Infection Signs and Symptoms  Outcome: Met     Problem: Bariatric Environmental Safety  Goal: Safety Maintained with Care  Outcome: Met     Problem: Pain Acute  Goal: Acceptable Pain Control and Functional Ability  Outcome: Met     Problem: Impaired Wound Healing  Goal: Optimal Wound Healing  Outcome: Met     [facility] Plan of Care Note  Dx S/P Colectomy    Shift Events Medicated for pain and nausea; CONSTANZA drain care; oral antibiotics.     Goals of Care: manage pain; d/c home today    Neuro: A&O x 4    Vital Signs: VSS    Respiratory: Room air    Diet: Regular    Is patient tolerating current diet? Yes    GTTS: none    Urine Output/Bowel Movement: Rt abd Ileostomy; adeqaute urine output    Drains/Tubes/Tube Feeds (include total output/shift): CONSTANZA drain x 2    Lines: SOPHIA midline; RFA 20g PIV      Accuchecks:none    Skin: Healing mid abd incision    Fall Risk Score: 8    Activity level? independent    Any scheduled procedures? none    Any safety concerns? none    Other: none

## 2023-09-12 NOTE — SUBJECTIVE & OBJECTIVE
Subjective:     Interval History:   Minimal drain output   Thin output   No organism seen on gram stain, aerobic culture shows no growth, other cultures are pending    Post-Op Info:  * No surgery found *          Medications:  Continuous Infusions:  Scheduled Meds:   acetaminophen  1,000 mg Oral Q8H    amoxicillin-clavulanate 875-125mg  1 tablet Oral Q12H    enoxparin  40 mg Subcutaneous Q24H (prophylaxis, 1700)    ibuprofen  600 mg Oral Q6H    methocarbamoL  500 mg Oral QID    ondansetron  8 mg Intravenous Q8H    sodium chloride 0.9%  10 mL Intravenous Q6H    venlafaxine  150 mg Oral Daily     PRN Meds:   diphenhydrAMINE    HYDROmorphone    melatonin    oxyCODONE    oxyCODONE    sodium chloride 0.9%    sodium chloride 0.9%        Objective:     Vital Signs (Most Recent):  Temp: 98.5 °F (36.9 °C) (09/12/23 0459)  Pulse: 75 (09/12/23 0459)  Resp: 18 (09/12/23 0517)  BP: 125/73 (09/12/23 0459)  SpO2: 98 % (09/12/23 0459) Vital Signs (24h Range):  Temp:  [96.4 °F (35.8 °C)-98.5 °F (36.9 °C)] 98.5 °F (36.9 °C)  Pulse:  [75-99] 75  Resp:  [18-20] 18  SpO2:  [94 %-98 %] 98 %  BP: (114-152)/(59-87) 125/73     Intake/Output - Last 3 Shifts         09/10 0700 09/11 0659 09/11 0700  09/12 0659    P.O. 720     Total Intake(mL/kg) 720 (5.8)     Urine (mL/kg/hr) 0 (0)     Drains 75     Other 230     Stool 0     Total Output 305     Net +415           Urine Occurrence 3 x     Stool Occurrence 3 x 0 x          Physical Exam  AAO, NAD, well developed and well nourished.  Head normocephalic, atraumatic.  Trachea midline, neck supple.  Respirations unlabored with good inspiratory effort.  Heart regular rate and rhythm.  Abdomen soft, obese, ostomy working, incision with some minor drainage and the bottom, drains thin, nondistended, nontender to palpation.       Significant Labs:  All pertinent lab results within the last 24 hours have been reviewed.     Significant Diagnostics:  I have reviewed all pertinent imaging  results/findings within the past 24 hours.

## 2023-09-12 NOTE — PT/OT/SLP PROGRESS
Physical Therapy Treatment    Patient Name:  Georgette Abrams   MRN:  [id number]    Recommendations:     Discharge Recommendations: other (see comments)  Discharge Equipment Recommendations: none  Barriers to discharge: None    Assessment:     Georgette Abrams is a 38 y.o. female admitted with a medical diagnosis of S/P colectomy.  She presents with the following impairments/functional limitations: weakness, impaired endurance, impaired self care skills, impaired functional mobility, pain .Pt  tolerated treatment fairly well and is progressing slowly with mobility. pt limited due to fatigue and pain. Patient remains appropriate for continued skilled services within the acute environment and goals remain appropriate.      Rehab Prognosis: Good; patient would benefit from acute skilled PT services to address these deficits and reach maximum level of function.    Recent Surgery: * No surgery found *      Plan:     During this hospitalization, patient to be seen 3 x/week to address the identified rehab impairments via gait training, therapeutic activities, therapeutic exercises, neuromuscular re-education and progress toward the following goals:    Plan of Care Expires:  10/10/23    Subjective     Chief Complaint: I am sore, I need to rest  Pain/Comfort:  Pain Rating 1: 5/10  Location - Orientation 1: generalized  Location 1: abdomen  Pain Addressed 1: Reposition, Distraction      Objective:     Communicated with RN prior to session.  Patient found ambulatory in room/mendoza with colostomy upon PT entry to room.     General Precautions: Standard, fall  Orthopedic Precautions: N/A  Braces: N/A  Respiratory Status: Room air     Functional Mobility:  Bed Mobility:     Scooting: supervision  Sit to Supine: supervision  Transfers:     Sit to Stand:  supervision with no AD  Gait: pt amb 15 ft with no AD with (S)      AM-PAC 6 CLICK MOBILITY  Turning over in bed (including adjusting bedclothes, sheets and blankets)?:  3  Sitting down on and standing up from a chair with arms (e.g., wheelchair, bedside commode, etc.): 3  Moving from lying on back to sitting on the side of the bed?: 3  Moving to and from a bed to a chair (including a wheelchair)?: 3  Need to walk in hospital room?: 3  Climbing 3-5 steps with a railing?: 3  Basic Mobility Total Score: 18       Treatment & Education:  Therapist provided instruction and educated of  patient on progress, safety,d/c,PT POC,   proper body mechanics, energy conservation, and fall prevention strategies during tasks listed above, on the effects of prolonged immobility and the importance of performing OOB activity and exercises to promote healing and reduce recovery time    Updated white board with appropriate PT mobility information for medical team notification    Call nursing/pct to transfer to chair/use bathroom. Pt stated understanding      Bedside table in front of patient and area set up for function, convenience, and safety. RN aware of patient's mobility needs and status. Questions/concerns addressed within PTA scope of practice; patient  with no further questions. Time was provided for active listening, discussion of health disposition, and discussion of safe discharge. Pt?verbalized?agreement .    Patient left HOB elevated with all lines intact, call button in reach, and nsg notified..    GOALS:   Multidisciplinary Problems       Physical Therapy Goals          Problem: Physical Therapy    Goal Priority Disciplines Outcome Goal Variances Interventions   Physical Therapy Goal     PT, PT/OT Ongoing, Progressing     Description: Goals to be met by: 2023     Patient will increase functional independence with mobility by performin. Supine to sit with [place]  2. Sit to supine with [place]  3. Sit to stand transfer with [place]  4. Bed to chair transfer with [place] using No Assistive Device  5. Gait  x 200 feet with [place] using No Assistive  Device.   6. Lower extremity exercise program x10 reps per handout, with independence                         Time Tracking:     PT Received On: 09/12/23  PT Start Time: 1059     PT Stop Time: 1139  PT Total Time (min): 40 min     Billable Minutes: Therapeutic Activity 38    Treatment Type: Treatment  PT/PTA: PTA     Number of PTA visits since last PT visit: 1     09/12/2023

## 2023-09-12 NOTE — PLAN OF CARE
09/12/23 1333   Discharge Reassessment   Assessment Type Discharge Planning Reassessment   Did the patient's condition or plan change since previous assessment? No   Discharge Plan discussed with: Patient   Communicated DANNY with patient/caregiver Yes   Discharge Plan A Home   Discharge Plan B Home   Post-Acute Status   Discharge Delays (!) Personal Transportation     Pt. Medically ready for discharge. I spoke with patient at bedside. I notified her I can provide transportation. Pt. Refused. Pt. States her family will pick her up tomorrow morning.

## 2023-09-12 NOTE — PLAN OF CARE
Plan of care discussed with patient. Pain treated with prns. VSS, call light within reach. O2 sats maintained above 94%

## 2023-09-12 NOTE — DISCHARGE SUMMARY
[address]  [facility]  DISCHARGE SUMMARY  General Surgery      Admit Date:  9/9/2023    Discharge Date and Time:  9/12/2023  12:00 PM    Attending Physician:  KRISH Tracy MD     Discharge Provider:  Isidoro Murillo MD     Reason for Admission:  S/P colectomy     Hospital Course:  Please see the admission H&P, progress notes, and other available documentation for full details related to history prior to this admission.  Briefly, Georgette Abrams is a 38 y.o. female who was admitted following a presentation to the ED complaining of nausea, abdominal pain and pressure, and subjective fevers. CT scan showed two fluid collections. She was admitted on placed on IV abx. On 9/10 she had two IR drains placed. She has progressed well and is medically stable for discharge.    Consults:  None.    Significant Diagnostic Studies:   Recent Labs   Lab 09/09/23  1545 09/09/23  1550 09/10/23  0858 09/11/23  0544   WBC 7.04  --  7.01 4.98   HGB 11.0*  --  12.3 11.0*   HCT 35.8* 35* 39.4 35.4*     --  376 335     Recent Labs   Lab 09/09/23  1545 09/10/23  0858 09/11/23  0544    137 139   K 4.2 4.0 4.0    101 103   CO2 22* 24 22*   BUN 7 6 6   CREATININE 0.7 0.7 0.7   * 114* 116*   CALCIUM 9.5 9.7 9.0   MG  --  1.8 1.8   PHOS  --  3.4 3.8   AST 14 11 8*   ALT 19 19 13   ALKPHOS 104 105 93   BILITOT 0.7 0.7 0.2   PROT 7.4 7.5 6.6   ALBUMIN 3.4* 3.4* 3.0*   LIPASE 12  --   --    .2* 121.5* 89.5*         Final Diagnoses:   Principal Problem:  S/P colectomy   Secondary Diagnoses:    Active Hospital Problems    Diagnosis  POA    *S/P colectomy [Z90.49]  Not Applicable      Resolved Hospital Problems   No resolved problems to display.       Discharged Condition:  Good    Disposition:  Home or Self Care    Follow Up/Patient Instructions:   Follow up with Dr. Tracy in 1-2 weeks.  Drain Care Instructions  You will be discharged home with your drain.  If you are having discharge from around your drain  "at the insertion point, you can place dry guaze and tape in place, replacing as needed.  Sometimes the drain needs to be "stripped." Please use the technique discussed while you were hospitalized in order to "strip" your drain.  Make sure that you record the output and the characteristic of the discharge twice daily (example: 50mL of yellow-red output), as you were instructed during your hospitalization.  Please bring this information with you to your next clinic appointment.     Medications:  Reconciled Home Medications:    Current Discharge Medication List        CONTINUE these medications which have CHANGED    Details   amoxicillin-clavulanate 875-125mg (AUGMENTIN) 875-125 mg per tablet Take 1 tablet by mouth every 12 (twelve) hours. for 4 days  Qty: 8 tablet, Refills: 0           CONTINUE these medications which have NOT CHANGED    Details   clonazepam (KLONOPIN) 1 MG tablet Take 1 mg by mouth 2 (two) times daily as needed.        cyanocobalamin 1,000 mcg/mL injection 1,000 mcg twice a week.      ibuprofen (ADVIL,MOTRIN) 800 MG tablet Take 1 tablet (800 mg total) by mouth every 8 (eight) hours.  Qty: 30 tablet, Refills: 3      losartan (COZAAR) 100 MG tablet Take 100 mg by mouth.      methocarbamoL (ROBAXIN) 500 MG Tab Take 1 tablet (500 mg total) by mouth 4 (four) times daily.  Qty: 40 tablet, Refills: 0      ondansetron (ZOFRAN) 4 MG tablet Take 4 mg by mouth every 6 (six) hours as needed for Nausea.      ondansetron (ZOFRAN-ODT) 8 MG TbDL Take 4 mg by mouth once.        !! oxyCODONE (ROXICODONE) 15 MG Tab Take 1 tablet (15 mg total) by mouth every 4 (four) hours as needed for Pain.  Qty: 30 tablet, Refills: 0    Comments: Quantity prescribed more than 7 day supply? Yes, quantity medically necessary  Associated Diagnoses: Incisional hernia with obstruction but no gangrene      !! oxyCODONE (ROXICODONE) 15 MG Tab Take 1 tablet (15 mg total) by mouth every 4 (four) hours as needed for Pain.  Qty: 30 tablet, " "Refills: 0    Comments: Quantity prescribed more than 7 day supply? Yes, quantity medically necessary  Associated Diagnoses: Lower abdominal pain      progesterone (PROMETRIUM) 100 MG capsule Take 400 mg by mouth every evening.      venlafaxine (EFFEXOR) 100 MG Tab Take 150 mg by mouth Daily.      gabapentin (NEURONTIN) 300 MG capsule Take 1 capsule (300 mg total) by mouth 3 (three) times daily.  Qty: 90 capsule, Refills: 0       !! - Potential duplicate medications found. Please discuss with provider.        Discharge Procedure Orders   Diet Adult Regular     Notify your health care provider if you experience any of the following:  temperature >100.4     Notify your health care provider if you experience any of the following:  persistent nausea and vomiting or diarrhea     Notify your health care provider if you experience any of the following:  severe uncontrolled pain     Notify your health care provider if you experience any of the following:  difficulty breathing or increased cough     Notify your health care provider if you experience any of the following:  persistent dizziness, light-headedness, or visual disturbances     Change dressing (specify)   Order Comments: Drain Care Instructions  You will be discharged home with your drain.  If you are having discharge from around your drain at the insertion point, you can place dry guaze and tape in place, replacing as needed.  Sometimes the drain needs to be "stripped." Please use the technique discussed while you were hospitalized in order to "strip" your drain.  Make sure that you record the output and the characteristic of the discharge twice daily (example: 50mL of yellow-red output), as you were instructed during your hospitalization.  Please bring this information with you to your next clinic appointment.     Activity as tolerated      Follow-up Information       KRISH Tracy MD Follow up in 2 week(s).    Specialty: Colon and Rectal Surgery  Contact " information:  [address] [facility] [address]  [phone]                             Isidoro Murillo MD

## 2023-09-12 NOTE — PROGRESS NOTES
[facility]  Colorectal Surgery  Progress Note    Patient Name: Georgette Abrams  MRN: [id number]  Admission Date: 9/9/2023  Hospital Length of Stay: 2 days  Attending Physician: KRISH Tracy MD    Subjective:     Interval History:   Minimal drain output   Thin output   No organism seen on gram stain, aerobic culture shows no growth, other cultures are pending    Post-Op Info:  * No surgery found *          Medications:  Continuous Infusions:  Scheduled Meds:   acetaminophen  1,000 mg Oral Q8H    amoxicillin-clavulanate 875-125mg  1 tablet Oral Q12H    enoxparin  40 mg Subcutaneous Q24H (prophylaxis, 1700)    ibuprofen  600 mg Oral Q6H    methocarbamoL  500 mg Oral QID    ondansetron  8 mg Intravenous Q8H    sodium chloride 0.9%  10 mL Intravenous Q6H    venlafaxine  150 mg Oral Daily     PRN Meds:   diphenhydrAMINE    HYDROmorphone    melatonin    oxyCODONE    oxyCODONE    sodium chloride 0.9%    sodium chloride 0.9%        Objective:     Vital Signs (Most Recent):  Temp: 98.5 °F (36.9 °C) (09/12/23 0459)  Pulse: 75 (09/12/23 0459)  Resp: 18 (09/12/23 0517)  BP: 125/73 (09/12/23 0459)  SpO2: 98 % (09/12/23 0459) Vital Signs (24h Range):  Temp:  [96.4 °F (35.8 °C)-98.5 °F (36.9 °C)] 98.5 °F (36.9 °C)  Pulse:  [75-99] 75  Resp:  [18-20] 18  SpO2:  [94 %-98 %] 98 %  BP: (114-152)/(59-87) 125/73     Intake/Output - Last 3 Shifts         09/10 0700 09/11 0659 09/11 0700 09/12 0659    P.O. 720     Total Intake(mL/kg) 720 (5.8)     Urine (mL/kg/hr) 0 (0)     Drains 75     Other 230     Stool 0     Total Output 305     Net +415           Urine Occurrence 3 x     Stool Occurrence 3 x 0 x          Physical Exam  AAO, NAD, well developed and well nourished.  Head normocephalic, atraumatic.  Trachea midline, neck supple.  Respirations unlabored with good inspiratory effort.  Heart regular rate and rhythm.  Abdomen soft, obese, ostomy working, incision with some minor drainage and the bottom,  drains thin, nondistended, nontender to palpation.       Significant Labs:  All pertinent lab results within the last 24 hours have been reviewed.     Significant Diagnostics:  I have reviewed all pertinent imaging results/findings within the past 24 hours.    Assessment/Plan:     S/P colectomy  38 y.o. female well known to the colorectal surgery department. Presented to the ED complaining of nausea, abdominal pain and pressure, and subjective fevers. CT scan showed two fluid collections, one in the retrorectus space and the other one in the subq space. Colon and rectal surgery was consulted for evaluation.         PLAN:   No acute surgical intervention indicated at this time.   Augmentin    CRP downtrending   Regular diet    Encourage ambulation   DVT prophylaxis   Home meds    Dispo: Home today           Isidoro Murillo MD  Colorectal Surgery  [facility]

## 2023-09-12 NOTE — ASSESSMENT & PLAN NOTE
38 y.o. female well known to the colorectal surgery department. Presented to the ED complaining of nausea, abdominal pain and pressure, and subjective fevers. CT scan showed two fluid collections, one in the retrorectus space and the other one in the subq space. Colon and rectal surgery was consulted for evaluation.         PLAN:   No acute surgical intervention indicated at this time.   Augmentin    CRP downtrending   Regular diet    Encourage ambulation   DVT prophylaxis   Home meds    Dispo: Home today

## 2023-09-13 LAB — BACTERIA SPEC AEROBE CULT: NO GROWTH

## 2023-09-18 LAB — BACTERIA SPEC ANAEROBE CULT: NORMAL

## 2023-09-19 ENCOUNTER — TELEPHONE (OUTPATIENT)
Dept: SURGERY | Facility: CLINIC | Age: 38
End: 2023-09-19
Payer: COMMERCIAL

## 2023-09-19 ENCOUNTER — PATIENT MESSAGE (OUTPATIENT)
Dept: SURGERY | Facility: CLINIC | Age: 38
End: 2023-09-19
Payer: COMMERCIAL

## 2023-09-19 DIAGNOSIS — K43.0 INCISIONAL HERNIA WITH OBSTRUCTION BUT NO GANGRENE: ICD-10-CM

## 2023-09-19 RX ORDER — OXYCODONE HYDROCHLORIDE 15 MG/1
15 TABLET ORAL EVERY 4 HOURS PRN
Qty: 30 TABLET | Refills: 0 | Status: SHIPPED | OUTPATIENT
Start: 2023-09-19 | End: 2023-10-18

## 2023-09-19 NOTE — TELEPHONE ENCOUNTER
Spoke with patient for 20 minutes on phone regarding pain medication refill and need to see Dr. Tracy when she comes to  medicine.Patient verbalizes not being happy with the care she received in patient, as well as being ignored by staff. Explained to patient that I will relay message to Dr. Tracy. Patient verbalizes understanding.

## 2023-09-19 NOTE — TELEPHONE ENCOUNTER
----- Message from Gloria Sanford sent at 9/19/2023  9:22 AM CDT -----  Regarding: Rx refill and Pt care concerns  Contact: @ [phone]  Pt is calling in wanting to speak with a nurse or the doctor in regards to her being in the hospital last week, pt is NOT wanting to speak with an MA. Pt states that she is not happy with the care she has been receiving. Pt is also needing a refill on her oxyCODONE (ROXICODONE) 15 MG Tab. She is needing the script to be sent to the [facility]. Pt states that the drain tubes are pulling and she is still running a low grade fever. Please call pt to discuss further.         Pharmacy:  [facility]

## 2023-09-25 ENCOUNTER — TELEPHONE (OUTPATIENT)
Dept: SURGERY | Facility: CLINIC | Age: 38
End: 2023-09-25
Payer: COMMERCIAL

## 2023-09-25 NOTE — TELEPHONE ENCOUNTER
----- Message from Ayush Henderson sent at 9/25/2023  4:39 PM CDT -----  Regarding: adv  Contact: @[phone]  Pt calling to get nurse to give her a call back states due to her being sick ... Pls call and adv@[phone]

## 2023-09-25 NOTE — TELEPHONE ENCOUNTER
Spoke with patient who states that she is still sick from surgery. Patient states that she is unhappy with the way she was treated and that Dr. Tracy will have to talk to her at her visit.

## 2023-09-26 ENCOUNTER — HOSPITAL ENCOUNTER (OUTPATIENT)
Dept: RADIOLOGY | Facility: HOSPITAL | Age: 38
Discharge: HOME | End: 2023-09-26
Attending: COLON & RECTAL SURGERY
Payer: COMMERCIAL

## 2023-09-26 ENCOUNTER — OFFICE VISIT (OUTPATIENT)
Dept: SURGERY | Facility: CLINIC | Age: 38
End: 2023-09-26
Payer: COMMERCIAL

## 2023-09-26 VITALS
DIASTOLIC BLOOD PRESSURE: 100 MMHG | SYSTOLIC BLOOD PRESSURE: 156 MMHG | DIASTOLIC BLOOD PRESSURE: 100 MMHG | HEART RATE: 114 BPM | BODY MASS INDEX: 41.98 KG/M2 | BODY MASS INDEX: 41.98 KG/M2 | WEIGHT: 276 LBS | HEART RATE: 114 BPM | SYSTOLIC BLOOD PRESSURE: 156 MMHG | HEIGHT: 68 IN

## 2023-09-26 DIAGNOSIS — R10.30 LOWER ABDOMINAL PAIN: Primary | ICD-10-CM

## 2023-09-26 DIAGNOSIS — Z43.2 ATTENTION TO ILEOSTOMY: Primary | ICD-10-CM

## 2023-09-26 DIAGNOSIS — R10.30 LOWER ABDOMINAL PAIN: ICD-10-CM

## 2023-09-26 PROCEDURE — 1159F MED LIST DOCD IN RCRD: CPT | Mod: CPTII,S$GLB,, | Performed by: NURSE PRACTITIONER

## 2023-09-26 PROCEDURE — 99024 PR POST-OP FOLLOW-UP VISIT: ICD-10-PCS | Mod: S$GLB,,, | Performed by: COLON & RECTAL SURGERY

## 2023-09-26 PROCEDURE — 3077F SYST BP >= 140 MM HG: CPT | Mod: CPTII,S$GLB,, | Performed by: COLON & RECTAL SURGERY

## 2023-09-26 PROCEDURE — 1160F PR REVIEW ALL MEDS BY PRESCRIBER/CLIN PHARMACIST DOCUMENTED: ICD-10-PCS | Mod: CPTII,S$GLB,, | Performed by: NURSE PRACTITIONER

## 2023-09-26 PROCEDURE — 99024 POSTOP FOLLOW-UP VISIT: CPT | Mod: S$GLB,,, | Performed by: COLON & RECTAL SURGERY

## 2023-09-26 PROCEDURE — 3008F BODY MASS INDEX DOCD: CPT | Mod: CPTII,S$GLB,, | Performed by: COLON & RECTAL SURGERY

## 2023-09-26 PROCEDURE — 3080F PR MOST RECENT DIASTOLIC BLOOD PRESSURE >= 90 MM HG: ICD-10-PCS | Mod: CPTII,S$GLB,, | Performed by: NURSE PRACTITIONER

## 2023-09-26 PROCEDURE — 99999 PR PBB SHADOW E&M-EST. PATIENT-LVL III: CPT | Mod: PBBFAC,,, | Performed by: COLON & RECTAL SURGERY

## 2023-09-26 PROCEDURE — 1159F MED LIST DOCD IN RCRD: CPT | Mod: CPTII,S$GLB,, | Performed by: COLON & RECTAL SURGERY

## 2023-09-26 PROCEDURE — 1160F RVW MEDS BY RX/DR IN RCRD: CPT | Mod: CPTII,S$GLB,, | Performed by: NURSE PRACTITIONER

## 2023-09-26 PROCEDURE — 1159F PR MEDICATION LIST DOCUMENTED IN MEDICAL RECORD: ICD-10-PCS | Mod: CPTII,S$GLB,, | Performed by: COLON & RECTAL SURGERY

## 2023-09-26 PROCEDURE — 3008F PR BODY MASS INDEX (BMI) DOCUMENTED: ICD-10-PCS | Mod: CPTII,S$GLB,, | Performed by: COLON & RECTAL SURGERY

## 2023-09-26 PROCEDURE — 4010F ACE/ARB THERAPY RXD/TAKEN: CPT | Mod: CPTII,S$GLB,, | Performed by: COLON & RECTAL SURGERY

## 2023-09-26 PROCEDURE — 74176 CT ABD & PELVIS W/O CONTRAST: CPT | Mod: 26,,, | Performed by: RADIOLOGY

## 2023-09-26 PROCEDURE — 4010F ACE/ARB THERAPY RXD/TAKEN: CPT | Mod: CPTII,S$GLB,, | Performed by: NURSE PRACTITIONER

## 2023-09-26 PROCEDURE — 1160F RVW MEDS BY RX/DR IN RCRD: CPT | Mod: CPTII,S$GLB,, | Performed by: COLON & RECTAL SURGERY

## 2023-09-26 PROCEDURE — 3080F PR MOST RECENT DIASTOLIC BLOOD PRESSURE >= 90 MM HG: ICD-10-PCS | Mod: CPTII,S$GLB,, | Performed by: COLON & RECTAL SURGERY

## 2023-09-26 PROCEDURE — 1159F PR MEDICATION LIST DOCUMENTED IN MEDICAL RECORD: ICD-10-PCS | Mod: CPTII,S$GLB,, | Performed by: NURSE PRACTITIONER

## 2023-09-26 PROCEDURE — 99999 PR PBB SHADOW E&M-EST. PATIENT-LVL III: ICD-10-PCS | Mod: PBBFAC,,, | Performed by: COLON & RECTAL SURGERY

## 2023-09-26 PROCEDURE — 74176 CT ABD & PELVIS W/O CONTRAST: CPT | Mod: TC

## 2023-09-26 PROCEDURE — 74176 CT ABDOMEN PELVIS WITHOUT CONTRAST: ICD-10-PCS | Mod: 26,,, | Performed by: RADIOLOGY

## 2023-09-26 PROCEDURE — 4010F PR ACE/ARB THEARPY RXD/TAKEN: ICD-10-PCS | Mod: CPTII,S$GLB,, | Performed by: NURSE PRACTITIONER

## 2023-09-26 PROCEDURE — 99024 PR POST-OP FOLLOW-UP VISIT: ICD-10-PCS | Mod: S$GLB,,, | Performed by: NURSE PRACTITIONER

## 2023-09-26 PROCEDURE — 1160F PR REVIEW ALL MEDS BY PRESCRIBER/CLIN PHARMACIST DOCUMENTED: ICD-10-PCS | Mod: CPTII,S$GLB,, | Performed by: COLON & RECTAL SURGERY

## 2023-09-26 PROCEDURE — 3008F PR BODY MASS INDEX (BMI) DOCUMENTED: ICD-10-PCS | Mod: CPTII,S$GLB,, | Performed by: NURSE PRACTITIONER

## 2023-09-26 PROCEDURE — 3008F BODY MASS INDEX DOCD: CPT | Mod: CPTII,S$GLB,, | Performed by: NURSE PRACTITIONER

## 2023-09-26 PROCEDURE — 3077F SYST BP >= 140 MM HG: CPT | Mod: CPTII,S$GLB,, | Performed by: NURSE PRACTITIONER

## 2023-09-26 PROCEDURE — 99999 PR PBB SHADOW E&M-EST. PATIENT-LVL III: CPT | Mod: PBBFAC,,, | Performed by: NURSE PRACTITIONER

## 2023-09-26 PROCEDURE — 99999 PR PBB SHADOW E&M-EST. PATIENT-LVL III: ICD-10-PCS | Mod: PBBFAC,,, | Performed by: NURSE PRACTITIONER

## 2023-09-26 PROCEDURE — 3080F DIAST BP >= 90 MM HG: CPT | Mod: CPTII,S$GLB,, | Performed by: COLON & RECTAL SURGERY

## 2023-09-26 PROCEDURE — 99024 POSTOP FOLLOW-UP VISIT: CPT | Mod: S$GLB,,, | Performed by: NURSE PRACTITIONER

## 2023-09-26 PROCEDURE — 4010F PR ACE/ARB THEARPY RXD/TAKEN: ICD-10-PCS | Mod: CPTII,S$GLB,, | Performed by: COLON & RECTAL SURGERY

## 2023-09-26 PROCEDURE — 3077F PR MOST RECENT SYSTOLIC BLOOD PRESSURE >= 140 MM HG: ICD-10-PCS | Mod: CPTII,S$GLB,, | Performed by: NURSE PRACTITIONER

## 2023-09-26 PROCEDURE — 3080F DIAST BP >= 90 MM HG: CPT | Mod: CPTII,S$GLB,, | Performed by: NURSE PRACTITIONER

## 2023-09-26 PROCEDURE — 3077F PR MOST RECENT SYSTOLIC BLOOD PRESSURE >= 140 MM HG: ICD-10-PCS | Mod: CPTII,S$GLB,, | Performed by: COLON & RECTAL SURGERY

## 2023-09-26 RX ORDER — CIPROFLOXACIN 500 MG/1
500 TABLET ORAL EVERY 12 HOURS
Qty: 20 TABLET | Refills: 0 | Status: ON HOLD | OUTPATIENT
Start: 2023-09-26 | End: 2023-10-12 | Stop reason: HOSPADM

## 2023-09-26 RX ORDER — METRONIDAZOLE 500 MG/1
500 TABLET ORAL EVERY 8 HOURS
Qty: 30 TABLET | Refills: 0 | Status: ON HOLD | OUTPATIENT
Start: 2023-09-26 | End: 2023-10-12 | Stop reason: HOSPADM

## 2023-09-26 RX ORDER — ONDANSETRON 4 MG/1
4 TABLET, ORALLY DISINTEGRATING ORAL EVERY 6 HOURS PRN
Qty: 40 TABLET | Refills: 3 | Status: SHIPPED | OUTPATIENT
Start: 2023-09-26 | End: 2023-12-02

## 2023-09-26 RX ORDER — OXYCODONE HYDROCHLORIDE 30 MG/1
30 TABLET ORAL EVERY 4 HOURS PRN
Qty: 30 TABLET | Refills: 0 | Status: SHIPPED | OUTPATIENT
Start: 2023-09-26 | End: 2023-10-18

## 2023-09-26 NOTE — PROGRESS NOTES
[facility] Office Visit Postop    Referring Md:   No referring provider defined for this encounter.    SUBJECTIVE:     Chief Complaint: parastomal hernia    History of Present Illness:     Course is as follows:  Patient is a 38 y.o. female presents with parastomal hernia.  She has had 66 hospital admission since 2009.  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  She therefore underwent repeat exploratory laparotomy with small-bowel resection in June 2010.  Ultimately, she had pouch failure requiring pouch excision in September 2010.  She underwent combined hysterectomy and ventral hernia repair with mesh placement in 2012.  This was complicated by mesh infection requiring mesh excision in 2016.  Her last abdominal operation was in 2016.    Since 2016, she is developed recurrent multiple midline hernias with increasing obstructive symptoms.  She is been hospitalized multiple times.  Normally, she empties her bag 7 times per day without Imodium.  Over the past 14 months, she is had increasing obstructive symptoms frequently requiring her to lay down and press on her hernias or be admitted to the hospital.  Nonsmoker.  Weight has been stable.  Nondiabetic.    She is been seen at multiple outside hospitals including [facility] regarding ventral and parastomal hernia repair but has not been offered repair previously.    Socially, she is going through a divorce.  She does live near her family and has a good social support system at home.  She has anxiety which is well controlled with her home medications.    8/7/2023:  Ileostomy revision with relocation of the ileostomy and small-bowel resection  Extensive lysis of adhesions  Omental pedicle flap to the pelvis  Ventral hernia repair with retrorectus mesh placement    FINDINGS:  1. Large midline incisional hernia as well as parastomal hernia with  incarcerated small bowel.  Multiple loops of small bowel densely adherent to pelvis along the sacrum.  Lysis of adhesions was performed in order to divide all of the small bowel loops and free the small bowel from the pelvis.  The incarcerated hernia was reduced.  5 cm of the terminal ileum was resected in order to form a new ostomy.  Incisional hernia x2 and peristomal hernia were repaired.  Prior stoma defect closed in 2 layers with anterior posterior rectus sheath closed individually.  New ostomy was tunneled into the right upper quadrant.  Retrorectus ventral hernia repair was performed with Phasix absorbable mesh placement.  Omental pedicle flap freed and based off of the left gastroepiploic placed down into the pelvis to fill the pelvis to avoid future obstructive symptoms from the small bowel becoming adherent to the sacral promontory     Current status:  8/25/23: presents for first followup visit.  Eager to have the drain removed.  Putting out 2-3 oz per day.  Complains of abdominal pain and cramping.  No issues with her incision or drainage through his incision.  New ostomy working well.  9/8/23:  Presents for evaluation for fatigue.  Lower part of her incision with slight skin separation with mostly serous drainage.  Low-grade fevers at home.  Pain on the left side of her abdomen.  Ostomy working well.   - she was admitted 09/09/2023 for 4 days.  CT scan with 2 fluid collections treated with IR drainage.  Cultures were negative.  9/26/23:  Presents with her friend for follow-up.  She has ongoing difficulty with pain control.  She is very frustrated with how she was treated when she was recently hospitalized.  She feels that she was treated poorly in the ER as well as on the floor and at the CT scanner.  She is 2 drains in place.  One with 30 mL of serous output per day.  The other wound with very small output.  Slight erythema around the drain sites.  Ostomy working well.  Midline incision healing  "well.    Review of Systems:  Review of Systems   Constitutional:  Negative for chills, diaphoresis, fever, malaise/fatigue and weight loss.   HENT:  Negative for congestion.    Respiratory:  Negative for shortness of breath.    Cardiovascular:  Negative for chest pain and leg swelling.   Gastrointestinal:  Positive for abdominal pain, blood in stool and diarrhea. Negative for constipation, nausea and vomiting.   Genitourinary:  Negative for dysuria.   Musculoskeletal:  Negative for back pain and myalgias.   Skin:  Negative for rash.   Neurological:  Negative for dizziness and weakness.   Endo/Heme/Allergies:  Does not bruise/bleed easily.   Psychiatric/Behavioral:  Negative for depression. The patient is nervous/anxious.        OBJECTIVE:     Vital Signs (Most Recent)  BP (!) 156/100 (BP Location: Left arm, Patient Position: Sitting)   Pulse (!) 114   Ht 5' 7.99" (1.727 m)   LMP 08/07/2012   BMI 41.98 kg/m²     Physical Exam:  General: White female anxious  Neuro: alert and oriented x 4.  Moves all extremities.     HEENT: no icterus.  Trachea midline  Respiratory: respirations are even and unlabored  Cardiac: regular rate  Abdomen:  Large midline incision healing well.  Prior stoma site in the right lower abdomen is healing well.  No recurrent hernia.  New ostomy in the right upper quadrant is pink and protrudes above the level of the skin.  Abdomen is soft.  Two drain sites in her left lower abdomen with surrounding induration and erythema.  Low output drain was removed today.  High output drain was flushed and left intact to continue to drain her abdominal wall seroma.  Extremities: Warm dry and intact  Skin: no rashes  Anorectal:  Deferred    Labs:  H&H 15 and 48.  Albumin 4.5.  Normal renal function.    Imaging:   CT abdomen pelvis 06/25/2023 reviewed and demonstrates right lower quadrant ileostomy with parastomal hernia as well as a left paramedian ventral hernia and a right paramedian supraumbilical " hernia.  Small-bowel follow-through on 06/25/2023 demonstrates passage of contrast to the ostomy bag after 2 hours.  CT abdomen pelvis 06/22/2023 reviewed.  Left-sided rectus is intact.  Right-sided rectus with stoma through the inferior portion.  TAR plane appears intact    ASSESSMENT/PLAN:     Diagnoses and all orders for this visit:    Lower abdominal pain  -     CT Abdomen Pelvis  Without Contrast; Future    Other orders  -     oxyCODONE (ROXICODONE) 30 MG Tab; Take 1 tablet (30 mg total) by mouth every 4 (four) hours as needed for Pain.  -     ciprofloxacin HCl (CIPRO) 500 MG tablet; Take 1 tablet (500 mg total) by mouth every 12 (twelve) hours. for 10 days  -     metroNIDAZOLE (FLAGYL) 500 MG tablet; Take 1 tablet (500 mg total) by mouth every 8 (eight) hours. for 10 days  -     ondansetron (ZOFRAN-ODT) 4 MG TbDL; Dissolve 1 tablet (4 mg total) by mouth every 6 (six) hours as needed.        38 y.o. female with history of FAP status post failed pouch with multiple complex abdominal wall hernias and a peristomal hernia.  She is having obstructive symptoms with multiple hospitalizations.  Obstructive symptoms interfering with her ability to function normally.  Although she is a very complex surgical history, medically, she remains active with normal nutrition, no weight change, nonsmoker, nondiabetic.  Discussed that she is certainly at high risk for perioperative complication given her extensive past surgical history.    She underwent abdominal wall reconstruction with reciting of her ileostomy on 08/07/2023.    She was readmitted with 2 abdominal fluid collections treated with IR drainage.  Both with negative cultures not consistent with abscess, but rather with post operative seroma.     One drain was removed today.  Other drain was flushed and left in place to allow for ongoing drainage.  She continues to have significant difficulty with pain control.  Will plan for repeat CT of the abdomen pelvis for further  evaluation.  She expressed significant frustration with her care and pain control.  Increase in her pain medication was requested.  I provided a higher dose of oxycodone sent to the down stairs pharmacy.  Antibiotics sent in prophylactically due to the abdominal wall erythema as well as subjective fevers.  Refill of her Zofran was also sent in.  I will follow up with her with the results of her CT scan.    KRISH Tracy MD, FACS, FASCRS  Staff Surgeon  Colon & Rectal Surgery

## 2023-09-26 NOTE — PROGRESS NOTES
Georgette is unknown to me and comes today after surgery on 8/7/23 with Dr. Tracy.   Pt is seeing me for post op evaluation of ileostomy . The patient reports no  problems with  new ostomy.     .ASSESSMENT:  The ileostomy is 20-22 mm.    The pt wearing a 2piece pouching system by [employer]?  Pt is coping well with new ostomy. Very happy with new ostomy vs old  Support being  provided by Family member(s)      PLAN:  Patient instructed to do the following routine for pouching:  Cleanse skin with water, dry well.  Apply skin barrier film. Allow to dry  Apply  thin barrier ring  Apply convex pouch sized appropriately for stoma.  Pt counseled on DME suppliers for  ostomy pouches. PCP orders through [employer].  Deep convex samples provided today.   Pt also counseled on skin care, nutrition, hydration and ADL.  I spent greater than 50% of this 30 minute visit in face to face counseling.  Return clinic visit recommended prn

## 2023-09-28 ENCOUNTER — TELEPHONE (OUTPATIENT)
Dept: SURGERY | Facility: CLINIC | Age: 38
End: 2023-09-28
Payer: COMMERCIAL

## 2023-09-28 ENCOUNTER — PATIENT MESSAGE (OUTPATIENT)
Dept: SURGERY | Facility: CLINIC | Age: 38
End: 2023-09-28
Payer: COMMERCIAL

## 2023-09-28 NOTE — TELEPHONE ENCOUNTER
Called pt to check in with her about coming in town for an appt to see Dr. Tracy for drain removal in a couple of weeks.  Appt made for 10/10 at 2:20pm until pt can call back and confirm.  RN left pt a message to call offices back.

## 2023-09-29 ENCOUNTER — PATIENT MESSAGE (OUTPATIENT)
Dept: SURGERY | Facility: CLINIC | Age: 38
End: 2023-09-29
Payer: COMMERCIAL

## 2023-10-02 ENCOUNTER — TELEPHONE (OUTPATIENT)
Dept: SURGERY | Facility: CLINIC | Age: 38
End: 2023-10-02
Payer: COMMERCIAL

## 2023-10-02 NOTE — TELEPHONE ENCOUNTER
Spoke with patient regarding pain and possible infection from surgery/drain sites. Patient states that she did not want to come to hospital over weekend because Dr. Tracy was not on service and she only wants to see him. Explained to patient that I will message Dr. Tracy to advise on plan of care and call her back. Patient verbalizes understanding.

## 2023-10-02 NOTE — TELEPHONE ENCOUNTER
----- Message from Nahed Yañez sent at 10/2/2023  8:36 AM CDT -----  Regarding: Advise  Contact: [phone]  Pt called in and is requesting a call back. Pt stated she cannot make ambulance ride from [place] and would like to know what to do. Pt stated she is not getting better. Please call to further discuss. Thanks

## 2023-10-03 ENCOUNTER — PATIENT MESSAGE (OUTPATIENT)
Dept: SURGERY | Facility: CLINIC | Age: 38
End: 2023-10-03
Payer: COMMERCIAL

## 2023-10-03 ENCOUNTER — TELEPHONE (OUTPATIENT)
Dept: SURGERY | Facility: CLINIC | Age: 38
End: 2023-10-03
Payer: COMMERCIAL

## 2023-10-03 NOTE — TELEPHONE ENCOUNTER
Called patient.  She continues to have ongoing significant abdominal pain.  Drain with more purulent output.  She was seen in her local ER over the weekend where she had a CT scan done.  Unable to retrieve her results or images.  She is unable tolerate any significant oral intake.    Recommended coming to our ER for further evaluation.  We will plan to admit her to the hospital with early placement of a PICC or midline to allow better IV access.  We could then plan for a CT scan of the abdomen and pelvis with oral and IV contrast for further delineation of her abdominal wall anatomy and to rule out any underlying abscess that would require drainage.    We will continue her oral pain medication from home.    KRISH Tracy MD, FACS, FASCRS  Staff Surgeon  Colon & Rectal Surgery

## 2023-10-04 ENCOUNTER — TELEPHONE (OUTPATIENT)
Dept: SURGERY | Facility: CLINIC | Age: 38
End: 2023-10-04
Payer: COMMERCIAL

## 2023-10-04 ENCOUNTER — HOSPITAL ENCOUNTER (INPATIENT)
Facility: HOSPITAL | Age: 38
LOS: 8 days | Discharge: HOME HEALTH | DRG: 862 | End: 2023-10-12
Attending: EMERGENCY MEDICINE | Admitting: COLON & RECTAL SURGERY
Payer: COMMERCIAL

## 2023-10-04 ENCOUNTER — PATIENT MESSAGE (OUTPATIENT)
Dept: SURGERY | Facility: CLINIC | Age: 38
End: 2023-10-04
Payer: COMMERCIAL

## 2023-10-04 DIAGNOSIS — R11.2 NAUSEA AND VOMITING, UNSPECIFIED VOMITING TYPE: ICD-10-CM

## 2023-10-04 DIAGNOSIS — R10.9 RIGHT SIDED ABDOMINAL PAIN: ICD-10-CM

## 2023-10-04 DIAGNOSIS — R18.8 INTRAABDOMINAL FLUID COLLECTION: ICD-10-CM

## 2023-10-04 DIAGNOSIS — G89.18 POST-OPERATIVE PAIN: Primary | ICD-10-CM

## 2023-10-04 LAB
ALBUMIN SERPL BCP-MCNC: 3.1 G/DL (ref 3.5–5.2)
ALP SERPL-CCNC: 136 U/L (ref 55–135)
ALT SERPL W/O P-5'-P-CCNC: 9 U/L (ref 10–44)
ANION GAP SERPL CALC-SCNC: 7 MMOL/L (ref 8–16)
AST SERPL-CCNC: 11 U/L (ref 10–40)
BILIRUB SERPL-MCNC: 0.2 MG/DL (ref 0.1–1)
BUN SERPL-MCNC: 5 MG/DL (ref 6–20)
CALCIUM SERPL-MCNC: 9 MG/DL (ref 8.7–10.5)
CHLORIDE SERPL-SCNC: 104 MMOL/L (ref 95–110)
CO2 SERPL-SCNC: 24 MMOL/L (ref 23–29)
CREAT SERPL-MCNC: 0.7 MG/DL (ref 0.5–1.4)
CRP SERPL-MCNC: 65.2 MG/L (ref 0–8.2)
ERYTHROCYTE [DISTWIDTH] IN BLOOD BY AUTOMATED COUNT: 13.1 % (ref 11.5–14.5)
EST. GFR  (NO RACE VARIABLE): >60 ML/MIN/1.73 M^2
GLUCOSE SERPL-MCNC: 145 MG/DL (ref 70–110)
HCT VFR BLD AUTO: 35.4 % (ref 37–48.5)
HGB BLD-MCNC: 10.8 G/DL (ref 12–16)
LACTATE SERPL-SCNC: 1.1 MMOL/L (ref 0.5–2.2)
MCH RBC QN AUTO: 27.8 PG (ref 27–31)
MCHC RBC AUTO-ENTMCNC: 30.5 G/DL (ref 32–36)
MCV RBC AUTO: 91 FL (ref 82–98)
PLATELET # BLD AUTO: 380 K/UL (ref 150–450)
PMV BLD AUTO: 11.1 FL (ref 9.2–12.9)
POTASSIUM SERPL-SCNC: 3.9 MMOL/L (ref 3.5–5.1)
PROT SERPL-MCNC: 7.1 G/DL (ref 6–8.4)
RBC # BLD AUTO: 3.89 M/UL (ref 4–5.4)
SODIUM SERPL-SCNC: 135 MMOL/L (ref 136–145)
WBC # BLD AUTO: 7.69 K/UL (ref 3.9–12.7)

## 2023-10-04 PROCEDURE — 96374 THER/PROPH/DIAG INJ IV PUSH: CPT

## 2023-10-04 PROCEDURE — 63600175 PHARM REV CODE 636 W HCPCS: Performed by: SURGERY

## 2023-10-04 PROCEDURE — 99285 EMERGENCY DEPT VISIT HI MDM: CPT | Mod: 25

## 2023-10-04 PROCEDURE — 80053 COMPREHEN METABOLIC PANEL: CPT | Performed by: EMERGENCY MEDICINE

## 2023-10-04 PROCEDURE — 83605 ASSAY OF LACTIC ACID: CPT | Performed by: EMERGENCY MEDICINE

## 2023-10-04 PROCEDURE — 87040 BLOOD CULTURE FOR BACTERIA: CPT | Performed by: EMERGENCY MEDICINE

## 2023-10-04 PROCEDURE — 86140 C-REACTIVE PROTEIN: CPT | Performed by: EMERGENCY MEDICINE

## 2023-10-04 PROCEDURE — 25000003 PHARM REV CODE 250: Performed by: EMERGENCY MEDICINE

## 2023-10-04 PROCEDURE — 87075 CULTR BACTERIA EXCEPT BLOOD: CPT | Performed by: SURGERY

## 2023-10-04 PROCEDURE — 63600175 PHARM REV CODE 636 W HCPCS: Performed by: EMERGENCY MEDICINE

## 2023-10-04 PROCEDURE — 87077 CULTURE AEROBIC IDENTIFY: CPT | Performed by: SURGERY

## 2023-10-04 PROCEDURE — 87070 CULTURE OTHR SPECIMN AEROBIC: CPT | Performed by: SURGERY

## 2023-10-04 PROCEDURE — 85027 COMPLETE CBC AUTOMATED: CPT | Performed by: EMERGENCY MEDICINE

## 2023-10-04 PROCEDURE — 12000002 HC ACUTE/MED SURGE SEMI-PRIVATE ROOM

## 2023-10-04 PROCEDURE — 25000003 PHARM REV CODE 250: Performed by: SURGERY

## 2023-10-04 PROCEDURE — 87186 SC STD MICRODIL/AGAR DIL: CPT | Performed by: SURGERY

## 2023-10-04 RX ORDER — OXYCODONE HYDROCHLORIDE 10 MG/1
30 TABLET ORAL EVERY 4 HOURS PRN
Status: DISCONTINUED | OUTPATIENT
Start: 2023-10-04 | End: 2023-10-12 | Stop reason: HOSPADM

## 2023-10-04 RX ORDER — METRONIDAZOLE 500 MG/100ML
500 INJECTION, SOLUTION INTRAVENOUS
Status: DISCONTINUED | OUTPATIENT
Start: 2023-10-04 | End: 2023-10-06

## 2023-10-04 RX ORDER — CLONAZEPAM 0.5 MG/1
0.5 TABLET ORAL 2 TIMES DAILY PRN
Status: DISCONTINUED | OUTPATIENT
Start: 2023-10-04 | End: 2023-10-12 | Stop reason: HOSPADM

## 2023-10-04 RX ORDER — VENLAFAXINE 75 MG/1
150 TABLET ORAL DAILY
Status: DISCONTINUED | OUTPATIENT
Start: 2023-10-05 | End: 2023-10-12 | Stop reason: HOSPADM

## 2023-10-04 RX ORDER — LOSARTAN POTASSIUM 50 MG/1
100 TABLET ORAL DAILY
Status: DISCONTINUED | OUTPATIENT
Start: 2023-10-05 | End: 2023-10-12 | Stop reason: HOSPADM

## 2023-10-04 RX ORDER — ONDANSETRON 2 MG/ML
4 INJECTION INTRAMUSCULAR; INTRAVENOUS EVERY 6 HOURS PRN
Status: DISCONTINUED | OUTPATIENT
Start: 2023-10-04 | End: 2023-10-12 | Stop reason: HOSPADM

## 2023-10-04 RX ORDER — METHOCARBAMOL 500 MG/1
500 TABLET, FILM COATED ORAL 4 TIMES DAILY
Status: DISCONTINUED | OUTPATIENT
Start: 2023-10-04 | End: 2023-10-12 | Stop reason: HOSPADM

## 2023-10-04 RX ORDER — SODIUM CHLORIDE 0.9 % (FLUSH) 0.9 %
10 SYRINGE (ML) INJECTION
Status: DISCONTINUED | OUTPATIENT
Start: 2023-10-04 | End: 2023-10-12 | Stop reason: HOSPADM

## 2023-10-04 RX ORDER — ACETAMINOPHEN 325 MG/1
650 TABLET ORAL EVERY 6 HOURS
Status: DISCONTINUED | OUTPATIENT
Start: 2023-10-04 | End: 2023-10-12 | Stop reason: HOSPADM

## 2023-10-04 RX ORDER — DEXTROSE MONOHYDRATE, SODIUM CHLORIDE, AND POTASSIUM CHLORIDE 50; 1.49; 4.5 G/1000ML; G/1000ML; G/1000ML
INJECTION, SOLUTION INTRAVENOUS CONTINUOUS
Status: DISCONTINUED | OUTPATIENT
Start: 2023-10-04 | End: 2023-10-06

## 2023-10-04 RX ORDER — HYDROMORPHONE HYDROCHLORIDE 1 MG/ML
0.5 INJECTION, SOLUTION INTRAMUSCULAR; INTRAVENOUS; SUBCUTANEOUS
Status: COMPLETED | OUTPATIENT
Start: 2023-10-04 | End: 2023-10-04

## 2023-10-04 RX ORDER — NALOXONE HCL 0.4 MG/ML
0.02 VIAL (ML) INJECTION
Status: DISCONTINUED | OUTPATIENT
Start: 2023-10-04 | End: 2023-10-12 | Stop reason: HOSPADM

## 2023-10-04 RX ORDER — HYDROMORPHONE HYDROCHLORIDE 1 MG/ML
1 INJECTION, SOLUTION INTRAMUSCULAR; INTRAVENOUS; SUBCUTANEOUS
Status: DISCONTINUED | OUTPATIENT
Start: 2023-10-05 | End: 2023-10-12 | Stop reason: HOSPADM

## 2023-10-04 RX ADMIN — METRONIDAZOLE 500 MG: 500 INJECTION, SOLUTION INTRAVENOUS at 07:10

## 2023-10-04 RX ADMIN — CEFTRIAXONE 2 G: 2 INJECTION, POWDER, FOR SOLUTION INTRAMUSCULAR; INTRAVENOUS at 07:10

## 2023-10-04 RX ADMIN — ACETAMINOPHEN 650 MG: 325 TABLET ORAL at 06:10

## 2023-10-04 RX ADMIN — SODIUM CHLORIDE 500 ML: 9 INJECTION, SOLUTION INTRAVENOUS at 06:10

## 2023-10-04 RX ADMIN — POTASSIUM CHLORIDE, DEXTROSE MONOHYDRATE AND SODIUM CHLORIDE: 150; 5; 450 INJECTION, SOLUTION INTRAVENOUS at 07:10

## 2023-10-04 RX ADMIN — HYDROMORPHONE HYDROCHLORIDE 0.5 MG: 1 INJECTION, SOLUTION INTRAMUSCULAR; INTRAVENOUS; SUBCUTANEOUS at 06:10

## 2023-10-04 RX ADMIN — PROMETHAZINE HYDROCHLORIDE 12.5 MG: 25 INJECTION INTRAMUSCULAR; INTRAVENOUS at 06:10

## 2023-10-04 RX ADMIN — OXYCODONE HYDROCHLORIDE 30 MG: 10 TABLET ORAL at 08:10

## 2023-10-04 RX ADMIN — METHOCARBAMOL 500 MG: 500 TABLET ORAL at 09:10

## 2023-10-04 NOTE — TELEPHONE ENCOUNTER
Called pt back re: her message to let her know that she may get the PICC line once she has been admitted if the PICC team is not able to do so while she is in the ED.  RN let pt know that she spoke with Charge Nurse uRdy to let him know that she was coming, but she will have to still be triaged accordingly.  Pt verbalized understanding to all.

## 2023-10-04 NOTE — TELEPHONE ENCOUNTER
Called pt back re: her message.  RN will call and alert the ED that pt is on her way from [place] and she is expecting to be admitted per discussion with Dr. Tracy.  Pt verbalized understanding to all.

## 2023-10-04 NOTE — ED PROVIDER NOTES
"Emergency Department Encounter  Provider Note    Georgette Abrams  [id number]  10/4/2023    Evaluation:    History Acquisition:     Chief Complaint   Patient presents with    Abdominal Pain    Post-op Problem     Reports CONSTANZA drain infection s/p surgery in August 2023.  Area is read, tender. Drain draining puss. Followed by colorectal surgery.        History of Present Illness:  Georgette Abrams is a 38 y.o. female who has a past medical history of Anxiety, Familial polyposis, and S/P total colectomy.    The patient presents to the ED due to abdominal pain, N/V.   Patient reports symptoms have been ongoing for the last several days.  She was seen in her local ED in [place] a few days ago, and she was supposed to be transferred to [facility] for further evaluation by her Colorectal surgeon.     She has history of multiple abdominal surgeries, and was recently admitted 9/9-9/12 for intra-abdominal fluid collections. 2 drains were placed. Since discharge, she reports worsening pain with purulent drainage from one of the drain sites. She reports inability to hold down food or fluids, and reports uncontrolled pain. No fever. She states she has not been able to get a CT with contrast because she has poor venous access and IV access is always difficult. She called her colorectal surgeon and was instructed to come to the ED for admission, PICC placement, CT A/P with contrast, and further management by the Colorectal surgery service.     Additional historians utilized:  none    Prior medical records were reviewed:   History of parastomal hernia, FAP s/p total proctocolectomy, J-pouch, loop ileostomy, complicated by post-op fistula, underwent repeat ex-lap with small bowel resection. 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 Had hysterectomy and ventral hernia repair, complicated by mesh infection requiring excision.   Discussed with Dr. Tracy via telephone today:    "Called patient.  She continues to have ongoing " "significant abdominal pain.  Drain with more purulent output.  She was seen in her local ER over the weekend where she had a CT scan done.  Unable to retrieve her results or images.  She is unable tolerate any significant oral intake.    Recommended coming to our ER for further evaluation.  We will plan to admit her to the hospital with early placement of a PICC or midline to allow better IV access.  We could then plan for a CT scan of the abdomen and pelvis with oral and IV contrast for further delineation of her abdominal wall anatomy and to rule out any underlying abscess that would require drainage.     We will continue her oral pain medication from home.    KRISH Tracy MD, FACS, FASCRS  Staff Surgeon  Colon & Rectal Surgery"    The patient's list of active medical problems, social history, medications, and allergies as documented has been reviewed.     Past Medical History:   Diagnosis Date    Anxiety     Familial polyposis     S/P total colectomy      Past Surgical History:   Procedure Laterality Date    Davinci Assisted Bilateral Ovarian Cystectomy, with extension  SEAN   8/2011    HYSTERECTOMY  8/23/2012    DAVINCI     ILEOSTOMY REVISION N/A 8/7/2023    Procedure: REVISION, ILEOSTOMY;  Surgeon: KRISH Tracy MD;  [facility];  Service: Colon and Rectal;  Laterality: N/A;    Illeotomy Creation  2009    LYSIS OF ADHESIONS N/A 8/7/2023    Procedure: LYSIS, ADHESIONS;  Surgeon: KRISH Tracy MD;  [facility];  Service: Colon and Rectal;  Laterality: N/A;    OOPHORECTOMY  8/23/2012    DAvinci removal with DLH     REPAIR OF RECURRENT VENTRAL HERNIA N/A 8/7/2023    Procedure: REPAIR, HERNIA, VENTRAL, RECURRENT, ERAS low;  Surgeon: KRISH Tracy MD;  Location: [facility];  Service: Colon and Rectal;  Laterality: N/A;  w/ mesh and omental pedical flap    TOTAL COLECTOMY      Iloanal pouch creation     Family History   Problem Relation Age of Onset    " Colon cancer Maternal Grandmother         near late 60's    Lung cancer Maternal Grandfather         lung cancer     Social History     Socioeconomic History    Marital status:    Tobacco Use    Smoking status: Former    Smokeless tobacco: Never   Substance and Sexual Activity    Alcohol use: Not Currently     Comment: seldom    Drug use: No    Sexual activity: Yes     Partners: Male     Birth control/protection: None     Comment: , lives in [place]     Social Determinants of Health     Financial Resource Strain: Low Risk  (8/8/2023)    Overall Financial Resource Strain (CARDIA)     Difficulty of Paying Living Expenses: Not very hard   Food Insecurity: No Food Insecurity (8/8/2023)    Hunger Vital Sign     Worried About Running Out of Food in the Last Year: Never true     Ran Out of Food in the Last Year: Never true   Transportation Needs: No Transportation Needs (8/8/2023)    PRAPARE - Transportation     Lack of Transportation (Medical): No     Lack of Transportation (Non-Medical): No   Physical Activity: Unknown (8/8/2023)    Exercise Vital Sign     Days of Exercise per Week: 4 days   Social Connections: Unknown (8/8/2023)    Social Connection and Isolation Panel [NHANES]     Frequency of Communication with Friends and Family: Patient refused     Frequency of Social Gatherings with Friends and Family: Patient refused     Attends [employer] Services: 1 to 4 times per year     Active Member of Clubs or Organizations: No     Attends Club or Organization Meetings: Never     Marital Status:    Housing Stability: Unknown (8/8/2023)    Housing Stability Vital Sign     Unable to Pay for Housing in the Last Year: No     Unstable Housing in the Last Year: No     Review of patient's allergies indicates:   Allergen Reactions    Levofloxacin Nausea And Vomiting and Rash    Morphine Anaphylaxis, Hives, Shortness Of Breath and Hallucinations           Piperacillin-tazobactam Rash    Sulfa (sulfonamide  antibiotics) Nausea And Vomiting and Rash    Opioids - morphine analogues     Hydrocodone-acetaminophen Itching       Review of Systems   Gastrointestinal:  Positive for abdominal pain, nausea and vomiting.         Physical Exam:     Initial Vitals [10/04/23 1620]   BP Pulse Resp Temp SpO2   129/76 95 18 98.1 °F (36.7 °C) 98 %      MAP       --         Physical Exam    Nursing note and vitals reviewed.  Constitutional: She appears well-developed and well-nourished. She is not diaphoretic. No distress.   HENT:   Head: Normocephalic and atraumatic.   Mouth/Throat: Oropharynx is clear and moist.   Eyes: EOM are normal. Pupils are equal, round, and reactive to light.   Neck: No tracheal deviation present.   Cardiovascular:  Normal rate, regular rhythm, normal heart sounds and intact distal pulses.           Pulmonary/Chest: Breath sounds normal. No stridor. No respiratory distress. She has no wheezes.   Abdominal: Abdomen is soft and protuberant. Bowel sounds are normal. She exhibits no distension and no mass. There is abdominal tenderness.       Musculoskeletal:         General: No edema. Normal range of motion.     Neurological: She is alert and oriented to person, place, and time. She has normal strength. No cranial nerve deficit or sensory deficit.   Skin: Skin is warm and dry. Capillary refill takes less than 2 seconds. No pallor.   Psychiatric: She has a normal mood and affect. Her behavior is normal. Thought content normal.         ED Course:   Procedures  Medical Decision Making:    Differential Diagnoses:   Based on available information and initial assessment, Differential Diagnosis includes, but is not limited to:  AAA, aortic dissection, mesenteric ischemia, perforated viscous, MI/ACS, SBO/volvulus, incarcerated/strangulated hernia, intussusception, ileus, appendicitis, cholecystitis, cholangitis, diverticulitis, esophagitis, hepatitis, nephrolithiasis, pancreatitis, gastroenteritis, colitis, IBD/IBS,  biliary colic, GERD, PUD, constipation, UTI/pyelonephritis,  disorder.        EKG:       Labs:     Labs Reviewed   CBC WITHOUT DIFFERENTIAL - Abnormal; Notable for the following components:       Result Value    RBC 3.89 (*)     Hemoglobin 10.8 (*)     Hematocrit 35.4 (*)     MCHC 30.5 (*)     All other components within normal limits   COMPREHENSIVE METABOLIC PANEL - Abnormal; Notable for the following components:    Sodium 135 (*)     Glucose 145 (*)     BUN 5 (*)     Albumin 3.1 (*)     Alkaline Phosphatase 136 (*)     ALT 9 (*)     Anion Gap 7 (*)     All other components within normal limits    Narrative:     Add on CRP per Dr. Hernandez @ 18:11 pm to order # [id number]   C-REACTIVE PROTEIN - Abnormal; Notable for the following components:    CRP 65.2 (*)     All other components within normal limits    Narrative:     Add on CRP per Dr. Hernandez @ 18:11 pm to order # [id number]   CULTURE, BLOOD   CULTURE, BLOOD   CULTURE, ANAEROBIC   CULTURE, AEROBIC  (SPECIFY SOURCE)   LACTIC ACID, PLASMA    Narrative:     Add on CRP per Dr. Hernandez @ 18:11 pm to order # [id number]   C-REACTIVE PROTEIN   C-REACTIVE PROTEIN     Independent review of the labs ordered include:   See ED course    Imaging:     Imaging Results    None            Medications Given:     Medications   sodium chloride 0.9% flush 10 mL (has no administration in time range)   naloxone 0.4 mg/mL injection 0.02 mg (has no administration in time range)   methocarbamoL tablet 500 mg (has no administration in time range)   acetaminophen tablet 650 mg (650 mg Oral Given 10/4/23 1836)   oxyCODONE immediate release tablet Tab 30 mg (30 mg Oral Given 10/4/23 2003)   dextrose 5 % and 0.45 % NaCl with KCl 20 mEq infusion ( Intravenous New Bag 10/4/23 1950)   ondansetron injection 4 mg (has no administration in time range)   cefTRIAXone (ROCEPHIN) 2 g in dextrose 5 % in water (D5W) 100 mL IVPB (MB+) (2 g Intravenous New Bag 10/4/23 1951)   metronidazole IVPB 500 mg (500 mg  Intravenous New Bag 10/4/23 1951)   clonazePAM tablet 0.5 mg (has no administration in time range)   losartan tablet 100 mg (has no administration in time range)   venlafaxine tablet 150 mg (has no administration in time range)   HYDROmorphone injection 0.5 mg (0.5 mg Intravenous Given 10/4/23 1809)   promethazine (PHENERGAN) 12.5 mg in dextrose 5 % (D5W) 50 mL IVPB (0 mg Intravenous Stopped 10/4/23 1926)   sodium chloride 0.9% bolus 500 mL 500 mL (0 mLs Intravenous Stopped 10/4/23 1927)        Additional Consideration:   Additional testing considered during clinical course: none    Social determinants of health considered during development of treatment plan include: none    Current co-morbidities considered which impacted clinical decision making: FAP s/p colectomy, multiple abdominal surgeries, ostomy creation, drain placement     Case discussed with additional provider:   Colorectal Surgery service consulted, and will admit for further management    ED Course as of 10/04/23 2017   Wed Oct 04, 2023   2016 SpO2: 98 % [SS]   2016 Resp: 18 [SS]   2016 Pulse: 95 [SS]   2016 Temp Source: Oral [SS]   2016 Temp: 98.1 °F (36.7 °C) [SS]   2016 BP: 129/76  Vitals reassuring  [SS]   2016 C-Reactive Protein(!)  Elevated  [SS]   2016 Comprehensive metabolic panel(!)  Unremarkable  [SS]   2016 CBC Without Differential(!)  Unremarkable  [SS]   2016 Lactic Acid, Plasma  WNL [SS]   2016 CRS service consulted and will admit for further management [SS]      ED Course User Index  [SS] Eben Hernandez MD            Medical Decision Making  37 yo F with extensive surgical history presents with abdominal pain, N/V.  Vitals stable.   Labs unremarkable.   CRS will admit.     Problems Addressed:  Nausea and vomiting, unspecified vomiting type: complicated acute illness or injury with systemic symptoms  Post-operative pain: complicated acute illness or injury with systemic symptoms  Right sided abdominal pain: complicated acute illness or  injury with systemic symptoms    Amount and/or Complexity of Data Reviewed  External Data Reviewed: labs and notes.  Labs: ordered. Decision-making details documented in ED Course.  Radiology: ordered.    Risk  Prescription drug management.  Decision regarding hospitalization.        Clinical Impression:       ICD-10-CM ICD-9-CM   1. Post-operative pain  G89.18 338.18   2. Right sided abdominal pain  R10.9 789.09   3. Nausea and vomiting, unspecified vomiting type  R11.2 787.01       Follow-up Information    None          ED Disposition Condition    Admit               On re-evaluation, the patient's status has remained stable.  At this time, I believe the patient should be admitted to the hospital for further evaluation and management of post-op abdominal pain.  Colorectal Surgery service was contacted and the case was discussed.   The consulting physician/team agrees with plan and will admit under their service.   The patient and family were updated with test results, overall impression, and further plan of care. All questions were answered. The patient expressed understanding and agrees with the current plan.       Eben Hernandez MD  10/04/23 2018

## 2023-10-04 NOTE — CONSULTS
Subjective:       Patient ID: Georgette Abrams is a 38 y.o. female.    Chief Complaint: Abdominal Pain and Post-op Problem (Reports CONSTANZA drain infection s/p surgery in August 2023.  Area is read, tender. Drain draining puss. Followed by colorectal surgery. )    HPI    10/4/23: Presents with ongoing, worsened abdominal pain. Reports that her drain stopped draining two days ago, prior to that it was putting out around 30cc serous fluid per day. Since then it has started to drain purulent fluid. She has not measured the output of purulent fluid. Reports worsened and continued abdominal pain. Endorsing nausea and decreased PO intake, continues to have stoma output of gas and liquid. Purulent drainage from around drain site but no abdominal wall redness. She was seen at an ER near her home and had a CT scan, however this is not available in our system. She contacted our office and we recommended that she present for evaluation in the ER. Our team consulted when she arrived to the ED. Vital signs stable, HR 90s-100, /75, no fever on presentation.  Labs and CT pending.     Previous history:  Patient is a 38 y.o. female presents with parastomal hernia.  She has had 66 hospital admission since 2009.  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  She therefore underwent repeat exploratory laparotomy with small-bowel resection in June 2010.  Ultimately, she had pouch failure requiring pouch excision in September 2010.  She underwent combined hysterectomy and ventral hernia repair with mesh placement in 2012.  This was complicated by mesh infection requiring mesh excision in 2016.  Her last abdominal operation was in 2016.     Since 2016, she is developed recurrent multiple midline hernias with increasing obstructive symptoms.  She is been hospitalized multiple times.  Normally, she empties her bag  7 times per day without Imodium.  Over the past 14 months, she is had increasing obstructive symptoms frequently requiring her to lay down and press on her hernias or be admitted to the hospital.  Nonsmoker.  Weight has been stable.  Nondiabetic.     She is been seen at multiple outside hospitals including [facility] regarding ventral and parastomal hernia repair but has not been offered repair previously.     Socially, she is going through a divorce.  She does live near her family and has a good social support system at home.  She has anxiety which is well controlled with her home medications.     8/7/2023:  Ileostomy revision with relocation of the ileostomy and small-bowel resection  Extensive lysis of adhesions  Omental pedicle flap to the pelvis  Ventral hernia repair with retrorectus mesh placement     FINDINGS:  1. Large midline incisional hernia as well as parastomal hernia with incarcerated small bowel.  Multiple loops of small bowel densely adherent to pelvis along the sacrum.  Lysis of adhesions was performed in order to divide all of the small bowel loops and free the small bowel from the pelvis.  The incarcerated hernia was reduced.  5 cm of the terminal ileum was resected in order to form a new ostomy.  Incisional hernia x2 and peristomal hernia were repaired.  Prior stoma defect closed in 2 layers with anterior posterior rectus sheath closed individually.  New ostomy was tunneled into the right upper quadrant.  Retrorectus ventral hernia repair was performed with Phasix absorbable mesh placement.  Omental pedicle flap freed and based off of the left gastroepiploic placed down into the pelvis to fill the pelvis to avoid future obstructive symptoms from the small bowel becoming adherent to the sacral promontory     Current status:  8/25/23: presents for first followup visit.  Eager to have the drain removed.  Putting out 2-3 oz per day.  Complains of abdominal pain and cramping.  No issues with her  incision or drainage through his incision.  New ostomy working well.  9/8/23:  Presents for evaluation for fatigue.  Lower part of her incision with slight skin separation with mostly serous drainage.  Low-grade fevers at home.  Pain on the left side of her abdomen.  Ostomy working well.              - she was admitted 09/09/2023 for 4 days.  CT scan with 2 fluid collections treated with IR drainage.  Cultures were negative.  9/26/23:  Presents with her friend for follow-up.  She has ongoing difficulty with pain control.  She is very frustrated with how she was treated when she was recently hospitalized.  She feels that she was treated poorly in the ER as well as on the floor and at the CT scanner.  She is 2 drains in place.  One with 30 mL of serous output per day.  The other wound with very small output.  Slight erythema around the drain sites.  Ostomy working well.  Midline incision healing well.      Review of patient's allergies indicates:   Allergen Reactions    Levofloxacin Nausea And Vomiting and Rash    Morphine Anaphylaxis, Hives, Shortness Of Breath and Hallucinations           Piperacillin-tazobactam Rash    Sulfa (sulfonamide antibiotics) Nausea And Vomiting and Rash    Opioids - morphine analogues     Hydrocodone-acetaminophen Itching       Past Medical History:   Diagnosis Date    Anxiety     Familial polyposis     S/P total colectomy        Past Surgical History:   Procedure Laterality Date    Davinci Assisted Bilateral Ovarian Cystectomy, with extension  SEAN   8/2011    HYSTERECTOMY  8/23/2012    DAVINCI     ILEOSTOMY REVISION N/A 8/7/2023    Procedure: REVISION, ILEOSTOMY;  Surgeon: KRISH Tracy MD;  Location: [facility] OR 2ND FLR;  Service: Colon and Rectal;  Laterality: N/A;    Illeotomy Creation  2009    LYSIS OF ADHESIONS N/A 8/7/2023    Procedure: LYSIS, ADHESIONS;  Surgeon: KRISH Tracy MD;  Location: [facility] OR 2ND FLR;  Service: Colon and Rectal;  Laterality: N/A;    OOPHORECTOMY   8/23/2012    DAvinci removal with DLH     REPAIR OF RECURRENT VENTRAL HERNIA N/A 8/7/2023    Procedure: REPAIR, HERNIA, VENTRAL, RECURRENT, ERAS low;  Surgeon: KRISH Tracy MD;  Location: [facility] OR 2ND FLR;  Service: Colon and Rectal;  Laterality: N/A;  w/ mesh and omental pedical flap    TOTAL COLECTOMY      Iloanal pouch creation       Current Facility-Administered Medications   Medication Dose Route Frequency Provider Last Rate Last Admin    acetaminophen tablet 650 mg  650 mg Oral Q6H Robert Cruz MD        cefTRIAXone (ROCEPHIN) 2 g in dextrose 5 % in water (D5W) 100 mL IVPB (MB+)  2 g Intravenous Q24H Robert Cruz MD        clonazePAM tablet 0.5 mg  0.5 mg Oral BID PRN Robert Cruz MD        dextrose 5 % and 0.45 % NaCl with KCl 20 mEq infusion   Intravenous Continuous Robert Cruz MD        [START ON 10/5/2023] losartan tablet 100 mg  100 mg Oral Daily Robert Cruz MD        methocarbamoL tablet 500 mg  500 mg Oral QID Robert Cruz MD        metronidazole IVPB 500 mg  500 mg Intravenous Q8H Robert Cruz MD        naloxone 0.4 mg/mL injection 0.02 mg  0.02 mg Intravenous PRN Robert Cruz MD        ondansetron injection 4 mg  4 mg Intravenous Q6H PRN Robert Cruz MD        oxyCODONE immediate release tablet 30 mg  30 mg Oral Q4H PRN Robert Cruz MD        promethazine (PHENERGAN) 12.5 mg in dextrose 5 % (D5W) 50 mL IVPB  12.5 mg Intravenous ED 1 Time Eben Hernandez MD        sodium chloride 0.9% bolus 500 mL 500 mL  500 mL Intravenous ED 1 Time Eben Hernandez  mL/hr at 10/04/23 1814 500 mL at 10/04/23 1814    sodium chloride 0.9% flush 10 mL  10 mL Intravenous PRN Robert Cruz MD        [START ON 10/5/2023] venlafaxine tablet 150 mg  150 mg Oral Daily Robert Cruz MD         Current Outpatient Medications   Medication Sig Dispense Refill    ciprofloxacin HCl (CIPRO) 500 MG tablet Take 1 tablet (500 mg total) by mouth every 12 (twelve) hours. for  10 days 20 tablet 0    clonazepam (KLONOPIN) 1 MG tablet Take 1 mg by mouth 2 (two) times daily as needed.        cyanocobalamin 1,000 mcg/mL injection 1,000 mcg twice a week.      ibuprofen (ADVIL,MOTRIN) 800 MG tablet Take 1 tablet (800 mg total) by mouth every 8 (eight) hours. 30 tablet 3    losartan (COZAAR) 100 MG tablet Take 100 mg by mouth.      methocarbamoL (ROBAXIN) 500 MG Tab Take 1 tablet (500 mg total) by mouth 4 (four) times daily. 40 tablet 0    metroNIDAZOLE (FLAGYL) 500 MG tablet Take 1 tablet (500 mg total) by mouth every 8 (eight) hours. for 10 days 30 tablet 0    ondansetron (ZOFRAN) 4 MG tablet Take 4 mg by mouth every 6 (six) hours as needed for Nausea.      ondansetron (ZOFRAN-ODT) 4 MG TbDL Dissolve 1 tablet (4 mg total) by mouth every 6 (six) hours as needed. 40 tablet 3    oxyCODONE (ROXICODONE) 15 MG Tab Take 1 tablet (15 mg total) by mouth every 4 (four) hours as needed for Pain. 30 tablet 0    oxyCODONE (ROXICODONE) 15 MG Tab Take 1 tablet (15 mg total) by mouth every 4 (four) hours as needed for Pain. 30 tablet 0    oxyCODONE (ROXICODONE) 30 MG Tab Take 1 tablet (30 mg total) by mouth every 4 (four) hours as needed for Pain. 30 tablet 0    progesterone (PROMETRIUM) 100 MG capsule Take 400 mg by mouth every evening.      venlafaxine (EFFEXOR) 100 MG Tab Take 150 mg by mouth Daily.       Facility-Administered Medications Ordered in Other Encounters   Medication Dose Route Frequency Provider Last Rate Last Admin    LIDOcaine (PF) 10 mg/ml (1%) injection 10 mg  1 mL Intradermal On Call Procedure Rhina Saldana NP           Family History   Problem Relation Age of Onset    Colon cancer Maternal Grandmother         near late 60's    Lung cancer Maternal Grandfather         lung cancer       Social History     Socioeconomic History    Marital status:    Tobacco Use    Smoking status: Former    Smokeless tobacco: Never   Substance and Sexual Activity    Alcohol use: Not Currently      Comment: seldom    Drug use: No    Sexual activity: Yes     Partners: Male     Birth control/protection: None     Comment: , lives in [place]     Social Determinants of Health     Financial Resource Strain: Low Risk  (8/8/2023)    Overall Financial Resource Strain (CARDIA)     Difficulty of Paying Living Expenses: Not very hard   Food Insecurity: No Food Insecurity (8/8/2023)    Hunger Vital Sign     Worried About Running Out of Food in the Last Year: Never true     Ran Out of Food in the Last Year: Never true   Transportation Needs: No Transportation Needs (8/8/2023)    PRAPARE - Transportation     Lack of Transportation (Medical): No     Lack of Transportation (Non-Medical): No   Physical Activity: Unknown (8/8/2023)    Exercise Vital Sign     Days of Exercise per Week: 4 days   Social Connections: Unknown (8/8/2023)    Social Connection and Isolation Panel [NHANES]     Frequency of Communication with Friends and Family: Patient refused     Frequency of Social Gatherings with Friends and Family: Patient refused     Attends Rastafari Services: 1 to 4 times per year     Active Member of Clubs or Organizations: No     Attends Club or Organization Meetings: Never     Marital Status:    Housing Stability: Unknown (8/8/2023)    Housing Stability Vital Sign     Unable to Pay for Housing in the Last Year: No     Unstable Housing in the Last Year: No       Review of Systems    Objective:      Physical Exam      Physical Exam:  /76 (BP Location: Right arm, Patient Position: Sitting)   Pulse 95   Temp 98.1 °F (36.7 °C) (Oral)   Resp 20   Wt 99.8 kg (220 lb)   LMP 08/07/2012   SpO2 98%   Breastfeeding No   BMI 33.46 kg/m²     General: Alert and oriented x 3. No acute distress. Well-nourished.  HEENT: EOMI. Sclera anicteric. Moist mucous membranes. No scleral icterus. No cervical lymphadenopathy.  Lungs: Clear to auscultation bilaterally. No accessory muscle use.  Cardiac: Regular rate and  rhythm. No murmur. No JVD.  Abdomen: Ileostomy viable and semi solid output in appliance. LLQ drain with some fibrinopurulent output at site, drain bulb with purulent output, previous drain site healing. No abdominal wall erythema identified. Has tenderness throughout, no peritonitis.   Extremities: No edema. Non-tender.?  Skin: No rashes or lesions. Warm.  Neuro: No focal neurological deficits. CN II-XII grossly intact, but not individually tested.  Psych: Cooperative. Appropriate mood and affect.    Laboratory Studies:  Complete Blood Count:  Lab Results   Component Value Date/Time    WBC 7.69 10/04/2023 05:57 PM    HGB 10.8 (L) 10/04/2023 05:57 PM    HCT 35.4 (L) 10/04/2023 05:57 PM    HCT 35 (L) 09/09/2023 03:50 PM    RBC 3.89 (L) 10/04/2023 05:57 PM     10/04/2023 05:57 PM       Basic Chemistry Panel:  Lab Results   Component Value Date/Time     09/11/2023 05:44 AM    K 4.0 09/11/2023 05:44 AM     09/11/2023 05:44 AM    CO2 22 (L) 09/11/2023 05:44 AM    BUN 6 09/11/2023 05:44 AM    CREATININE 0.7 09/11/2023 05:44 AM    CALCIUM 9.0 09/11/2023 05:44 AM       Lab Results   Component Value Date/Time    CRP 89.5 (H) 09/11/2023 05:44 AM           Assessment:       1. Post-operative pain    2. Right sided abdominal pain    3. Nausea and vomiting, unspecified vomiting type        Plan:       Ms. Abrams is a 38 year old woman well known to our service most recently with complex abdominal wall reconstruction and ileostomy re-siting. This has been complicated by persistent postoperative pain and seromas that required IR drain placement. She now presents with new purulent drain output and persistent pain. Will plan for CT abdomen pelvis with IV and oral contrast. Will admit for ongoing observation, IV antibiotics, and analgesia.    - follow up CT, may need IR drain if undrained collection is present  - ceftriaxone/flagyl  - CLD tonight  - home oxycodone plus tylenol/robaxin  - trend CRP  - will discuss  with PICC team for PICC or midline given previous issues with IV access    Robert Cruz MD  Department of Colon & Rectal Surgery

## 2023-10-04 NOTE — ED TRIAGE NOTES
Georgette Abrams, a 38 y.o. female presents to the ED w/ complaint of abdominal pain and post op problem. Pt states her CONSTANZA drains are infected from her procedure that was completed in August. Pt states drains are not draining properly and are filled with pus. Pt abdomen is reddened around the drain. Pt is currently being followed by colorectal.         Triage note:  Chief Complaint   Patient presents with    Abdominal Pain    Post-op Problem     Reports CONSTANZA drain infection s/p surgery in August 2023.  Area is read, tender. Drain draining puss. Followed by colorectal surgery.      Review of patient's allergies indicates:   Allergen Reactions    Levofloxacin Nausea And Vomiting and Rash    Morphine Anaphylaxis, Hives, Shortness Of Breath and Hallucinations           Piperacillin-tazobactam Rash    Sulfa (sulfonamide antibiotics) Nausea And Vomiting and Rash    Opioids - morphine analogues     Hydrocodone-acetaminophen Itching     Past Medical History:   Diagnosis Date    Anxiety     Familial polyposis     S/P total colectomy

## 2023-10-05 LAB
ANION GAP SERPL CALC-SCNC: 8 MMOL/L (ref 8–16)
BASOPHILS # BLD AUTO: 0.04 K/UL (ref 0–0.2)
BASOPHILS NFR BLD: 0.6 % (ref 0–1.9)
BUN SERPL-MCNC: 4 MG/DL (ref 6–20)
CALCIUM SERPL-MCNC: 8.8 MG/DL (ref 8.7–10.5)
CHLORIDE SERPL-SCNC: 101 MMOL/L (ref 95–110)
CO2 SERPL-SCNC: 27 MMOL/L (ref 23–29)
CREAT SERPL-MCNC: 0.7 MG/DL (ref 0.5–1.4)
CRP SERPL-MCNC: 60.2 MG/L (ref 0–8.2)
DIFFERENTIAL METHOD: ABNORMAL
EOSINOPHIL # BLD AUTO: 0.3 K/UL (ref 0–0.5)
EOSINOPHIL NFR BLD: 4.5 % (ref 0–8)
ERYTHROCYTE [DISTWIDTH] IN BLOOD BY AUTOMATED COUNT: 13.2 % (ref 11.5–14.5)
EST. GFR  (NO RACE VARIABLE): >60 ML/MIN/1.73 M^2
GLUCOSE SERPL-MCNC: 106 MG/DL (ref 70–110)
GRAM STN SPEC: NORMAL
HCT VFR BLD AUTO: 34.3 % (ref 37–48.5)
HGB BLD-MCNC: 10.6 G/DL (ref 12–16)
IMM GRANULOCYTES # BLD AUTO: 0.03 K/UL (ref 0–0.04)
IMM GRANULOCYTES NFR BLD AUTO: 0.5 % (ref 0–0.5)
INR PPP: 1 (ref 0.8–1.2)
LYMPHOCYTES # BLD AUTO: 1.6 K/UL (ref 1–4.8)
LYMPHOCYTES NFR BLD: 24.7 % (ref 18–48)
MAGNESIUM SERPL-MCNC: 1.8 MG/DL (ref 1.6–2.6)
MCH RBC QN AUTO: 28.1 PG (ref 27–31)
MCHC RBC AUTO-ENTMCNC: 30.9 G/DL (ref 32–36)
MCV RBC AUTO: 91 FL (ref 82–98)
MONOCYTES # BLD AUTO: 0.7 K/UL (ref 0.3–1)
MONOCYTES NFR BLD: 10.6 % (ref 4–15)
NEUTROPHILS # BLD AUTO: 3.8 K/UL (ref 1.8–7.7)
NEUTROPHILS NFR BLD: 59.1 % (ref 38–73)
NRBC BLD-RTO: 0 /100 WBC
PHOSPHATE SERPL-MCNC: 2.9 MG/DL (ref 2.7–4.5)
PLATELET # BLD AUTO: 375 K/UL (ref 150–450)
PMV BLD AUTO: 10.9 FL (ref 9.2–12.9)
POTASSIUM SERPL-SCNC: 4 MMOL/L (ref 3.5–5.1)
PROTHROMBIN TIME: 11.1 SEC (ref 9–12.5)
RBC # BLD AUTO: 3.77 M/UL (ref 4–5.4)
SODIUM SERPL-SCNC: 136 MMOL/L (ref 136–145)
WBC # BLD AUTO: 6.44 K/UL (ref 3.9–12.7)

## 2023-10-05 PROCEDURE — 25500020 PHARM REV CODE 255: Performed by: COLON & RECTAL SURGERY

## 2023-10-05 PROCEDURE — 63600175 PHARM REV CODE 636 W HCPCS: Performed by: STUDENT IN AN ORGANIZED HEALTH CARE EDUCATION/TRAINING PROGRAM

## 2023-10-05 PROCEDURE — 87186 SC STD MICRODIL/AGAR DIL: CPT | Performed by: COLON & RECTAL SURGERY

## 2023-10-05 PROCEDURE — 12000002 HC ACUTE/MED SURGE SEMI-PRIVATE ROOM

## 2023-10-05 PROCEDURE — 94761 N-INVAS EAR/PLS OXIMETRY MLT: CPT

## 2023-10-05 PROCEDURE — 86140 C-REACTIVE PROTEIN: CPT | Performed by: SURGERY

## 2023-10-05 PROCEDURE — 25000003 PHARM REV CODE 250: Performed by: SURGERY

## 2023-10-05 PROCEDURE — 87205 SMEAR GRAM STAIN: CPT | Mod: 59 | Performed by: COLON & RECTAL SURGERY

## 2023-10-05 PROCEDURE — 83735 ASSAY OF MAGNESIUM: CPT | Performed by: SURGERY

## 2023-10-05 PROCEDURE — 87077 CULTURE AEROBIC IDENTIFY: CPT | Performed by: COLON & RECTAL SURGERY

## 2023-10-05 PROCEDURE — 87102 FUNGUS ISOLATION CULTURE: CPT | Mod: 59 | Performed by: COLON & RECTAL SURGERY

## 2023-10-05 PROCEDURE — 87206 SMEAR FLUORESCENT/ACID STAI: CPT | Mod: 91 | Performed by: COLON & RECTAL SURGERY

## 2023-10-05 PROCEDURE — 87077 CULTURE AEROBIC IDENTIFY: CPT | Mod: 59 | Performed by: STUDENT IN AN ORGANIZED HEALTH CARE EDUCATION/TRAINING PROGRAM

## 2023-10-05 PROCEDURE — 85025 COMPLETE CBC W/AUTO DIFF WBC: CPT | Performed by: SURGERY

## 2023-10-05 PROCEDURE — 85610 PROTHROMBIN TIME: CPT

## 2023-10-05 PROCEDURE — 63600175 PHARM REV CODE 636 W HCPCS: Performed by: RADIOLOGY

## 2023-10-05 PROCEDURE — 87075 CULTR BACTERIA EXCEPT BLOOD: CPT | Performed by: COLON & RECTAL SURGERY

## 2023-10-05 PROCEDURE — 99284 EMERGENCY DEPT VISIT MOD MDM: CPT | Mod: ,,,

## 2023-10-05 PROCEDURE — 63600175 PHARM REV CODE 636 W HCPCS: Performed by: SURGERY

## 2023-10-05 PROCEDURE — 87070 CULTURE OTHR SPECIMN AEROBIC: CPT | Mod: 59 | Performed by: STUDENT IN AN ORGANIZED HEALTH CARE EDUCATION/TRAINING PROGRAM

## 2023-10-05 PROCEDURE — 99284 PR EMERGENCY DEPT VISIT,LEVEL IV: ICD-10-PCS | Mod: ,,,

## 2023-10-05 PROCEDURE — 84100 ASSAY OF PHOSPHORUS: CPT | Performed by: SURGERY

## 2023-10-05 PROCEDURE — 87116 MYCOBACTERIA CULTURE: CPT | Performed by: COLON & RECTAL SURGERY

## 2023-10-05 PROCEDURE — 80048 BASIC METABOLIC PNL TOTAL CA: CPT | Performed by: SURGERY

## 2023-10-05 PROCEDURE — 87075 CULTR BACTERIA EXCEPT BLOOD: CPT | Mod: 59 | Performed by: STUDENT IN AN ORGANIZED HEALTH CARE EDUCATION/TRAINING PROGRAM

## 2023-10-05 PROCEDURE — 87070 CULTURE OTHR SPECIMN AEROBIC: CPT | Performed by: COLON & RECTAL SURGERY

## 2023-10-05 PROCEDURE — 20600001 HC STEP DOWN PRIVATE ROOM

## 2023-10-05 PROCEDURE — 87186 SC STD MICRODIL/AGAR DIL: CPT | Mod: 59 | Performed by: STUDENT IN AN ORGANIZED HEALTH CARE EDUCATION/TRAINING PROGRAM

## 2023-10-05 RX ORDER — FENTANYL CITRATE 50 UG/ML
INJECTION, SOLUTION INTRAMUSCULAR; INTRAVENOUS
Status: COMPLETED | OUTPATIENT
Start: 2023-10-05 | End: 2023-10-05

## 2023-10-05 RX ORDER — MIDAZOLAM HYDROCHLORIDE 1 MG/ML
INJECTION INTRAMUSCULAR; INTRAVENOUS
Status: COMPLETED | OUTPATIENT
Start: 2023-10-05 | End: 2023-10-05

## 2023-10-05 RX ORDER — DIPHENHYDRAMINE HYDROCHLORIDE 50 MG/ML
INJECTION INTRAMUSCULAR; INTRAVENOUS
Status: COMPLETED | OUTPATIENT
Start: 2023-10-05 | End: 2023-10-05

## 2023-10-05 RX ORDER — HYDROMORPHONE HYDROCHLORIDE 1 MG/ML
1 INJECTION, SOLUTION INTRAMUSCULAR; INTRAVENOUS; SUBCUTANEOUS ONCE
Status: COMPLETED | OUTPATIENT
Start: 2023-10-05 | End: 2023-10-05

## 2023-10-05 RX ORDER — HYDROMORPHONE HYDROCHLORIDE 1 MG/ML
INJECTION, SOLUTION INTRAMUSCULAR; INTRAVENOUS; SUBCUTANEOUS
Status: COMPLETED
Start: 2023-10-05 | End: 2023-10-08

## 2023-10-05 RX ADMIN — ACETAMINOPHEN 650 MG: 325 TABLET ORAL at 06:10

## 2023-10-05 RX ADMIN — METHOCARBAMOL 500 MG: 500 TABLET ORAL at 04:10

## 2023-10-05 RX ADMIN — DIPHENHYDRAMINE HYDROCHLORIDE 50 MG: 50 INJECTION, SOLUTION INTRAMUSCULAR; INTRAVENOUS at 11:10

## 2023-10-05 RX ADMIN — MIDAZOLAM HYDROCHLORIDE 1 MG: 2 INJECTION, SOLUTION INTRAMUSCULAR; INTRAVENOUS at 11:10

## 2023-10-05 RX ADMIN — ONDANSETRON 4 MG: 2 INJECTION INTRAMUSCULAR; INTRAVENOUS at 06:10

## 2023-10-05 RX ADMIN — ACETAMINOPHEN 650 MG: 325 TABLET ORAL at 12:10

## 2023-10-05 RX ADMIN — CEFTRIAXONE 2 G: 2 INJECTION, POWDER, FOR SOLUTION INTRAMUSCULAR; INTRAVENOUS at 07:10

## 2023-10-05 RX ADMIN — HYDROMORPHONE HYDROCHLORIDE 1 MG: 1 INJECTION, SOLUTION INTRAMUSCULAR; INTRAVENOUS; SUBCUTANEOUS at 11:10

## 2023-10-05 RX ADMIN — HYDROMORPHONE HYDROCHLORIDE 1 MG: 1 INJECTION, SOLUTION INTRAMUSCULAR; INTRAVENOUS; SUBCUTANEOUS at 03:10

## 2023-10-05 RX ADMIN — FENTANYL CITRATE 50 MCG: 50 INJECTION, SOLUTION INTRAMUSCULAR; INTRAVENOUS at 11:10

## 2023-10-05 RX ADMIN — HYDROMORPHONE HYDROCHLORIDE 1 MG: 1 INJECTION, SOLUTION INTRAMUSCULAR; INTRAVENOUS; SUBCUTANEOUS at 04:10

## 2023-10-05 RX ADMIN — VENLAFAXINE 150 MG: 75 TABLET ORAL at 06:10

## 2023-10-05 RX ADMIN — POTASSIUM CHLORIDE, DEXTROSE MONOHYDRATE AND SODIUM CHLORIDE: 150; 5; 450 INJECTION, SOLUTION INTRAVENOUS at 01:10

## 2023-10-05 RX ADMIN — OXYCODONE HYDROCHLORIDE 30 MG: 10 TABLET ORAL at 09:10

## 2023-10-05 RX ADMIN — OXYCODONE HYDROCHLORIDE 30 MG: 10 TABLET ORAL at 01:10

## 2023-10-05 RX ADMIN — HYDROMORPHONE HYDROCHLORIDE 1 MG: 1 INJECTION, SOLUTION INTRAMUSCULAR; INTRAVENOUS; SUBCUTANEOUS at 07:10

## 2023-10-05 RX ADMIN — METHOCARBAMOL 500 MG: 500 TABLET ORAL at 01:10

## 2023-10-05 RX ADMIN — METHOCARBAMOL 500 MG: 500 TABLET ORAL at 08:10

## 2023-10-05 RX ADMIN — METRONIDAZOLE 500 MG: 500 INJECTION, SOLUTION INTRAVENOUS at 03:10

## 2023-10-05 RX ADMIN — IOHEXOL 100 ML: 350 INJECTION, SOLUTION INTRAVENOUS at 12:10

## 2023-10-05 RX ADMIN — METRONIDAZOLE 500 MG: 500 INJECTION, SOLUTION INTRAVENOUS at 08:10

## 2023-10-05 RX ADMIN — ACETAMINOPHEN 650 MG: 325 TABLET ORAL at 05:10

## 2023-10-05 RX ADMIN — OXYCODONE HYDROCHLORIDE 30 MG: 10 TABLET ORAL at 03:10

## 2023-10-05 RX ADMIN — LOSARTAN POTASSIUM 100 MG: 50 TABLET, FILM COATED ORAL at 08:10

## 2023-10-05 RX ADMIN — ONDANSETRON 4 MG: 2 INJECTION INTRAMUSCULAR; INTRAVENOUS at 04:10

## 2023-10-05 RX ADMIN — METRONIDAZOLE 500 MG: 500 INJECTION, SOLUTION INTRAVENOUS at 01:10

## 2023-10-05 RX ADMIN — HYDROMORPHONE HYDROCHLORIDE 1 MG: 1 INJECTION, SOLUTION INTRAMUSCULAR; INTRAVENOUS; SUBCUTANEOUS at 12:10

## 2023-10-05 RX ADMIN — OXYCODONE HYDROCHLORIDE 30 MG: 10 TABLET ORAL at 06:10

## 2023-10-05 RX ADMIN — ACETAMINOPHEN 650 MG: 325 TABLET ORAL at 01:10

## 2023-10-05 NOTE — ED NOTES
Assumed care of patient. Pt AAOX4. Respirations even an unlabored and in NAD. Pain 7/10 currently. Denies any needs.  Bed placed in low locked position, side rails up x2, call bell is within reach.

## 2023-10-05 NOTE — PROCEDURES
Radiology Post-Procedure Note    Pre Op Diagnosis:Abdominal abscess x 2    Post Op Diagnosis: Same     Procedure:midline abscess drainage    Procedure performed by: Chencho Suh MD    Written Informed Consent Obtained: Yes    Specimen Removed: YES 10 cc deep collection, serosanguinous; 1 cc SQ collection serosanguinous    Estimated Blood Loss: Minimal    Findings: CT was used for localization of abnormal fluid collection in anterior abdomen. A needle was inserted into the fluid collection and serosanguinous fluid was aspirated.  A wire was inserted into the collection and the tract was dilated.  A 14 [place] all-purpose drainage catheter was inserted and a pigtail loop of the distal end was formed.  The catheter was sutured into place and approximately  10 mL fluid was removed.     CT was used for localization of abnormal fluid collection in SQ tissues. A needle was inserted into the fluid collection and serosanguinous fluid was aspirated.  A wire was inserted into the collection and the tract was dilated.  A 14 [place] all-purpose drainage catheter was inserted and a pigtail loop of the distal end was formed.  The catheter was sutured into place and approximately  1 mL fluid was removed.    A specimen was sent to the lab for further analysis and culture. Existing drain removed.    The patient tolerated procedure well and there were no complications. Please see procedure report under Imaging for further details.    Chencho Suh M.D.  Interventional Radiology  Department of Radiology

## 2023-10-05 NOTE — ED NOTES
Assumed pt care. Pt is upset and in pain. Secure chat sent to MD to request something other than oxycodone which pt takes at home and says is not working for her. Inquired about CT also. Pt notified that with drinking the contrast she is scheduled to have it at 0030 to obtain a good scan.

## 2023-10-05 NOTE — ED NOTES
Pt updated on status. Contacted Dr. Perez per pt request.  Dr. Perez stated he would be by later to see her but a collaborating physician will come see her.  Patient notified and verbalized understanding. Patient resting comfortably at this time.  VSS.  IVFs infusing.  Denies any pain or other needs.  Bed low and locked.  Side rails up X 2. Call bell in reach.

## 2023-10-05 NOTE — PROGRESS NOTES
Pt had two new bulb suction drains placed, drains were up sized from 10 fr to 14 fr.  Pt in MPU 5 for one hr recovery.

## 2023-10-05 NOTE — CONSULTS
Consult Note  Interventional Radiology      Date: 10/5/2023   Primary team: Networked reference to record PCT , KRISH Tracy MD   Room/bed: OBS03/EDOU03    Inpatient consult to Interventional Radiology  Consult performed by: Georgette Mccarty PA-C  Consult ordered by: Estuardo Oro MD         Reason for Consult:   Abscess s/p hernia repair     SUBJECTIVE:     Chief Complaint:  retracted percutaneous drain in abdominal wall collection, new collection     History of Present Illness:  Georgette Abrams is a 38 y.o. female with a past medical history of complex abdominal wall reconstruction and ileostomy re-siting on 8/7/23 who was admitted on 10/4/23 for abdominal pain and drainage from drain insertion site. She had an IR guided percutaneous placement of a 10 Ch[place] drainage catheter into 2 anterior abdominal wall fluid collections on 9/10/23. She had a CT done which revealed: interval decrease in size of the subcutaneous fluid and air collection measuring 3.5 x 8.5 cm, previously 16.5 x 3.9 cm with percutaneous drainage catheter within this collection appears to been retracted. The air and fluid collection deep to the rectus abdominus muscles has also decreased in size, measuring 14.1 x 3.6 cm, previously 15.1 x 4.0 cm, with increased foci of air compared to prior. Interventional Radiology has been consulted for image guided percutaneous aspiration/drain placement into a collection deep to the rectus abdominus muscle and re-positioning of current drain in the subcutaneous collection. The pt's WBC is 6, currently afebrile, and is hemodynamically stable.       Review of Systems   Constitutional: Negative.    HENT: Negative.     Respiratory: Negative.     Cardiovascular: Negative.    Gastrointestinal:  Positive for abdominal pain.   Musculoskeletal: Negative.    Skin: Negative.    Neurological: Negative.    Psychiatric/Behavioral: Negative.          Scheduled Meds:   acetaminophen  650 mg Oral Q6H     cefTRIAXone (ROCEPHIN) IVPB  2 g Intravenous Q24H    losartan  100 mg Oral Daily    methocarbamoL  500 mg Oral QID    metronidazole  500 mg Intravenous Q8H    venlafaxine  150 mg Oral Daily     Continuous Infusions:   dextrose 5 % and 0.45 % NaCl with KCl 20 mEq 75 mL/hr at 10/04/23 1950     PRN Meds:clonazePAM, HYDROmorphone, naloxone, ondansetron, oxyCODONE, sodium chloride 0.9%    Review of patient's allergies indicates:   Allergen Reactions    Levofloxacin Nausea And Vomiting and Rash    Morphine Anaphylaxis, Hives, Shortness Of Breath and Hallucinations           Piperacillin-tazobactam Rash    Sulfa (sulfonamide antibiotics) Nausea And Vomiting and Rash    Opioids - morphine analogues     Hydrocodone-acetaminophen Itching       Past Medical History:   Diagnosis Date    Anxiety     Familial polyposis     S/P total colectomy      Past Surgical History:   Procedure Laterality Date    Davinci Assisted Bilateral Ovarian Cystectomy, with extension  SEAN   8/2011    HYSTERECTOMY  8/23/2012    DAVINCI     ILEOSTOMY REVISION N/A 8/7/2023    Procedure: REVISION, ILEOSTOMY;  Surgeon: KRISH Tracy MD;  Location: [facility] OR [facility] FLR;  Service: Colon and Rectal;  Laterality: N/A;    Illeotomy Creation  2009    LYSIS OF ADHESIONS N/A 8/7/2023    Procedure: LYSIS, ADHESIONS;  Surgeon: KRISH Tracy MD;  Location: [facility] OR 2ND FLR;  Service: Colon and Rectal;  Laterality: N/A;    OOPHORECTOMY  8/23/2012    DAvinci removal with DLH     REPAIR OF RECURRENT VENTRAL HERNIA N/A 8/7/2023    Procedure: REPAIR, HERNIA, VENTRAL, RECURRENT, ERAS low;  Surgeon: KRISH Tracy MD;  Location: [facility] OR 2ND FLR;  Service: Colon and Rectal;  Laterality: N/A;  w/ mesh and omental pedical flap    TOTAL COLECTOMY      Iloanal pouch creation     Family History   Problem Relation Age of Onset    Colon cancer Maternal Grandmother         near late 60's    Lung cancer Maternal Grandfather         lung cancer     Social History      Tobacco Use    Smoking status: Former    Smokeless tobacco: Never   Substance Use Topics    Alcohol use: Not Currently     Comment: seldom    Drug use: No       OBJECTIVE:     Vital Signs (Most Recent)  Temp: 97.7 °F (36.5 °C) (10/05/23 0729)  Pulse: 80 (10/05/23 0729)  Resp: 16 (10/05/23 0750)  BP: 128/61 (10/05/23 0729)  SpO2: 100 % (10/05/23 0729)    Physical Exam:   Physical Exam  Constitutional:       Appearance: She is obese.      Comments: Awake, alert   HENT:      Head: Normocephalic.   Cardiovascular:      Rate and Rhythm: Normal rate.   Pulmonary:      Effort: Pulmonary effort is normal.   Abdominal:      Comments: Obese, ileostomy in place.  L sided percutaneous drain in place, appears retracted, purulent oozing noted around insertion site, 50 cc of purulent brown drainage noted in bulb.    Neurological:      Mental Status: Mental status is at baseline.   Psychiatric:         Mood and Affect: Mood normal.         Laboratory  I have reviewed all pertinent lab results within the past 24 hours.  CBC:   Recent Labs   Lab 10/05/23  0348   WBC 6.44   RBC 3.77*   HGB 10.6*   HCT 34.3*      MCV 91   MCH 28.1   MCHC 30.9*     CMP:   Recent Labs   Lab 10/04/23  1757 10/05/23  0348   * 106   CALCIUM 9.0 8.8   ALBUMIN 3.1*  --    PROT 7.1  --    * 136   K 3.9 4.0   CO2 24 27    101   BUN 5* 4*   CREATININE 0.7 0.7   ALKPHOS 136*  --    ALT 9*  --    AST 11  --    BILITOT 0.2  --          Anticoagulants/Antiplatelets:   No anticoagulation    ASA/Mallampati  ASA: 3  Mallampati: 2    Imaging:  Reviewed by Gold Joyce MD.     ASSESSMENT/PLAN:     Assessment:  38 y.o. female with a past medical history of complex abdominal wall reconstruction and ileostomy re-siting on 8/7/23. She had an IR guided percutaneous placement of a 10 Estonian drainage catheter into 2 anterior abdominal wall fluid collections on 9/10/23. Pt now admitted with abdominal pain and drainage around drain insertion site.  Had a CT done which revealed: interval decrease in size of the subcutaneous fluid and air collection measuring 3.5 x 8.5 cm, previously 16.5 x 3.9 cm with percutaneous drainage catheter within this collection appears to been retracted. The air and fluid collection deep to the rectus abdominus muscles has also decreased in size, measuring 14.1 x 3.6 cm, previously 15.1 x 4.0 cm, with increased foci of air compared to prior. Interventional Radiology has been consulted for image guided percutaneous aspiration/drain placement into a collection deep to the rectus abdominus muscle and re-positioning of current drain in the subcutaneous collection.       Plan:  Will proceed with image guided percutaneous aspiration/drain placement for mgmt of a collection deep to the rectus abdominus muscle and re positioning of current 10 Fr percutaneous drain in place in the anterior abdominal collection on 10/5/23.   Sedation plan: up to moderate  Continue keeping patient NPO  Anticoagulation history reviewed.Coagulation labs reviewed. INR ordered.   Thank you for the consult. Please contact with questions via [email or web address] secure chat.    Georgette Mccarty PA-C  Interventional Radiology  10/5/2023

## 2023-10-05 NOTE — PROGRESS NOTES
Colon and Rectal Surgery Progress Note    Patient name: Georgette Abrams   YOB: 1985   MRN: [id number]    Patient Name: Georgette Abrams  Date: 10/5/2023    Subjective:  NAEO. Pain mostly controlled. Drain with purulent output.   Medications:    Current Facility-Administered Medications:     acetaminophen tablet 650 mg, 650 mg, Oral, Q6H, Robert Cruz MD, 650 mg at 10/05/23 0541    cefTRIAXone (ROCEPHIN) 2 g in dextrose 5 % in water (D5W) 100 mL IVPB (MB+), 2 g, Intravenous, Q24H, Robert Cruz MD, Stopped at 10/04/23 2021    clonazePAM tablet 0.5 mg, 0.5 mg, Oral, BID PRN, Robert Cruz MD    dextrose 5 % and 0.45 % NaCl with KCl 20 mEq infusion, , Intravenous, Continuous, Robert Cruz MD, Last Rate: 75 mL/hr at 10/04/23 1950, New Bag at 10/04/23 1950    HYDROmorphone injection 1 mg, 1 mg, Intravenous, Q3H PRN, Robert Cruz MD, 1 mg at 10/05/23 0750    losartan tablet 100 mg, 100 mg, Oral, Daily, Robert Cruz MD, 100 mg at 10/05/23 0824    methocarbamoL tablet 500 mg, 500 mg, Oral, QID, Robert Cruz MD, 500 mg at 10/05/23 0827    metronidazole IVPB 500 mg, 500 mg, Intravenous, Q8H, Robert Cruz MD, Stopped at 10/05/23 0451    naloxone 0.4 mg/mL injection 0.02 mg, 0.02 mg, Intravenous, PRN, Robert Cruz MD    ondansetron injection 4 mg, 4 mg, Intravenous, Q6H PRN, Robert Cruz MD, 4 mg at 10/05/23 0418    oxyCODONE immediate release tablet Tab 30 mg, 30 mg, Oral, Q4H PRN, Robert Cruz MD, 30 mg at 10/05/23 0355    sodium chloride 0.9% flush 10 mL, 10 mL, Intravenous, PRN, Robert Cruz MD    venlafaxine tablet 150 mg, 150 mg, Oral, Daily, Robert Cruz MD    Current Outpatient Medications:     ciprofloxacin HCl (CIPRO) 500 MG tablet, Take 1 tablet (500 mg total) by mouth every 12 (twelve) hours. for 10 days, Disp: 20 tablet, Rfl: 0    clonazepam (KLONOPIN) 1 MG tablet, Take 1 mg by mouth 2 (two) times daily as needed.  , Disp: , Rfl:      cyanocobalamin 1,000 mcg/mL injection, 1,000 mcg twice a week., Disp: , Rfl:     ibuprofen (ADVIL,MOTRIN) 800 MG tablet, Take 1 tablet (800 mg total) by mouth every 8 (eight) hours., Disp: 30 tablet, Rfl: 3    losartan (COZAAR) 100 MG tablet, Take 100 mg by mouth., Disp: , Rfl:     methocarbamoL (ROBAXIN) 500 MG Tab, Take 1 tablet (500 mg total) by mouth 4 (four) times daily., Disp: 40 tablet, Rfl: 0    metroNIDAZOLE (FLAGYL) 500 MG tablet, Take 1 tablet (500 mg total) by mouth every 8 (eight) hours. for 10 days, Disp: 30 tablet, Rfl: 0    ondansetron (ZOFRAN) 4 MG tablet, Take 4 mg by mouth every 6 (six) hours as needed for Nausea., Disp: , Rfl:     ondansetron (ZOFRAN-ODT) 4 MG TbDL, Dissolve 1 tablet (4 mg total) by mouth every 6 (six) hours as needed., Disp: 40 tablet, Rfl: 3    oxyCODONE (ROXICODONE) 15 MG Tab, Take 1 tablet (15 mg total) by mouth every 4 (four) hours as needed for Pain., Disp: 30 tablet, Rfl: 0    oxyCODONE (ROXICODONE) 15 MG Tab, Take 1 tablet (15 mg total) by mouth every 4 (four) hours as needed for Pain., Disp: 30 tablet, Rfl: 0    oxyCODONE (ROXICODONE) 30 MG Tab, Take 1 tablet (30 mg total) by mouth every 4 (four) hours as needed for Pain., Disp: 30 tablet, Rfl: 0    progesterone (PROMETRIUM) 100 MG capsule, Take 400 mg by mouth every evening., Disp: , Rfl:     venlafaxine (EFFEXOR) 100 MG Tab, Take 150 mg by mouth Daily., Disp: , Rfl:     Facility-Administered Medications Ordered in Other Encounters:     LIDOcaine (PF) 10 mg/ml (1%) injection 10 mg, 1 mL, Intradermal, On Call Procedure, Rhina Saldana NP    Vital Signs:  Vitals:    10/05/23 0750   BP:    Pulse:    Resp: 16   Temp:        In/Out:  Intake/Output - Last 3 Shifts         10/03 0700  10/04 0659 10/04 0700  10/05 0659 10/05 0700  10/06 0659    IV Piggyback  845.5     Total Intake(mL/kg)  845.5 (8.5)     Net  +845.5                    Physical Exam:  General: Alert, oriented, in no apparent distress  HEENT: Sclera  anicteric, trachea midline  Lungs: Normal respiratory rate and effort on room air  Abdomen: Soft, minimal tenderness, drain with purulent output  Extremities: Warm, well perfused, no edema, SCDs in place  Neuro: Grossly intact, moves all extremities  Psych: Appropriate affect    Laboratory Studies:  Complete Blood Count:  Lab Results   Component Value Date/Time    WBC 6.44 10/05/2023 03:48 AM    HGB 10.6 (L) 10/05/2023 03:48 AM    HCT 34.3 (L) 10/05/2023 03:48 AM    HCT 35 (L) 09/09/2023 03:50 PM    RBC 3.77 (L) 10/05/2023 03:48 AM     10/05/2023 03:48 AM       Basic Chemistry Panel:  Lab Results   Component Value Date/Time     10/05/2023 03:48 AM    K 4.0 10/05/2023 03:48 AM     10/05/2023 03:48 AM    CO2 27 10/05/2023 03:48 AM    BUN 4 (L) 10/05/2023 03:48 AM    CREATININE 0.7 10/05/2023 03:48 AM    CALCIUM 8.8 10/05/2023 03:48 AM       Lab Results   Component Value Date/Time    CRP 60.2 (H) 10/05/2023 03:48 AM       Imaging Studies:  None new    Assessment:  Ms. Abrams is a 38 year old woman well known to our service most recently with complex abdominal wall reconstruction and ileostomy re-siting. This has been complicated by persistent postoperative pain and seromas that required IR drain placement. She now presents with new purulent drain output and persistent pain.     Plan:  Continue current pain regimen, appears to be controlling symptoms  CRP downtrending on abx, continue to trend. WBC wnl  IR consulted for drainage of fluid collection and exchange of drain  Monitor abdominal exam  Continue abx  Continue home meds  Vte ppx: darrel, david Oro MD  Colorectal Surgery Fellow

## 2023-10-05 NOTE — ED NOTES
Received bedside report from BHARGAV Barrientos. Assumed care of pt at this time  Pt AAOx4, resting comfortably in bed, NAD, respirations E/UL, updated on POC, wheels locked and in low position, call bell with in reach, Comfort positioning and restroom needs were addressed. Necessary items were placed with in reach and was advised when a reassessment would take place.

## 2023-10-05 NOTE — H&P
Please see IR consult note dated 10/5/2023    Georgette Mccarty PA-C  Interventional Radiology  Spectra [phone]  10/5/2023

## 2023-10-05 NOTE — ED NOTES
Nurses Note -- 4 Eyes      10/4/2023   9:41 PM      Skin assessed during: Admit      [] No Altered Skin Integrity Present    []Prevention Measures Documented      [x] Yes- Altered Skin Integrity Present or Discovered   [x] LDA Added if Not in Epic (Describe Wound)   [x] New Altered Skin Integrity was Present on Admit and Documented in LDA   [x] Wound Image Taken    Wound Care Consulted? Yes    Attending Nurse:  BHARGAV Blanton    Second RN/Staff Member:   BHARGAV Rivera

## 2023-10-05 NOTE — PLAN OF CARE
SW attempted to meet with pt bedside for discharge planning assessment. Pt was still with IR and out of room. SW will attempt again time permitting.    Maggie Leigh LMSW  Case Management [facility] [place]  [phone]

## 2023-10-06 LAB — CRP SERPL-MCNC: 64.4 MG/L (ref 0–8.2)

## 2023-10-06 PROCEDURE — 86140 C-REACTIVE PROTEIN: CPT | Performed by: SURGERY

## 2023-10-06 PROCEDURE — 63600175 PHARM REV CODE 636 W HCPCS: Performed by: COLON & RECTAL SURGERY

## 2023-10-06 PROCEDURE — 94761 N-INVAS EAR/PLS OXIMETRY MLT: CPT

## 2023-10-06 PROCEDURE — 25000003 PHARM REV CODE 250: Performed by: SURGERY

## 2023-10-06 PROCEDURE — 25000003 PHARM REV CODE 250: Performed by: COLON & RECTAL SURGERY

## 2023-10-06 PROCEDURE — 20600001 HC STEP DOWN PRIVATE ROOM

## 2023-10-06 PROCEDURE — 36415 COLL VENOUS BLD VENIPUNCTURE: CPT | Performed by: SURGERY

## 2023-10-06 PROCEDURE — 63600175 PHARM REV CODE 636 W HCPCS: Performed by: SURGERY

## 2023-10-06 RX ADMIN — VANCOMYCIN HYDROCHLORIDE 1500 MG: 1.5 INJECTION, POWDER, LYOPHILIZED, FOR SOLUTION INTRAVENOUS at 11:10

## 2023-10-06 RX ADMIN — ONDANSETRON 4 MG: 2 INJECTION INTRAMUSCULAR; INTRAVENOUS at 04:10

## 2023-10-06 RX ADMIN — VENLAFAXINE 150 MG: 75 TABLET ORAL at 05:10

## 2023-10-06 RX ADMIN — ACETAMINOPHEN 650 MG: 325 TABLET ORAL at 06:10

## 2023-10-06 RX ADMIN — ACETAMINOPHEN 650 MG: 325 TABLET ORAL at 12:10

## 2023-10-06 RX ADMIN — VANCOMYCIN HYDROCHLORIDE 2000 MG: 500 INJECTION, POWDER, LYOPHILIZED, FOR SOLUTION INTRAVENOUS at 12:10

## 2023-10-06 RX ADMIN — OXYCODONE HYDROCHLORIDE 30 MG: 10 TABLET ORAL at 02:10

## 2023-10-06 RX ADMIN — POTASSIUM CHLORIDE, DEXTROSE MONOHYDRATE AND SODIUM CHLORIDE: 150; 5; 450 INJECTION, SOLUTION INTRAVENOUS at 12:10

## 2023-10-06 RX ADMIN — LOSARTAN POTASSIUM 100 MG: 50 TABLET, FILM COATED ORAL at 08:10

## 2023-10-06 RX ADMIN — OXYCODONE HYDROCHLORIDE 30 MG: 10 TABLET ORAL at 08:10

## 2023-10-06 RX ADMIN — HYDROMORPHONE HYDROCHLORIDE 1 MG: 1 INJECTION, SOLUTION INTRAMUSCULAR; INTRAVENOUS; SUBCUTANEOUS at 11:10

## 2023-10-06 RX ADMIN — OXYCODONE HYDROCHLORIDE 30 MG: 10 TABLET ORAL at 05:10

## 2023-10-06 RX ADMIN — HYDROMORPHONE HYDROCHLORIDE 1 MG: 1 INJECTION, SOLUTION INTRAMUSCULAR; INTRAVENOUS; SUBCUTANEOUS at 03:10

## 2023-10-06 RX ADMIN — CLONAZEPAM 0.5 MG: 0.5 TABLET ORAL at 12:10

## 2023-10-06 RX ADMIN — OXYCODONE HYDROCHLORIDE 30 MG: 10 TABLET ORAL at 01:10

## 2023-10-06 RX ADMIN — HYDROMORPHONE HYDROCHLORIDE 1 MG: 1 INJECTION, SOLUTION INTRAMUSCULAR; INTRAVENOUS; SUBCUTANEOUS at 10:10

## 2023-10-06 RX ADMIN — METHOCARBAMOL 500 MG: 500 TABLET ORAL at 12:10

## 2023-10-06 RX ADMIN — METRONIDAZOLE 500 MG: 500 INJECTION, SOLUTION INTRAVENOUS at 03:10

## 2023-10-06 RX ADMIN — METHOCARBAMOL 500 MG: 500 TABLET ORAL at 08:10

## 2023-10-06 RX ADMIN — OXYCODONE HYDROCHLORIDE 30 MG: 10 TABLET ORAL at 09:10

## 2023-10-06 RX ADMIN — HYDROMORPHONE HYDROCHLORIDE 1 MG: 1 INJECTION, SOLUTION INTRAMUSCULAR; INTRAVENOUS; SUBCUTANEOUS at 04:10

## 2023-10-06 RX ADMIN — ACETAMINOPHEN 650 MG: 325 TABLET ORAL at 05:10

## 2023-10-06 RX ADMIN — METHOCARBAMOL 500 MG: 500 TABLET ORAL at 05:10

## 2023-10-06 RX ADMIN — HYDROMORPHONE HYDROCHLORIDE 1 MG: 1 INJECTION, SOLUTION INTRAMUSCULAR; INTRAVENOUS; SUBCUTANEOUS at 06:10

## 2023-10-06 NOTE — NURSING
Nurses Note -- 4 Eyes      10/6/2023   3:10 AM      Skin assessed during: Admit      [x] No Altered Skin Integrity Present    [x]Prevention Measures Documented      [] Yes- Altered Skin Integrity Present or Discovered   [] LDA Added if Not in Epic (Describe Wound)   [] New Altered Skin Integrity was Present on Admit and Documented in LDA   [] Wound Image Taken    Wound Care Consulted? No    Attending Nurse:  Barbara Nicholas RN    Second RN/Staff Member:   Charley DURANT

## 2023-10-06 NOTE — PLAN OF CARE
[facility] Plan of Care Note     Dx: Post-operative pain, Right sided abdominal pain, Nausea and vomiting     Shift Events: Pain Management, Drain Care, ABT, & Safety     Goals of Care: Pain Management, Drain Care, ABT, & Safety     Neuro: AAO x 4     Vital Signs: WNL      Respiratory: Room Air     Diet: Regular diet     Is patient tolerating current diet? yes     GTTS: D5 1/2AM59QYY @75 ml/hr      Urine Output/Bowel Movement: Adequate urine output, RUQ Ileostomy- put cares for herself      Drains/Tubes/Tube Feeds (include total output/shift): CONSTANZA Drains to RUQ: #1 Superior, #2 Inferior     Lines: 20g R FA        Accuchecks:None     Skin: Healed midline abdominal incision, healed old ostomy sited to RLQ of abdomen, & Insertion site for removed CONSTANZA drain      Fall Risk Score: 13     Activity level? Independent      Any scheduled procedures? N/a     Any safety concerns? None     Other: None

## 2023-10-06 NOTE — NURSING
[facility] Plan of Care Note    Dx: Post-operative pain, Right sided abdominal pain, Nausea and vomiting    Shift Events: Pain Management, Drain Care, IV fluids, IV Antibiotics, Safety    Goals of Care: Pain Management, Drain Care, IV fluids, IV Antibiotics, Safety    Neuro: AAOx4    Vital Signs: WNL (refused Telemetry, MD notified)    Respiratory: Room Air    Diet: Regular diet    Is patient tolerating current diet? yes    GTTS: D5 0.45 NS 20 meq KCL @75 ml/hr     Urine Output/Bowel Movement: voids spontaneously: ileostomy to RLQ     Drains/Tubes/Tube Feeds (include total output/shift): 2 x CONSTANZA Drains to RUQ: see flow sheet    Lines: 20 g L FA      Accuchecks:n/a    Skin: healed midline incision, abd. Insertion site for removed CONSTANZA drain     Fall Risk Score: 13    Activity level? Independent     Any scheduled procedures? N/a    Any safety concerns? Generalized weakness     Other: n/a

## 2023-10-06 NOTE — ED TRIAGE NOTES
Georgette Abrams, a 38 y.o. female presents to the ED w/ complaint of abdominal pain. Reports infection during a surgery in August. Abdomen is red, and tender, draining pus. Followed by colorectal    Adult Physical Assessment  LOC: Georgette Abrams, 38 y.o. female verified via two identifiers.  The patient is awake, alert, oriented and speaking appropriately at this time.  APPEARANCE: Patient resting comfortably and appears to be in no acute distress at this time. Patient is clean and well groomed, patient's clothing is properly fastened.  SKIN:The skin is warm and dry, color consistent with ethnicity, patient has normal skin turgor and moist mucus membranes, skin intact, no breakdown or brusing noted.  MUSCULOSKELETAL: Patient moving all extremities well, no obvious swelling or deformities noted.  RESPIRATORY: Airway is open and patent, respirations are spontaneous, patient has a normal effort and rate, no accessory muscle use noted.  CARDIAC: Patient has a normal rate and rhythm, no periphreal edema noted in any extremity, capillary refill < 3 seconds in all extremities  ABDOMEN: Soft and tender to palpation, no abdominal distention noted. Bowel sounds present in all four quadrants. Reports pain/ infection during a surgery in August. Abdomen is red, and tender, draining pus. Followed by colorectal  NEUROLOGIC: Eyes open spontaneously, behavior appropriate to situation, follows commands, facial expression symmetrical, bilateral hand grasp equal and even, purposeful motor response noted, normal sensation in all extremities when touched with a finger.      Triage note:  Chief Complaint   Patient presents with    Abdominal Pain    Post-op Problem     Reports CONSTANZA drain infection s/p surgery in August 2023.  Area is read, tender. Drain draining puss. Followed by colorectal surgery.      Review of patient's allergies indicates:   Allergen Reactions    Levofloxacin Nausea And Vomiting and Rash    Morphine  Anaphylaxis, Hives, Shortness Of Breath and Hallucinations           Piperacillin-tazobactam Rash    Sulfa (sulfonamide antibiotics) Nausea And Vomiting and Rash    Opioids - morphine analogues     Hydrocodone-acetaminophen Itching     Past Medical History:   Diagnosis Date    Anxiety     Familial polyposis     S/P total colectomy

## 2023-10-06 NOTE — PROGRESS NOTES
[facility]  Colorectal Surgery  Progress Note    Patient Name: Georgette Abrams  MRN: [id number]  Admission Date: 10/4/2023  Hospital Length of Stay: 2 days  Attending Physician: KRISH Tracy MD    Subjective:     Interval History: Requesting increase in IV dilaudid yesterday but also reports Dr Tracy has already addressed why this is not feasible.   Pain mildly controlled per pt. She is moving well OOB independently. New IR drains from yesterday  w serosanguinous drainage . No fevers, ostomy w stool in bag, tolerating PO.    Post-Op Info:  * No surgery found *          Medications:  Continuous Infusions:   dextrose 5 % and 0.45 % NaCl with KCl 20 mEq 75 mL/hr at 10/06/23 0048     Scheduled Meds:   acetaminophen  650 mg Oral Q6H    cefTRIAXone (ROCEPHIN) IVPB  2 g Intravenous Q24H    losartan  100 mg Oral Daily    methocarbamoL  500 mg Oral QID    metronidazole  500 mg Intravenous Q8H    venlafaxine  150 mg Oral Daily     PRN Meds:   clonazePAM    HYDROmorphone    naloxone    ondansetron    oxyCODONE    sodium chloride 0.9%        Objective:     Vital Signs (Most Recent):  Temp: 97.8 °F (36.6 °C) (10/06/23 0752)  Pulse: (!) 59 (10/06/23 0752)  Resp: 17 (10/06/23 0935)  BP: 110/64 (10/06/23 0752)  SpO2: (!) 94 % (10/06/23 0752) Vital Signs (24h Range):  Temp:  [97.8 °F (36.6 °C)-98.4 °F (36.9 °C)] 97.8 °F (36.6 °C)  Pulse:  [] 59  Resp:  [15-20] 17  SpO2:  [94 %-100 %] 94 %  BP: (110-156)/(59-92) 110/64     Intake/Output - Last 3 Shifts         10/04 0700  10/05 0659 10/05 0700  10/06 0659 10/06 0700  10/07 0659    P.O.  480     I.V. (mL/kg)  367.4 (3)     IV Piggyback 845.5 100     Total Intake(mL/kg) 845.5 (8.5) 947.4 (7.9)     Urine (mL/kg/hr)  500 (0.2)     Drains  45     Stool  0     Total Output  545     Net +845.5 +402.4            Stool Occurrence  1 x 0 x             Physical Exam  Vitals and nursing note reviewed.   Constitutional:       Appearance: She is well-developed. She is  obese.   HENT:      Head: Normocephalic and atraumatic.      Nose: Nose normal.   Eyes:      Conjunctiva/sclera: Conjunctivae normal.   Cardiovascular:      Rate and Rhythm: Normal rate and regular rhythm.      Heart sounds: Normal heart sounds.   Pulmonary:      Effort: Pulmonary effort is normal. No respiratory distress.   Abdominal:      Tenderness: There is abdominal tenderness. There is guarding.      Comments: Ostomy w stool  IR drains w serosanguinous drainage    Musculoskeletal:         General: Normal range of motion.      Cervical back: Normal range of motion and neck supple.   Skin:     General: Skin is warm and dry.      Capillary Refill: Capillary refill takes less than 2 seconds.   Neurological:      Mental Status: She is alert and oriented to person, place, and time.   Psychiatric:         Behavior: Behavior normal.         Judgment: Judgment normal.          Significant Labs:  BMP:   Recent Labs   Lab 10/05/23  0348         K 4.0      CO2 27   BUN 4*   CREATININE 0.7   CALCIUM 8.8   MG 1.8     CBC:   Recent Labs   Lab 10/05/23  0348   WBC 6.44   RBC 3.77*   HGB 10.6*   HCT 34.3*      MCV 91   MCH 28.1   MCHC 30.9*     CMP:   Recent Labs   Lab 10/04/23  1757 10/05/23  0348   * 106   CALCIUM 9.0 8.8   ALBUMIN 3.1*  --    PROT 7.1  --    * 136   K 3.9 4.0   CO2 24 27    101   BUN 5* 4*   CREATININE 0.7 0.7   ALKPHOS 136*  --    ALT 9*  --    AST 11  --    BILITOT 0.2  --      CRP:   Recent Labs   Lab 10/06/23  0538   CRP 64.4*       Significant Diagnostics:  None    Assessment/Plan:     Attention to ileostomy     Assessment:  Ms. Abrams is a 38 year old woman well known to our service most recently with complex abdominal wall reconstruction and ileostomy re-siting. This has been complicated by persistent postoperative pain and seromas that required IR drain placement. She now presents with new purulent drain output and persistent pain.   S/p IR w drainage  placement 10/6/2023  Chronic opoid use     Plan:  Continue current pain regimen  CRP downtrending on abx, continue to trend. WBC wnl  Monitor drain output  Monitor abdominal exam  Continue abx  Continue home meds  Vte ppx: scd, david Saldana, NP  Colorectal Surgery  [facility]

## 2023-10-06 NOTE — PROGRESS NOTES
Pharmacokinetic Initial Assessment: IV Vancomycin    Assessment/Plan:    Initiate intravenous vancomycin with loading dose of 2000 mg once followed by a maintenance dose of vancomycin 1500 mg IV every 12 hours (jason. 15 mg/kg based on weight of 100 kg)  Desired empiric serum trough concentration is 15 to 20 mcg/mL  Draw vancomycin trough level 60 min prior to the 5th overall dose on 10/08 at approximately 1100, or sooner if renal function changes.   Pharmacy will continue to follow and monitor vancomycin.        Please contact pharmacy at extension [phone] with any questions regarding this assessment.     Thank you for the consult,   Michelle Guillen, PharmD, BCPS       Patient brief summary:  Georgette Abrams is a 38 y.o. female initiated on antimicrobial therapy with IV Vancomycin for treatment of suspected intra-abdominal infection  - Scr stable  - Abdominal Abscess culture from 10/04 + for Staph. Aureus (susceptibilities pending)    Drug Allergies:   Review of patient's allergies indicates:   Allergen Reactions    Levofloxacin Nausea And Vomiting and Rash    Morphine Anaphylaxis, Hives, Shortness Of Breath and Hallucinations           Piperacillin-tazobactam Rash    Sulfa (sulfonamide antibiotics) Nausea And Vomiting and Rash    Opioids - morphine analogues     Hydrocodone-acetaminophen Itching       Actual Body Weight:   - 100 kg (based on weight entry on 10/04 and 09/29)  - Documented weight of 120 kg likely error.      Renal Function:   Estimated Creatinine Clearance: 143.7 mL/min (based on SCr of 0.7 mg/dL).,     Dialysis Method (if applicable):  N/A    CBC (last 72 hours):  Recent Labs   Lab Result Units 10/04/23  1757 10/05/23  0348   WBC K/uL 7.69 6.44   Hemoglobin g/dL 10.8* 10.6*   Hematocrit % 35.4* 34.3*   Platelets K/uL 380 375   Gran % %  --  59.1   Lymph % %  --  24.7   Mono % %  --  10.6   Eosinophil % %  --  4.5   Basophil % %  --  0.6   Differential Method   --  Automated       Metabolic Panel  "(last 72 hours):  Recent Labs   Lab Result Units 10/04/23  1757 10/05/23  0348   Sodium mmol/L 135* 136   Potassium mmol/L 3.9 4.0   Chloride mmol/L 104 101   CO2 mmol/L 24 27   Glucose mg/dL 145* 106   BUN mg/dL 5* 4*   Creatinine mg/dL 0.7 0.7   Albumin g/dL 3.1*  --    Total Bilirubin mg/dL 0.2  --    Alkaline Phosphatase U/L 136*  --    AST U/L 11  --    ALT U/L 9*  --    Magnesium mg/dL  --  1.8   Phosphorus mg/dL  --  2.9       Drug levels (last 3 results):  No results for input(s): "VANCOMYCINRA", "VANCORANDOM", "VANCOMYCINPE", "VANCOPEAK", "VANCOMYCINTR", "VANCOTROUGH" in the last 72 hours.    Microbiologic Results:  Microbiology Results (last 7 days)       Procedure Component Value Units Date/Time    Culture, Anaerobe [[id number]] Collected: 10/05/23 1200    Order Status: Completed Specimen: Abscess from Abdomen Updated: 10/06/23 0954     Anaerobic Culture Culture in progress    Narrative:      Subcutaneous    Culture, Anaerobe [[id number]] Collected: 10/04/23 1838    Order Status: Completed Specimen: Abscess from Abdomen Updated: 10/06/23 0953     Anaerobic Culture Culture in progress    Narrative:      From abdominal wall drain bulb    Aerobic culture [[id number]]  (Abnormal) Collected: 10/04/23 1838    Order Status: Completed Specimen: Abscess from Abdomen Updated: 10/06/23 0947     Aerobic Bacterial Culture STAPHYLOCOCCUS AUREUS  Few  Susceptibility pending      Narrative:      From abdominal wall drain bulb    Gram stain [[id number]] Collected: 10/05/23 1200    Order Status: Completed Specimen: Abscess from Abdomen Updated: 10/05/23 2129     Gram Stain Result Many WBC's      No organisms seen    Blood Culture #2 **CANNOT BE ORDERED STAT** [[id number]] Collected: 10/04/23 1821    Order Status: Completed Specimen: Blood from Peripheral, Hand, Left Updated: 10/05/23 2012     Blood Culture, Routine No Growth to date      No Growth to date    Blood Culture #1 **CANNOT BE ORDERED STAT** [[id number]] " Collected: 10/04/23 1821    Order Status: Completed Specimen: Blood from Peripheral, Forearm, Left Updated: 10/05/23 2012     Blood Culture, Routine No Growth to date      No Growth to date    Gram stain [[id number]] Collected: 10/05/23 1200    Order Status: Completed Specimen: Abscess from Abdomen Updated: 10/05/23 1932     Gram Stain Result Few WBC's      No organisms seen    Narrative:      Subcutaneous    Culture, Anaerobe [[id number]] Collected: 10/05/23 1200    Order Status: Sent Specimen: Abscess from Abdomen Updated: 10/05/23 1542    Aerobic culture [[id number]] Collected: 10/05/23 1200    Order Status: Sent Specimen: Abscess from Abdomen Updated: 10/05/23 1542    Fungus culture [[id number]] Collected: 10/05/23 1200    Order Status: Sent Specimen: Abscess from Abdomen Updated: 10/05/23 1541    AFB Culture & Smear [[id number]] Collected: 10/05/23 1200    Order Status: Sent Specimen: Abscess from Abdomen Updated: 10/05/23 1541    AFB Culture & Smear [[id number]] Collected: 10/05/23 1200    Order Status: Sent Specimen: Abscess from Abdomen Updated: 10/05/23 1539    Aerobic culture [[id number]] Collected: 10/05/23 1200    Order Status: Sent Specimen: Abscess from Abdomen Updated: 10/05/23 1537    Fungus culture [[id number]] Collected: 10/05/23 1200    Order Status: Sent Specimen: Abscess from Abdomen Updated: 10/05/23 1537

## 2023-10-06 NOTE — ASSESSMENT & PLAN NOTE
Assessment:  Ms. Abrams is a 38 year old woman well known to our service most recently with complex abdominal wall reconstruction and ileostomy re-siting. This has been complicated by persistent postoperative pain and seromas that required IR drain placement. She now presents with new purulent drain output and persistent pain.   S/p IR w drainage placement 10/6/2023  Chronic opoid use     Plan:  Continue current pain regimen  CRP downtrending on abx, continue to trend. WBC wnl  Monitor drain output  Monitor abdominal exam  Continue abx  Continue home meds  Vte ppx: scd, lovenox

## 2023-10-06 NOTE — PLAN OF CARE
Problem: Adult Inpatient Plan of Care  Goal: Plan of Care Review  Outcome: Ongoing, Progressing  Goal: Patient-Specific Goal (Individualized)  Outcome: Ongoing, Progressing  Goal: Absence of Hospital-Acquired Illness or Injury  Outcome: Ongoing, Progressing  Goal: Optimal Comfort and Wellbeing  Outcome: Ongoing, Progressing  Goal: Readiness for Transition of Care  Outcome: Ongoing, Progressing     Problem: Impaired Wound Healing  Goal: Optimal Wound Healing  Outcome: Ongoing, Progressing     Problem: Bariatric Environmental Safety  Goal: Safety Maintained with Care  Outcome: Ongoing, Progressing

## 2023-10-06 NOTE — SUBJECTIVE & OBJECTIVE
Subjective:     Interval History: Requesting increase in IV dilaudid yesterday but also reports Dr Tracy has already addressed why this is not feasible.   Pain mildly controlled per pt. She is moving well OOB independently. New IR drains from yesterday w IR drains w serosanguinous drainage . No fevers, ostomy w stool in bag, tolerating PO.    Post-Op Info:  * No surgery found *          Medications:  Continuous Infusions:   dextrose 5 % and 0.45 % NaCl with KCl 20 mEq 75 mL/hr at 10/06/23 0048     Scheduled Meds:   acetaminophen  650 mg Oral Q6H    cefTRIAXone (ROCEPHIN) IVPB  2 g Intravenous Q24H    losartan  100 mg Oral Daily    methocarbamoL  500 mg Oral QID    metronidazole  500 mg Intravenous Q8H    venlafaxine  150 mg Oral Daily     PRN Meds:   clonazePAM    HYDROmorphone    naloxone    ondansetron    oxyCODONE    sodium chloride 0.9%        Objective:     Vital Signs (Most Recent):  Temp: 97.8 °F (36.6 °C) (10/06/23 0752)  Pulse: (!) 59 (10/06/23 0752)  Resp: 17 (10/06/23 0935)  BP: 110/64 (10/06/23 0752)  SpO2: (!) 94 % (10/06/23 0752) Vital Signs (24h Range):  Temp:  [97.8 °F (36.6 °C)-98.4 °F (36.9 °C)] 97.8 °F (36.6 °C)  Pulse:  [] 59  Resp:  [15-20] 17  SpO2:  [94 %-100 %] 94 %  BP: (110-156)/(59-92) 110/64     Intake/Output - Last 3 Shifts         10/04 0700  10/05 0659 10/05 0700  10/06 0659 10/06 0700  10/07 0659    P.O.  480     I.V. (mL/kg)  367.4 (3)     IV Piggyback 845.5 100     Total Intake(mL/kg) 845.5 (8.5) 947.4 (7.9)     Urine (mL/kg/hr)  500 (0.2)     Drains  45     Stool  0     Total Output  545     Net +845.5 +402.4            Stool Occurrence  1 x 0 x             Physical Exam  Vitals and nursing note reviewed.   Constitutional:       Appearance: She is well-developed. She is obese.   HENT:      Head: Normocephalic and atraumatic.      Nose: Nose normal.   Eyes:      Conjunctiva/sclera: Conjunctivae normal.   Cardiovascular:      Rate and Rhythm: Normal rate and regular  rhythm.      Heart sounds: Normal heart sounds.   Pulmonary:      Effort: Pulmonary effort is normal. No respiratory distress.   Abdominal:      Tenderness: There is abdominal tenderness. There is guarding.      Comments: Ostomy w stool  IR drains w serosanguinous drainage    Musculoskeletal:         General: Normal range of motion.      Cervical back: Normal range of motion and neck supple.   Skin:     General: Skin is warm and dry.      Capillary Refill: Capillary refill takes less than 2 seconds.   Neurological:      Mental Status: She is alert and oriented to person, place, and time.   Psychiatric:         Behavior: Behavior normal.         Judgment: Judgment normal.          Significant Labs:  BMP:   Recent Labs   Lab 10/05/23  0348         K 4.0      CO2 27   BUN 4*   CREATININE 0.7   CALCIUM 8.8   MG 1.8     CBC:   Recent Labs   Lab 10/05/23  0348   WBC 6.44   RBC 3.77*   HGB 10.6*   HCT 34.3*      MCV 91   MCH 28.1   MCHC 30.9*     CMP:   Recent Labs   Lab 10/04/23  1757 10/05/23  0348   * 106   CALCIUM 9.0 8.8   ALBUMIN 3.1*  --    PROT 7.1  --    * 136   K 3.9 4.0   CO2 24 27    101   BUN 5* 4*   CREATININE 0.7 0.7   ALKPHOS 136*  --    ALT 9*  --    AST 11  --    BILITOT 0.2  --      CRP:   Recent Labs   Lab 10/06/23  0538   CRP 64.4*       Significant Diagnostics:  None

## 2023-10-06 NOTE — NURSING
"Call placed to On-Call MD,  Dr. Milton Marie.  Informed MD that patient is refusing to wear telemetry monitor at this time stating," My anxiety  is too bad to wear that !"  Education provided about the importance of complying with MD orders and the necessity to monitor her by telemetry.  Unable to redirect patient. No new orders given at this time   "

## 2023-10-07 LAB
BACTERIA SPEC AEROBE CULT: ABNORMAL
BACTERIA SPEC AEROBE CULT: ABNORMAL

## 2023-10-07 PROCEDURE — 25000003 PHARM REV CODE 250

## 2023-10-07 PROCEDURE — 25000003 PHARM REV CODE 250: Performed by: SURGERY

## 2023-10-07 PROCEDURE — 63600175 PHARM REV CODE 636 W HCPCS: Performed by: SURGERY

## 2023-10-07 PROCEDURE — 63600175 PHARM REV CODE 636 W HCPCS

## 2023-10-07 PROCEDURE — 25000003 PHARM REV CODE 250: Performed by: COLON & RECTAL SURGERY

## 2023-10-07 PROCEDURE — 63600175 PHARM REV CODE 636 W HCPCS: Performed by: COLON & RECTAL SURGERY

## 2023-10-07 PROCEDURE — 20600001 HC STEP DOWN PRIVATE ROOM

## 2023-10-07 RX ORDER — FLUCONAZOLE 150 MG/1
150 TABLET ORAL ONCE
Status: COMPLETED | OUTPATIENT
Start: 2023-10-07 | End: 2023-10-07

## 2023-10-07 RX ORDER — LIDOCAINE 50 MG/G
1 PATCH TOPICAL
Status: DISCONTINUED | OUTPATIENT
Start: 2023-10-07 | End: 2023-10-12 | Stop reason: HOSPADM

## 2023-10-07 RX ORDER — ENOXAPARIN SODIUM 100 MG/ML
40 INJECTION SUBCUTANEOUS EVERY 24 HOURS
Status: DISCONTINUED | OUTPATIENT
Start: 2023-10-07 | End: 2023-10-12 | Stop reason: HOSPADM

## 2023-10-07 RX ADMIN — OXYCODONE HYDROCHLORIDE 30 MG: 10 TABLET ORAL at 04:10

## 2023-10-07 RX ADMIN — VANCOMYCIN HYDROCHLORIDE 1500 MG: 1.5 INJECTION, POWDER, LYOPHILIZED, FOR SOLUTION INTRAVENOUS at 11:10

## 2023-10-07 RX ADMIN — OXYCODONE HYDROCHLORIDE 30 MG: 10 TABLET ORAL at 08:10

## 2023-10-07 RX ADMIN — METHOCARBAMOL 500 MG: 500 TABLET ORAL at 08:10

## 2023-10-07 RX ADMIN — OXYCODONE HYDROCHLORIDE 30 MG: 10 TABLET ORAL at 12:10

## 2023-10-07 RX ADMIN — FLUCONAZOLE 150 MG: 150 TABLET ORAL at 10:10

## 2023-10-07 RX ADMIN — LIDOCAINE 5% 1 PATCH: 700 PATCH TOPICAL at 10:10

## 2023-10-07 RX ADMIN — HYDROMORPHONE HYDROCHLORIDE 1 MG: 1 INJECTION, SOLUTION INTRAMUSCULAR; INTRAVENOUS; SUBCUTANEOUS at 06:10

## 2023-10-07 RX ADMIN — METHOCARBAMOL 500 MG: 500 TABLET ORAL at 12:10

## 2023-10-07 RX ADMIN — METHOCARBAMOL 500 MG: 500 TABLET ORAL at 04:10

## 2023-10-07 RX ADMIN — HYDROMORPHONE HYDROCHLORIDE 1 MG: 1 INJECTION, SOLUTION INTRAMUSCULAR; INTRAVENOUS; SUBCUTANEOUS at 04:10

## 2023-10-07 RX ADMIN — OXYCODONE HYDROCHLORIDE 30 MG: 10 TABLET ORAL at 09:10

## 2023-10-07 RX ADMIN — VENLAFAXINE 150 MG: 75 TABLET ORAL at 04:10

## 2023-10-07 RX ADMIN — ACETAMINOPHEN 650 MG: 325 TABLET ORAL at 11:10

## 2023-10-07 RX ADMIN — HYDROMORPHONE HYDROCHLORIDE 1 MG: 1 INJECTION, SOLUTION INTRAMUSCULAR; INTRAVENOUS; SUBCUTANEOUS at 10:10

## 2023-10-07 RX ADMIN — METHOCARBAMOL 500 MG: 500 TABLET ORAL at 09:10

## 2023-10-07 RX ADMIN — CLONAZEPAM 0.5 MG: 0.5 TABLET ORAL at 09:10

## 2023-10-07 RX ADMIN — OXYCODONE HYDROCHLORIDE 30 MG: 10 TABLET ORAL at 02:10

## 2023-10-07 RX ADMIN — ACETAMINOPHEN 650 MG: 325 TABLET ORAL at 05:10

## 2023-10-07 RX ADMIN — HYDROMORPHONE HYDROCHLORIDE 1 MG: 1 INJECTION, SOLUTION INTRAMUSCULAR; INTRAVENOUS; SUBCUTANEOUS at 03:10

## 2023-10-07 RX ADMIN — ENOXAPARIN SODIUM 40 MG: 40 INJECTION SUBCUTANEOUS at 04:10

## 2023-10-07 RX ADMIN — LOSARTAN POTASSIUM 100 MG: 50 TABLET, FILM COATED ORAL at 08:10

## 2023-10-07 NOTE — PROGRESS NOTES
[facility]  Colorectal Surgery  Progress Note    Patient Name: Georgette Abrams  MRN: [id number]  Admission Date: 10/4/2023  Hospital Length of Stay: 3 days  Attending Physician: KRISH Tracy MD    Subjective:     Interval History: Patient still reporting difficulty with pain control. Ambulating well. Stool and gas in ostomy appliance. Also complaining of a yeast infection.     Post-Op Info:  * No surgery found *          Medications:  Continuous Infusions:      Scheduled Meds:   acetaminophen  650 mg Oral Q6H    LIDOcaine  1 patch Transdermal Q24H    losartan  100 mg Oral Daily    methocarbamoL  500 mg Oral QID    vancomycin (VANCOCIN) IV (PEDS and ADULTS)  1,500 mg Intravenous Q12H    venlafaxine  150 mg Oral Daily     PRN Meds:   clonazePAM    HYDROmorphone    naloxone    ondansetron    oxyCODONE    sodium chloride 0.9%    vancomycin - pharmacy to dose        Objective:     Vital Signs (Most Recent):  Temp: 97.6 °F (36.4 °C) (10/07/23 1137)  Pulse: 80 (10/07/23 1137)  Resp: 10 (10/07/23 1019)  BP: (!) 107/56 (10/07/23 1137)  SpO2: (!) 93 % (10/07/23 1137) Vital Signs (24h Range):  Temp:  [97.6 °F (36.4 °C)-98.7 °F (37.1 °C)] 97.6 °F (36.4 °C)  Pulse:  [] 80  Resp:  [10-18] 10  SpO2:  [93 %-100 %] 93 %  BP: (107-138)/(56-90) 107/56     Intake/Output - Last 3 Shifts         10/05 0700  10/06 0659 10/06 0700  10/07 0659 10/07 0700  10/08 0659    P.O. 480 720 400    I.V. (mL/kg) 367.4 (3)      IV Piggyback 100      Total Intake(mL/kg) 947.4 (7.9) 720 (6) 400 (3.3)    Urine (mL/kg/hr) 500 (0.2)      Drains 45 20     Stool 0      Total Output 545 20     Net +402.4 +700 +400           Urine Occurrence  1 x 1 x    Stool Occurrence 1 x 0 x 0 x             Physical Exam  Vitals reviewed.   Constitutional:       Appearance: She is well-developed. She is obese.   HENT:      Head: Normocephalic and atraumatic.   Eyes:      Extraocular Movements: Extraocular movements intact.    Cardiovascular:      Rate and Rhythm: Normal rate.   Pulmonary:      Effort: Pulmonary effort is normal. No respiratory distress.   Abdominal:      Tenderness: There is abdominal tenderness. There is no guarding.      Comments: Ostomy w stool +air  IR drains x2 w serosanguinous drainage    Musculoskeletal:         General: Normal range of motion.   Skin:     General: Skin is warm and dry.      Capillary Refill: Capillary refill takes less than 2 seconds.   Neurological:      General: No focal deficit present.      Mental Status: She is alert and oriented to person, place, and time.          Significant Labs:  BMP:   Recent Labs   Lab 10/05/23  0348         K 4.0      CO2 27   BUN 4*   CREATININE 0.7   CALCIUM 8.8   MG 1.8       CBC:   Recent Labs   Lab 10/05/23  0348   WBC 6.44   RBC 3.77*   HGB 10.6*   HCT 34.3*      MCV 91   MCH 28.1   MCHC 30.9*       CMP:   Recent Labs   Lab 10/04/23  1757 10/05/23  0348   * 106   CALCIUM 9.0 8.8   ALBUMIN 3.1*  --    PROT 7.1  --    * 136   K 3.9 4.0   CO2 24 27    101   BUN 5* 4*   CREATININE 0.7 0.7   ALKPHOS 136*  --    ALT 9*  --    AST 11  --    BILITOT 0.2  --        CRP:   Recent Labs   Lab 10/06/23  0538   CRP 64.4*         Significant Diagnostics:  None    Assessment/Plan:     Attention to ileostomy     Assessment:  Ms. Abrams is a 38 year old woman well known to our service most recently with complex abdominal wall reconstruction and ileostomy re-siting. This has been complicated by persistent postoperative pain and seromas that required IR drain placement. She now presents with new purulent drain output and persistent pain.   S/p IR w drainage placement 10/6/2023  Chronic opoid use     Plan:  Continue multimodal pain regimen  Continue trending CRP  Waiting on abx susceptibilities to result. Will transition to PO abx when appropriate  Monitor drain output  Monitor abdominal exam  Diflucan x1 dose  Continue IV  abx  Continue home meds  Vte ppx: scd, lovenox        Ora Pierre MD  Colorectal Surgery  [facility]

## 2023-10-07 NOTE — SUBJECTIVE & OBJECTIVE
Subjective:     Interval History: Patient still reporting difficulty with pain control. Ambulating well. Stool and gas in ostomy appliance. Also complaining of yeast infection.     Post-Op Info:  * No surgery found *          Medications:  Continuous Infusions:      Scheduled Meds:   acetaminophen  650 mg Oral Q6H    LIDOcaine  1 patch Transdermal Q24H    losartan  100 mg Oral Daily    methocarbamoL  500 mg Oral QID    vancomycin (VANCOCIN) IV (PEDS and ADULTS)  1,500 mg Intravenous Q12H    venlafaxine  150 mg Oral Daily     PRN Meds:   clonazePAM    HYDROmorphone    naloxone    ondansetron    oxyCODONE    sodium chloride 0.9%    vancomycin - pharmacy to dose        Objective:     Vital Signs (Most Recent):  Temp: 97.6 °F (36.4 °C) (10/07/23 1137)  Pulse: 80 (10/07/23 1137)  Resp: 10 (10/07/23 1019)  BP: (!) 107/56 (10/07/23 1137)  SpO2: (!) 93 % (10/07/23 1137) Vital Signs (24h Range):  Temp:  [97.6 °F (36.4 °C)-98.7 °F (37.1 °C)] 97.6 °F (36.4 °C)  Pulse:  [] 80  Resp:  [10-18] 10  SpO2:  [93 %-100 %] 93 %  BP: (107-138)/(56-90) 107/56     Intake/Output - Last 3 Shifts         10/05 0700  10/06 0659 10/06 0700  10/07 0659 10/07 0700  10/08 0659    P.O. 480 720 400    I.V. (mL/kg) 367.4 (3)      IV Piggyback 100      Total Intake(mL/kg) 947.4 (7.9) 720 (6) 400 (3.3)    Urine (mL/kg/hr) 500 (0.2)      Drains 45 20     Stool 0      Total Output 545 20     Net +402.4 +700 +400           Urine Occurrence  1 x 1 x    Stool Occurrence 1 x 0 x 0 x             Physical Exam  Vitals reviewed.   Constitutional:       Appearance: She is well-developed. She is obese.   HENT:      Head: Normocephalic and atraumatic.   Eyes:      Extraocular Movements: Extraocular movements intact.   Cardiovascular:      Rate and Rhythm: Normal rate.   Pulmonary:      Effort: Pulmonary effort is normal. No respiratory distress.   Abdominal:      Tenderness: There is abdominal tenderness. There is no guarding.      Comments: Ostomy w  stool +air  IR drains x2 w serosanguinous drainage    Musculoskeletal:         General: Normal range of motion.   Skin:     General: Skin is warm and dry.      Capillary Refill: Capillary refill takes less than 2 seconds.   Neurological:      General: No focal deficit present.      Mental Status: She is alert and oriented to person, place, and time.          Significant Labs:  BMP:   Recent Labs   Lab 10/05/23  0348         K 4.0      CO2 27   BUN 4*   CREATININE 0.7   CALCIUM 8.8   MG 1.8       CBC:   Recent Labs   Lab 10/05/23  0348   WBC 6.44   RBC 3.77*   HGB 10.6*   HCT 34.3*      MCV 91   MCH 28.1   MCHC 30.9*       CMP:   Recent Labs   Lab 10/04/23  1757 10/05/23  0348   * 106   CALCIUM 9.0 8.8   ALBUMIN 3.1*  --    PROT 7.1  --    * 136   K 3.9 4.0   CO2 24 27    101   BUN 5* 4*   CREATININE 0.7 0.7   ALKPHOS 136*  --    ALT 9*  --    AST 11  --    BILITOT 0.2  --        CRP:   Recent Labs   Lab 10/06/23  0538   CRP 64.4*         Significant Diagnostics:  None

## 2023-10-07 NOTE — ASSESSMENT & PLAN NOTE
Assessment:  Ms. Abrams is a 38 year old woman well known to our service most recently with complex abdominal wall reconstruction and ileostomy re-siting. This has been complicated by persistent postoperative pain and seromas that required IR drain placement. She now presents with new purulent drain output and persistent pain.   S/p IR w drainage placement 10/6/2023  Chronic opoid use     Plan:  Continue multimodal pain regimen  Continue trending CRP  Waiting on abx susceptibilities to result. Will transition to PO abx when appropriate  Monitor drain output  Monitor abdominal exam  Diflucan x1 dose  Continue IV abx  Continue home meds  Vte ppx: scd, lovenox

## 2023-10-07 NOTE — PLAN OF CARE
Patient is alert and oriented with no communication barriers. Patient is not on coumadin or dialysis at this time. Patient lives in [place], [place]. With her 2 children (6 years old and 10 years old). Patient parents will transport her home at discharge. Patient is requesting home health when she leaves and goes back to [place].   10/07/23 1422   Discharge Assessment   Assessment Type Discharge Planning Assessment   Confirmed/corrected address, phone number and insurance Yes   Confirmed Demographics Correct on Facesheet   Source of Information patient   People in Home child(grayson), dependent   Do you expect to return to your current living situation? Yes   Do you have help at home or someone to help you manage your care at home? No   Prior to hospitilization cognitive status: Alert/Oriented   Current cognitive status: Alert/Oriented   Equipment Currently Used at Home none   Readmission within 30 days? Yes   Patient currently being followed by outpatient case management? No   Do you currently have service(s) that help you manage your care at home? No   Do you take prescription medications? Yes   Do you have prescription coverage? Yes   Coverage [facility]   Do you have any problems affording any of your prescribed medications? No   Is the patient taking medications as prescribed? yes   Who is going to help you get home at discharge? Parents   How do you get to doctors appointments? car, drives self;family or friend will provide   Are you on dialysis? No   Do you take coumadin? No   DME Needed Upon Discharge  none   Transition of Care Barriers None   Discharge Plan A Home   Physical Activity   On average, how many days per week do you engage in moderate to strenuous exercise (like a brisk walk)? 0 days   On average, how many minutes do you engage in exercise at this level? 0 min   Financial Resource Strain   How hard is it for you to pay for the very basics like food, housing, medical care, and heating? Not hard   Housing Stability    In the last 12 months, was there a time when you were not able to pay the mortgage or rent on time? N   In the last 12 months, was there a time when you did not have a steady place to sleep or slept in a shelter (including now)? N   Transportation Needs   In the past 12 months, has lack of transportation kept you from medical appointments or from getting medications? no   In the past 12 months, has lack of transportation kept you from meetings, work, or from getting things needed for daily living? No   Food Insecurity   Within the past 12 months, you worried that your food would run out before you got the money to buy more. Never true   Within the past 12 months, the food you bought just didn't last and you didn't have money to get more. Never true   Stress   Do you feel stress - tense, restless, nervous, or anxious, or unable to sleep at night because your mind is troubled all the time - these days? To some exte   Social Connections   In a typical week, how many times do you talk on the phone with family, friends, or neighbors? More than 3   How often do you get together with friends or relatives? More than 3   How often do you attend Episcopal or Judaism services? More than 4   Do you belong to any clubs or organizations such as Episcopal groups, unions, fraternal or athletic groups, or school groups? No   How often do you attend meetings of the clubs or organizations you belong to? Never   Are you , , , , never , or living with a partner?    Alcohol Use   Q1: How often do you have a drink containing alcohol? Never   Q2: How many drinks containing alcohol do you have on a typical day when you are drinking? None   Q3: How often do you have six or more drinks on one occasion? Never     Gordon bhavin [facility]  Initial Discharge Assessment       Primary Care Provider: No, Primary Doctor    Admission Diagnosis: Post-operative pain [G89.18]  Right sided abdominal pain [R10.9]  Nausea  and vomiting, unspecified vomiting type [R11.2]    Admission Date: 10/4/2023  Expected Discharge Date:     Transition of Care Barriers: (P) None    Payor: [employer] / Plan: [facility] CHOICE PLUS / Product Type: Commercial /     Extended Emergency Contact Information  Primary Emergency Contact: Delroy Craig  Mobile Phone: [phone]  Relation: Father  Preferred language: English   needed? No  Secondary Emergency Contact: Stacia Craig  Mobile Phone: [phone]  Relation: Mother  Preferred language: English   needed? No    Discharge Plan A: (P) Home         [facility] [address]  [address] [address]  Phone: [phone] Fax: [phone]    [id number] [address] [place] [address] AT [facility]  [address] [place] [address]  Phone: [phone] Fax: [phone]      Initial Assessment (most recent)       Adult Discharge Assessment - 10/07/23 1422          Discharge Assessment    Assessment Type Discharge Planning Assessment (P)      Confirmed/corrected address, phone number and insurance Yes (P)      Confirmed Demographics Correct on Facesheet (P)      Source of Information patient (P)      People in Home child(grayson), dependent (P)      Do you expect to return to your current living situation? Yes (P)      Do you have help at home or someone to help you manage your care at home? No (P)      Prior to hospitilization cognitive status: Alert/Oriented (P)      Current cognitive status: Alert/Oriented (P)      Equipment Currently Used at Home none (P)      Readmission within 30 days? Yes (P)      Patient currently being followed by outpatient case management? No (P)      Do you currently have service(s) that help you manage your care at home? No (P)      Do you take prescription medications? Yes (P)      Do you have prescription coverage? Yes (P)      Coverage [facility] (P)      Do you have any  problems affording any of your prescribed medications? No (P)      Is the patient taking medications as prescribed? yes (P)      Who is going to help you get home at discharge? Parents (P)      How do you get to doctors appointments? car, drives self;family or friend will provide (P)      Are you on dialysis? No (P)      Do you take coumadin? No (P)      DME Needed Upon Discharge  none (P)      Transition of Care Barriers None (P)      Discharge Plan A Home (P)         Physical Activity    On average, how many days per week do you engage in moderate to strenuous exercise (like a brisk walk)? 0 days (P)      On average, how many minutes do you engage in exercise at this level? 0 min (P)         Financial Resource Strain    How hard is it for you to pay for the very basics like food, housing, medical care, and heating? Not hard at all (P)         Housing Stability    In the last 12 months, was there a time when you were not able to pay the mortgage or rent on time? No (P)      In the last 12 months, was there a time when you did not have a steady place to sleep or slept in a shelter (including now)? No (P)         Transportation Needs    In the past 12 months, has lack of transportation kept you from medical appointments or from getting medications? No (P)      In the past 12 months, has lack of transportation kept you from meetings, work, or from getting things needed for daily living? No (P)         Food Insecurity    Within the past 12 months, you worried that your food would run out before you got the money to buy more. Never true (P)      Within the past 12 months, the food you bought just didn't last and you didn't have money to get more. Never true (P)         Stress    Do you feel stress - tense, restless, nervous, or anxious, or unable to sleep at night because your mind is troubled all the time - these days? To some extent (P)         Social Connections    In a typical week, how many times do you talk on the  phone with family, friends, or neighbors? More than three times a week (P)      How often do you get together with friends or relatives? More than three times a week (P)      How often do you attend Scientologist or Sikhism services? More than 4 times per year (P)      Do you belong to any clubs or organizations such as Scientologist groups, unions, fraternal or athletic groups, or school groups? No (P)      How often do you attend meetings of the clubs or organizations you belong to? Never (P)      Are you , , , , never , or living with a partner?  (P)         Alcohol Use    Q1: How often do you have a drink containing alcohol? Never (P)      Q2: How many drinks containing alcohol do you have on a typical day when you are drinking? Patient does not drink (P)      Q3: How often do you have six or more drinks on one occasion? Never (P)

## 2023-10-07 NOTE — PLAN OF CARE
Problem: Adult Inpatient Plan of Care  Goal: Plan of Care Review  Outcome: Ongoing, Progressing  Goal: Patient-Specific Goal (Individualized)  Outcome: Ongoing, Progressing  Goal: Absence of Hospital-Acquired Illness or Injury  Outcome: Ongoing, Progressing  Goal: Optimal Comfort and Wellbeing  Outcome: Ongoing, Progressing  Goal: Readiness for Transition of Care  Outcome: Ongoing, Progressing     Problem: Impaired Wound Healing  Goal: Optimal Wound Healing  Outcome: Ongoing, Progressing     Problem: Bariatric Environmental Safety  Goal: Safety Maintained with Care  Outcome: Ongoing, Progressing       [facility] Plan of Care Note  Dx: attention to ileostomy/ abdominal pain     Shift Events: none     Goals of Care: pain management     Neuro: WDL     Vital Signs: WDL     Respiratory: WDL     Diet: Regular     Is patient tolerating current diet? yes no n/v      GTTS: none     Urine Output/Bowel Movement: see flow sheets     Drains/Tubes/Tube Feeds (include total output/shift): 10cc and 10cc     Lines: PIV x1     Accuchecks: none     Skin: WDL ex drains/ devices     Fall Risk Score: 13     Activity level? Independent     Any scheduled procedures?     Any safety concerns? none     Other:  NA

## 2023-10-07 NOTE — PLAN OF CARE
[facility] Plan of Care Note     Dx: Post-operative pain, Right sided abdominal pain, Nausea and vomiting     Shift Events: Pain Management, Drain Care, ABT, & Safety     Goals of Care: Pain Management, Drain Care, ABT, & Safety     Neuro: AAO x 4     Vital Signs: WNL      Respiratory: Room Air     Diet: Regular diet     Is patient tolerating current diet? yes     GTTS: None     Urine Output/Bowel Movement: Adequate urine output, RUQ Ileostomy- put cares for herself LBM 10/7/23       Drains/Tubes/Tube Feeds (include total output/shift): CONSTANZA Drains to RUQ: #1 Superior, #2 Inferior     Lines: 20g R UA        Accuchecks: None     Skin: Healed midline abdominal incision, healed old ostomy sited to RLQ of abdomen, & Insertion site for removed CONSTANZA drain      Fall Risk Score: 13     Activity level? Independent      Any scheduled procedures? N/a     Any safety concerns? None     Other: None

## 2023-10-08 LAB
ANION GAP SERPL CALC-SCNC: 10 MMOL/L (ref 8–16)
BUN SERPL-MCNC: 4 MG/DL (ref 6–20)
CALCIUM SERPL-MCNC: 8.9 MG/DL (ref 8.7–10.5)
CHLORIDE SERPL-SCNC: 103 MMOL/L (ref 95–110)
CO2 SERPL-SCNC: 20 MMOL/L (ref 23–29)
CREAT SERPL-MCNC: 0.7 MG/DL (ref 0.5–1.4)
CRP SERPL-MCNC: 69.5 MG/L (ref 0–8.2)
EST. GFR  (NO RACE VARIABLE): >60 ML/MIN/1.73 M^2
GLUCOSE SERPL-MCNC: 106 MG/DL (ref 70–110)
POTASSIUM SERPL-SCNC: 5.1 MMOL/L (ref 3.5–5.1)
SODIUM SERPL-SCNC: 133 MMOL/L (ref 136–145)
VANCOMYCIN TROUGH SERPL-MCNC: 12.1 UG/ML (ref 10–22)

## 2023-10-08 PROCEDURE — 80202 ASSAY OF VANCOMYCIN: CPT | Performed by: COLON & RECTAL SURGERY

## 2023-10-08 PROCEDURE — 25000003 PHARM REV CODE 250

## 2023-10-08 PROCEDURE — 63600175 PHARM REV CODE 636 W HCPCS

## 2023-10-08 PROCEDURE — 25000003 PHARM REV CODE 250: Performed by: COLON & RECTAL SURGERY

## 2023-10-08 PROCEDURE — 80048 BASIC METABOLIC PNL TOTAL CA: CPT | Performed by: COLON & RECTAL SURGERY

## 2023-10-08 PROCEDURE — 36415 COLL VENOUS BLD VENIPUNCTURE: CPT | Performed by: COLON & RECTAL SURGERY

## 2023-10-08 PROCEDURE — 63600175 PHARM REV CODE 636 W HCPCS: Performed by: SURGERY

## 2023-10-08 PROCEDURE — 25000003 PHARM REV CODE 250: Performed by: SURGERY

## 2023-10-08 PROCEDURE — 20600001 HC STEP DOWN PRIVATE ROOM

## 2023-10-08 PROCEDURE — 86140 C-REACTIVE PROTEIN: CPT | Performed by: SURGERY

## 2023-10-08 PROCEDURE — 36415 COLL VENOUS BLD VENIPUNCTURE: CPT | Performed by: SURGERY

## 2023-10-08 PROCEDURE — 63600175 PHARM REV CODE 636 W HCPCS: Performed by: COLON & RECTAL SURGERY

## 2023-10-08 RX ADMIN — ENOXAPARIN SODIUM 40 MG: 40 INJECTION SUBCUTANEOUS at 04:10

## 2023-10-08 RX ADMIN — ACETAMINOPHEN 650 MG: 325 TABLET ORAL at 11:10

## 2023-10-08 RX ADMIN — VANCOMYCIN HYDROCHLORIDE 1500 MG: 1.5 INJECTION, POWDER, LYOPHILIZED, FOR SOLUTION INTRAVENOUS at 11:10

## 2023-10-08 RX ADMIN — OXYCODONE HYDROCHLORIDE 30 MG: 10 TABLET ORAL at 04:10

## 2023-10-08 RX ADMIN — LOSARTAN POTASSIUM 100 MG: 50 TABLET, FILM COATED ORAL at 08:10

## 2023-10-08 RX ADMIN — ACETAMINOPHEN 650 MG: 325 TABLET ORAL at 12:10

## 2023-10-08 RX ADMIN — ACETAMINOPHEN 650 MG: 325 TABLET ORAL at 05:10

## 2023-10-08 RX ADMIN — HYDROMORPHONE HYDROCHLORIDE 1 MG: 1 INJECTION, SOLUTION INTRAMUSCULAR; INTRAVENOUS; SUBCUTANEOUS at 12:10

## 2023-10-08 RX ADMIN — OXYCODONE HYDROCHLORIDE 30 MG: 10 TABLET ORAL at 11:10

## 2023-10-08 RX ADMIN — VENLAFAXINE 150 MG: 75 TABLET ORAL at 04:10

## 2023-10-08 RX ADMIN — METHOCARBAMOL 500 MG: 500 TABLET ORAL at 12:10

## 2023-10-08 RX ADMIN — VANCOMYCIN HYDROCHLORIDE 1500 MG: 1.5 INJECTION, POWDER, LYOPHILIZED, FOR SOLUTION INTRAVENOUS at 12:10

## 2023-10-08 RX ADMIN — HYDROMORPHONE HYDROCHLORIDE 1 MG: 1 INJECTION, SOLUTION INTRAMUSCULAR; INTRAVENOUS; SUBCUTANEOUS at 05:10

## 2023-10-08 RX ADMIN — HYDROMORPHONE HYDROCHLORIDE 1 MG: 1 INJECTION, SOLUTION INTRAMUSCULAR; INTRAVENOUS; SUBCUTANEOUS at 09:10

## 2023-10-08 RX ADMIN — METHOCARBAMOL 500 MG: 500 TABLET ORAL at 04:10

## 2023-10-08 RX ADMIN — METHOCARBAMOL 500 MG: 500 TABLET ORAL at 09:10

## 2023-10-08 RX ADMIN — HYDROMORPHONE HYDROCHLORIDE 1 MG: 1 INJECTION, SOLUTION INTRAMUSCULAR; INTRAVENOUS; SUBCUTANEOUS at 10:10

## 2023-10-08 RX ADMIN — HYDROMORPHONE HYDROCHLORIDE 1 MG: 1 INJECTION, SOLUTION INTRAMUSCULAR; INTRAVENOUS; SUBCUTANEOUS at 02:10

## 2023-10-08 RX ADMIN — OXYCODONE HYDROCHLORIDE 30 MG: 10 TABLET ORAL at 08:10

## 2023-10-08 RX ADMIN — HYDROMORPHONE HYDROCHLORIDE 1 MG: 1 INJECTION, SOLUTION INTRAMUSCULAR; INTRAVENOUS; SUBCUTANEOUS at 06:10

## 2023-10-08 RX ADMIN — LIDOCAINE 5% 1 PATCH: 700 PATCH TOPICAL at 10:10

## 2023-10-08 RX ADMIN — METHOCARBAMOL 500 MG: 500 TABLET ORAL at 08:10

## 2023-10-08 NOTE — SUBJECTIVE & OBJECTIVE
Subjective:     Interval History: Pain well controlled. CONSTANZA drains x2 with mostly serous output. Still pending all cultures/susceptibilities from drains to result. Tolerating diet.    Post-Op Info:  * No surgery found *          Medications:  Continuous Infusions:      Scheduled Meds:   acetaminophen  650 mg Oral Q6H    enoxparin  40 mg Subcutaneous Q24H (prophylaxis, 1700)    LIDOcaine  1 patch Transdermal Q24H    losartan  100 mg Oral Daily    methocarbamoL  500 mg Oral QID    vancomycin (VANCOCIN) IV (PEDS and ADULTS)  1,500 mg Intravenous Q12H    venlafaxine  150 mg Oral Daily     PRN Meds:   clonazePAM    HYDROmorphone    naloxone    ondansetron    oxyCODONE    sodium chloride 0.9%    vancomycin - pharmacy to dose        Objective:     Vital Signs (Most Recent):  Temp: 97.7 °F (36.5 °C) (10/08/23 0707)  Pulse: 75 (10/08/23 0856)  Resp: 17 (10/08/23 1000)  BP: (!) 104/55 (10/08/23 0856)  SpO2: 96 % (10/08/23 0856) Vital Signs (24h Range):  Temp:  [96.6 °F (35.9 °C)-98.8 °F (37.1 °C)] 97.7 °F (36.5 °C)  Pulse:  [72-92] 75  Resp:  [14-18] 17  SpO2:  [93 %-100 %] 96 %  BP: (104-136)/(55-78) 104/55     Intake/Output - Last 3 Shifts         10/06 0700  10/07 0659 10/07 0700  10/08 0659 10/08 0700  10/09 0659    P.O. 720 1280     I.V. (mL/kg)       IV Piggyback       Total Intake(mL/kg) 720 (6) 1280 (10.7)     Urine (mL/kg/hr)       Drains 20 20     Stool  0     Total Output 20 20     Net +700 +1260            Urine Occurrence 1 x 3 x     Stool Occurrence 0 x 4 x 0 x             Physical Exam  Vitals reviewed.   Constitutional:       Appearance: She is well-developed. She is obese.   HENT:      Head: Normocephalic and atraumatic.   Eyes:      Extraocular Movements: Extraocular movements intact.   Cardiovascular:      Rate and Rhythm: Normal rate.   Pulmonary:      Effort: Pulmonary effort is normal. No respiratory distress.   Abdominal:      Tenderness: There is abdominal tenderness. There is no guarding.       Comments: Ostomy w stool +air  IR drains x2 w serous drainage    Musculoskeletal:         General: Normal range of motion.   Skin:     General: Skin is warm and dry.      Capillary Refill: Capillary refill takes less than 2 seconds.   Neurological:      General: No focal deficit present.      Mental Status: She is alert and oriented to person, place, and time.          Significant Labs:  BMP:   Recent Labs   Lab 10/05/23  0348 10/08/23  0558    106    133*   K 4.0 5.1    103   CO2 27 20*   BUN 4* 4*   CREATININE 0.7 0.7   CALCIUM 8.8 8.9   MG 1.8  --        CBC:   Recent Labs   Lab 10/05/23  0348   WBC 6.44   RBC 3.77*   HGB 10.6*   HCT 34.3*      MCV 91   MCH 28.1   MCHC 30.9*       CMP:   Recent Labs   Lab 10/04/23  1757 10/05/23  0348 10/08/23  0558   *   < > 106   CALCIUM 9.0   < > 8.9   ALBUMIN 3.1*  --   --    PROT 7.1  --   --    *   < > 133*   K 3.9   < > 5.1   CO2 24   < > 20*      < > 103   BUN 5*   < > 4*   CREATININE 0.7   < > 0.7   ALKPHOS 136*  --   --    ALT 9*  --   --    AST 11  --   --    BILITOT 0.2  --   --     < > = values in this interval not displayed.       CRP:   Recent Labs   Lab 10/08/23  0558   CRP 69.5*         Significant Diagnostics:  None

## 2023-10-08 NOTE — PLAN OF CARE
Problem: Adult Inpatient Plan of Care  Goal: Plan of Care Review  Outcome: Ongoing, Progressing  Goal: Patient-Specific Goal (Individualized)  Outcome: Ongoing, Progressing  Goal: Absence of Hospital-Acquired Illness or Injury  Outcome: Ongoing, Progressing  Goal: Optimal Comfort and Wellbeing  Outcome: Ongoing, Progressing  Goal: Readiness for Transition of Care  Outcome: Ongoing, Progressing     Problem: Impaired Wound Healing  Goal: Optimal Wound Healing  Outcome: Ongoing, Progressing     Problem: Bariatric Environmental Safety  Goal: Safety Maintained with Care  Outcome: Ongoing, Progressing       [facility] Plan of Care Note  Dx: attention to ileostomy/ abdominal pain     Shift Events: none     Goals of Care: pain management     Neuro: WDL     Vital Signs: WDL     Respiratory: WDL     Diet: Regular     Is patient tolerating current diet? yes no n/v      GTTS: none     Urine Output/Bowel Movement: see flow sheets     Drains/Tubes/Tube Feeds (include total output/shift): see flow sheets     Lines: PIV x1     Accuchecks: none     Skin: WDL ex drains/ devices     Fall Risk Score: 13     Activity level? Independent     Any scheduled procedures?     Any safety concerns? none     Other:  NA

## 2023-10-08 NOTE — ASSESSMENT & PLAN NOTE
Assessment:  Ms. Abrams is a 38 year old woman well known to our service most recently with complex abdominal wall reconstruction and ileostomy re-siting. This has been complicated by persistent postoperative pain and seromas that required IR drain placement. She now presents with new purulent drain output and persistent pain.   S/p IR w drainage placement 10/6/2023  Chronic opoid use     Plan:  Continue multimodal pain regimen  Continue trending CRP  Monitor drain output  Continue IV abx  Continue home meds  Vte ppx: scd, lovenox    Dispo: waiting for final culture/susceptibilities to result. Will transition to po abx as appropriate.

## 2023-10-08 NOTE — PLAN OF CARE
10/08/23 1816   Discharge Plan   Discharge Plan A Home  (Requested home health)   Discharge Plan B Home       Patient lives in [place], [place]. Requested home health at discharge.      Rhina Dorsey RN  Weekend  - [facility] [address]  Spectralink: [phone]

## 2023-10-08 NOTE — PROGRESS NOTES
"Pharmacokinetic Assessment Follow Up: IV Vancomycin    Vancomycin serum concentration assessment(s):    The trough level was drawn correctly and can be used to guide therapy at this time. The measurement is within the desired definitive target range of 10 to 20 mcg/mL.    Vancomycin Regimen Plan:    Cultures growing MSSA and enterococcus species, will liberalize goal trough to 10-20 for IAI  Plan to CCR of vancomycin 1.5 g IV q12h  Plan for next vancomycin trough Tuesday 10/10 at 1100  De-escalate antibiotics once all cultures have finalized    Drug levels (last 3 results):  Recent Labs   Lab Result Units 10/08/23  1107   Vancomycin-Trough ug/mL 12.1     Pharmacy will continue to follow and monitor vancomycin.    Please contact pharmacy at extension [phone] for questions regarding this assessment.    Thank you for the consult,   Allen Nicolas, PharmD, [facility]      Patient brief summary:  Georgette Abrams is a 38 y.o. female initiated on antimicrobial therapy with IV Vancomycin for treatment of intra-abdominal infection    Drug Allergies:   Review of patient's allergies indicates:   Allergen Reactions    Levofloxacin Nausea And Vomiting and Rash    Morphine Anaphylaxis, Hives, Shortness Of Breath and Hallucinations           Piperacillin-tazobactam Rash    Sulfa (sulfonamide antibiotics) Nausea And Vomiting and Rash    Opioids - morphine analogues     Hydrocodone-acetaminophen Itching       Actual Body Weight:   120.1 kg    Renal Function:   Estimated Creatinine Clearance: 143.4 mL/min (based on SCr of 0.7 mg/dL).,     Dialysis Method (if applicable):  N/A    CBC (last 72 hours):  No results for input(s): "WHITE BLOOD CELL COUNT", "HEMOGLOBIN", "HEMATOCRIT", "PLATELETS", "GRAN%", "LYMPH%", "MONO%", "EOSINOPHIL%", "BASOPHIL%", "DIFFERENTIAL METHOD" in the last 72 hours.    Metabolic Panel (last 72 hours):  Recent Labs   Lab Result Units 10/08/23  0558   Sodium mmol/L 133*   Potassium mmol/L 5.1   Chloride mmol/L 103 "   CO2 mmol/L 20*   Glucose mg/dL 106   BUN mg/dL 4*   Creatinine mg/dL 0.7       Vancomycin Administrations:  vancomycin given in the last 96 hours                     vancomycin 1,500 mg in dextrose 5 % (D5W) 250 mL IVPB (Vial-Mate) (mg) 1,500 mg New Bag 10/08/23 0027     1,500 mg New Bag 10/07/23 1103     1,500 mg New Bag 10/06/23 2338    vancomycin 2 g in dextrose 5 % 500 mL IVPB (mg) 2,000 mg New Bag 10/06/23 1203                    Microbiologic Results:  Microbiology Results (last 7 days)       Procedure Component Value Units Date/Time    Blood Culture #1 **CANNOT BE ORDERED STAT** [[id number]] Collected: 10/04/23 1821    Order Status: Completed Specimen: Blood from Peripheral, Forearm, Left Updated: 10/07/23 2012     Blood Culture, Routine No Growth to date      No Growth to date      No Growth to date      No Growth to date    Blood Culture #2 **CANNOT BE ORDERED STAT** [[id number]] Collected: 10/04/23 1821    Order Status: Completed Specimen: Blood from Peripheral, Hand, Left Updated: 10/07/23 2012     Blood Culture, Routine No Growth to date      No Growth to date      No Growth to date      No Growth to date    Culture, Anaerobe [[id number]] Collected: 10/05/23 1200    Order Status: Completed Specimen: Abscess from Abdomen Updated: 10/07/23 1348     Anaerobic Culture Culture in progress    Aerobic culture [[id number]]  (Abnormal) Collected: 10/05/23 1200    Order Status: Completed Specimen: Abscess from Abdomen Updated: 10/07/23 1305     Aerobic Bacterial Culture STAPHYLOCOCCUS AUREUS  Many  Susceptibility pending        ENTEROCOCCUS SPECIES  Many  Identification and susceptibility pending      Aerobic culture [[id number]]  (Abnormal)  (Susceptibility) Collected: 10/05/23 1200    Order Status: Completed Specimen: Abscess from Abdomen Updated: 10/07/23 1233     Aerobic Bacterial Culture STAPHYLOCOCCUS AUREUS  Many      Narrative:      Subcutaneous    Aerobic culture [[id number]]  (Abnormal)   (Susceptibility) Collected: 10/04/23 1838    Order Status: Completed Specimen: Abscess from Abdomen Updated: 10/07/23 1221     Aerobic Bacterial Culture STAPHYLOCOCCUS AUREUS  Few      Narrative:      From abdominal wall drain bulb    AFB Culture & Smear [[id number]] Collected: 10/05/23 1200    Order Status: Completed Specimen: Abscess from Abdomen Updated: 10/06/23 2127     AFB Culture & Smear Culture in progress     AFB CULTURE STAIN No acid fast bacilli seen.    AFB Culture & Smear [[id number]] Collected: 10/05/23 1200    Order Status: Completed Specimen: Abscess from Abdomen Updated: 10/06/23 2127     AFB Culture & Smear Culture in progress     AFB CULTURE STAIN No acid fast bacilli seen.    Narrative:      Subcutaneous    Culture, Anaerobe [[id number]] Collected: 10/05/23 1200    Order Status: Completed Specimen: Abscess from Abdomen Updated: 10/06/23 0954     Anaerobic Culture Culture in progress    Narrative:      Subcutaneous    Culture, Anaerobe [[id number]] Collected: 10/04/23 1838    Order Status: Completed Specimen: Abscess from Abdomen Updated: 10/06/23 0953     Anaerobic Culture Culture in progress    Narrative:      From abdominal wall drain bulb    Gram stain [[id number]] Collected: 10/05/23 1200    Order Status: Completed Specimen: Abscess from Abdomen Updated: 10/05/23 2129     Gram Stain Result Many WBC's      No organisms seen    Gram stain [[id number]] Collected: 10/05/23 1200    Order Status: Completed Specimen: Abscess from Abdomen Updated: 10/05/23 1932     Gram Stain Result Few WBC's      No organisms seen    Narrative:      Subcutaneous    Fungus culture [[id number]] Collected: 10/05/23 1200    Order Status: Sent Specimen: Abscess from Abdomen Updated: 10/05/23 1541    Fungus culture [[id number]] Collected: 10/05/23 1200    Order Status: Sent Specimen: Abscess from Abdomen Updated: 10/05/23 1537

## 2023-10-08 NOTE — PLAN OF CARE
[facility] Plan of Care Note     Dx: Post-operative pain, Right sided abdominal pain, Nausea and vomiting     Shift Events: Pain Management, Drain Care, ABT, & Safety     Goals of Care: Pain Management, Drain Care, ABT, & Safety     Neuro: AAO x 4     Vital Signs: WNL      Respiratory: Room Air     Diet: Regular diet     Is patient tolerating current diet? yes     GTTS: None     Urine Output/Bowel Movement: Adequate urine output, RUQ Ileostomy- put cares for herself LBM 10/8/23     Drains/Tubes/Tube Feeds (include total output/shift): CONSTANZA Drains to RUQ: #1 Superior, #2 Inferior     Lines: 20g JULIO      Accuchecks: None     Skin: Healed midline abdominal incision, healed old ostomy sited to RLQ of abdomen, & Insertion site for removed CONSTANZA drain      Fall Risk Score: 13     Activity level? Independent      Any scheduled procedures? N/a     Any safety concerns? BP going to low 100's     Other: None

## 2023-10-08 NOTE — PROGRESS NOTES
[facility]  Colorectal Surgery  Progress Note    Patient Name: Georgette Abrams  MRN: [id number]  Admission Date: 10/4/2023  Hospital Length of Stay: 4 days  Attending Physician: KRISH Tracy MD    Subjective:     Interval History: Pain well controlled. CONSTANZA drains x2 with mostly serous output. Still pending all cultures/susceptibilities from drains to result. Tolerating diet.    Post-Op Info:  * No surgery found *          Medications:  Continuous Infusions:      Scheduled Meds:   acetaminophen  650 mg Oral Q6H    enoxparin  40 mg Subcutaneous Q24H (prophylaxis, 1700)    LIDOcaine  1 patch Transdermal Q24H    losartan  100 mg Oral Daily    methocarbamoL  500 mg Oral QID    vancomycin (VANCOCIN) IV (PEDS and ADULTS)  1,500 mg Intravenous Q12H    venlafaxine  150 mg Oral Daily     PRN Meds:   clonazePAM    HYDROmorphone    naloxone    ondansetron    oxyCODONE    sodium chloride 0.9%    vancomycin - pharmacy to dose        Objective:     Vital Signs (Most Recent):  Temp: 97.7 °F (36.5 °C) (10/08/23 0707)  Pulse: 75 (10/08/23 0856)  Resp: 17 (10/08/23 1000)  BP: (!) 104/55 (10/08/23 0856)  SpO2: 96 % (10/08/23 0856) Vital Signs (24h Range):  Temp:  [96.6 °F (35.9 °C)-98.8 °F (37.1 °C)] 97.7 °F (36.5 °C)  Pulse:  [72-92] 75  Resp:  [14-18] 17  SpO2:  [93 %-100 %] 96 %  BP: (104-136)/(55-78) 104/55     Intake/Output - Last 3 Shifts         10/06 0700  10/07 0659 10/07 0700  10/08 0659 10/08 0700  10/09 0659    P.O. 720 1280     I.V. (mL/kg)       IV Piggyback       Total Intake(mL/kg) 720 (6) 1280 (10.7)     Urine (mL/kg/hr)       Drains 20 20     Stool  0     Total Output 20 20     Net +700 +1260            Urine Occurrence 1 x 3 x     Stool Occurrence 0 x 4 x 0 x             Physical Exam  Vitals reviewed.   Constitutional:       Appearance: She is well-developed. She is obese.   HENT:      Head: Normocephalic and atraumatic.   Eyes:      Extraocular Movements: Extraocular movements intact.    Cardiovascular:      Rate and Rhythm: Normal rate.   Pulmonary:      Effort: Pulmonary effort is normal. No respiratory distress.   Abdominal:      Tenderness: There is abdominal tenderness. There is no guarding.      Comments: Ostomy w stool +air  IR drains x2 w serous drainage    Musculoskeletal:         General: Normal range of motion.   Skin:     General: Skin is warm and dry.      Capillary Refill: Capillary refill takes less than 2 seconds.   Neurological:      General: No focal deficit present.      Mental Status: She is alert and oriented to person, place, and time.          Significant Labs:  BMP:   Recent Labs   Lab 10/05/23  0348 10/08/23  0558    106    133*   K 4.0 5.1    103   CO2 27 20*   BUN 4* 4*   CREATININE 0.7 0.7   CALCIUM 8.8 8.9   MG 1.8  --        CBC:   Recent Labs   Lab 10/05/23  0348   WBC 6.44   RBC 3.77*   HGB 10.6*   HCT 34.3*      MCV 91   MCH 28.1   MCHC 30.9*       CMP:   Recent Labs   Lab 10/04/23  1757 10/05/23  0348 10/08/23  0558   *   < > 106   CALCIUM 9.0   < > 8.9   ALBUMIN 3.1*  --   --    PROT 7.1  --   --    *   < > 133*   K 3.9   < > 5.1   CO2 24   < > 20*      < > 103   BUN 5*   < > 4*   CREATININE 0.7   < > 0.7   ALKPHOS 136*  --   --    ALT 9*  --   --    AST 11  --   --    BILITOT 0.2  --   --     < > = values in this interval not displayed.       CRP:   Recent Labs   Lab 10/08/23  0558   CRP 69.5*         Significant Diagnostics:  None    Assessment/Plan:     Attention to ileostomy     Assessment:  Ms. Abrams is a 38 year old woman well known to our service most recently with complex abdominal wall reconstruction and ileostomy re-siting. This has been complicated by persistent postoperative pain and seromas that required IR drain placement. She now presents with new purulent drain output and persistent pain.   S/p IR w drainage placement 10/6/2023  Chronic opoid use     Plan:  Continue multimodal pain regimen  Continue  trending CRP  Monitor drain output  Continue IV abx  Continue home meds  Vte ppx: scd, lovenox    Dispo: waiting for final culture/susceptibilities to result. Will transition to po abx as appropriate.          Ora Pierre MD  Colorectal Surgery  [facility]

## 2023-10-09 LAB
ANION GAP SERPL CALC-SCNC: 9 MMOL/L (ref 8–16)
BACTERIA BLD CULT: NORMAL
BACTERIA BLD CULT: NORMAL
BACTERIA SPEC ANAEROBE CULT: NORMAL
BUN SERPL-MCNC: 5 MG/DL (ref 6–20)
CALCIUM SERPL-MCNC: 9 MG/DL (ref 8.7–10.5)
CHLORIDE SERPL-SCNC: 103 MMOL/L (ref 95–110)
CO2 SERPL-SCNC: 26 MMOL/L (ref 23–29)
CREAT SERPL-MCNC: 0.7 MG/DL (ref 0.5–1.4)
CRP SERPL-MCNC: 55.7 MG/L (ref 0–8.2)
EST. GFR  (NO RACE VARIABLE): >60 ML/MIN/1.73 M^2
GLUCOSE SERPL-MCNC: 150 MG/DL (ref 70–110)
POTASSIUM SERPL-SCNC: 3.8 MMOL/L (ref 3.5–5.1)
SODIUM SERPL-SCNC: 138 MMOL/L (ref 136–145)

## 2023-10-09 PROCEDURE — 36415 COLL VENOUS BLD VENIPUNCTURE: CPT | Performed by: SURGERY

## 2023-10-09 PROCEDURE — 86140 C-REACTIVE PROTEIN: CPT | Performed by: SURGERY

## 2023-10-09 PROCEDURE — 80048 BASIC METABOLIC PNL TOTAL CA: CPT | Performed by: COLON & RECTAL SURGERY

## 2023-10-09 PROCEDURE — 63600175 PHARM REV CODE 636 W HCPCS: Performed by: COLON & RECTAL SURGERY

## 2023-10-09 PROCEDURE — 36573 INSJ PICC RS&I 5 YR+: CPT

## 2023-10-09 PROCEDURE — 76937 US GUIDE VASCULAR ACCESS: CPT

## 2023-10-09 PROCEDURE — 20600001 HC STEP DOWN PRIVATE ROOM

## 2023-10-09 PROCEDURE — A4216 STERILE WATER/SALINE, 10 ML: HCPCS | Performed by: COLON & RECTAL SURGERY

## 2023-10-09 PROCEDURE — 94761 N-INVAS EAR/PLS OXIMETRY MLT: CPT

## 2023-10-09 PROCEDURE — 25000003 PHARM REV CODE 250: Performed by: COLON & RECTAL SURGERY

## 2023-10-09 PROCEDURE — 25000003 PHARM REV CODE 250: Performed by: SURGERY

## 2023-10-09 PROCEDURE — 63600175 PHARM REV CODE 636 W HCPCS

## 2023-10-09 PROCEDURE — 25000003 PHARM REV CODE 250

## 2023-10-09 PROCEDURE — C1751 CATH, INF, PER/CENT/MIDLINE: HCPCS

## 2023-10-09 PROCEDURE — 63600175 PHARM REV CODE 636 W HCPCS: Performed by: SURGERY

## 2023-10-09 RX ORDER — SODIUM CHLORIDE 0.9 % (FLUSH) 0.9 %
10 SYRINGE (ML) INJECTION EVERY 6 HOURS
Status: DISCONTINUED | OUTPATIENT
Start: 2023-10-09 | End: 2023-10-12 | Stop reason: HOSPADM

## 2023-10-09 RX ORDER — SODIUM CHLORIDE 0.9 % (FLUSH) 0.9 %
10 SYRINGE (ML) INJECTION
Status: DISCONTINUED | OUTPATIENT
Start: 2023-10-09 | End: 2023-10-12 | Stop reason: HOSPADM

## 2023-10-09 RX ADMIN — HYDROMORPHONE HYDROCHLORIDE 1 MG: 1 INJECTION, SOLUTION INTRAMUSCULAR; INTRAVENOUS; SUBCUTANEOUS at 10:10

## 2023-10-09 RX ADMIN — HYDROMORPHONE HYDROCHLORIDE 1 MG: 1 INJECTION, SOLUTION INTRAMUSCULAR; INTRAVENOUS; SUBCUTANEOUS at 06:10

## 2023-10-09 RX ADMIN — LIDOCAINE 5% 1 PATCH: 700 PATCH TOPICAL at 11:10

## 2023-10-09 RX ADMIN — ACETAMINOPHEN 650 MG: 325 TABLET ORAL at 11:10

## 2023-10-09 RX ADMIN — HYDROMORPHONE HYDROCHLORIDE 1 MG: 1 INJECTION, SOLUTION INTRAMUSCULAR; INTRAVENOUS; SUBCUTANEOUS at 02:10

## 2023-10-09 RX ADMIN — ACETAMINOPHEN 650 MG: 325 TABLET ORAL at 05:10

## 2023-10-09 RX ADMIN — OXYCODONE HYDROCHLORIDE 30 MG: 10 TABLET ORAL at 11:10

## 2023-10-09 RX ADMIN — CLONAZEPAM 0.5 MG: 0.5 TABLET ORAL at 03:10

## 2023-10-09 RX ADMIN — OXYCODONE HYDROCHLORIDE 30 MG: 10 TABLET ORAL at 09:10

## 2023-10-09 RX ADMIN — METHOCARBAMOL 500 MG: 500 TABLET ORAL at 09:10

## 2023-10-09 RX ADMIN — HYDROMORPHONE HYDROCHLORIDE 1 MG: 1 INJECTION, SOLUTION INTRAMUSCULAR; INTRAVENOUS; SUBCUTANEOUS at 04:10

## 2023-10-09 RX ADMIN — VANCOMYCIN HYDROCHLORIDE 1500 MG: 1.5 INJECTION, POWDER, LYOPHILIZED, FOR SOLUTION INTRAVENOUS at 10:10

## 2023-10-09 RX ADMIN — METHOCARBAMOL 500 MG: 500 TABLET ORAL at 05:10

## 2023-10-09 RX ADMIN — METHOCARBAMOL 500 MG: 500 TABLET ORAL at 08:10

## 2023-10-09 RX ADMIN — OXYCODONE HYDROCHLORIDE 30 MG: 10 TABLET ORAL at 07:10

## 2023-10-09 RX ADMIN — ENOXAPARIN SODIUM 40 MG: 40 INJECTION SUBCUTANEOUS at 05:10

## 2023-10-09 RX ADMIN — VENLAFAXINE 150 MG: 75 TABLET ORAL at 05:10

## 2023-10-09 RX ADMIN — HYDROMORPHONE HYDROCHLORIDE 1 MG: 1 INJECTION, SOLUTION INTRAMUSCULAR; INTRAVENOUS; SUBCUTANEOUS at 08:10

## 2023-10-09 RX ADMIN — Medication 10 ML: at 06:10

## 2023-10-09 RX ADMIN — HYDROMORPHONE HYDROCHLORIDE 1 MG: 1 INJECTION, SOLUTION INTRAMUSCULAR; INTRAVENOUS; SUBCUTANEOUS at 01:10

## 2023-10-09 RX ADMIN — LOSARTAN POTASSIUM 100 MG: 50 TABLET, FILM COATED ORAL at 08:10

## 2023-10-09 RX ADMIN — OXYCODONE HYDROCHLORIDE 30 MG: 10 TABLET ORAL at 05:10

## 2023-10-09 RX ADMIN — VANCOMYCIN HYDROCHLORIDE 1500 MG: 1.5 INJECTION, POWDER, LYOPHILIZED, FOR SOLUTION INTRAVENOUS at 11:10

## 2023-10-09 RX ADMIN — METHOCARBAMOL 500 MG: 500 TABLET ORAL at 01:10

## 2023-10-09 RX ADMIN — OXYCODONE HYDROCHLORIDE 30 MG: 10 TABLET ORAL at 03:10

## 2023-10-09 NOTE — NURSING
0127 IV infiltrated. Catheter removed. Extremity elevated. IV site changed to right hand, flushing without difficulty.

## 2023-10-09 NOTE — PLAN OF CARE
[facility] Plan of Care Note     Dx: Post-operative pain     Shift Events: PRN pain medicine given throughout the night. No further complaints     Goals of Care: Pain Management and Drain Care     Neuro: AAO x 4     Vital Signs: WNL      Respiratory: Room Air     Diet: Regular diet     Is patient tolerating current diet? yes     GTTS: None     Urine Output/Bowel Movement: reports voidingx4 and emptying colostomy per selfx3     Drains/Tubes/Tube Feeds (include total output/shift): CONSTANZA Drains to RUQ: #1 Superior (2.5ml)  #2 Inferior (2.5ml)     Lines: 24g right hand       Accuchecks: None     Skin: Healed midline abdominal incision, healed old ostomy sited to RLQ of abdomen, & Insertion site for removed CONSTANZA drain      Fall Risk Score: 13     Activity level? Independent      Any scheduled procedures? N/a     Any safety concerns? NA     Other: None        Problem: Adult Inpatient Plan of Care  Goal: Plan of Care Review  Outcome: Ongoing, Progressing  Goal: Patient-Specific Goal (Individualized)  Outcome: Ongoing, Progressing  Goal: Absence of Hospital-Acquired Illness or Injury  Outcome: Ongoing, Progressing  Goal: Optimal Comfort and Wellbeing  Outcome: Ongoing, Progressing  Goal: Readiness for Transition of Care  Outcome: Ongoing, Progressing     Problem: Impaired Wound Healing  Goal: Optimal Wound Healing  Outcome: Ongoing, Progressing     Problem: Bariatric Environmental Safety  Goal: Safety Maintained with Care  Outcome: Ongoing, Progressing

## 2023-10-09 NOTE — CONSULTS
Double lumen PICC to right basilic vein.  37 cm in length, 38 cm arm circumference and 0 cm exposed.   Lot # [id number].

## 2023-10-09 NOTE — PROCEDURES
"Georgette Abrams is a 38 y.o. female patient.    Temp: 97.4 °F (36.3 °C) (10/09/23 0830)  Pulse: 73 (10/09/23 0830)  Resp: 20 (10/09/23 0830)  BP: 129/73 (10/09/23 0830)  SpO2: 97 % (10/09/23 0830)  Weight: 120.1 kg (264 lb 12.4 oz) (10/06/23 1100)  Height: 5' 7" (170.2 cm) (10/06/23 0000)    PICC  Date/Time: 10/9/2023 11:17 AM  Performed by: Daniel Lema RN  Assisting provider: Ora Terrazas RN  Consent Done: Yes  Time out: Immediately prior to procedure a time out was called to verify the correct patient, procedure, equipment, support staff and site/side marked as required  Indications: med administration and vascular access  Anesthesia: local infiltration  Local anesthetic: lidocaine 1% without epinephrine  Anesthetic Total (mL): 3  Description of findings: PICC  Preparation: skin prepped with ChloraPrep  Skin prep agent dried: skin prep agent completely dried prior to procedure  Sterile barriers: all five maximum sterile barriers used - cap, mask, sterile gown, sterile gloves, and large sterile sheet  Hand hygiene: hand hygiene performed prior to central venous catheter insertion  Location details: right basilic  Catheter type: double lumen  Catheter size: 5 Fr  Catheter Length: 37cm    Ultrasound guidance: yes  Vessel Caliber: medium and large and patent, compressibility normal  Vascular Doppler: not done  Needle advanced into vessel with real time Ultrasound guidance.  Guidewire confirmed in vessel.  Image recorded and saved.  Sterile sheath used.  no esophageal manometryNumber of attempts: 1  Post-procedure: blood return through all ports, sterile dressing applied and chlorhexidine patch  Technical procedures used: 3cg  Estimated blood loss (mL): 0  Specimens: No  Implants: No  Assessment: placement verified by x-ray  Complications: none          Name   10/9/2023    "

## 2023-10-09 NOTE — PROGRESS NOTES
Colon and Rectal Surgery Progress Note    Patient name: Georgette Abrams   YOB: 1985   MRN: [id number]    Patient Name: Georgette Abarms  Date: 10/9/2023    Subjective:  NAEO. Pain ok. Tolerating oral intake, occasional nausea.   Medications:    Current Facility-Administered Medications:     acetaminophen tablet 650 mg, 650 mg, Oral, Q6H, Robert Cruz MD, 650 mg at 10/09/23 0544    clonazePAM tablet 0.5 mg, 0.5 mg, Oral, BID PRN, Robert Cruz MD, 0.5 mg at 10/09/23 0304    enoxaparin injection 40 mg, 40 mg, Subcutaneous, Q24H (prophylaxis, 1700), Ora Pierre MD, 40 mg at 10/08/23 1652    HYDROmorphone injection 1 mg, 1 mg, Intravenous, Q3H PRN, Robert Cruz MD, 1 mg at 10/09/23 0450    LIDOcaine 5 % patch 1 patch, 1 patch, Transdermal, Q24H, Ora Pierre MD, 1 patch at 10/08/23 1000    losartan tablet 100 mg, 100 mg, Oral, Daily, Robert Cruz MD, 100 mg at 10/08/23 0850    methocarbamoL tablet 500 mg, 500 mg, Oral, QID, Robert Cruz MD, 500 mg at 10/08/23 2146    naloxone 0.4 mg/mL injection 0.02 mg, 0.02 mg, Intravenous, PRN, Robert Cruz MD    ondansetron injection 4 mg, 4 mg, Intravenous, Q6H PRN, Robert Cruz MD, 4 mg at 10/06/23 1622    oxyCODONE immediate release tablet Tab 30 mg, 30 mg, Oral, Q4H PRN, Robert Cruz MD, 30 mg at 10/09/23 0304    sodium chloride 0.9% flush 10 mL, 10 mL, Intravenous, PRN, oRbert Cruz MD    Pharmacy to dose Vancomycin consult, , , Once **AND** vancomycin - pharmacy to dose, , Intravenous, pharmacy to manage frequency, Estuardo Oro MD    [COMPLETED] vancomycin 2 g in dextrose 5 % 500 mL IVPB, 2,000 mg, Intravenous, Once, Stopped at 10/06/23 1403 **FOLLOWED BY** vancomycin 1,500 mg in dextrose 5 % (D5W) 250 mL IVPB (Vial-Mate), 1,500 mg, Intravenous, Q12H, KRISH Tracy MD, Stopped at 10/09/23 0038    venlafaxine tablet 150 mg, 150 mg, Oral, Daily, Robert Cruz MD, 150 mg at 10/08/23  1657    Facility-Administered Medications Ordered in Other Encounters:     LIDOcaine (PF) 10 mg/ml (1%) injection 10 mg, 1 mL, Intradermal, On Call Procedure, Rhina Saldana NP    Vital Signs:  Vitals:    10/09/23 0450   BP:    Pulse:    Resp: 16   Temp:        In/Out:  Intake/Output - Last 3 Shifts         10/07 0700  10/08 0659 10/08 0700  10/09 0659    P.O. 1280 1220    Total Intake(mL/kg) 1280 (10.7) 1220 (10.2)    Urine (mL/kg/hr)  4 (0)    Drains 20 15    Stool 0 2    Total Output 20 21    Net +1260 +1199          Urine Occurrence 3 x 4 x    Stool Occurrence 4 x 3 x            Physical Exam:  General: Alert, oriented, in no apparent distress  HEENT: Sclera anicteric, trachea midline  Lungs: Normal respiratory rate and effort on room air  Abdomen: Soft, minimal tenderness, drain with serous output  Extremities: Warm, well perfused, no edema, SCDs in place  Neuro: Grossly intact, moves all extremities  Psych: Appropriate affect    Laboratory Studies:  Complete Blood Count:  Lab Results   Component Value Date/Time    WBC 6.44 10/05/2023 03:48 AM    HGB 10.6 (L) 10/05/2023 03:48 AM    HCT 34.3 (L) 10/05/2023 03:48 AM    HCT 35 (L) 09/09/2023 03:50 PM    RBC 3.77 (L) 10/05/2023 03:48 AM     10/05/2023 03:48 AM       Basic Chemistry Panel:  Lab Results   Component Value Date/Time     10/09/2023 05:28 AM    K 3.8 10/09/2023 05:28 AM     10/09/2023 05:28 AM    CO2 26 10/09/2023 05:28 AM    BUN 5 (L) 10/09/2023 05:28 AM    CREATININE 0.7 10/09/2023 05:28 AM    CALCIUM 9.0 10/09/2023 05:28 AM       Lab Results   Component Value Date/Time    CRP 55.7 (H) 10/09/2023 05:28 AM       Imaging Studies:  None new    Assessment:  Ms. Abrams is a 38 year old woman well known to our service most recently with complex abdominal wall reconstruction and ileostomy re-siting. This has been complicated by persistent postoperative pain and seromas that required IR drain placement. She now presents with new  purulent drain output and persistent pain.     Plan:  Continue current pain regimen, appears to be controlling symptoms  CRP stable in 60s  IR drain in place with serous ourput  Culture growing staph and entercoccus, awaiting sensitivities  Monitor abdominal exam  Continue abx  Continue home meds  Vte ppx: darrel, david Oro MD  Colorectal Surgery Fellow

## 2023-10-09 NOTE — NURSING
Dx: Post-operative pain, Right sided abdominal pain,     Shift Events: drain care, PICC line placed        Neuro: AAO x 4     Vital Signs: WNL      Respiratory: Room Air     Diet: Regular diet     Is patient tolerating current diet? yes     GTTS: None     Urine Output/Bowel Movement: Adequate urine output, RUQ Ileostomy- put cares for herself LBM 10/8/23     Drains/Tubes/Tube Feeds (include total output/shift): CONSTANZA Drains to RUQ: #1 Superior, #2 Inferior     Lines: double lumen picc JULIO      Accuchecks: None     Fall Risk Score: 13

## 2023-10-10 PROBLEM — G89.4 CHRONIC PAIN SYNDROME: Status: ACTIVE | Noted: 2023-10-10

## 2023-10-10 PROBLEM — N73.9 PELVIC ABSCESS IN FEMALE: Status: ACTIVE | Noted: 2023-10-10

## 2023-10-10 LAB
ANION GAP SERPL CALC-SCNC: 9 MMOL/L (ref 8–16)
BUN SERPL-MCNC: 4 MG/DL (ref 6–20)
CALCIUM SERPL-MCNC: 9.4 MG/DL (ref 8.7–10.5)
CHLORIDE SERPL-SCNC: 102 MMOL/L (ref 95–110)
CO2 SERPL-SCNC: 26 MMOL/L (ref 23–29)
CREAT SERPL-MCNC: 0.7 MG/DL (ref 0.5–1.4)
CRP SERPL-MCNC: 54 MG/L (ref 0–8.2)
EST. GFR  (NO RACE VARIABLE): >60 ML/MIN/1.73 M^2
GLUCOSE SERPL-MCNC: 118 MG/DL (ref 70–110)
POTASSIUM SERPL-SCNC: 4.3 MMOL/L (ref 3.5–5.1)
SODIUM SERPL-SCNC: 137 MMOL/L (ref 136–145)
VANCOMYCIN TROUGH SERPL-MCNC: 12 UG/ML (ref 10–22)

## 2023-10-10 PROCEDURE — 94761 N-INVAS EAR/PLS OXIMETRY MLT: CPT

## 2023-10-10 PROCEDURE — 63600175 PHARM REV CODE 636 W HCPCS: Performed by: COLON & RECTAL SURGERY

## 2023-10-10 PROCEDURE — 86140 C-REACTIVE PROTEIN: CPT | Performed by: COLON & RECTAL SURGERY

## 2023-10-10 PROCEDURE — 25000003 PHARM REV CODE 250: Performed by: STUDENT IN AN ORGANIZED HEALTH CARE EDUCATION/TRAINING PROGRAM

## 2023-10-10 PROCEDURE — 25000003 PHARM REV CODE 250: Performed by: COLON & RECTAL SURGERY

## 2023-10-10 PROCEDURE — 80048 BASIC METABOLIC PNL TOTAL CA: CPT | Performed by: COLON & RECTAL SURGERY

## 2023-10-10 PROCEDURE — 20600001 HC STEP DOWN PRIVATE ROOM

## 2023-10-10 PROCEDURE — 80202 ASSAY OF VANCOMYCIN: CPT | Performed by: COLON & RECTAL SURGERY

## 2023-10-10 PROCEDURE — 63600175 PHARM REV CODE 636 W HCPCS

## 2023-10-10 PROCEDURE — A4216 STERILE WATER/SALINE, 10 ML: HCPCS | Performed by: COLON & RECTAL SURGERY

## 2023-10-10 PROCEDURE — 25000003 PHARM REV CODE 250: Performed by: SURGERY

## 2023-10-10 PROCEDURE — 63600175 PHARM REV CODE 636 W HCPCS: Performed by: SURGERY

## 2023-10-10 RX ORDER — SCOLOPAMINE TRANSDERMAL SYSTEM 1 MG/1
1 PATCH, EXTENDED RELEASE TRANSDERMAL
Status: DISCONTINUED | OUTPATIENT
Start: 2023-10-10 | End: 2023-10-12 | Stop reason: HOSPADM

## 2023-10-10 RX ORDER — MICONAZOLE NITRATE 100 MG/1
100 SUPPOSITORY VAGINAL NIGHTLY
Status: DISCONTINUED | OUTPATIENT
Start: 2023-10-10 | End: 2023-10-12 | Stop reason: HOSPADM

## 2023-10-10 RX ADMIN — METHOCARBAMOL 500 MG: 500 TABLET ORAL at 08:10

## 2023-10-10 RX ADMIN — Medication 10 ML: at 05:10

## 2023-10-10 RX ADMIN — ACETAMINOPHEN 650 MG: 325 TABLET ORAL at 05:10

## 2023-10-10 RX ADMIN — HYDROMORPHONE HYDROCHLORIDE 1 MG: 1 INJECTION, SOLUTION INTRAMUSCULAR; INTRAVENOUS; SUBCUTANEOUS at 06:10

## 2023-10-10 RX ADMIN — Medication 10 ML: at 11:10

## 2023-10-10 RX ADMIN — OXYCODONE HYDROCHLORIDE 30 MG: 10 TABLET ORAL at 01:10

## 2023-10-10 RX ADMIN — OXYCODONE HYDROCHLORIDE 30 MG: 10 TABLET ORAL at 08:10

## 2023-10-10 RX ADMIN — VANCOMYCIN HYDROCHLORIDE 1500 MG: 1.5 INJECTION, POWDER, LYOPHILIZED, FOR SOLUTION INTRAVENOUS at 12:10

## 2023-10-10 RX ADMIN — Medication 10 ML: at 12:10

## 2023-10-10 RX ADMIN — HYDROMORPHONE HYDROCHLORIDE 1 MG: 1 INJECTION, SOLUTION INTRAMUSCULAR; INTRAVENOUS; SUBCUTANEOUS at 01:10

## 2023-10-10 RX ADMIN — SCOPALAMINE 1 PATCH: 1 PATCH, EXTENDED RELEASE TRANSDERMAL at 01:10

## 2023-10-10 RX ADMIN — MICONAZOLE NITRATE 100 MG: 100 SUPPOSITORY VAGINAL at 08:10

## 2023-10-10 RX ADMIN — HYDROMORPHONE HYDROCHLORIDE 1 MG: 1 INJECTION, SOLUTION INTRAMUSCULAR; INTRAVENOUS; SUBCUTANEOUS at 11:10

## 2023-10-10 RX ADMIN — ENOXAPARIN SODIUM 40 MG: 40 INJECTION SUBCUTANEOUS at 05:10

## 2023-10-10 RX ADMIN — ACETAMINOPHEN 650 MG: 325 TABLET ORAL at 12:10

## 2023-10-10 RX ADMIN — LOSARTAN POTASSIUM 100 MG: 50 TABLET, FILM COATED ORAL at 08:10

## 2023-10-10 RX ADMIN — VANCOMYCIN HYDROCHLORIDE 1500 MG: 1.5 INJECTION, POWDER, LYOPHILIZED, FOR SOLUTION INTRAVENOUS at 11:10

## 2023-10-10 RX ADMIN — ACETAMINOPHEN 650 MG: 325 TABLET ORAL at 11:10

## 2023-10-10 RX ADMIN — OXYCODONE HYDROCHLORIDE 30 MG: 10 TABLET ORAL at 12:10

## 2023-10-10 RX ADMIN — OXYCODONE HYDROCHLORIDE 30 MG: 10 TABLET ORAL at 05:10

## 2023-10-10 RX ADMIN — HYDROMORPHONE HYDROCHLORIDE 1 MG: 1 INJECTION, SOLUTION INTRAMUSCULAR; INTRAVENOUS; SUBCUTANEOUS at 09:10

## 2023-10-10 RX ADMIN — HYDROMORPHONE HYDROCHLORIDE 1 MG: 1 INJECTION, SOLUTION INTRAMUSCULAR; INTRAVENOUS; SUBCUTANEOUS at 05:10

## 2023-10-10 RX ADMIN — CLONAZEPAM 0.5 MG: 0.5 TABLET ORAL at 01:10

## 2023-10-10 RX ADMIN — VENLAFAXINE 150 MG: 75 TABLET ORAL at 05:10

## 2023-10-10 RX ADMIN — METHOCARBAMOL 500 MG: 500 TABLET ORAL at 05:10

## 2023-10-10 RX ADMIN — CLONAZEPAM 0.5 MG: 0.5 TABLET ORAL at 08:10

## 2023-10-10 RX ADMIN — METHOCARBAMOL 500 MG: 500 TABLET ORAL at 12:10

## 2023-10-10 RX ADMIN — OXYCODONE HYDROCHLORIDE 30 MG: 10 TABLET ORAL at 04:10

## 2023-10-10 NOTE — PLAN OF CARE
[facility] Plan of Care Note     Dx: Post-operative pain     Shift Events: PRN pain medicine given throughout the night. No further complaints     Goals of Care: Pain Management and Drain Care     Neuro: AAO x 4     Vital Signs: WNL      Respiratory: Room Air     Diet: Regular diet     Is patient tolerating current diet? yes     GTTS: None     Urine Output/Bowel Movement: reports voiding without difficulty and emptying colostomy per selfx2     Drains/Tubes/Tube Feeds (include total output/shift): CONSTANZA Drains x2 to RUQ: small amount of serosanguinous fluid     Lines: Right upper arm dual lumen PICC.     Accuchecks: None     Skin: Healed midline abdominal incision, healed old ostomy sited to RLQ of abdomen, & Insertion site for removed CONSTANZA drain      Fall Risk Score: 13     Activity level? Independent      Any scheduled procedures? N/a     Any safety concerns? NA     Other: None         Problem: Adult Inpatient Plan of Care  Goal: Plan of Care Review  Outcome: Ongoing, Progressing  Goal: Patient-Specific Goal (Individualized)  Outcome: Ongoing, Progressing  Goal: Absence of Hospital-Acquired Illness or Injury  Outcome: Ongoing, Progressing  Goal: Optimal Comfort and Wellbeing  Outcome: Ongoing, Progressing  Goal: Readiness for Transition of Care  Outcome: Ongoing, Progressing     Problem: Impaired Wound Healing  Goal: Optimal Wound Healing  Outcome: Ongoing, Progressing     Problem: Bariatric Environmental Safety  Goal: Safety Maintained with Care  Outcome: Ongoing, Progressing      used

## 2023-10-10 NOTE — SUBJECTIVE & OBJECTIVE
Subjective:     Interval History: no acute events overnite, still waiting on culture sensitivities, still requiring IV dilaudid every 4 h ours despite 30 mgm of oxy every 4 hours for pain control, norma diet, ileosotmy functional,     Post-Op Info:  * No surgery found *          Medications:  Continuous Infusions:  Scheduled Meds:   acetaminophen  650 mg Oral Q6H    enoxparin  40 mg Subcutaneous Q24H (prophylaxis, 1700)    LIDOcaine  1 patch Transdermal Q24H    losartan  100 mg Oral Daily    methocarbamoL  500 mg Oral QID    miconazole  100 mg Vaginal QHS    sodium chloride 0.9%  10 mL Intravenous Q6H    vancomycin (VANCOCIN) IV (PEDS and ADULTS)  1,500 mg Intravenous Q12H    venlafaxine  150 mg Oral Daily     PRN Meds:   clonazePAM    HYDROmorphone    naloxone    ondansetron    oxyCODONE    sodium chloride 0.9%    sodium chloride 0.9%    vancomycin - pharmacy to dose        Objective:     Vital Signs (Most Recent):  Temp: 97.5 °F (36.4 °C) (10/10/23 0715)  Pulse: 70 (10/10/23 0715)  Resp: 18 (10/10/23 0948)  BP: 135/68 (10/10/23 0715)  SpO2: 97 % (10/10/23 0715) Vital Signs (24h Range):  Temp:  [97 °F (36.1 °C)-97.9 °F (36.6 °C)] 97.5 °F (36.4 °C)  Pulse:  [70-88] 70  Resp:  [18-20] 18  SpO2:  [96 %-100 %] 97 %  BP: (120-141)/(68-83) 135/68     Intake/Output - Last 3 Shifts         10/08 0700  10/09 0659 10/09 0700  10/10 0659 10/10 0700  10/11 0659    P.O. 1220 240     Total Intake(mL/kg) 1220 (10.2) 240 (2)     Urine (mL/kg/hr) 4 (0)      Drains 15 15     Stool 2      Total Output 21 15     Net +1199 +225            Urine Occurrence 4 x      Stool Occurrence 3 x               Physical Exam  Vitals and nursing note reviewed.   Constitutional:       Appearance: She is well-developed.   HENT:      Head: Normocephalic.   Eyes:      Pupils: Pupils are equal, round, and reactive to light.   Cardiovascular:      Rate and Rhythm: Normal rate and regular rhythm.      Heart sounds: Normal heart sounds.   Pulmonary:       Effort: Pulmonary effort is normal. No respiratory distress.      Breath sounds: Normal breath sounds. No wheezing or rales.   Abdominal:      Palpations: Abdomen is soft. There is no mass.      Tenderness: There is no guarding or rebound.      Comments: IR drain purulent draiange  Ileosotmy functional    Skin:     General: Skin is warm and dry.   Neurological:      Mental Status: She is alert and oriented to person, place, and time.            Significant Labs:  BMP (Last 3 Results):   Recent Labs   Lab 10/05/23  0348 10/08/23  0558 10/09/23  0528    106 150*    133* 138   K 4.0 5.1 3.8    103 103   CO2 27 20* 26   BUN 4* 4* 5*   CREATININE 0.7 0.7 0.7   CALCIUM 8.8 8.9 9.0   MG 1.8  --   --      CBC (Last 3 Results):   Recent Labs   Lab 10/04/23  1757 10/05/23  0348   WBC 7.69 6.44   RBC 3.89* 3.77*   HGB 10.8* 10.6*   HCT 35.4* 34.3*    375   MCV 91 91   MCH 27.8 28.1   MCHC 30.5* 30.9*       Significant Diagnostics:  None

## 2023-10-10 NOTE — PROGRESS NOTES
[facility]  Colorectal Surgery  Progress Note    Patient Name: Georgette Abrams  MRN: [id number]  Admission Date: 10/4/2023  Hospital Length of Stay: 6 days  Attending Physician: KRISH Tracy MD    Subjective:     Interval History: no acute events overnite, still waiting on culture sensitivities, still requiring IV dilaudid every 4 h ours despite 30 mgm of oxy every 4 hours for pain control, norma diet, ileosotmy functional,     Post-Op Info:  * No surgery found *          Medications:  Continuous Infusions:  Scheduled Meds:   acetaminophen  650 mg Oral Q6H    enoxparin  40 mg Subcutaneous Q24H (prophylaxis, 1700)    LIDOcaine  1 patch Transdermal Q24H    losartan  100 mg Oral Daily    methocarbamoL  500 mg Oral QID    miconazole  100 mg Vaginal QHS    sodium chloride 0.9%  10 mL Intravenous Q6H    vancomycin (VANCOCIN) IV (PEDS and ADULTS)  1,500 mg Intravenous Q12H    venlafaxine  150 mg Oral Daily     PRN Meds:   clonazePAM    HYDROmorphone    naloxone    ondansetron    oxyCODONE    sodium chloride 0.9%    sodium chloride 0.9%    vancomycin - pharmacy to dose        Objective:     Vital Signs (Most Recent):  Temp: 97.5 °F (36.4 °C) (10/10/23 0715)  Pulse: 70 (10/10/23 0715)  Resp: 18 (10/10/23 0948)  BP: 135/68 (10/10/23 0715)  SpO2: 97 % (10/10/23 0715) Vital Signs (24h Range):  Temp:  [97 °F (36.1 °C)-97.9 °F (36.6 °C)] 97.5 °F (36.4 °C)  Pulse:  [70-88] 70  Resp:  [18-20] 18  SpO2:  [96 %-100 %] 97 %  BP: (120-141)/(68-83) 135/68     Intake/Output - Last 3 Shifts         10/08 0700  10/09 0659 10/09 0700  10/10 0659 10/10 0700  10/11 0659    P.O. 1220 240     Total Intake(mL/kg) 1220 (10.2) 240 (2)     Urine (mL/kg/hr) 4 (0)      Drains 15 15     Stool 2      Total Output 21 15     Net +1199 +225            Urine Occurrence 4 x      Stool Occurrence 3 x               Physical Exam  Vitals and nursing note reviewed.   Constitutional:       Appearance: She is well-developed.    HENT:      Head: Normocephalic.   Eyes:      Pupils: Pupils are equal, round, and reactive to light.   Cardiovascular:      Rate and Rhythm: Normal rate and regular rhythm.      Heart sounds: Normal heart sounds.   Pulmonary:      Effort: Pulmonary effort is normal. No respiratory distress.      Breath sounds: Normal breath sounds. No wheezing or rales.   Abdominal:      Palpations: Abdomen is soft. There is no mass.      Tenderness: There is no guarding or rebound.      Comments: IR drain purulent draiange  Ileosotmy functional    Skin:     General: Skin is warm and dry.   Neurological:      Mental Status: She is alert and oriented to person, place, and time.            Significant Labs:  BMP (Last 3 Results):   Recent Labs   Lab 10/05/23  0348 10/08/23  0558 10/09/23  0528    106 150*    133* 138   K 4.0 5.1 3.8    103 103   CO2 27 20* 26   BUN 4* 4* 5*   CREATININE 0.7 0.7 0.7   CALCIUM 8.8 8.9 9.0   MG 1.8  --   --      CBC (Last 3 Results):   Recent Labs   Lab 10/04/23  1757 10/05/23  0348   WBC 7.69 6.44   RBC 3.89* 3.77*   HGB 10.8* 10.6*   HCT 35.4* 34.3*    375   MCV 91 91   MCH 27.8 28.1   MCHC 30.5* 30.9*       Significant Diagnostics:  None    Assessment/Plan:     * Pelvic abscess in female  See above    Chronic pain syndrome  Attempting to transition off IV dilaudid but pt very resistant     Attention to ileostomy     Assessment:  Ms. Abrams is a 38 year old woman well known to our service most recently with complex abdominal wall reconstruction and ileostomy re-siting. This has been complicated by persistent postoperative pain and seromas that required IR drain placement. She now presents with new purulent drain output and persistent pain.   S/p IR w drainage placement 10/6/2023  Chronic opoid use     Plan:  Continue multimodal pain regimen  Continue trending CRP  Monitor drain output  Continue IV abx  Continue home meds  Vte ppx: scd, lovenox    Dispo: waiting for final  culture/susceptibilities to result. Will transition to po abx as appropriate.          Nell Gaytan NP  Colorectal Surgery  [facility]

## 2023-10-10 NOTE — Clinical Note
[facility] Plan of Care Note  Dx ***    Shift Events ***    Goals of Care: ***    Neuro: ***    Vital Signs: ***    Respiratory: ***    Diet: add***    Is patient tolerating current diet? ***    GTTS: ***    Urine Output/Bowel Movement: ***    Drains/Tubes/Tube Feeds (include total output/shift): ***    Lines: ***      Accuchecks:***    Skin: ***    Fall Risk Score: ***    Activity level? ***    Any scheduled procedures? ***    Any safety concerns? ***    Other: ***

## 2023-10-10 NOTE — PROGRESS NOTES
"Pharmacokinetic Assessment Follow Up: IV Vancomycin    Vancomycin serum concentration assessment(s):    The trough level was drawn correctly and can be used to guide therapy at this time. The measurement is within the desired definitive target range of 10 to 20 mcg/mL.    Vancomycin Regimen Plan:    Continue regimen to Vancomycin 1500 mg IV every 12 hours with next serum trough concentration measured in 3-5 days.    Drug levels (last 3 results):  Recent Labs   Lab Result Units 10/08/23  1107 10/10/23  1000   Vancomycin-Trough ug/mL 12.1 12.0       Pharmacy will continue to follow and monitor vancomycin.    Please contact pharmacy at extension [phone] for questions regarding this assessment.    Thank you for the consult,   Estuardo Joseph       Patient brief summary:  Georgette Abrams is a 38 y.o. female initiated on antimicrobial therapy with IV Vancomycin for treatment of intra-abdominal infection    Drug Allergies:   Review of patient's allergies indicates:   Allergen Reactions    Levofloxacin Nausea And Vomiting and Rash    Morphine Anaphylaxis, Hives, Shortness Of Breath and Hallucinations           Piperacillin-tazobactam Rash    Sulfa (sulfonamide antibiotics) Nausea And Vomiting and Rash    Opioids - morphine analogues     Hydrocodone-acetaminophen Itching       Actual Body Weight:   120.1 kg    Renal Function:   Estimated Creatinine Clearance: 143.4 mL/min (based on SCr of 0.7 mg/dL).,     Dialysis Method (if applicable):  N/A    CBC (last 72 hours):  No results for input(s): "WHITE BLOOD CELL COUNT", "HEMOGLOBIN", "HEMATOCRIT", "PLATELETS", "GRAN%", "LYMPH%", "MONO%", "EOSINOPHIL%", "BASOPHIL%", "DIFFERENTIAL METHOD" in the last 72 hours.    Metabolic Panel (last 72 hours):  Recent Labs   Lab Result Units 10/08/23  0558 10/09/23  0528 10/10/23  1000   Sodium mmol/L 133* 138 137   Potassium mmol/L 5.1 3.8 4.3   Chloride mmol/L 103 103 102   CO2 mmol/L 20* 26 26   Glucose mg/dL 106 150* 118*   BUN mg/dL 4* " 5* 4*   Creatinine mg/dL 0.7 0.7 0.7       Vancomycin Administrations:  vancomycin given in the last 96 hours                     vancomycin 1,500 mg in dextrose 5 % (D5W) 250 mL IVPB (Vial-Mate) (mg) 1,500 mg New Bag 10/10/23 1210     1,500 mg New Bag 10/09/23 2259     1,500 mg New Bag  1139     1,500 mg New Bag 10/08/23 2308     1,500 mg New Bag  1225     1,500 mg New Bag  0027     1,500 mg New Bag 10/07/23 1103     1,500 mg New Bag 10/06/23 2338                    Microbiologic Results:  Microbiology Results (last 7 days)       Procedure Component Value Units Date/Time    Aerobic culture [[id number]]  (Abnormal)  (Susceptibility) Collected: 10/05/23 1200    Order Status: Completed Specimen: Abscess from Abdomen Updated: 10/10/23 1020     Aerobic Bacterial Culture STAPHYLOCOCCUS AUREUS  Many        ENTEROCOCCUS SPECIES  Many  Identification and susceptibility pending      Blood Culture #2 **CANNOT BE ORDERED STAT** [[id number]] Collected: 10/04/23 1821    Order Status: Completed Specimen: Blood from Peripheral, Hand, Left Updated: 10/09/23 2012     Blood Culture, Routine No growth after 5 days.    Blood Culture #1 **CANNOT BE ORDERED STAT** [[id number]] Collected: 10/04/23 1821    Order Status: Completed Specimen: Blood from Peripheral, Forearm, Left Updated: 10/09/23 2012     Blood Culture, Routine No growth after 5 days.    Culture, Anaerobe [[id number]] Collected: 10/05/23 1200    Order Status: Completed Specimen: Abscess from Abdomen Updated: 10/09/23 0838     Anaerobic Culture No anaerobes isolated    Culture, Anaerobe [[id number]] Collected: 10/05/23 1200    Order Status: Completed Specimen: Abscess from Abdomen Updated: 10/09/23 0836     Anaerobic Culture No anaerobes isolated    Narrative:      Subcutaneous    Culture, Anaerobe [[id number]] Collected: 10/04/23 1838    Order Status: Completed Specimen: Abscess from Abdomen Updated: 10/09/23 0835     Anaerobic Culture No anaerobes isolated    Narrative:       From abdominal wall drain bulb    Aerobic culture [[id number]]  (Abnormal)  (Susceptibility) Collected: 10/05/23 1200    Order Status: Completed Specimen: Abscess from Abdomen Updated: 10/07/23 1233     Aerobic Bacterial Culture STAPHYLOCOCCUS AUREUS  Many      Narrative:      Subcutaneous    Aerobic culture [[id number]]  (Abnormal)  (Susceptibility) Collected: 10/04/23 1838    Order Status: Completed Specimen: Abscess from Abdomen Updated: 10/07/23 1221     Aerobic Bacterial Culture STAPHYLOCOCCUS AUREUS  Few      Narrative:      From abdominal wall drain bulb    AFB Culture & Smear [[id number]] Collected: 10/05/23 1200    Order Status: Completed Specimen: Abscess from Abdomen Updated: 10/06/23 2127     AFB Culture & Smear Culture in progress     AFB CULTURE STAIN No acid fast bacilli seen.    AFB Culture & Smear [[id number]] Collected: 10/05/23 1200    Order Status: Completed Specimen: Abscess from Abdomen Updated: 10/06/23 2127     AFB Culture & Smear Culture in progress     AFB CULTURE STAIN No acid fast bacilli seen.    Narrative:      Subcutaneous    Gram stain [[id number]] Collected: 10/05/23 1200    Order Status: Completed Specimen: Abscess from Abdomen Updated: 10/05/23 2129     Gram Stain Result Many WBC's      No organisms seen    Gram stain [[id number]] Collected: 10/05/23 1200    Order Status: Completed Specimen: Abscess from Abdomen Updated: 10/05/23 1932     Gram Stain Result Few WBC's      No organisms seen    Narrative:      Subcutaneous    Fungus culture [[id number]] Collected: 10/05/23 1200    Order Status: Sent Specimen: Abscess from Abdomen Updated: 10/05/23 1541    Fungus culture [[id number]] Collected: 10/05/23 1200    Order Status: Sent Specimen: Abscess from Abdomen Updated: 10/05/23 1537

## 2023-10-11 PROBLEM — R18.8 INTRAABDOMINAL FLUID COLLECTION: Status: ACTIVE | Noted: 2023-10-11

## 2023-10-11 LAB
ANION GAP SERPL CALC-SCNC: 9 MMOL/L (ref 8–16)
BUN SERPL-MCNC: 6 MG/DL (ref 6–20)
CALCIUM SERPL-MCNC: 9.1 MG/DL (ref 8.7–10.5)
CHLORIDE SERPL-SCNC: 101 MMOL/L (ref 95–110)
CO2 SERPL-SCNC: 27 MMOL/L (ref 23–29)
CREAT SERPL-MCNC: 0.6 MG/DL (ref 0.5–1.4)
CRP SERPL-MCNC: 47.1 MG/L (ref 0–8.2)
EST. GFR  (NO RACE VARIABLE): >60 ML/MIN/1.73 M^2
FUNGUS SPEC CULT: NORMAL
GLUCOSE SERPL-MCNC: 131 MG/DL (ref 70–110)
POTASSIUM SERPL-SCNC: 4 MMOL/L (ref 3.5–5.1)
SODIUM SERPL-SCNC: 137 MMOL/L (ref 136–145)

## 2023-10-11 PROCEDURE — 25000003 PHARM REV CODE 250: Performed by: COLON & RECTAL SURGERY

## 2023-10-11 PROCEDURE — 86140 C-REACTIVE PROTEIN: CPT | Performed by: SURGERY

## 2023-10-11 PROCEDURE — 25000003 PHARM REV CODE 250: Performed by: SURGERY

## 2023-10-11 PROCEDURE — 94761 N-INVAS EAR/PLS OXIMETRY MLT: CPT

## 2023-10-11 PROCEDURE — 99223 1ST HOSP IP/OBS HIGH 75: CPT | Mod: ,,, | Performed by: INTERNAL MEDICINE

## 2023-10-11 PROCEDURE — 80048 BASIC METABOLIC PNL TOTAL CA: CPT | Performed by: COLON & RECTAL SURGERY

## 2023-10-11 PROCEDURE — 63600175 PHARM REV CODE 636 W HCPCS

## 2023-10-11 PROCEDURE — 25000003 PHARM REV CODE 250: Performed by: STUDENT IN AN ORGANIZED HEALTH CARE EDUCATION/TRAINING PROGRAM

## 2023-10-11 PROCEDURE — 63600175 PHARM REV CODE 636 W HCPCS: Performed by: COLON & RECTAL SURGERY

## 2023-10-11 PROCEDURE — A4216 STERILE WATER/SALINE, 10 ML: HCPCS | Performed by: COLON & RECTAL SURGERY

## 2023-10-11 PROCEDURE — 99223 PR INITIAL HOSPITAL CARE,LEVL III: ICD-10-PCS | Mod: ,,, | Performed by: INTERNAL MEDICINE

## 2023-10-11 PROCEDURE — 63600175 PHARM REV CODE 636 W HCPCS: Performed by: SURGERY

## 2023-10-11 PROCEDURE — 25000003 PHARM REV CODE 250: Performed by: REGISTERED NURSE

## 2023-10-11 PROCEDURE — 20600001 HC STEP DOWN PRIVATE ROOM

## 2023-10-11 PROCEDURE — 63600175 PHARM REV CODE 636 W HCPCS: Performed by: REGISTERED NURSE

## 2023-10-11 RX ADMIN — Medication 10 ML: at 12:10

## 2023-10-11 RX ADMIN — HYDROMORPHONE HYDROCHLORIDE 1 MG: 1 INJECTION, SOLUTION INTRAMUSCULAR; INTRAVENOUS; SUBCUTANEOUS at 03:10

## 2023-10-11 RX ADMIN — OXYCODONE HYDROCHLORIDE 30 MG: 10 TABLET ORAL at 06:10

## 2023-10-11 RX ADMIN — ACETAMINOPHEN 650 MG: 325 TABLET ORAL at 06:10

## 2023-10-11 RX ADMIN — METHOCARBAMOL 500 MG: 500 TABLET ORAL at 08:10

## 2023-10-11 RX ADMIN — VENLAFAXINE 150 MG: 75 TABLET ORAL at 04:10

## 2023-10-11 RX ADMIN — ACETAMINOPHEN 650 MG: 325 TABLET ORAL at 12:10

## 2023-10-11 RX ADMIN — HYDROMORPHONE HYDROCHLORIDE 1 MG: 1 INJECTION, SOLUTION INTRAMUSCULAR; INTRAVENOUS; SUBCUTANEOUS at 12:10

## 2023-10-11 RX ADMIN — VANCOMYCIN HYDROCHLORIDE 1500 MG: 1.5 INJECTION, POWDER, LYOPHILIZED, FOR SOLUTION INTRAVENOUS at 10:10

## 2023-10-11 RX ADMIN — MICONAZOLE NITRATE 100 MG: 100 SUPPOSITORY VAGINAL at 08:10

## 2023-10-11 RX ADMIN — OXYCODONE HYDROCHLORIDE 30 MG: 10 TABLET ORAL at 03:10

## 2023-10-11 RX ADMIN — HYDROMORPHONE HYDROCHLORIDE 1 MG: 1 INJECTION, SOLUTION INTRAMUSCULAR; INTRAVENOUS; SUBCUTANEOUS at 08:10

## 2023-10-11 RX ADMIN — OXYCODONE HYDROCHLORIDE 30 MG: 10 TABLET ORAL at 08:10

## 2023-10-11 RX ADMIN — METHOCARBAMOL 500 MG: 500 TABLET ORAL at 12:10

## 2023-10-11 RX ADMIN — ONDANSETRON 4 MG: 2 INJECTION INTRAMUSCULAR; INTRAVENOUS at 03:10

## 2023-10-11 RX ADMIN — Medication 10 ML: at 06:10

## 2023-10-11 RX ADMIN — HYDROMORPHONE HYDROCHLORIDE 1 MG: 1 INJECTION, SOLUTION INTRAMUSCULAR; INTRAVENOUS; SUBCUTANEOUS at 10:10

## 2023-10-11 RX ADMIN — DAPTOMYCIN 680 MG: 500 INJECTION, POWDER, LYOPHILIZED, FOR SOLUTION INTRAVENOUS at 06:10

## 2023-10-11 RX ADMIN — ENOXAPARIN SODIUM 40 MG: 40 INJECTION SUBCUTANEOUS at 04:10

## 2023-10-11 RX ADMIN — OXYCODONE HYDROCHLORIDE 30 MG: 10 TABLET ORAL at 01:10

## 2023-10-11 RX ADMIN — LOSARTAN POTASSIUM 100 MG: 50 TABLET, FILM COATED ORAL at 08:10

## 2023-10-11 RX ADMIN — OXYCODONE HYDROCHLORIDE 30 MG: 10 TABLET ORAL at 11:10

## 2023-10-11 RX ADMIN — CLONAZEPAM 0.5 MG: 0.5 TABLET ORAL at 10:10

## 2023-10-11 RX ADMIN — METHOCARBAMOL 500 MG: 500 TABLET ORAL at 04:10

## 2023-10-11 RX ADMIN — HYDROMORPHONE HYDROCHLORIDE 1 MG: 1 INJECTION, SOLUTION INTRAMUSCULAR; INTRAVENOUS; SUBCUTANEOUS at 04:10

## 2023-10-11 NOTE — PROGRESS NOTES
Therapy with Vancomycin complete and/or consult discontinued by provider.  Pharmacy will sign off, please re-consult as needed. Ext. [phone]

## 2023-10-11 NOTE — PLAN OF CARE
Dx: Post-operative pain, Right sided abdominal pain,     Shift Events: Medicated for pain multiple times this shift. No other acute changes     Neuro: AAO x 4     Vital Signs: WNL      Respiratory: Room Air     Diet: Regular diet     Is patient tolerating current diet? yes     GTTS: None     Urine Output/Bowel Movement: Adequate urine output, RUQ Ileostomy     Drains/Tubes/Tube Feeds (include total output/shift): CONSTANZA Drains to RUQ: #1 Superior, #2 Inferior     Lines: double lumen picc JULIO      Accuchecks: None     Fall Risk Score: 13    Problem: Adult Inpatient Plan of Care  Goal: Plan of Care Review  Outcome: Ongoing, Progressing  Goal: Patient-Specific Goal (Individualized)  Outcome: Ongoing, Progressing  Goal: Absence of Hospital-Acquired Illness or Injury  Outcome: Ongoing, Progressing     Problem: Impaired Wound Healing  Goal: Optimal Wound Healing  Outcome: Ongoing, Progressing     Problem: Bariatric Environmental Safety  Goal: Safety Maintained with Care  Outcome: Ongoing, Progressing

## 2023-10-11 NOTE — SUBJECTIVE & OBJECTIVE
Subjective:     Interval History: no acute events overnite, still requiring IV dilauidd and oral oxy for pain control, ileostomy functional  Post-Op Info:  * No surgery found *          Medications:  Continuous Infusions:  Scheduled Meds:   acetaminophen  650 mg Oral Q6H    enoxparin  40 mg Subcutaneous Q24H (prophylaxis, 1700)    LIDOcaine  1 patch Transdermal Q24H    losartan  100 mg Oral Daily    methocarbamoL  500 mg Oral QID    miconazole  100 mg Vaginal QHS    scopolamine  1 patch Transdermal Q3 Days    sodium chloride 0.9%  10 mL Intravenous Q6H    vancomycin (VANCOCIN) IV (PEDS and ADULTS)  1,500 mg Intravenous Q12H    venlafaxine  150 mg Oral Daily     PRN Meds:   clonazePAM    HYDROmorphone    naloxone    ondansetron    oxyCODONE    sodium chloride 0.9%    sodium chloride 0.9%    vancomycin - pharmacy to dose        Objective:     Vital Signs (Most Recent):  Temp: 97 °F (36.1 °C) (10/11/23 0711)  Pulse: 78 (10/11/23 0711)  Resp: 18 (10/11/23 0842)  BP: 130/71 (10/11/23 0711)  SpO2: 95 % (10/11/23 0711) Vital Signs (24h Range):  Temp:  [97 °F (36.1 °C)-98.2 °F (36.8 °C)] 97 °F (36.1 °C)  Pulse:  [] 78  Resp:  [16-18] 18  SpO2:  [91 %-100 %] 95 %  BP: (118-151)/(68-78) 130/71     Intake/Output - Last 3 Shifts         10/09 0700  10/10 0659 10/10 0700  10/11 0659 10/11 0700  10/12 0659    P.O. 240 450     Total Intake(mL/kg) 240 (2) 450 (3.7)     Urine (mL/kg/hr)  0 (0)     Drains 15 8     Stool       Total Output 15 8     Net +225 +442            Urine Occurrence  4 x              Physical Exam  Vitals and nursing note reviewed.   Constitutional:       Appearance: She is well-developed.   HENT:      Head: Normocephalic.   Eyes:      Pupils: Pupils are equal, round, and reactive to light.   Cardiovascular:      Rate and Rhythm: Normal rate and regular rhythm.      Heart sounds: Normal heart sounds.   Pulmonary:      Effort: Pulmonary effort is normal. No respiratory distress.      Breath sounds:  Normal breath sounds. No wheezing or rales.   Abdominal:      Palpations: Abdomen is soft. There is no mass.      Tenderness: There is no guarding or rebound.      Comments: IR drain puruolent drainage  Ileostomy functional   Skin:     General: Skin is warm and dry.   Neurological:      Mental Status: She is alert and oriented to person, place, and time.   Psychiatric:      Comments: Hx of short term memory loss, in nursing home  Mentality at baseline                Significant Labs:  BMP (Last 3 Results):   Recent Labs   Lab 10/05/23  0348 10/08/23  0558 10/09/23  0528 10/10/23  1000 10/11/23  0352      < > 150* 118* 131*      < > 138 137 137   K 4.0   < > 3.8 4.3 4.0      < > 103 102 101   CO2 27   < > 26 26 27   BUN 4*   < > 5* 4* 6   CREATININE 0.7   < > 0.7 0.7 0.6   CALCIUM 8.8   < > 9.0 9.4 9.1   MG 1.8  --   --   --   --     < > = values in this interval not displayed.     CBC (Last 3 Results):   Recent Labs   Lab 10/04/23  1757 10/05/23  0348   WBC 7.69 6.44   RBC 3.89* 3.77*   HGB 10.8* 10.6*   HCT 35.4* 34.3*    375   MCV 91 91   MCH 27.8 28.1   MCHC 30.5* 30.9*       Significant Diagnostics:  None

## 2023-10-11 NOTE — CONSULTS
[facility]  Infectious Disease  Consult Note    Patient Name: Georgette Abrams  MRN: [id number]  Admission Date: 10/4/2023  Hospital Length of Stay: 7 days  Attending Physician: KRISH Tracy MD  Primary Care Provider: Coretta, Primary Doctor     Isolation Status: No active isolations    Patient information was obtained from patient, past medical records and ER records.      Inpatient consult to Infectious Diseases  Consult performed by: Libertad Jackson NP  Consult ordered by: Estuardo Oro MD        Assessment/Plan:     GI  Intraabdominal fluid collection   37 yo female with PMH of multiple GI surgeries including total proctocolectomy, J pouch, loop ileostomy, cb post op fistula , as well as hysterectomy and ventral hernia repar, cb mesh infection requring excision, and now complex abdominal wall reconstruction and ileostomy re-siting on 8/7/23 which was cb with recurrent seromas requiring drain placement as well as persistent pain. S/p IR drainage on 9/10/23, cultures were negative. Returns to [facility] ED with concerns for new purulent drain output and uncontrolled pain. S/p IR drainage with new drained placed on 10/6 per IR, cultures + MSSA and VRE.     Currently on vancomycin. ID was consulted for duration recs.     Recommendations:   - discontinue vancomycin   - start daptomycin 8mg/kg IV q24hr. Will need baseline CPK.   - will anticipate 3-4 weeks of IV therapy. Will re image toward end of care to assess for resolution of fluid collection   - Pt [place] resident. Will coordinate follow up with colorectal   - pt seen and plan reviewed with ID staff. ID will follow.           Thank you for your consult. I will follow-up with patient. Please contact us if you have any additional questions.    Libertad Jackson NP  Infectious Disease  [facility]    Subjective:     Principal Problem: Pelvic abscess in female    HPI: Ms Abrams is a 37 yo female with PMH of multiple GI surgeries including total  proctocolectomy, J pouch, loop ileostomy, cb post op fistula , as well as hysterectomy and ventral hernia repar, cb mesh infection requring excision, and now complex abdominal wall reconstruction and ileostomy re-siting on 8/7/23 which was cb with recurrent seromas requiring drain placement as well as persistent pain. S/p IR drainage on 9/10/23, cultures were negative. Returns to [facility] ED with concerns for new purulent drain output and uncontrolled pain. S/p IR drainage with new drained placed on 10/6 per IR, cultures + MSSA and VRE.     To note, pt was recently admitted from 9/9 - 9/12 for intraabdominal fluid collections requiring two drains. Cultures during that stay cultures were no growth. Was prescribed Augmentin x4 days and then was on cipro/flagyl x10d on 9/26.     Since admission, afebrile, HDS. Without leukocytosis. CRP downtrending. CT AP with continued fluid collection deep into rectus abdominus muscles, though decreased in size from previous scan. Also noting presacral fluid collection. S/p IR drainage, cultures + MSSA and E faecalis. Pt is currently on vancomycin IV. ID was consulted for abx recs and duration.           Past Medical History:   Diagnosis Date    Anxiety     Familial polyposis     S/P total colectomy        Past Surgical History:   Procedure Laterality Date    Davinci Assisted Bilateral Ovarian Cystectomy, with extension  SEAN   8/2011    HYSTERECTOMY  8/23/2012    DAVINCI     ILEOSTOMY REVISION N/A 8/7/2023    Procedure: REVISION, ILEOSTOMY;  Surgeon: KRISH Tracy MD;  Location: [facility] OR 2ND FLR;  Service: Colon and Rectal;  Laterality: N/A;    Illeotomy Creation  2009    LYSIS OF ADHESIONS N/A 8/7/2023    Procedure: LYSIS, ADHESIONS;  Surgeon: KRISH Tracy MD;  Location: [facility] OR 2ND FLR;  Service: Colon and Rectal;  Laterality: N/A;    OOPHORECTOMY  8/23/2012    DAvinci removal with DLH     REPAIR OF RECURRENT VENTRAL HERNIA N/A 8/7/2023    Procedure: REPAIR,  HERNIA, VENTRAL, RECURRENT, ERAS low;  Surgeon: KRISH Tracy MD;  Location: [facility] OR [address] [place] [address];  Service: Colon and Rectal;  Laterality: N/A;  w/ mesh and omental pedical flap    TOTAL COLECTOMY      Iloanal pouch creation       Review of patient's allergies indicates:   Allergen Reactions    Levofloxacin Nausea And Vomiting and Rash    Morphine Anaphylaxis, Hives, Shortness Of Breath and Hallucinations           Piperacillin-tazobactam Rash    Sulfa (sulfonamide antibiotics) Nausea And Vomiting and Rash    Opioids - morphine analogues     Hydrocodone-acetaminophen Itching       Medications:  Medications Prior to Admission   Medication Sig    [] ciprofloxacin HCl (CIPRO) 500 MG tablet Take 1 tablet (500 mg total) by mouth every 12 (twelve) hours. for 10 days    clonazepam (KLONOPIN) 1 MG tablet Take 1 mg by mouth 2 (two) times daily as needed.      cyanocobalamin 1,000 mcg/mL injection 1,000 mcg twice a week.    ibuprofen (ADVIL,MOTRIN) 800 MG tablet Take 1 tablet (800 mg total) by mouth every 8 (eight) hours.    losartan (COZAAR) 100 MG tablet Take 100 mg by mouth.    methocarbamoL (ROBAXIN) 500 MG Tab Take 1 tablet (500 mg total) by mouth 4 (four) times daily.    [] metroNIDAZOLE (FLAGYL) 500 MG tablet Take 1 tablet (500 mg total) by mouth every 8 (eight) hours. for 10 days    ondansetron (ZOFRAN) 4 MG tablet Take 4 mg by mouth every 6 (six) hours as needed for Nausea.    ondansetron (ZOFRAN-ODT) 4 MG TbDL Dissolve 1 tablet (4 mg total) by mouth every 6 (six) hours as needed.    oxyCODONE (ROXICODONE) 15 MG Tab Take 1 tablet (15 mg total) by mouth every 4 (four) hours as needed for Pain.    oxyCODONE (ROXICODONE) 15 MG Tab Take 1 tablet (15 mg total) by mouth every 4 (four) hours as needed for Pain.    oxyCODONE (ROXICODONE) 30 MG Tab Take 1 tablet (30 mg total) by mouth every 4 (four) hours as needed for Pain.    progesterone (PROMETRIUM) 100 MG capsule Take 400 mg by mouth  every evening.    venlafaxine (EFFEXOR) 100 MG Tab Take 150 mg by mouth Daily.     Antibiotics (From admission, onward)      Start     Stop Route Frequency Ordered    10/07/23 0000  vancomycin 1,500 mg in dextrose 5 % (D5W) 250 mL IVPB (Vial-Mate)        See Hyperspace for full Linked Orders Report.    -- IV Every 12 hours (non-standard times) 10/06/23 1116    10/06/23 1132  vancomycin - pharmacy to dose  (vancomycin IVPB (PEDS and ADULTS))        See Hyperspace for full Linked Orders Report.    -- IV pharmacy to manage frequency 10/06/23 1033          Antifungals (From admission, onward)      Start     Stop Route Frequency Ordered    10/10/23 2100  miconazole 100 mg vaginal suppository 100 mg         10/17/23 2059 Vagl Nightly 10/10/23 0639          Antivirals (From admission, onward)      None               There is no immunization history on file for this patient.    Family History       Problem Relation (Age of Onset)    Colon cancer Maternal Grandmother    Lung cancer Maternal Grandfather          Social History     Socioeconomic History    Marital status:    Tobacco Use    Smoking status: Former    Smokeless tobacco: Never   Substance and Sexual Activity    Alcohol use: Not Currently     Comment: seldom    Drug use: No    Sexual activity: Yes     Partners: Male     Birth control/protection: None     Comment: , lives in [place]     Social Determinants of Health     Financial Resource Strain: Low Risk  (10/7/2023)    Overall Financial Resource Strain (CARDIA)     Difficulty of Paying Living Expenses: Not hard at all   Food Insecurity: No Food Insecurity (10/7/2023)    Hunger Vital Sign     Worried About Running Out of Food in the Last Year: Never true     Ran Out of Food in the Last Year: Never true   Transportation Needs: No Transportation Needs (10/7/2023)    PRAPARE - Transportation     Lack of Transportation (Medical): No     Lack of Transportation (Non-Medical): No   Physical  Activity: Inactive (10/7/2023)    Exercise Vital Sign     Days of Exercise per Week: 0 days     Minutes of Exercise per Session: 0 min   Stress: Stress Concern Present (10/7/2023)    [employer] - Occupational Stress Questionnaire     Feeling of Stress : To some extent   Social Connections: Moderately Integrated (10/7/2023)    Social Connection and Isolation Panel [NHANES]     Frequency of Communication with Friends and Family: More than three times a week     Frequency of Social Gatherings with Friends and Family: More than three times a week     Attends Oriental orthodox Services: More than 4 times per year     Active Member of Clubs or Organizations: No     Attends Club or Organization Meetings: Never     Marital Status:    Housing Stability: Unknown (10/7/2023)    Housing Stability Vital Sign     Unable to Pay for Housing in the Last Year: No     Unstable Housing in the Last Year: No     Review of Systems   Constitutional:  Negative for chills, diaphoresis, fatigue and fever.   HENT: Negative.     Eyes: Negative.    Respiratory:  Negative for cough and shortness of breath.    Cardiovascular:  Negative for chest pain, palpitations and leg swelling.   Gastrointestinal:  Positive for nausea. Negative for constipation, diarrhea and vomiting.   Genitourinary:  Negative for difficulty urinating and dysuria.   Musculoskeletal:  Negative for arthralgias and myalgias.   Skin:  Positive for wound. Negative for rash.   Neurological:  Negative for dizziness and numbness.   Psychiatric/Behavioral:  Negative for agitation and confusion.      Objective:     Vital Signs (Most Recent):  Temp: 97.1 °F (36.2 °C) (10/11/23 1531)  Pulse: 76 (10/11/23 1531)  Resp: 18 (10/11/23 1623)  BP: (!) 145/75 (10/11/23 1531)  SpO2: 100 % (10/11/23 1531) Vital Signs (24h Range):  Temp:  [97 °F (36.1 °C)-97.8 °F (36.6 °C)] 97.1 °F (36.2 °C)  Pulse:  [] 76  Resp:  [9-18] 18  SpO2:  [94 %-100 %] 100  %  BP: (118-145)/(68-75) 145/75     Weight: 120.1 kg (264 lb 12.4 oz)  Body mass index is 41.47 kg/m².    Estimated Creatinine Clearance: 167.2 mL/min (based on SCr of 0.6 mg/dL).     Physical Exam  Vitals reviewed.   Constitutional:       General: She is not in acute distress.     Appearance: Normal appearance. She is not ill-appearing.   HENT:      Head: Normocephalic.      Nose: Nose normal.      Mouth/Throat:      Mouth: Mucous membranes are moist.      Pharynx: Oropharynx is clear.   Eyes:      General:         Right eye: No discharge.         Left eye: No discharge.      Conjunctiva/sclera: Conjunctivae normal.   Cardiovascular:      Rate and Rhythm: Normal rate and regular rhythm.   Pulmonary:      Effort: Pulmonary effort is normal. No respiratory distress.      Breath sounds: No stridor.   Abdominal:      General: Abdomen is flat. There is no distension.      Palpations: Abdomen is soft. There is no mass.      Tenderness: There is no abdominal tenderness. There is no right CVA tenderness or left CVA tenderness.   Musculoskeletal:         General: Normal range of motion.      Cervical back: Normal range of motion.      Right lower leg: No edema.      Left lower leg: No edema.   Skin:     General: Skin is dry.      Findings: No erythema or rash.      Comments: With abdominal CONSTANZA drains, SS output.    Neurological:      General: No focal deficit present.      Mental Status: She is alert and oriented to person, place, and time.      Motor: No weakness.      Gait: Gait normal.   Psychiatric:         Mood and Affect: Mood normal.         Behavior: Behavior normal.         Thought Content: Thought content normal.         Judgment: Judgment normal.          Significant Labs: All pertinent labs within the past 24 hours have been reviewed.    Significant Imaging: I have reviewed all pertinent imaging results/findings within the past 24 hours.

## 2023-10-11 NOTE — SUBJECTIVE & OBJECTIVE
Past Medical History:   Diagnosis Date    Anxiety     Familial polyposis     S/P total colectomy        Past Surgical History:   Procedure Laterality Date    Davinci Assisted Bilateral Ovarian Cystectomy, with extension  SEAN   2011    HYSTERECTOMY  2012    DAVINCI     ILEOSTOMY REVISION N/A 2023    Procedure: REVISION, ILEOSTOMY;  Surgeon: KRISH Tracy MD;  Location: [facility] OR 2ND FLR;  Service: Colon and Rectal;  Laterality: N/A;    Illeotomy Creation      LYSIS OF ADHESIONS N/A 2023    Procedure: LYSIS, ADHESIONS;  Surgeon: KRISH Tracy MD;  Location: [facility] OR 2ND FLR;  Service: Colon and Rectal;  Laterality: N/A;    OOPHORECTOMY  2012    DAvinci removal with DLH     REPAIR OF RECURRENT VENTRAL HERNIA N/A 2023    Procedure: REPAIR, HERNIA, VENTRAL, RECURRENT, ERAS low;  Surgeon: KRISH Tracy MD;  Location: [facility] OR 2ND FLR;  Service: Colon and Rectal;  Laterality: N/A;  w/ mesh and omental pedical flap    TOTAL COLECTOMY      Iloanal pouch creation       Review of patient's allergies indicates:   Allergen Reactions    Levofloxacin Nausea And Vomiting and Rash    Morphine Anaphylaxis, Hives, Shortness Of Breath and Hallucinations           Piperacillin-tazobactam Rash    Sulfa (sulfonamide antibiotics) Nausea And Vomiting and Rash    Opioids - morphine analogues     Hydrocodone-acetaminophen Itching       Medications:  Medications Prior to Admission   Medication Sig    [] ciprofloxacin HCl (CIPRO) 500 MG tablet Take 1 tablet (500 mg total) by mouth every 12 (twelve) hours. for 10 days    clonazepam (KLONOPIN) 1 MG tablet Take 1 mg by mouth 2 (two) times daily as needed.      cyanocobalamin 1,000 mcg/mL injection 1,000 mcg twice a week.    ibuprofen (ADVIL,MOTRIN) 800 MG tablet Take 1 tablet (800 mg total) by mouth every 8 (eight) hours.    losartan (COZAAR) 100 MG tablet Take 100 mg by mouth.    methocarbamoL (ROBAXIN) 500 MG Tab Take 1 tablet (500 mg total)  by mouth 4 (four) times daily.    [] metroNIDAZOLE (FLAGYL) 500 MG tablet Take 1 tablet (500 mg total) by mouth every 8 (eight) hours. for 10 days    ondansetron (ZOFRAN) 4 MG tablet Take 4 mg by mouth every 6 (six) hours as needed for Nausea.    ondansetron (ZOFRAN-ODT) 4 MG TbDL Dissolve 1 tablet (4 mg total) by mouth every 6 (six) hours as needed.    oxyCODONE (ROXICODONE) 15 MG Tab Take 1 tablet (15 mg total) by mouth every 4 (four) hours as needed for Pain.    oxyCODONE (ROXICODONE) 15 MG Tab Take 1 tablet (15 mg total) by mouth every 4 (four) hours as needed for Pain.    oxyCODONE (ROXICODONE) 30 MG Tab Take 1 tablet (30 mg total) by mouth every 4 (four) hours as needed for Pain.    progesterone (PROMETRIUM) 100 MG capsule Take 400 mg by mouth every evening.    venlafaxine (EFFEXOR) 100 MG Tab Take 150 mg by mouth Daily.     Antibiotics (From admission, onward)      Start     Stop Route Frequency Ordered    10/07/23 0000  vancomycin 1,500 mg in dextrose 5 % (D5W) 250 mL IVPB (Vial-Mate)        See Hyperspace for full Linked Orders Report.    -- IV Every 12 hours (non-standard times) 10/06/23 1116    10/06/23 1132  vancomycin - pharmacy to dose  (vancomycin IVPB (PEDS and ADULTS))        See Hyperspace for full Linked Orders Report.    -- IV pharmacy to manage frequency 10/06/23 1033          Antifungals (From admission, onward)      Start     Stop Route Frequency Ordered    10/10/23 2100  miconazole 100 mg vaginal suppository 100 mg         10/17/23 2059 Vagl Nightly 10/10/23 0639          Antivirals (From admission, onward)      None               There is no immunization history on file for this patient.    Family History       Problem Relation (Age of Onset)    Colon cancer Maternal Grandmother    Lung cancer Maternal Grandfather          Social History     Socioeconomic History    Marital status:    Tobacco Use    Smoking status: Former    Smokeless tobacco: Never   Substance and Sexual  Activity    Alcohol use: Not Currently     Comment: seldom    Drug use: No    Sexual activity: Yes     Partners: Male     Birth control/protection: None     Comment: , lives in [place]     Social Determinants of Health     Financial Resource Strain: Low Risk  (10/7/2023)    Overall Financial Resource Strain (CARDIA)     Difficulty of Paying Living Expenses: Not hard at all   Food Insecurity: No Food Insecurity (10/7/2023)    Hunger Vital Sign     Worried About Running Out of Food in the Last Year: Never true     Ran Out of Food in the Last Year: Never true   Transportation Needs: No Transportation Needs (10/7/2023)    PRAPARE - Transportation     Lack of Transportation (Medical): No     Lack of Transportation (Non-Medical): No   Physical Activity: Inactive (10/7/2023)    Exercise Vital Sign     Days of Exercise per Week: 0 days     Minutes of Exercise per Session: 0 min   Stress: Stress Concern Present (10/7/2023)    [place] citizen [place] [employer] Occup[employer] - Occupational Stress Questionnaire     Feeling of Stress : To some extent   Social Connections: Moderately Integrated (10/7/2023)    Social Connection and Isolation Panel [NHANES]     Frequency of Communication with Friends and Family: More than three times a week     Frequency of Social Gatherings with Friends and Family: More than three times a week     Attends Confucianism Services: More than 4 times per year     Active Member of Clubs or Organizations: No     Attends Club or Organization Meetings: Never     Marital Status:    Housing Stability: Unknown (10/7/2023)    Housing Stability Vital Sign     Unable to Pay for Housing in the Last Year: No     Unstable Housing in the Last Year: No     Review of Systems   Constitutional:  Negative for chills, diaphoresis, fatigue and fever.   HENT: Negative.     Eyes: Negative.    Respiratory:  Negative for cough and shortness of breath.    Cardiovascular:  Negative for chest pain, palpitations and leg  swelling.   Gastrointestinal:  Positive for nausea. Negative for constipation, diarrhea and vomiting.   Genitourinary:  Negative for difficulty urinating and dysuria.   Musculoskeletal:  Negative for arthralgias and myalgias.   Skin:  Positive for wound. Negative for rash.   Neurological:  Negative for dizziness and numbness.   Psychiatric/Behavioral:  Negative for agitation and confusion.      Objective:     Vital Signs (Most Recent):  Temp: 97.1 °F (36.2 °C) (10/11/23 1531)  Pulse: 76 (10/11/23 1531)  Resp: 18 (10/11/23 1623)  BP: (!) 145/75 (10/11/23 1531)  SpO2: 100 % (10/11/23 1531) Vital Signs (24h Range):  Temp:  [97 °F (36.1 °C)-97.8 °F (36.6 °C)] 97.1 °F (36.2 °C)  Pulse:  [] 76  Resp:  [9-18] 18  SpO2:  [94 %-100 %] 100 %  BP: (118-145)/(68-75) 145/75     Weight: 120.1 kg (264 lb 12.4 oz)  Body mass index is 41.47 kg/m².    Estimated Creatinine Clearance: 167.2 mL/min (based on SCr of 0.6 mg/dL).     Physical Exam  Vitals reviewed.   Constitutional:       General: She is not in acute distress.     Appearance: Normal appearance. She is not ill-appearing.   HENT:      Head: Normocephalic.      Nose: Nose normal.      Mouth/Throat:      Mouth: Mucous membranes are moist.      Pharynx: Oropharynx is clear.   Eyes:      General:         Right eye: No discharge.         Left eye: No discharge.      Conjunctiva/sclera: Conjunctivae normal.   Cardiovascular:      Rate and Rhythm: Normal rate and regular rhythm.   Pulmonary:      Effort: Pulmonary effort is normal. No respiratory distress.      Breath sounds: No stridor.   Abdominal:      General: Abdomen is flat. There is no distension.      Palpations: Abdomen is soft. There is no mass.      Tenderness: There is no abdominal tenderness. There is no right CVA tenderness or left CVA tenderness.   Musculoskeletal:         General: Normal range of motion.      Cervical back: Normal range of motion.      Right lower leg: No edema.      Left lower leg: No edema.    Skin:     General: Skin is dry.      Findings: No erythema or rash.      Comments: With abdominal CONSTANZA drains, SS output.    Neurological:      General: No focal deficit present.      Mental Status: She is alert and oriented to person, place, and time.      Motor: No weakness.      Gait: Gait normal.   Psychiatric:         Mood and Affect: Mood normal.         Behavior: Behavior normal.         Thought Content: Thought content normal.         Judgment: Judgment normal.          Significant Labs: All pertinent labs within the past 24 hours have been reviewed.    Significant Imaging: I have reviewed all pertinent imaging results/findings within the past 24 hours.

## 2023-10-11 NOTE — HPI
Ms Abrams is a 39 yo female with PMH of multiple GI surgeries including total proctocolectomy, J pouch, loop ileostomy, cb post op fistula , as well as hysterectomy and ventral hernia repar, cb mesh infection requring excision, and now complex abdominal wall reconstruction and ileostomy re-siting on 8/7/23 which was cb with recurrent seromas requiring drain placement as well as persistent pain. S/p IR drainage on 9/10/23, cultures were negative. Returns to [facility] [place] [place] ED with concerns for new purulent drain output and uncontrolled pain. S/p IR drainage with new drained placed on 10/6 per IR, cultures + MSSA and VRE.     To note, pt was recently admitted from 9/9 - 9/12 for intraabdominal fluid collections requiring two drains. Cultures during that stay cultures were no growth. Was prescribed Augmentin x4 days and then was on cipro/flagyl x10d on 9/26.     Since admission, afebrile, HDS. Without leukocytosis. CRP downtrending. CT AP with continued fluid collection deep into rectus abdominus muscles, though decreased in size from previous scan. Also noting presacral fluid collection. S/p IR drainage, cultures + MSSA and E faecalis. Pt is currently on vancomycin IV. ID was consulted for abx recs and duration.

## 2023-10-11 NOTE — PLAN OF CARE
Problem: Adult Inpatient Plan of Care  Goal: Plan of Care Review  Outcome: Ongoing, Progressing  Goal: Patient-Specific Goal (Individualized)  Outcome: Ongoing, Progressing  Goal: Absence of Hospital-Acquired Illness or Injury  Outcome: Ongoing, Progressing  Goal: Optimal Comfort and Wellbeing  Outcome: Ongoing, Progressing  Goal: Readiness for Transition of Care  Outcome: Ongoing, Progressing     Problem: Impaired Wound Healing  Goal: Optimal Wound Healing  Outcome: Ongoing, Progressing     Problem: Bariatric Environmental Safety  Goal: Safety Maintained with Care  Outcome: Ongoing, Progressing     Problem: Infection  Goal: Absence of Infection Signs and Symptoms  Outcome: Ongoing, Progressing

## 2023-10-11 NOTE — PROGRESS NOTES
[facility]  Colorectal Surgery  Progress Note    Patient Name: Georgette Abrams  MRN: [id number]  Admission Date: 10/4/2023  Hospital Length of Stay: 7 days  Attending Physician: KRISH Tracy MD    Subjective:     Interval History: no acute events overnite, still requiring IV dilauidd and oral oxy for pain control, ileostomy functional  Post-Op Info:  * No surgery found *          Medications:  Continuous Infusions:  Scheduled Meds:   acetaminophen  650 mg Oral Q6H    enoxparin  40 mg Subcutaneous Q24H (prophylaxis, 1700)    LIDOcaine  1 patch Transdermal Q24H    losartan  100 mg Oral Daily    methocarbamoL  500 mg Oral QID    miconazole  100 mg Vaginal QHS    scopolamine  1 patch Transdermal Q3 Days    sodium chloride 0.9%  10 mL Intravenous Q6H    vancomycin (VANCOCIN) IV (PEDS and ADULTS)  1,500 mg Intravenous Q12H    venlafaxine  150 mg Oral Daily     PRN Meds:   clonazePAM    HYDROmorphone    naloxone    ondansetron    oxyCODONE    sodium chloride 0.9%    sodium chloride 0.9%    vancomycin - pharmacy to dose        Objective:     Vital Signs (Most Recent):  Temp: 97 °F (36.1 °C) (10/11/23 0711)  Pulse: 78 (10/11/23 0711)  Resp: 18 (10/11/23 0842)  BP: 130/71 (10/11/23 0711)  SpO2: 95 % (10/11/23 0711) Vital Signs (24h Range):  Temp:  [97 °F (36.1 °C)-98.2 °F (36.8 °C)] 97 °F (36.1 °C)  Pulse:  [] 78  Resp:  [16-18] 18  SpO2:  [91 %-100 %] 95 %  BP: (118-151)/(68-78) 130/71     Intake/Output - Last 3 Shifts         10/09 0700  10/10 0659 10/10 0700  10/11 0659 10/11 0700  10/12 0659    P.O. 240 450     Total Intake(mL/kg) 240 (2) 450 (3.7)     Urine (mL/kg/hr)  0 (0)     Drains 15 8     Stool       Total Output 15 8     Net +225 +442            Urine Occurrence  4 x              Physical Exam  Vitals and nursing note reviewed.   Constitutional:       Appearance: She is well-developed.   HENT:      Head: Normocephalic.   Eyes:      Pupils: Pupils are equal, round, and  reactive to light.   Cardiovascular:      Rate and Rhythm: Normal rate and regular rhythm.      Heart sounds: Normal heart sounds.   Pulmonary:      Effort: Pulmonary effort is normal. No respiratory distress.      Breath sounds: Normal breath sounds. No wheezing or rales.   Abdominal:      Palpations: Abdomen is soft. There is no mass.      Tenderness: There is no guarding or rebound.      Comments: IR drain puruolent drainage  Ileostomy functional   Skin:     General: Skin is warm and dry.   Neurological:      Mental Status: She is alert and oriented to person, place, and time.   Psychiatric:      Comments: Hx of short term memory loss, in nursing home  Mentality at baseline                Significant Labs:  BMP (Last 3 Results):   Recent Labs   Lab 10/05/23  0348 10/08/23  0558 10/09/23  0528 10/10/23  1000 10/11/23  0352      < > 150* 118* 131*      < > 138 137 137   K 4.0   < > 3.8 4.3 4.0      < > 103 102 101   CO2 27   < > 26 26 27   BUN 4*   < > 5* 4* 6   CREATININE 0.7   < > 0.7 0.7 0.6   CALCIUM 8.8   < > 9.0 9.4 9.1   MG 1.8  --   --   --   --     < > = values in this interval not displayed.     CBC (Last 3 Results):   Recent Labs   Lab 10/04/23  1757 10/05/23  0348   WBC 7.69 6.44   RBC 3.89* 3.77*   HGB 10.8* 10.6*   HCT 35.4* 34.3*    375   MCV 91 91   MCH 27.8 28.1   MCHC 30.5* 30.9*       Significant Diagnostics:  None    Assessment/Plan:     * Pelvic abscess in female  See above    Chronic pain syndrome  Attempting to transition off IV dilaudid but pt very resistant     Attention to ileostomy     Assessment:  Ms. Abrams is a 38 year old woman well known to our service most recently with complex abdominal wall reconstruction and ileostomy re-siting. This has been complicated by persistent postoperative pain and seromas that required IR drain placement. She now presents with new purulent drain output and persistent pain.   S/p IR w drainage placement 10/6/2023  Chronic  opoid use     Plan:  Continue multimodal pain regimen  Continue trending CRP  Monitor drain output  Continue IV abx  Continue home meds  Vte ppx: scd, lovenox    Dispo: waiting for final culture/susceptibilities to result. Will transition to po abx as appropriate.          Nell Gaytan NP  Colorectal Surgery  [facility]

## 2023-10-11 NOTE — ASSESSMENT & PLAN NOTE
39 yo female with PMH of multiple GI surgeries including total proctocolectomy, J pouch, loop ileostomy, cb post op fistula , as well as hysterectomy and ventral hernia repar, cb mesh infection requring excision, and now complex abdominal wall reconstruction and ileostomy re-siting on 8/7/23 which was cb with recurrent seromas requiring drain placement as well as persistent pain. S/p IR drainage on 9/10/23, cultures were negative. Returns to [facility] [place], [place] ED with concerns for new purulent drain output and uncontrolled pain. S/p IR drainage with new drained placed on 10/6 per IR, cultures + MSSA and VRE.     Currently on vancomycin. ID was consulted for duration recs.     Recommendations:   - discontinue vancomycin   - start daptomycin 8mg/kg IV q24hr. Will need baseline CPK.   - will anticipate 3-4 weeks of IV therapy. Will re image toward end of care to assess for resolution of fluid collection   - Pt [place] resident. Will coordinate follow up with colorectal   - pt seen and plan reviewed with ID staff. ID will follow.      Splint

## 2023-10-12 VITALS
HEIGHT: 67 IN | HEART RATE: 96 BPM | RESPIRATION RATE: 16 BRPM | BODY MASS INDEX: 41.55 KG/M2 | DIASTOLIC BLOOD PRESSURE: 74 MMHG | SYSTOLIC BLOOD PRESSURE: 133 MMHG | TEMPERATURE: 98 F | WEIGHT: 264.75 LBS | OXYGEN SATURATION: 96 %

## 2023-10-12 DIAGNOSIS — N73.9 PELVIC ABSCESS IN FEMALE: Primary | ICD-10-CM

## 2023-10-12 LAB
ANION GAP SERPL CALC-SCNC: 8 MMOL/L (ref 8–16)
BUN SERPL-MCNC: 6 MG/DL (ref 6–20)
CALCIUM SERPL-MCNC: 9 MG/DL (ref 8.7–10.5)
CHLORIDE SERPL-SCNC: 101 MMOL/L (ref 95–110)
CK SERPL-CCNC: 37 U/L (ref 20–180)
CO2 SERPL-SCNC: 28 MMOL/L (ref 23–29)
CREAT SERPL-MCNC: 0.7 MG/DL (ref 0.5–1.4)
CRP SERPL-MCNC: 31.8 MG/L (ref 0–8.2)
EST. GFR  (NO RACE VARIABLE): >60 ML/MIN/1.73 M^2
GLUCOSE SERPL-MCNC: 106 MG/DL (ref 70–110)
POTASSIUM SERPL-SCNC: 4.1 MMOL/L (ref 3.5–5.1)
SODIUM SERPL-SCNC: 137 MMOL/L (ref 136–145)

## 2023-10-12 PROCEDURE — A4216 STERILE WATER/SALINE, 10 ML: HCPCS | Performed by: COLON & RECTAL SURGERY

## 2023-10-12 PROCEDURE — 99233 SBSQ HOSP IP/OBS HIGH 50: CPT | Mod: ,,, | Performed by: REGISTERED NURSE

## 2023-10-12 PROCEDURE — 86140 C-REACTIVE PROTEIN: CPT | Performed by: SURGERY

## 2023-10-12 PROCEDURE — 99233 PR SUBSEQUENT HOSPITAL CARE,LEVL III: ICD-10-PCS | Mod: ,,, | Performed by: REGISTERED NURSE

## 2023-10-12 PROCEDURE — 25000003 PHARM REV CODE 250: Performed by: SURGERY

## 2023-10-12 PROCEDURE — 25000003 PHARM REV CODE 250: Performed by: REGISTERED NURSE

## 2023-10-12 PROCEDURE — 82550 ASSAY OF CK (CPK): CPT | Performed by: REGISTERED NURSE

## 2023-10-12 PROCEDURE — 63600175 PHARM REV CODE 636 W HCPCS: Performed by: SURGERY

## 2023-10-12 PROCEDURE — 25000003 PHARM REV CODE 250: Performed by: COLON & RECTAL SURGERY

## 2023-10-12 PROCEDURE — 63600175 PHARM REV CODE 636 W HCPCS: Performed by: REGISTERED NURSE

## 2023-10-12 PROCEDURE — 63600175 PHARM REV CODE 636 W HCPCS: Mod: JZ,JG

## 2023-10-12 PROCEDURE — 80048 BASIC METABOLIC PNL TOTAL CA: CPT | Performed by: COLON & RECTAL SURGERY

## 2023-10-12 RX ORDER — METHOCARBAMOL 500 MG/1
500 TABLET, FILM COATED ORAL 4 TIMES DAILY
Qty: 40 TABLET | Refills: 0 | Status: SHIPPED | OUTPATIENT
Start: 2023-10-12 | End: 2023-10-22

## 2023-10-12 RX ORDER — MICONAZOLE NITRATE 100 MG/1
100 SUPPOSITORY VAGINAL NIGHTLY
Qty: 7 SUPPOSITORY | Refills: 0 | Status: SHIPPED | OUTPATIENT
Start: 2023-10-12

## 2023-10-12 RX ORDER — LIDOCAINE 50 MG/G
1 PATCH TOPICAL DAILY
Qty: 30 PATCH | Refills: 3 | Status: SHIPPED | OUTPATIENT
Start: 2023-10-12

## 2023-10-12 RX ORDER — OXYCODONE HYDROCHLORIDE 30 MG/1
30 TABLET ORAL EVERY 4 HOURS PRN
Qty: 30 TABLET | Refills: 0 | Status: SHIPPED | OUTPATIENT
Start: 2023-10-12 | End: 2023-10-18

## 2023-10-12 RX ADMIN — ACETAMINOPHEN 650 MG: 325 TABLET ORAL at 11:10

## 2023-10-12 RX ADMIN — HYDROMORPHONE HYDROCHLORIDE 1 MG: 1 INJECTION, SOLUTION INTRAMUSCULAR; INTRAVENOUS; SUBCUTANEOUS at 04:10

## 2023-10-12 RX ADMIN — HYDROMORPHONE HYDROCHLORIDE 1 MG: 1 INJECTION, SOLUTION INTRAMUSCULAR; INTRAVENOUS; SUBCUTANEOUS at 11:10

## 2023-10-12 RX ADMIN — Medication 10 ML: at 11:10

## 2023-10-12 RX ADMIN — METHOCARBAMOL 500 MG: 500 TABLET ORAL at 09:10

## 2023-10-12 RX ADMIN — ACETAMINOPHEN 650 MG: 325 TABLET ORAL at 04:10

## 2023-10-12 RX ADMIN — OXYCODONE HYDROCHLORIDE 30 MG: 10 TABLET ORAL at 09:10

## 2023-10-12 RX ADMIN — LOSARTAN POTASSIUM 100 MG: 50 TABLET, FILM COATED ORAL at 09:10

## 2023-10-12 RX ADMIN — ALTEPLASE 2 MG: 2.2 INJECTION, POWDER, LYOPHILIZED, FOR SOLUTION INTRAVENOUS at 02:10

## 2023-10-12 RX ADMIN — DAPTOMYCIN 680 MG: 500 INJECTION, POWDER, LYOPHILIZED, FOR SOLUTION INTRAVENOUS at 02:10

## 2023-10-12 RX ADMIN — METHOCARBAMOL 500 MG: 500 TABLET ORAL at 01:10

## 2023-10-12 RX ADMIN — OXYCODONE HYDROCHLORIDE 30 MG: 10 TABLET ORAL at 02:10

## 2023-10-12 RX ADMIN — Medication 10 ML: at 05:10

## 2023-10-12 RX ADMIN — Medication 10 ML: at 02:10

## 2023-10-12 NOTE — PLAN OF CARE
Problem: Adult Inpatient Plan of Care  Goal: Plan of Care Review  Outcome: Met  Goal: Patient-Specific Goal (Individualized)  Outcome: Met  Goal: Absence of Hospital-Acquired Illness or Injury  Outcome: Met  Goal: Optimal Comfort and Wellbeing  Outcome: Met  Goal: Readiness for Transition of Care  Outcome: Met     Problem: Impaired Wound Healing  Goal: Optimal Wound Healing  Outcome: Met     Problem: Bariatric Environmental Safety  Goal: Safety Maintained with Care  Outcome: Met     Problem: Infection  Goal: Absence of Infection Signs and Symptoms  Outcome: Met     Problem: Pain Acute  Goal: Acceptable Pain Control and Functional Ability  Outcome: Met     [facility] Plan of Care Note  Dx Pelvic abscess     Shift Events Pt had no significant events.  Medicated with oral and IV pain medication with effective results.    Goals of Care: Manage pain    Neuro: A&O x 4; intact    Vital Signs: WNL    Respiratory: Room air    Diet: Regular    Is patient tolerating current diet? Yes    GTTS: Q 24 hr Daptomycin    Urine Output/Bowel Movement: Adequate urine output; LBM today via Ileostomy.    Drains/Tubes/Tube Feeds (include total output/shift): 2 CONSTANZA drains RUQ    Lines: Double lumen PICC JULIO      Accuchecks:None    Skin: RUQ CONSTANZA drains    Fall Risk Score: 9    Activity level? Independent    Any scheduled procedures? None    Any safety concerns? None    Other: Spoke with pharmacist regarding early administration of IV antibiotic due to patient wanting to leave because of a 4 hour drive home.  Per Matt it is ok to give early.  IV antibiotic administered; home health and home infusion set up prior to discharge.   Pt discharged home in stable condition.  [facility] pharmacy delivered home medications; pt has all personal belongings.

## 2023-10-12 NOTE — PLAN OF CARE
10/12/23 1344   Post-Acute Status   Post-Acute Authorization Home Health   Home Health Status Referrals Sent   Discharge Plan   Discharge Plan A Home Health   Discharge Plan B Home Health     Met with patient to review discharge recommendation of home health and is agreeable to plan    Patient/family provided list of facilities in-network with patient's payor plan. Providers that are owned, operated, or affiliated with [facility] are included on the list.     Notified that referral sent to below listed facilities from in-network list based on proximity to home/family support:   1.  2.  3.  4.  5. (can send more than 5)    Patient/family instructed to identify preference.    Preferred Facility: (if more than 1, listed in order of descending preference)  Pt. Has no preference.     If an additional preferred facility not listed above is identified, additional referral to be sent. If above facilities unable to accept, will send additional referrals to in-network providers.

## 2023-10-12 NOTE — PROGRESS NOTES
[facility] - [facility]  Infectious Disease  Progress Note    Patient Name: Georgette Abrams  MRN: [id number]  Admission Date: 10/4/2023  Length of Stay: 8 days  Attending Physician: KRISH Tracy MD  Primary Care Provider: No, Primary Doctor    Isolation Status: No active isolations  Assessment/Plan:      GI  Intraabdominal fluid collection   39 yo female with PMH of multiple GI surgeries including total proctocolectomy, J pouch, loop ileostomy, cb post op fistula , as well as hysterectomy and ventral hernia repar, cb mesh infection requring excision, and now complex abdominal wall reconstruction and ileostomy re-siting on 8/7/23 which was cb with recurrent seromas requiring drain placement as well as persistent pain. S/p IR drainage on 9/10/23, cultures were negative. Returns to [facility] ED with concerns for new purulent drain output and uncontrolled pain. S/p IR drainage with new drained placed on 10/6 per IR, cultures + MSSA and VRE.     Currently on daptomycin. ID was consulted for duration recs.     Recommendations:   - Continue daptomycin 8mg/kg IV q24hr. Baseline CPK 37   - will anticipate 3-4 weeks of IV therapy. Will re image toward end of care to assess for resolution of fluid collection   - pt requesting diflucan as needed for vaginal yeast infections 2/2 antibiotic use   - Pt [place] resident. Will coordinate follow up with colorectal   - will need weekly labs, weekly dressing changes, and ID follow up   - pt seen and plan reviewed with ID staff. ID will sign off.     Outpatient Antibiotic Therapy Plan:    Please send referral to [facility].    1) Infection: MSSA, e faecalis abdominal fluid collection     2) Discharge Antibiotics:    Intravenous antibiotics:   daptomycin 680 mg IV q24 hr    3) Therapy Duration:  3-4 weeks, dependent on re imaging     Estimated end date of IV antibiotics: 11/3/2023    4) Outpatient Weekly Labs:    Order the following labs to be drawn on  Mondays:    CBC   CMP    CRP   CPK (when on Daptomycin)      5) Fax Lab Results to Infectious Diseases Provider: JA Montano FNP-C    [facility] Fax Number: [phone]    6) Outpatient Infectious Diseases Follow-up     Follow-up appointment will be arranged by the ID clinic and will be found in the patient's appointments tab.     Prior to discharge, please ensure the patient's follow-up has been scheduled.     If there is still no follow-up scheduled prior to discharge, please send an EPIC message to Karlee Arteaga in Infectious Diseases.                      Thank you for your consult. I will sign off. Please contact us if you have any additional questions.    Libertad Jackson NP  Infectious Disease  [facility]    Subjective:     Principal Problem:Pelvic abscess in female    HPI: Ms Abrams is a 39 yo female with PMH of multiple GI surgeries including total proctocolectomy, J pouch, loop ileostomy, cb post op fistula , as well as hysterectomy and ventral hernia repar, cb mesh infection requring excision, and now complex abdominal wall reconstruction and ileostomy re-siting on 8/7/23 which was cb with recurrent seromas requiring drain placement as well as persistent pain. S/p IR drainage on 9/10/23, cultures were negative. Returns to [facility] ED with concerns for new purulent drain output and uncontrolled pain. S/p IR drainage with new drained placed on 10/6 per IR, cultures + MSSA and VRE.     To note, pt was recently admitted from 9/9 - 9/12 for intraabdominal fluid collections requiring two drains. Cultures during that stay cultures were no growth. Was prescribed Augmentin x4 days and then was on cipro/flagyl x10d on 9/26.     Since admission, afebrile, HDS. Without leukocytosis. CRP downtrending. CT AP with continued fluid collection deep into rectus abdominus muscles, though decreased in size from previous scan. Also noting presacral fluid collection. S/p IR drainage, cultures + MSSA  and E faecalis. Pt is currently on vancomycin IV. ID was consulted for abx recs and duration.         Interval hx:  Afebrile, HDS. Transitioned to daptomycin yesterday. Drain cultures + MSSA, e faecalis        Past Medical History:   Diagnosis Date    Anxiety     Familial polyposis     S/P total colectomy        Past Surgical History:   Procedure Laterality Date    Davinci Assisted Bilateral Ovarian Cystectomy, with extension  SEAN   2011    HYSTERECTOMY  2012    DAVINCI     ILEOSTOMY REVISION N/A 2023    Procedure: REVISION, ILEOSTOMY;  Surgeon: KRISH Tracy MD;  Location: [facility] OR 2ND FLR;  Service: Colon and Rectal;  Laterality: N/A;    Illeotomy Creation      LYSIS OF ADHESIONS N/A 2023    Procedure: LYSIS, ADHESIONS;  Surgeon: KRISH Tracy MD;  Location: [facility] OR 2ND FLR;  Service: Colon and Rectal;  Laterality: N/A;    OOPHORECTOMY  2012    DAvinci removal with DLH     REPAIR OF RECURRENT VENTRAL HERNIA N/A 2023    Procedure: REPAIR, HERNIA, VENTRAL, RECURRENT, ERAS low;  Surgeon: KRISH Tracy MD;  Location: [facility] OR 2ND FLR;  Service: Colon and Rectal;  Laterality: N/A;  w/ mesh and omental pedical flap    TOTAL COLECTOMY      Iloanal pouch creation       Review of patient's allergies indicates:   Allergen Reactions    Levofloxacin Nausea And Vomiting and Rash    Morphine Anaphylaxis, Hives, Shortness Of Breath and Hallucinations           Piperacillin-tazobactam Rash    Sulfa (sulfonamide antibiotics) Nausea And Vomiting and Rash    Opioids - morphine analogues     Hydrocodone-acetaminophen Itching       Medications:  Medications Prior to Admission   Medication Sig    [] ciprofloxacin HCl (CIPRO) 500 MG tablet Take 1 tablet (500 mg total) by mouth every 12 (twelve) hours. for 10 days    clonazepam (KLONOPIN) 1 MG tablet Take 1 mg by mouth 2 (two) times daily as needed.      cyanocobalamin 1,000 mcg/mL injection 1,000 mcg twice a  week.    ibuprofen (ADVIL,MOTRIN) 800 MG tablet Take 1 tablet (800 mg total) by mouth every 8 (eight) hours.    losartan (COZAAR) 100 MG tablet Take 100 mg by mouth.    methocarbamoL (ROBAXIN) 500 MG Tab Take 1 tablet (500 mg total) by mouth 4 (four) times daily.    [] metroNIDAZOLE (FLAGYL) 500 MG tablet Take 1 tablet (500 mg total) by mouth every 8 (eight) hours. for 10 days    ondansetron (ZOFRAN) 4 MG tablet Take 4 mg by mouth every 6 (six) hours as needed for Nausea.    ondansetron (ZOFRAN-ODT) 4 MG TbDL Dissolve 1 tablet (4 mg total) by mouth every 6 (six) hours as needed.    oxyCODONE (ROXICODONE) 15 MG Tab Take 1 tablet (15 mg total) by mouth every 4 (four) hours as needed for Pain.    oxyCODONE (ROXICODONE) 15 MG Tab Take 1 tablet (15 mg total) by mouth every 4 (four) hours as needed for Pain.    oxyCODONE (ROXICODONE) 30 MG Tab Take 1 tablet (30 mg total) by mouth every 4 (four) hours as needed for Pain.    progesterone (PROMETRIUM) 100 MG capsule Take 400 mg by mouth every evening.    venlafaxine (EFFEXOR) 100 MG Tab Take 150 mg by mouth Daily.     Antibiotics (From admission, onward)      Start     Stop Route Frequency Ordered    10/11/23 1800  DAPTOmycin (CUBICIN) 680 mg in sodium chloride 0.9% SolP 50 mL IVPB         -- IV Every 24 hours (non-standard times) 10/11/23 1635          Antifungals (From admission, onward)      Start     Stop Route Frequency Ordered    10/10/23 2100  miconazole 100 mg vaginal suppository 100 mg         10/17/23 2059 Vagl Nightly 10/10/23 0639          Antivirals (From admission, onward)      None               There is no immunization history on file for this patient.    Family History       Problem Relation (Age of Onset)    Colon cancer Maternal Grandmother    Lung cancer Maternal Grandfather          Social History     Socioeconomic History    Marital status:    Tobacco Use    Smoking status: Former    Smokeless tobacco: Never   Substance and  Sexual Activity    Alcohol use: Not Currently     Comment: seldom    Drug use: No    Sexual activity: Yes     Partners: Male     Birth control/protection: None     Comment: , lives in [place]     Social Determinants of Health     Financial Resource Strain: Low Risk  (10/7/2023)    Overall Financial Resource Strain (CARDIA)     Difficulty of Paying Living Expenses: Not hard at all   Food Insecurity: No Food Insecurity (10/7/2023)    Hunger Vital Sign     Worried About Running Out of Food in the Last Year: Never true     Ran Out of Food in the Last Year: Never true   Transportation Needs: No Transportation Needs (10/7/2023)    PRAPARE - Transportation     Lack of Transportation (Medical): No     Lack of Transportation (Non-Medical): No   Physical Activity: Inactive (10/7/2023)    Exercise Vital Sign     Days of Exercise per Week: 0 days     Minutes of Exercise per Session: 0 min   Stress: Stress Concern Present (10/7/2023)    [employer] - Occupational Stress Questionnaire     Feeling of Stress : To some extent   Social Connections: Moderately Integrated (10/7/2023)    Social Connection and Isolation Panel [NHANES]     Frequency of Communication with Friends and Family: More than three times a week     Frequency of Social Gatherings with Friends and Family: More than three times a week     Attends Yazidi Services: More than 4 times per year     Active Member of Clubs or Organizations: No     Attends Club or Organization Meetings: Never     Marital Status:    Housing Stability: Unknown (10/7/2023)    Housing Stability Vital Sign     Unable to Pay for Housing in the Last Year: No     Unstable Housing in the Last Year: No     Review of Systems   Constitutional:  Negative for chills, diaphoresis, fatigue and fever.   HENT: Negative.     Eyes: Negative.    Respiratory:  Negative for cough and shortness of breath.    Cardiovascular:  Negative for chest pain,  palpitations and leg swelling.   Gastrointestinal:  Positive for nausea. Negative for constipation, diarrhea and vomiting.   Genitourinary:  Negative for difficulty urinating and dysuria.   Musculoskeletal:  Negative for arthralgias and myalgias.   Skin:  Positive for wound. Negative for rash.   Neurological:  Negative for dizziness and numbness.   Psychiatric/Behavioral:  Negative for agitation and confusion.      Objective:     Vital Signs (Most Recent):  Temp: 97.6 °F (36.4 °C) (10/12/23 0753)  Pulse: 71 (10/12/23 0753)  Resp: 16 (10/12/23 0924)  BP: (!) 114/58 (10/12/23 0753)  SpO2: 96 % (10/12/23 0753) Vital Signs (24h Range):  Temp:  [97.1 °F (36.2 °C)-98.6 °F (37 °C)] 97.6 °F (36.4 °C)  Pulse:  [63-87] 71  Resp:  [9-18] 16  SpO2:  [94 %-100 %] 96 %  BP: (113-145)/(57-78) 114/58     Weight: 120.1 kg (264 lb 12.4 oz)  Body mass index is 41.47 kg/m².    Estimated Creatinine Clearance: 143.4 mL/min (based on SCr of 0.7 mg/dL).     Physical Exam  Vitals reviewed.   Constitutional:       General: She is not in acute distress.     Appearance: Normal appearance. She is not ill-appearing.   HENT:      Head: Normocephalic.      Nose: Nose normal.      Mouth/Throat:      Mouth: Mucous membranes are moist.      Pharynx: Oropharynx is clear.   Eyes:      General:         Right eye: No discharge.         Left eye: No discharge.      Conjunctiva/sclera: Conjunctivae normal.   Cardiovascular:      Rate and Rhythm: Normal rate and regular rhythm.   Pulmonary:      Effort: Pulmonary effort is normal. No respiratory distress.      Breath sounds: No stridor.   Abdominal:      General: Abdomen is flat. There is no distension.      Palpations: Abdomen is soft. There is no mass.      Tenderness: There is no abdominal tenderness. There is no right CVA tenderness or left CVA tenderness.   Musculoskeletal:         General: Normal range of motion.      Cervical back: Normal range of motion.      Right lower leg: No edema.      Left  lower leg: No edema.   Skin:     General: Skin is dry.      Findings: No erythema or rash.      Comments: With abdominal CONSTANZA drains, SS output.    Neurological:      General: No focal deficit present.      Mental Status: She is alert and oriented to person, place, and time.      Motor: No weakness.      Gait: Gait normal.   Psychiatric:         Mood and Affect: Mood normal.         Behavior: Behavior normal.         Thought Content: Thought content normal.         Judgment: Judgment normal.          Significant Labs: All pertinent labs within the past 24 hours have been reviewed.    Significant Imaging: I have reviewed all pertinent imaging results/findings within the past 24 hours.

## 2023-10-12 NOTE — DISCHARGE SUMMARY
[facility]  Colorectal Surgery  Discharge Summary      Patient Name: Georgette Abrams  MRN: [id number]  Admission Date: 10/4/2023  Hospital Length of Stay: 8 days  Discharge Date and Time: 10/12/2023  4:05 PM  Attending Physician: Coretta att. providers found   Discharging Provider: Nell Gaytan NP  Primary Care Provider: Coretta, Primary Doctor     HPI:  No notes on file    * No surgery found *     Hospital Course:  Ms. Abrams is a 38 year old woman well known to our service most recently with complex abdominal wall reconstruction and ileostomy re-siting. This has been complicated by persistent postoperative pain and seromas that required IR drain placement. She now presents with new purulent drain output and persistent pain. Will plan for CT abdomen pelvis with IV and oral contrast. Will admit for ongoing observation, IV antibiotics, and analgesia.  IR 10/5 able to place drain and cultures done, ID consulted, vanc in progress.  Still having issues with pain control despite 30 mgm of oxycodone, added IV dilauidid 1 mgm every 4 h our prn.  She continued to improve, VS stable, AF but still complaining of pain, requesting more pain meds.  It took several days for ID to get sensitivies to the staph and enterocuccus faecalis, ID recommended Daptomycin for 3 weMs. On day of discharge pt is norma regular diet, inc line healing well, ostomy functional,, ambulating in mendoza without difficulty,  VS stable and afebrile.  Needs to have ct done 3 weeks and fu with Dr. Tracy and ID (appts scheduled)           Goals of Care Treatment Preferences:  Code Status: Full Code      Consults (From admission, onward)        Status Ordering Provider     Inpatient consult to Social Work  Once        Provider:  (Not yet assigned)    Acknowledged GUSTAVO TEJEDA     Inpatient consult to Infectious Diseases  Once        Provider:  (Not yet assigned)    Completed GUSTAVO TEJEDA     Inpatient consult to PICC team ([facility])  Once       "  Provider:  (Not yet assigned)    Completed GUSTAVO TEJEDA     Inpatient consult to Social Work  Once        Provider:  (Not yet assigned)    Acknowledged GUSTAVO TEJEDA     Inpatient consult to Midline team  Once        Provider:  (Not yet assigned)    Completed KRISH HOUSTON     Inpatient consult to Interventional Radiology  Once        Provider:  (Not yet assigned)    Completed GUSTAVO TEJEDA     Inpatient consult to Colorectal Surgery  Once        Provider:  (Not yet assigned)    Completed CUCA BARRERA     Inpatient consult to PICC team (hospitals)  Once        Provider:  (Not yet assigned)    Completed CUCA BARRERA          Significant Diagnostic Studies: Labs:   BMP:   Recent Labs   Lab 10/11/23  0352 10/12/23  0504   * 106    137   K 4.0 4.1    101   CO2 27 28   BUN 6 6   CREATININE 0.6 0.7   CALCIUM 9.1 9.0    and CBC No results for input(s): "WBC", "HGB", "HCT", "PLT" in the last 48 hours.    Pending Diagnostic Studies:     Procedure Component Value Units Date/Time    Basic metabolic panel [[id number]]     Order Status: Sent Lab Status: No result     Specimen: Blood     C-Reactive Protein [[id number]]     Order Status: Sent Lab Status: No result     Specimen: Blood         Final Active Diagnoses:    Diagnosis Date Noted POA    PRINCIPAL PROBLEM:  Pelvic abscess in female [N73.9] 10/10/2023 Yes    Intraabdominal fluid collection [R18.8] 10/11/2023 Unknown    Chronic pain syndrome [G89.4] 10/10/2023 Yes    Attention to ileostomy [Z43.2] 01/18/2016 Not Applicable      Problems Resolved During this Admission:      Discharged Condition: good    Disposition: Home-Health Care [facility]    Follow Up:   Follow-up Information     KRISH Houston MD Follow up in 3 week(s).    Specialty: Colon and Rectal Surgery  Why: also neeeds to have ct done prior to appt  Contact information:  [address] [place] [address] [phone]             Libertad Jaquez NP Follow up " in 3 week(s).    Specialty: Infectious Diseases  Why: also needs to see ID after ct done  Contact information:  [facility] [address]  [phone]             Patient medically ready for discharge to home with [facility]. [phone]. Follow up.    Contact information:  Patient medically ready for discharge to home with [facility]. [phone].      Patient will discharge using [facility] for Daptomycin IV abx. [employer] Rep is Maira. [phone].                     Patient Instructions:      Diet Adult Regular   Order Comments: Low fiber, no fresh fruit or fresh vegetables, no nuts, grapes, popcorn or raisins     Lifting restrictions   Order Comments: No lifting anything greater than 5 pounds     No dressing needed   Order Comments: May shower, no tub bath     Notify your health care provider if you experience any of the following:  temperature >100.4     Notify your health care provider if you experience any of the following:  persistent nausea and vomiting or diarrhea     Notify your health care provider if you experience any of the following:  severe uncontrolled pain     Notify your health care provider if you experience any of the following:  redness, tenderness, or signs of infection (pain, swelling, redness, odor or green/yellow discharge around incision site)     Notify your health care provider if you experience any of the following:  difficulty breathing or increased cough     Notify your health care provider if you experience any of the following:  severe persistent headache     Notify your health care provider if you experience any of the following:  worsening rash     Notify your health care provider if you experience any of the following:  persistent dizziness, light-headedness, or visual disturbances     Notify your health care provider if you experience any of the following:  increased confusion or weakness      Medications:  Reconciled Home Medications:      Medication List      START taking these medications    LIDOcaine 5 %  Commonly known as: LIDODERM  Place 1 patch onto the skin once daily. Remove & Discard patch within 12 hours or as directed by MD     miconazole 100 mg vaginal suppository  Commonly known as: MICOTIN  Place 1 suppository (100 mg total) vaginally every evening.        CHANGE how you take these medications    * oxyCODONE 15 MG Tab  Commonly known as: ROXICODONE  Take 1 tablet (15 mg total) by mouth every 4 (four) hours as needed for Pain.  What changed: Another medication with the same name was added. Make sure you understand how and when to take each.     * oxyCODONE 15 MG Tab  Commonly known as: ROXICODONE  Take 1 tablet (15 mg total) by mouth every 4 (four) hours as needed for Pain.  What changed: Another medication with the same name was added. Make sure you understand how and when to take each.     * oxyCODONE 30 MG Tab  Commonly known as: ROXICODONE  Take 1 tablet (30 mg total) by mouth every 4 (four) hours as needed for Pain.  What changed: Another medication with the same name was added. Make sure you understand how and when to take each.     * oxyCODONE 30 MG Tab  Commonly known as: ROXICODONE  Take 1 tablet (30 mg total) by mouth every 4 (four) hours as needed for Pain.  What changed: You were already taking a medication with the same name, and this prescription was added. Make sure you understand how and when to take each.         * This list has 4 medication(s) that are the same as other medications prescribed for you. Read the directions carefully, and ask your doctor or other care provider to review them with you.            CONTINUE taking these medications    clonazePAM 1 MG tablet  Commonly known as: KlonoPIN  Take 1 mg by mouth 2 (two) times daily as needed.     cyanocobalamin 1,000 mcg/mL injection  1,000 mcg twice a week.     ibuprofen 800 MG tablet  Commonly known as:  ADVIL,MOTRIN  Take 1 tablet (800 mg total) by mouth every 8 (eight) hours.     losartan 100 MG tablet  Commonly known as: COZAAR  Take 100 mg by mouth.     methocarbamoL 500 MG Tab  Commonly known as: ROBAXIN  Take 1 tablet (500 mg total) by mouth 4 (four) times daily. for 10 days     ondansetron 4 MG tablet  Commonly known as: ZOFRAN  Take 4 mg by mouth every 6 (six) hours as needed for Nausea.     ondansetron 4 MG Tbdl  Commonly known as: ZOFRAN-ODT  Dissolve 1 tablet (4 mg total) by mouth every 6 (six) hours as needed.     progesterone 100 MG capsule  Commonly known as: PROMETRIUM  Take 400 mg by mouth every evening.     venlafaxine 100 MG Tab  Commonly known as: EFFEXOR  Take 150 mg by mouth Daily.        STOP taking these medications    ciprofloxacin HCl 500 MG tablet  Commonly known as: CIPRO     metroNIDAZOLE 500 MG tablet  Commonly known as: AUDREY Gaytan NP  Colorectal Surgery  [facility]

## 2023-10-12 NOTE — ASSESSMENT & PLAN NOTE
37 yo female with PMH of multiple GI surgeries including total proctocolectomy, J pouch, loop ileostomy, cb post op fistula , as well as hysterectomy and ventral hernia repar, cb mesh infection requring excision, and now complex abdominal wall reconstruction and ileostomy re-siting on 8/7/23 which was cb with recurrent seromas requiring drain placement as well as persistent pain. S/p IR drainage on 9/10/23, cultures were negative. Returns to [facility] ED with concerns for new purulent drain output and uncontrolled pain. S/p IR drainage with new drained placed on 10/6 per IR, cultures + MSSA and VRE.     Currently on daptomycin. ID was consulted for duration recs.     Recommendations:   - Continue daptomycin 8mg/kg IV q24hr. Baseline CPK 37   - will anticipate 3-4 weeks of IV therapy. Will re image toward end of care to assess for resolution of fluid collection   - pt requesting diflucan as needed for vaginal yeast infections 2/2 antibiotic use   - Pt [place] resident. Will coordinate follow up with colorectal   - will need weekly labs, weekly dressing changes, and ID follow up   - pt seen and plan reviewed with ID staff. ID will sign off.     Outpatient Antibiotic Therapy Plan:    Please send referral to [facility].    1) Infection: MSSA, e faecalis abdominal fluid collection     2) Discharge Antibiotics:    Intravenous antibiotics:   daptomycin 680 mg IV q24 hr    3) Therapy Duration:  3-4 weeks, dependent on re imaging     Estimated end date of IV antibiotics: 11/3/2023    4) Outpatient Weekly Labs:    Order the following labs to be drawn on Mondays:    CBC   CMP    CRP   CPK (when on Daptomycin)      5) Fax Lab Results to Infectious Diseases Provider: JA Montano, RODRIGO    [facility] Fax Number: [phone]    6) Outpatient Infectious Diseases Follow-up     Follow-up appointment will be arranged by the ID clinic and will be found in the patient's appointments  tab.     Prior to discharge, please ensure the patient's follow-up has been scheduled.     If there is still no follow-up scheduled prior to discharge, please send an EPIC message to Karlee Arteaga in Infectious Diseases.

## 2023-10-12 NOTE — PLAN OF CARE
Gordon Aquino - [facility]  Discharge Final Note    Primary Care Provider: Coretta, Primary Doctor  Expected Discharge Date: 10/12/2023    Patient medically ready for discharge to home with [facility]. [phone].     Patient will discharge using [facility] for Daptomycin IV abx. [employer] is Maira. [phone].     Transportation: Mother will transport patient home.     Patient states Dr. Les Leigh in [place], [place] will follow.     Is family/patient aware of discharge: yes   Hospital follow up scheduled: yes       Final Discharge Note (most recent)       Final Note - 10/12/23 1527          Final Note    Assessment Type Final Discharge Note     Anticipated Discharge Disposition Home-Health Care [facility]     Hospital Resources/Appts/Education Provided Provided patient/caregiver with written discharge plan information                     Important Message from Medicare         Referral Info (most recent)       Referral Info    No documentation.                 Contact Info       KRISH Tracy MD   Specialty: Colon and Rectal Surgery    [address] [id number]   Phone: [phone]       Next Steps: Follow up in 3 week(s)    Instructions: also neeeds to have ct done prior to appt    Libertad Jaquez NP   Specialty: Infectious Diseases    [address] [facility] [address]   Phone: [phone]       Next Steps: Follow up in 3 week(s)    Instructions: also needs to see ID after ct done          Future Appointments   Date Time Provider Department Center   11/3/2023  9:00 AM Jael Sarabia MD [facility] ID Gordon Aquino   11/3/2023  1:30 PM [facility] OIC-CT1 500 LB LIMIT [facility] CTS [facility]   11/3/2023  3:00 PM KRISH Tracy MD [facility] COLON Gordon Rea RN  Case Management  Ext: [phone]  10/12/2023

## 2023-10-12 NOTE — PLAN OF CARE
Gordon bhavin [facility]      HOME HEALTH ORDERS  FACE TO FACE ENCOUNTER    Patient Name: Georgette Abrams  YOB: 1985    PCP: Coretta, Primary Doctor   PCP Address: None  PCP Phone Number: None  PCP Fax: None    Encounter Date: 10/4/23    Admit to Home Health    Diagnoses:  Active Hospital Problems    Diagnosis  POA    *Pelvic abscess in female [N73.9]  Yes    Intraabdominal fluid collection [R18.8]  Unknown    Chronic pain syndrome [G89.4]  Yes    Attention to ileostomy [Z43.2]  Not Applicable      Resolved Hospital Problems   No resolved problems to display.       Follow Up Appointments:  No future appointments.    Allergies:  Review of patient's allergies indicates:   Allergen Reactions    Levofloxacin Nausea And Vomiting and Rash    Morphine Anaphylaxis, Hives, Shortness Of Breath and Hallucinations           Piperacillin-tazobactam Rash    Sulfa (sulfonamide antibiotics) Nausea And Vomiting and Rash    Opioids - morphine analogues     Hydrocodone-acetaminophen Itching       Medications: Review discharge medications with patient and family and provide education.    Current Facility-Administered Medications   Medication Dose Route Frequency Provider Last Rate Last Admin    acetaminophen tablet 650 mg  650 mg Oral Q6H Robert Cruz MD   650 mg at 10/12/23 0456    clonazePAM tablet 0.5 mg  0.5 mg Oral BID PRN Robert Cruz MD   0.5 mg at 10/11/23 2232    DAPTOmycin (CUBICIN) 680 mg in sodium chloride 0.9% SolP 50 mL IVPB  8 mg/kg (Adjusted) Intravenous Q24H Libertad Jaquez NP   Stopped at 10/11/23 1925    enoxaparin injection 40 mg  40 mg Subcutaneous Q24H (prophylaxis, 1700) Ora Pierre MD   40 mg at 10/11/23 1619    HYDROmorphone injection 1 mg  1 mg Intravenous Q3H PRN Robert Cruz MD   1 mg at 10/12/23 0455    LIDOcaine 5 % patch 1 patch  1 patch Transdermal Q24H Ora Pierre MD   1 patch at 10/09/23 1138    losartan tablet 100 mg  100 mg Oral Daily Robert Cruz MD   100 mg at  10/12/23 0924    methocarbamoL tablet 500 mg  500 mg Oral QID Robert Cruz MD   500 mg at 10/12/23 0924    miconazole 100 mg vaginal suppository 100 mg  100 mg Vaginal QHS Estuardo Oro MD   100 mg at 10/11/23 2023    naloxone 0.4 mg/mL injection 0.02 mg  0.02 mg Intravenous PRN Robert Cruz MD        ondansetron injection 4 mg  4 mg Intravenous Q6H PRN Robert Cruz MD   4 mg at 10/11/23 1539    oxyCODONE immediate release tablet Tab 30 mg  30 mg Oral Q4H PRN Robert Cruz MD   30 mg at 10/12/23 0924    scopolamine 1.3-1.5 mg (1 mg over 3 days) 1 patch  1 patch Transdermal Q3 Days Estuardo Oro MD   1 patch at 10/10/23 1346    sodium chloride 0.9% flush 10 mL  10 mL Intravenous PRN Robert Cruz MD        sodium chloride 0.9% flush 10 mL  10 mL Intravenous Q6H KRISH Tracy MD   10 mL at 10/12/23 0505    And    sodium chloride 0.9% flush 10 mL  10 mL Intravenous PRN KRISH Tracy MD        venlafaxine tablet 150 mg  150 mg Oral Daily Robert Cruz MD   150 mg at 10/11/23 1619     Facility-Administered Medications Ordered in Other Encounters   Medication Dose Route Frequency Provider Last Rate Last Admin    LIDOcaine (PF) 10 mg/ml (1%) injection 10 mg  1 mL Intradermal On Call Procedure Rhina Saldana NP         Current Discharge Medication List        CONTINUE these medications which have NOT CHANGED    Details   clonazepam (KLONOPIN) 1 MG tablet Take 1 mg by mouth 2 (two) times daily as needed.        cyanocobalamin 1,000 mcg/mL injection 1,000 mcg twice a week.      ibuprofen (ADVIL,MOTRIN) 800 MG tablet Take 1 tablet (800 mg total) by mouth every 8 (eight) hours.  Qty: 30 tablet, Refills: 3      losartan (COZAAR) 100 MG tablet Take 100 mg by mouth.      methocarbamoL (ROBAXIN) 500 MG Tab Take 1 tablet (500 mg total) by mouth 4 (four) times daily.  Qty: 40 tablet, Refills: 0      ondansetron (ZOFRAN) 4 MG tablet Take 4 mg by mouth every 6 (six) hours as needed  for Nausea.      ondansetron (ZOFRAN-ODT) 4 MG TbDL Dissolve 1 tablet (4 mg total) by mouth every 6 (six) hours as needed.  Qty: 40 tablet, Refills: 3      !! oxyCODONE (ROXICODONE) 15 MG Tab Take 1 tablet (15 mg total) by mouth every 4 (four) hours as needed for Pain.  Qty: 30 tablet, Refills: 0    Comments: Quantity prescribed more than 7 day supply? Yes, quantity medically necessary  Associated Diagnoses: Lower abdominal pain      !! oxyCODONE (ROXICODONE) 15 MG Tab Take 1 tablet (15 mg total) by mouth every 4 (four) hours as needed for Pain.  Qty: 30 tablet, Refills: 0    Comments: Quantity prescribed more than 7 day supply? Yes, quantity medically necessary  Associated Diagnoses: Incisional hernia with obstruction but no gangrene      !! oxyCODONE (ROXICODONE) 30 MG Tab Take 1 tablet (30 mg total) by mouth every 4 (four) hours as needed for Pain.  Qty: 30 tablet, Refills: 0    Comments: Quantity prescribed more than 7 day supply? Yes, quantity medically necessary      progesterone (PROMETRIUM) 100 MG capsule Take 400 mg by mouth every evening.      venlafaxine (EFFEXOR) 100 MG Tab Take 150 mg by mouth Daily.       !! - Potential duplicate medications found. Please discuss with provider.        STOP taking these medications       ciprofloxacin HCl (CIPRO) 500 MG tablet Comments:   Reason for Stopping:         metroNIDAZOLE (FLAGYL) 500 MG tablet Comments:   Reason for Stopping:                 I have seen and examined this patient within the last 30 days. My clinical findings that support the need for the home health skilled services and home bound status are the following:no   Weakness/numbness causing balance and gait disturbance due to Infection making it taxing to leave home.     Diet:   regular diet    Labs:  Cbc, cmp, crp, cpk weekly, please fax to [phone]    Referrals/ Consults  Physical Therapy to evaluate and treat. Evaluate for home safety and equipment needs; Establish/upgrade home exercise  program. Perform / instruct on therapeutic exercises, gait training, transfer training, and Range of Motion.  Occupational Therapy to evaluate and treat. Evaluate home environment for safety and equipment needs. Perform/Instruct on transfers, ADL training, ROM, and therapeutic exercises.   to evaluate for community resources/long-range planning.    Activities:   activity as tolerated    Nursing:   Agency to admit patient within 24 hours of hospital discharge unless specified on physician order or at patient request    SN to complete comprehensive assessment including routine vital signs. Instruct on disease process and s/s of complications to report to MD. Review/verify medication list sent home with the patient at time of discharge  and instruct patient/caregiver as needed. Frequency may be adjusted depending on start of care date.     Skilled nurse to perform up to 3 visits PRN for symptoms related to diagnosis    Notify MD if SBP > 160 or < 90; DBP > 90 or < 50; HR > 120 or < 50; Temp > 101; O2 < 88%; Other:       Ok to schedule additional visits based on staff availability and patient request on consecutive days within the home health episode.    When multiple disciplines ordered:    Start of Care occurs on Sunday - Wednesday schedule remaining discipline evaluations as ordered on separate consecutive days following the start of care.    Thursday SOC -schedule subsequent evaluations Friday and Monday the following week.     Friday - Saturday SOC - schedule subsequent discipline evaluations on consecutive days starting Monday of the following week.    For all post-discharge communication and subsequent orders please contact patient's primary care physician. If unable to reach primary care physician or do not receive response within 30 minutes, please contact Dr. Tracy for clinical staff order clarification    Miscellaneous   Colostomy Care:  Instruct patient/caregiver to empty bag when full and  PRN., Change and clean site every 48 hours, and Monitor skin integrity.  Home Infusion Therapy:   SN to perform Infusion Therapy/Central Line Care.  Review Central Line Care & Central Line Flush with patient.    Administer (drug and dose): daptomycin 680 mgm IVPB every 24 hours for 3 weeks      Scrub the Hub: Prior to accessing the line, always perform a 30 second alcohol scrub  Each lumen of the central line is to be flushed at least daily with 10 mL Normal Saline and 3 mL Heparin flush (10 units/mL)  Skilled Nurse (SN) may draw blood from IV access  Blood Draw Procedure:   - Aspirate at least 5 mL of blood   - Discard   - Obtain specimen   - Change injection cap   - Flush with 20 mL Normal Saline followed by a                 3-5 mL Heparin flush (10 units/mL)  Central :   - Sterile dressing changes are done weekly and as needed.   - Use chlor-hexadine scrub to cleanse site, apply Biopatch to insertion site,       apply securement device dressing   - Injection caps are changed weekly and after EVERY lab draw.   - If sterile gauze is under dressing to control oozing,                 dressing change must be performed every 24 hours until gauze is not needed.    IR drain, empty at lest daily and keep record, keep insertion site clean and dry      Nursing Three times weekly, Physical Therapy Three times weekly, and Occupational Therapy Three times weekly        I certify that this patient is confined to her home and needs intermittent skilled nursing care, physical therapy, and occupational therapy.

## 2023-10-12 NOTE — SUBJECTIVE & OBJECTIVE
Interval hx:  Afebrile, HDS. Transitioned to daptomycin yesterday. Drain cultures + MSSA, e faecalis        Past Medical History:   Diagnosis Date    Anxiety     Familial polyposis     S/P total colectomy        Past Surgical History:   Procedure Laterality Date    Davinci Assisted Bilateral Ovarian Cystectomy, with extension  SEAN   2011    HYSTERECTOMY  2012    DAVINCI     ILEOSTOMY REVISION N/A 2023    Procedure: REVISION, ILEOSTOMY;  Surgeon: KRISH Tracy MD;  Location: [facility] OR 2ND FLR;  Service: Colon and Rectal;  Laterality: N/A;    Illeotomy Creation      LYSIS OF ADHESIONS N/A 2023    Procedure: LYSIS, ADHESIONS;  Surgeon: KRISH Tracy MD;  Location: [facility] OR 2ND FLR;  Service: Colon and Rectal;  Laterality: N/A;    OOPHORECTOMY  2012    DAvinci removal with DLH     REPAIR OF RECURRENT VENTRAL HERNIA N/A 2023    Procedure: REPAIR, HERNIA, VENTRAL, RECURRENT, ERAS low;  Surgeon: KRISH Tracy MD;  Location: [facility] OR 2ND FLR;  Service: Colon and Rectal;  Laterality: N/A;  w/ mesh and omental pedical flap    TOTAL COLECTOMY      Iloanal pouch creation       Review of patient's allergies indicates:   Allergen Reactions    Levofloxacin Nausea And Vomiting and Rash    Morphine Anaphylaxis, Hives, Shortness Of Breath and Hallucinations           Piperacillin-tazobactam Rash    Sulfa (sulfonamide antibiotics) Nausea And Vomiting and Rash    Opioids - morphine analogues     Hydrocodone-acetaminophen Itching       Medications:  Medications Prior to Admission   Medication Sig    [] ciprofloxacin HCl (CIPRO) 500 MG tablet Take 1 tablet (500 mg total) by mouth every 12 (twelve) hours. for 10 days    clonazepam (KLONOPIN) 1 MG tablet Take 1 mg by mouth 2 (two) times daily as needed.      cyanocobalamin 1,000 mcg/mL injection 1,000 mcg twice a week.    ibuprofen (ADVIL,MOTRIN) 800 MG tablet Take 1 tablet (800 mg total) by mouth every 8 (eight) hours.    losartan  (COZAAR) 100 MG tablet Take 100 mg by mouth.    methocarbamoL (ROBAXIN) 500 MG Tab Take 1 tablet (500 mg total) by mouth 4 (four) times daily.    [] metroNIDAZOLE (FLAGYL) 500 MG tablet Take 1 tablet (500 mg total) by mouth every 8 (eight) hours. for 10 days    ondansetron (ZOFRAN) 4 MG tablet Take 4 mg by mouth every 6 (six) hours as needed for Nausea.    ondansetron (ZOFRAN-ODT) 4 MG TbDL Dissolve 1 tablet (4 mg total) by mouth every 6 (six) hours as needed.    oxyCODONE (ROXICODONE) 15 MG Tab Take 1 tablet (15 mg total) by mouth every 4 (four) hours as needed for Pain.    oxyCODONE (ROXICODONE) 15 MG Tab Take 1 tablet (15 mg total) by mouth every 4 (four) hours as needed for Pain.    oxyCODONE (ROXICODONE) 30 MG Tab Take 1 tablet (30 mg total) by mouth every 4 (four) hours as needed for Pain.    progesterone (PROMETRIUM) 100 MG capsule Take 400 mg by mouth every evening.    venlafaxine (EFFEXOR) 100 MG Tab Take 150 mg by mouth Daily.     Antibiotics (From admission, onward)      Start     Stop Route Frequency Ordered    10/11/23 1800  DAPTOmycin (CUBICIN) 680 mg in sodium chloride 0.9% SolP 50 mL IVPB         -- IV Every 24 hours (non-standard times) 10/11/23 1635          Antifungals (From admission, onward)      Start     Stop Route Frequency Ordered    10/10/23 2100  miconazole 100 mg vaginal suppository 100 mg         10/17/23 2059 Vagl Nightly 10/10/23 0639          Antivirals (From admission, onward)      None               There is no immunization history on file for this patient.    Family History       Problem Relation (Age of Onset)    Colon cancer Maternal Grandmother    Lung cancer Maternal Grandfather          Social History     Socioeconomic History    Marital status:    Tobacco Use    Smoking status: Former    Smokeless tobacco: Never   Substance and Sexual Activity    Alcohol use: Not Currently     Comment: seldom    Drug use: No    Sexual activity: Yes     Partners: Male     Birth  control/protection: None     Comment: , lives in [place]     Social Determinants of Health     Financial Resource Strain: Low Risk  (10/7/2023)    Overall Financial Resource Strain (CARDIA)     Difficulty of Paying Living Expenses: Not hard at all   Food Insecurity: No Food Insecurity (10/7/2023)    Hunger Vital Sign     Worried About Running Out of Food in the Last Year: Never true     Ran Out of Food in the Last Year: Never true   Transportation Needs: No Transportation Needs (10/7/2023)    PRAPARE - Transportation     Lack of Transportation (Medical): No     Lack of Transportation (Non-Medical): No   Physical Activity: Inactive (10/7/2023)    Exercise Vital Sign     Days of Exercise per Week: 0 days     Minutes of Exercise per Session: 0 min   Stress: Stress Concern Present (10/7/2023)    Citizen of the [place] of Occupational Health - Occupational Stress Questionnaire     Feeling of Stress : To some extent   Social Connections: Moderately Integrated (10/7/2023)    Social Connection and Isolation Panel [NHANES]     Frequency of Communication with Friends and Family: More than three times a week     Frequency of Social Gatherings with Friends and Family: More than three times a week     Attends Shinto Services: More than 4 times per year     Active Member of Clubs or Organizations: No     Attends Club or Organization Meetings: Never     Marital Status:    Housing Stability: Unknown (10/7/2023)    Housing Stability Vital Sign     Unable to Pay for Housing in the Last Year: No     Unstable Housing in the Last Year: No     Review of Systems   Constitutional:  Negative for chills, diaphoresis, fatigue and fever.   HENT: Negative.     Eyes: Negative.    Respiratory:  Negative for cough and shortness of breath.    Cardiovascular:  Negative for chest pain, palpitations and leg swelling.   Gastrointestinal:  Positive for nausea. Negative for constipation, diarrhea and vomiting.   Genitourinary:  Negative for  difficulty urinating and dysuria.   Musculoskeletal:  Negative for arthralgias and myalgias.   Skin:  Positive for wound. Negative for rash.   Neurological:  Negative for dizziness and numbness.   Psychiatric/Behavioral:  Negative for agitation and confusion.      Objective:     Vital Signs (Most Recent):  Temp: 97.6 °F (36.4 °C) (10/12/23 0753)  Pulse: 71 (10/12/23 0753)  Resp: 16 (10/12/23 0924)  BP: (!) 114/58 (10/12/23 0753)  SpO2: 96 % (10/12/23 0753) Vital Signs (24h Range):  Temp:  [97.1 °F (36.2 °C)-98.6 °F (37 °C)] 97.6 °F (36.4 °C)  Pulse:  [63-87] 71  Resp:  [9-18] 16  SpO2:  [94 %-100 %] 96 %  BP: (113-145)/(57-78) 114/58     Weight: 120.1 kg (264 lb 12.4 oz)  Body mass index is 41.47 kg/m².    Estimated Creatinine Clearance: 143.4 mL/min (based on SCr of 0.7 mg/dL).     Physical Exam  Vitals reviewed.   Constitutional:       General: She is not in acute distress.     Appearance: Normal appearance. She is not ill-appearing.   HENT:      Head: Normocephalic.      Nose: Nose normal.      Mouth/Throat:      Mouth: Mucous membranes are moist.      Pharynx: Oropharynx is clear.   Eyes:      General:         Right eye: No discharge.         Left eye: No discharge.      Conjunctiva/sclera: Conjunctivae normal.   Cardiovascular:      Rate and Rhythm: Normal rate and regular rhythm.   Pulmonary:      Effort: Pulmonary effort is normal. No respiratory distress.      Breath sounds: No stridor.   Abdominal:      General: Abdomen is flat. There is no distension.      Palpations: Abdomen is soft. There is no mass.      Tenderness: There is no abdominal tenderness. There is no right CVA tenderness or left CVA tenderness.   Musculoskeletal:         General: Normal range of motion.      Cervical back: Normal range of motion.      Right lower leg: No edema.      Left lower leg: No edema.   Skin:     General: Skin is dry.      Findings: No erythema or rash.      Comments: With abdominal CONSTANZA drains, SS output.     Neurological:      General: No focal deficit present.      Mental Status: She is alert and oriented to person, place, and time.      Motor: No weakness.      Gait: Gait normal.   Psychiatric:         Mood and Affect: Mood normal.         Behavior: Behavior normal.         Thought Content: Thought content normal.         Judgment: Judgment normal.          Significant Labs: All pertinent labs within the past 24 hours have been reviewed.    Significant Imaging: I have reviewed all pertinent imaging results/findings within the past 24 hours.

## 2023-10-12 NOTE — HOSPITAL COURSE
Ms. Abrams is a 38 year old woman well known to our service most recently with complex abdominal wall reconstruction and ileostomy re-siting. This has been complicated by persistent postoperative pain and seromas that required IR drain placement. She now presents with new purulent drain output and persistent pain. Will plan for CT abdomen pelvis with IV and oral contrast. Will admit for ongoing observation, IV antibiotics, and analgesia.  IR 10/5 able to place drain and cultures done, ID consulted, vanc in progress.  Still having issues with pain control despite 30 mgm of oxycodone, added IV dilauidid 1 mgm every 4 h our prn.  She continued to improve, VS stable, AF but still complaining of pain, requesting more pain meds.  It took several days for ID to get sensitivies to the staph and enterocuccus faecalis, ID recommended Daptomycin for 3 weMs. On day of discharge pt is norma regular diet, inc line healing well, ostomy functional,, ambulating in mendoza without difficulty,  VS stable and afebrile.  Needs to have ct done 3 weeks and fu with Dr. Tracy and ID (appts scheduled)

## 2023-10-12 NOTE — PLAN OF CARE
Dx: Post-operative pain, Right sided abdominal pain,     Shift Events: Still requiring high doses of pain medication. C/o RUE discomfort and swelling, warmth and redness noted. Dr Medrano came to bedside to assess RUE/PICC. Line does have blood return but is sluggish and intermittently with resistance with flushed. He ordered Cathflo with no additional orders. Pt reports she did have a previous IV in that arm close to the area of her current PICC that infiltrated. Pt has told Dr Medrano about her concerns of going home today with current sx and the PICC. I also spoke with MD in the hallway after he came to bedside to assess patient and despite concerns does not want any further interventions. After cathflo infusion both lumens are more patent and great blood return.     Neuro: AAO x 4     Vital Signs: WNL      Respiratory: Room Air     Diet: Regular diet     Is patient tolerating current diet? yes     GTTS: None     Urine Output/Bowel Movement: Adequate urine output, RUQ Ileostomy     Drains/Tubes/Tube Feeds (include total output/shift): CONSTANZA Drains to RUQ: #1 Superior, #2 Inferior     Lines: double lumen picc JULIO      Accuchecks: None     Fall Risk Score: 13    0710: Report given to Perry DURANT    Problem: Adult Inpatient Plan of Care  Goal: Plan of Care Review  10/11/2023 1900 by Charley Lane RN  Outcome: Ongoing, Progressing  10/11/2023 1859 by Charley Lane RN  Outcome: Ongoing, Progressing  Goal: Patient-Specific Goal (Individualized)  10/11/2023 1900 by Charley Lane RN  Outcome: Ongoing, Progressing  10/11/2023 1859 by Charley Lane RN  Outcome: Ongoing, Progressing  Goal: Absence of Hospital-Acquired Illness or Injury  10/11/2023 1900 by Charley Lane RN  Outcome: Ongoing, Progressing  10/11/2023 1859 by Charley Lane RN  Outcome: Ongoing, Progressing  Goal: Optimal Comfort and Wellbeing  10/11/2023 1900 by Charley Lane RN  Outcome: Ongoing, Progressing  10/11/2023  1859 by Charley Lane, RN  Outcome: Ongoing, Progressing  Goal: Readiness for Transition of Care  10/11/2023 1900 by Charley Lane RN  Outcome: Ongoing, Progressing  10/11/2023 1859 by Charley Lane, BHARGAV  Outcome: Ongoing, Progressing     Problem: Pain Acute  Goal: Acceptable Pain Control and Functional Ability  Outcome: Ongoing, Progressing

## 2023-10-13 ENCOUNTER — TELEPHONE (OUTPATIENT)
Dept: SURGERY | Facility: CLINIC | Age: 38
End: 2023-10-13
Payer: COMMERCIAL

## 2023-10-13 ENCOUNTER — PATIENT OUTREACH (OUTPATIENT)
Dept: ADMINISTRATIVE | Facility: CLINIC | Age: 38
End: 2023-10-13
Payer: COMMERCIAL

## 2023-10-13 ENCOUNTER — TELEPHONE (OUTPATIENT)
Dept: SURGERY | Facility: HOSPITAL | Age: 38
End: 2023-10-13
Payer: COMMERCIAL

## 2023-10-13 NOTE — TELEPHONE ENCOUNTER
----- Message from Rhina Garner RN sent at 10/13/2023  4:35 PM CDT -----  Regarding: FW: pt advice  Contact: pt [phone]    ----- Message -----  From: Meg Irene  Sent: 10/13/2023   4:33 PM CDT  To: [facility] Staff  Subject: pt advice                                        Pt calling to request order to be sent to [facility] in [place].pls call [phone]

## 2023-10-13 NOTE — TELEPHONE ENCOUNTER
Called [facility] and got answering service, orders faxed to [facility]  [phone]

## 2023-10-13 NOTE — PROGRESS NOTES
C3 nurse attempted to contact Georgette Abrams  for a TCC post hospital discharge follow up call. No answer, left voicemail with call back information. The patient does not have a scheduled HOSFU appointment, and the pt does not have an [facility] PCP.

## 2023-10-16 ENCOUNTER — TELEPHONE (OUTPATIENT)
Dept: SURGERY | Facility: CLINIC | Age: 38
End: 2023-10-16

## 2023-10-16 RX ORDER — FLUCONAZOLE 150 MG/1
150 TABLET ORAL DAILY
Qty: 1 TABLET | Refills: 0 | Status: SHIPPED | OUTPATIENT
Start: 2023-10-16 | End: 2023-10-17

## 2023-10-16 NOTE — PROGRESS NOTES
"C3 nurse spoke with Georgette Abrams  for a TCC post hospital discharge follow up call.  Encouraged patient to schedule an HOSPFU appointment with [facility] within 5-7 days post discharge, patient voiced understanding.  [facility] does not service patient's residential location. Patient states that her PCP will not see her until she provides him with a DC Summary and a progress note from Infectious Disease.  Provided patient with [facility] (Silvestre bhavin) phone number and fax number, she voiced understanding.  Advised patient that she could also provide her PCP her most recent hospital discharge paperwork, she voiced understanding.  Provided patient with patient's relations number due to "the ball was dropped" when attempting to find a home health agency.  Patient did confirm that she was on service with [facility].     "

## 2023-10-18 ENCOUNTER — TELEPHONE (OUTPATIENT)
Dept: SURGERY | Facility: HOSPITAL | Age: 38
End: 2023-10-18
Payer: COMMERCIAL

## 2023-10-18 DIAGNOSIS — G89.18 POST-OP PAIN: Primary | ICD-10-CM

## 2023-10-18 RX ORDER — OXYCODONE HYDROCHLORIDE 30 MG/1
30 TABLET ORAL EVERY 4 HOURS PRN
Qty: 25 TABLET | Refills: 0 | Status: SHIPPED | OUTPATIENT
Start: 2023-10-18 | End: 2023-11-03 | Stop reason: SDUPTHER

## 2023-10-18 NOTE — TELEPHONE ENCOUNTER
Called bioscripts and talked to Maira [phone], she will be getting more daptomycin,  For her home health I talked to [facility] home health and they said they could not see her until she gets home health orders certified by local MD Dr. Les Leigh, I called Dr. Leigh and she has an appt to see hime tomorrow

## 2023-10-18 NOTE — TELEPHONE ENCOUNTER
----- Message from KRISH Tracy MD sent at 10/18/2023 12:11 PM CDT -----  I would think that this would all go through Infectious Disease?    Chris    ----- Message -----  From: Rhina Garner RN  Sent: 10/18/2023  10:45 AM CDT  To: Nell Gaytan NP; Libertad Jaquez NP; #    Good morning,    I spoke with her this morning regarding her IV antibiotics. She has 2 doses left and needs another refill.    The medication is Daptomycin 650 mg/13.5 mls via IV.    Please advise on plan or reach out to home health/patient.    Thanks,  Rhina

## 2023-10-18 NOTE — TELEPHONE ENCOUNTER
----- Message from Rhina Garner RN sent at 10/18/2023 10:43 AM CDT -----  Good morning,    I spoke with her this morning regarding her IV antibiotics. She has 2 doses left and needs another refill.    The medication is Daptomycin 650 mg/13.5 mls via IV.    Please advise on plan or reach out to home health/patient.    Thanks,  Rhina

## 2023-10-18 NOTE — TELEPHONE ENCOUNTER
----- Message from Karlee Baker sent at 10/18/2023  8:30 AM CDT -----  Regarding: inform  Contact: @ [phone]  Pt called in regards to informing them that she is having a trouble getting in touch with ID and need pain medication she is back in [place] and need some help .....Please call and adv @ [phone]

## 2023-10-18 NOTE — TELEPHONE ENCOUNTER
Spoke with patient regarding obtaining Dapto-mycin 680mg in 13.6 syringe via IV through Home Health. Message sent to nurse practitioner , ID office, and Dr. Tracy to assist in antibiotic refill. Pain medication refill request sent for approval. Explained to patient that I will call her back once I hear about antibiotics. Patient verbalizes understanding.

## 2023-10-18 NOTE — TELEPHONE ENCOUNTER
ID wanted three weeks, that has been ordered and more medication is being supplied by infusion [facility], she has an appt to see her local PCP to get home health orders processed, she is scheduled to have a ct scan in about 2 more weeks with fu with Dr. Tracy and ID

## 2023-10-19 ENCOUNTER — TELEPHONE (OUTPATIENT)
Dept: INFECTIOUS DISEASES | Facility: CLINIC | Age: 38
End: 2023-10-19
Payer: COMMERCIAL

## 2023-10-19 NOTE — TELEPHONE ENCOUNTER
----- Message from Libertad Jaquez NP sent at 10/19/2023  8:55 AM CDT -----  Can you double check with infusion that this is being taken care of? Thank you   ----- Message -----  From: Rhina Garner RN  Sent: 10/18/2023   2:04 PM CDT  To: Nell Gaytan NP; Libertad Jaquez NP; #    Thanks! Glad it's being taken care of.  ----- Message -----  From: Nell Gaytan NP  Sent: 10/18/2023   1:55 PM CDT  To: Libertad Jaquez NP; KRISH Tracy MD; #    I have  left several responses to this message, I called the infusion company [phone] and they said she will be getting more supplies today, I also called Dr. Wm Leigh and she has an appt tomorrow so that home health can be certified by her local md, she has an appt in 2 weeks to get a ct exam and fu with Dr. Tracy and ID.  I called her house today and left a voice mail on her phone    ----- Message -----  From: Rhina Garner RN  Sent: 10/18/2023   1:49 PM CDT  To: Nell Gaytan NP; Libertad Jaquez NP; #    She stated that she has called several times and gotten no response.  ----- Message -----  From: KRISH Tracy MD  Sent: 10/18/2023  12:11 PM CDT  To: Nell Gaytan NP; Libertad Jaquez NP; #    I would think that this would all go through Infectious Disease?    Chris    ----- Message -----  From: Rhina Garner RN  Sent: 10/18/2023  10:45 AM CDT  To: Nell Gaytan NP; Libertad Jaquez NP; #    Good morning,    I spoke with her this morning regarding her IV antibiotics. She has 2 doses left and needs another refill.    The medication is Daptomycin 650 mg/13.5 mls via IV.    Please advise on plan or reach out to home health/patient.    Thanks,  Rhina

## 2023-10-19 NOTE — TELEPHONE ENCOUNTER
I spoke with Sharon and she said that the patient spoke with pharmacist yesterday and a delivery going out today

## 2023-10-29 LAB
ACID FAST MOD KINY STN SPEC: NORMAL
MYCOBACTERIUM SPEC QL CULT: NORMAL

## 2023-10-31 LAB
FUNGUS SPEC CULT: NORMAL
FUNGUS SPEC CULT: NORMAL

## 2023-11-01 ENCOUNTER — TELEPHONE (OUTPATIENT)
Dept: INFECTIOUS DISEASES | Facility: CLINIC | Age: 38
End: 2023-11-01
Payer: COMMERCIAL

## 2023-11-01 NOTE — TELEPHONE ENCOUNTER
----- Message from Minna Miguel sent at 11/1/2023 11:34 AM CDT -----  Regarding: If CT can be done first  Contact: pt @[phone]  Patient is calling to apeak with someone in the office to have the CT Scan done first before the ID appt . Also if all appts can start late morning(about 11am)  early afternoon because pt is driving from [place] same day. Please c/b to advise..Thanks

## 2023-11-03 ENCOUNTER — OFFICE VISIT (OUTPATIENT)
Dept: SURGERY | Facility: CLINIC | Age: 38
End: 2023-11-03
Payer: COMMERCIAL

## 2023-11-03 ENCOUNTER — HOSPITAL ENCOUNTER (OUTPATIENT)
Dept: RADIOLOGY | Facility: HOSPITAL | Age: 38
Discharge: HOME | End: 2023-11-03
Attending: NURSE PRACTITIONER
Payer: COMMERCIAL

## 2023-11-03 ENCOUNTER — OFFICE VISIT (OUTPATIENT)
Dept: INFECTIOUS DISEASES | Facility: CLINIC | Age: 38
End: 2023-11-03
Payer: COMMERCIAL

## 2023-11-03 ENCOUNTER — INFUSION (OUTPATIENT)
Dept: INFECTIOUS DISEASES | Facility: HOSPITAL | Age: 38
End: 2023-11-03
Attending: NURSE PRACTITIONER
Payer: COMMERCIAL

## 2023-11-03 VITALS
TEMPERATURE: 98 F | HEART RATE: 87 BPM | DIASTOLIC BLOOD PRESSURE: 91 MMHG | SYSTOLIC BLOOD PRESSURE: 136 MMHG | HEIGHT: 67 IN | BODY MASS INDEX: 41.47 KG/M2

## 2023-11-03 VITALS
DIASTOLIC BLOOD PRESSURE: 91 MMHG | HEIGHT: 67 IN | SYSTOLIC BLOOD PRESSURE: 136 MMHG | BODY MASS INDEX: 41.47 KG/M2 | HEART RATE: 87 BPM

## 2023-11-03 DIAGNOSIS — R18.8 INTRAABDOMINAL FLUID COLLECTION: ICD-10-CM

## 2023-11-03 DIAGNOSIS — R18.8 INTRAABDOMINAL FLUID COLLECTION: Primary | ICD-10-CM

## 2023-11-03 DIAGNOSIS — N73.9 PELVIC ABSCESS IN FEMALE: Primary | ICD-10-CM

## 2023-11-03 DIAGNOSIS — G89.18 POST-OP PAIN: Primary | ICD-10-CM

## 2023-11-03 DIAGNOSIS — B37.9 YEAST INFECTION: ICD-10-CM

## 2023-11-03 DIAGNOSIS — B37.9 CANDIDIASIS: ICD-10-CM

## 2023-11-03 DIAGNOSIS — N73.9 PELVIC ABSCESS IN FEMALE: ICD-10-CM

## 2023-11-03 LAB
ALBUMIN SERPL BCP-MCNC: 3.5 G/DL (ref 3.5–5.2)
ALP SERPL-CCNC: 72 U/L (ref 55–135)
ALT SERPL W/O P-5'-P-CCNC: 13 U/L (ref 10–44)
ANION GAP SERPL CALC-SCNC: 11 MMOL/L (ref 8–16)
AST SERPL-CCNC: 12 U/L (ref 10–40)
BASOPHILS # BLD AUTO: 0.05 K/UL (ref 0–0.2)
BASOPHILS NFR BLD: 0.9 % (ref 0–1.9)
BILIRUB SERPL-MCNC: 0.4 MG/DL (ref 0.1–1)
BUN SERPL-MCNC: 6 MG/DL (ref 6–20)
CALCIUM SERPL-MCNC: 9 MG/DL (ref 8.7–10.5)
CHLORIDE SERPL-SCNC: 103 MMOL/L (ref 95–110)
CK SERPL-CCNC: 71 U/L (ref 20–180)
CO2 SERPL-SCNC: 23 MMOL/L (ref 23–29)
CREAT SERPL-MCNC: 0.7 MG/DL (ref 0.5–1.4)
DIFFERENTIAL METHOD: ABNORMAL
EOSINOPHIL # BLD AUTO: 0.2 K/UL (ref 0–0.5)
EOSINOPHIL NFR BLD: 4.4 % (ref 0–8)
ERYTHROCYTE [DISTWIDTH] IN BLOOD BY AUTOMATED COUNT: 14.5 % (ref 11.5–14.5)
EST. GFR  (NO RACE VARIABLE): >60 ML/MIN/1.73 M^2
GLUCOSE SERPL-MCNC: 97 MG/DL (ref 70–110)
HCT VFR BLD AUTO: 36.2 % (ref 37–48.5)
HGB BLD-MCNC: 11.6 G/DL (ref 12–16)
IMM GRANULOCYTES # BLD AUTO: 0.01 K/UL (ref 0–0.04)
IMM GRANULOCYTES NFR BLD AUTO: 0.2 % (ref 0–0.5)
LYMPHOCYTES # BLD AUTO: 1.5 K/UL (ref 1–4.8)
LYMPHOCYTES NFR BLD: 28.8 % (ref 18–48)
MCH RBC QN AUTO: 26.9 PG (ref 27–31)
MCHC RBC AUTO-ENTMCNC: 32 G/DL (ref 32–36)
MCV RBC AUTO: 84 FL (ref 82–98)
MONOCYTES # BLD AUTO: 0.5 K/UL (ref 0.3–1)
MONOCYTES NFR BLD: 10.2 % (ref 4–15)
NEUTROPHILS # BLD AUTO: 2.9 K/UL (ref 1.8–7.7)
NEUTROPHILS NFR BLD: 55.5 % (ref 38–73)
NRBC BLD-RTO: 0 /100 WBC
PLATELET # BLD AUTO: 222 K/UL (ref 150–450)
PMV BLD AUTO: 10.3 FL (ref 9.2–12.9)
POTASSIUM SERPL-SCNC: 3.7 MMOL/L (ref 3.5–5.1)
PROT SERPL-MCNC: 6.8 G/DL (ref 6–8.4)
RBC # BLD AUTO: 4.31 M/UL (ref 4–5.4)
SODIUM SERPL-SCNC: 137 MMOL/L (ref 136–145)
WBC # BLD AUTO: 5.28 K/UL (ref 3.9–12.7)

## 2023-11-03 PROCEDURE — 99215 OFFICE O/P EST HI 40 MIN: CPT | Mod: S$GLB,,, | Performed by: INTERNAL MEDICINE

## 2023-11-03 PROCEDURE — 82550 ASSAY OF CK (CPK): CPT | Performed by: INTERNAL MEDICINE

## 2023-11-03 PROCEDURE — A9698 NON-RAD CONTRAST MATERIALNOC: HCPCS | Performed by: NURSE PRACTITIONER

## 2023-11-03 PROCEDURE — 3075F SYST BP GE 130 - 139MM HG: CPT | Mod: CPTII,S$GLB,, | Performed by: INTERNAL MEDICINE

## 2023-11-03 PROCEDURE — 3080F DIAST BP >= 90 MM HG: CPT | Mod: CPTII,S$GLB,, | Performed by: INTERNAL MEDICINE

## 2023-11-03 PROCEDURE — 99417 PR PROLONGED SVC, OUTPT, W/WO DIRECT PT CONTACT,  EA ADDTL 15 MIN: ICD-10-PCS | Mod: S$GLB,,, | Performed by: INTERNAL MEDICINE

## 2023-11-03 PROCEDURE — 99024 PR POST-OP FOLLOW-UP VISIT: ICD-10-PCS | Mod: S$GLB,,, | Performed by: COLON & RECTAL SURGERY

## 2023-11-03 PROCEDURE — 85025 COMPLETE CBC W/AUTO DIFF WBC: CPT | Performed by: INTERNAL MEDICINE

## 2023-11-03 PROCEDURE — 1111F PR DISCHARGE MEDS RECONCILED W/ CURRENT OUTPATIENT MED LIST: ICD-10-PCS | Mod: CPTII,S$GLB,, | Performed by: INTERNAL MEDICINE

## 2023-11-03 PROCEDURE — 99215 PR OFFICE/OUTPT VISIT, EST, LEVL V, 40-54 MIN: ICD-10-PCS | Mod: S$GLB,,, | Performed by: INTERNAL MEDICINE

## 2023-11-03 PROCEDURE — 3008F PR BODY MASS INDEX (BMI) DOCUMENTED: ICD-10-PCS | Mod: CPTII,S$GLB,, | Performed by: INTERNAL MEDICINE

## 2023-11-03 PROCEDURE — 3075F PR MOST RECENT SYSTOLIC BLOOD PRESS GE 130-139MM HG: ICD-10-PCS | Mod: CPTII,S$GLB,, | Performed by: COLON & RECTAL SURGERY

## 2023-11-03 PROCEDURE — 36592 COLLECT BLOOD FROM PICC: CPT

## 2023-11-03 PROCEDURE — 99999 PR PBB SHADOW E&M-EST. PATIENT-LVL III: CPT | Mod: PBBFAC,,, | Performed by: INTERNAL MEDICINE

## 2023-11-03 PROCEDURE — 1111F DSCHRG MED/CURRENT MED MERGE: CPT | Mod: CPTII,S$GLB,, | Performed by: INTERNAL MEDICINE

## 2023-11-03 PROCEDURE — 3075F SYST BP GE 130 - 139MM HG: CPT | Mod: CPTII,S$GLB,, | Performed by: COLON & RECTAL SURGERY

## 2023-11-03 PROCEDURE — 74177 CT ABDOMEN PELVIS WITH CONTRAST: ICD-10-PCS | Mod: 26,,, | Performed by: RADIOLOGY

## 2023-11-03 PROCEDURE — 4010F PR ACE/ARB THEARPY RXD/TAKEN: ICD-10-PCS | Mod: CPTII,S$GLB,, | Performed by: COLON & RECTAL SURGERY

## 2023-11-03 PROCEDURE — 63600175 PHARM REV CODE 636 W HCPCS: Performed by: NURSE PRACTITIONER

## 2023-11-03 PROCEDURE — 99999 PR PBB SHADOW E&M-EST. PATIENT-LVL III: ICD-10-PCS | Mod: PBBFAC,,, | Performed by: INTERNAL MEDICINE

## 2023-11-03 PROCEDURE — 80053 COMPREHEN METABOLIC PANEL: CPT | Performed by: INTERNAL MEDICINE

## 2023-11-03 PROCEDURE — 74177 CT ABD & PELVIS W/CONTRAST: CPT | Mod: TC

## 2023-11-03 PROCEDURE — 99024 POSTOP FOLLOW-UP VISIT: CPT | Mod: S$GLB,,, | Performed by: COLON & RECTAL SURGERY

## 2023-11-03 PROCEDURE — 1159F MED LIST DOCD IN RCRD: CPT | Mod: CPTII,S$GLB,, | Performed by: COLON & RECTAL SURGERY

## 2023-11-03 PROCEDURE — 3008F BODY MASS INDEX DOCD: CPT | Mod: CPTII,S$GLB,, | Performed by: INTERNAL MEDICINE

## 2023-11-03 PROCEDURE — 4010F PR ACE/ARB THEARPY RXD/TAKEN: ICD-10-PCS | Mod: CPTII,S$GLB,, | Performed by: INTERNAL MEDICINE

## 2023-11-03 PROCEDURE — 3080F PR MOST RECENT DIASTOLIC BLOOD PRESSURE >= 90 MM HG: ICD-10-PCS | Mod: CPTII,S$GLB,, | Performed by: COLON & RECTAL SURGERY

## 2023-11-03 PROCEDURE — 1160F RVW MEDS BY RX/DR IN RCRD: CPT | Mod: CPTII,S$GLB,, | Performed by: COLON & RECTAL SURGERY

## 2023-11-03 PROCEDURE — 3080F PR MOST RECENT DIASTOLIC BLOOD PRESSURE >= 90 MM HG: ICD-10-PCS | Mod: CPTII,S$GLB,, | Performed by: INTERNAL MEDICINE

## 2023-11-03 PROCEDURE — 74177 CT ABD & PELVIS W/CONTRAST: CPT | Mod: 26,,, | Performed by: RADIOLOGY

## 2023-11-03 PROCEDURE — 3075F PR MOST RECENT SYSTOLIC BLOOD PRESS GE 130-139MM HG: ICD-10-PCS | Mod: CPTII,S$GLB,, | Performed by: INTERNAL MEDICINE

## 2023-11-03 PROCEDURE — 4010F ACE/ARB THERAPY RXD/TAKEN: CPT | Mod: CPTII,S$GLB,, | Performed by: COLON & RECTAL SURGERY

## 2023-11-03 PROCEDURE — 99417 PROLNG OP E/M EACH 15 MIN: CPT | Mod: S$GLB,,, | Performed by: INTERNAL MEDICINE

## 2023-11-03 PROCEDURE — 4010F ACE/ARB THERAPY RXD/TAKEN: CPT | Mod: CPTII,S$GLB,, | Performed by: INTERNAL MEDICINE

## 2023-11-03 PROCEDURE — 1159F PR MEDICATION LIST DOCUMENTED IN MEDICAL RECORD: ICD-10-PCS | Mod: CPTII,S$GLB,, | Performed by: COLON & RECTAL SURGERY

## 2023-11-03 PROCEDURE — 25500020 PHARM REV CODE 255: Performed by: NURSE PRACTITIONER

## 2023-11-03 PROCEDURE — 3080F DIAST BP >= 90 MM HG: CPT | Mod: CPTII,S$GLB,, | Performed by: COLON & RECTAL SURGERY

## 2023-11-03 PROCEDURE — 99999 PR PBB SHADOW E&M-EST. PATIENT-LVL IV: ICD-10-PCS | Mod: PBBFAC,,, | Performed by: COLON & RECTAL SURGERY

## 2023-11-03 PROCEDURE — 99999 PR PBB SHADOW E&M-EST. PATIENT-LVL IV: CPT | Mod: PBBFAC,,, | Performed by: COLON & RECTAL SURGERY

## 2023-11-03 PROCEDURE — 1160F PR REVIEW ALL MEDS BY PRESCRIBER/CLIN PHARMACIST DOCUMENTED: ICD-10-PCS | Mod: CPTII,S$GLB,, | Performed by: COLON & RECTAL SURGERY

## 2023-11-03 RX ORDER — HEPARIN 100 UNIT/ML
500 SYRINGE INTRAVENOUS
OUTPATIENT
Start: 2023-11-03

## 2023-11-03 RX ORDER — HEPARIN 100 UNIT/ML
500 SYRINGE INTRAVENOUS
Status: DISCONTINUED | OUTPATIENT
Start: 2023-11-03 | End: 2023-11-03 | Stop reason: HOSPADM

## 2023-11-03 RX ORDER — SODIUM CHLORIDE 0.9 % (FLUSH) 0.9 %
10 SYRINGE (ML) INJECTION
Status: DISCONTINUED | OUTPATIENT
Start: 2023-11-03 | End: 2023-11-03 | Stop reason: HOSPADM

## 2023-11-03 RX ORDER — OXYCODONE HYDROCHLORIDE 30 MG/1
30 TABLET ORAL EVERY 4 HOURS PRN
Qty: 25 TABLET | Refills: 0 | Status: SHIPPED | OUTPATIENT
Start: 2023-11-03 | End: 2023-11-10 | Stop reason: SDUPTHER

## 2023-11-03 RX ORDER — SODIUM CHLORIDE 0.9 % (FLUSH) 0.9 %
10 SYRINGE (ML) INJECTION
OUTPATIENT
Start: 2023-11-03

## 2023-11-03 RX ORDER — FLUCONAZOLE 150 MG/1
150 TABLET ORAL DAILY
Qty: 1 TABLET | Refills: 0 | Status: SHIPPED | OUTPATIENT
Start: 2023-11-03 | End: 2023-11-04

## 2023-11-03 RX ORDER — HEPARIN 100 UNIT/ML
500 SYRINGE INTRAVENOUS
Status: COMPLETED | OUTPATIENT
Start: 2023-11-03 | End: 2023-11-03

## 2023-11-03 RX ORDER — FLUCONAZOLE 150 MG/1
150 TABLET ORAL DAILY
Qty: 3 TABLET | Refills: 3 | Status: SHIPPED | OUTPATIENT
Start: 2023-11-03

## 2023-11-03 RX ORDER — MICONAZOLE NITRATE 2 %
1 CREAM WITH APPLICATOR VAGINAL NIGHTLY
Qty: 1 KIT | Refills: 3 | Status: SHIPPED | OUTPATIENT
Start: 2023-11-03

## 2023-11-03 RX ADMIN — HEPARIN 500 UNITS: 100 SYRINGE at 04:11

## 2023-11-03 RX ADMIN — IOHEXOL 100 ML: 350 INJECTION, SOLUTION INTRAVENOUS at 02:11

## 2023-11-03 RX ADMIN — BARIUM SULFATE 450 ML: 20 SUSPENSION ORAL at 02:11

## 2023-11-03 NOTE — PROGRESS NOTES
CRS Office Visit Postop    Referring Md:   No referring provider defined for this encounter.    SUBJECTIVE:     Chief Complaint: parastomal hernia    History of Present Illness:     Course is as follows:  Patient is a 38 y.o. female presents with parastomal hernia.  She has had 66 hospital admission since 2009.  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  She therefore underwent repeat exploratory laparotomy with small-bowel resection in June 2010.  Ultimately, she had pouch failure requiring pouch excision in September 2010.  She underwent combined hysterectomy and ventral hernia repair with mesh placement in 2012.  This was complicated by mesh infection requiring mesh excision in 2016.  Her last abdominal operation was in 2016.    Since 2016, she is developed recurrent multiple midline hernias with increasing obstructive symptoms.  She is been hospitalized multiple times.  Normally, she empties her bag 7 times per day without Imodium.  Over the past 14 months, she is had increasing obstructive symptoms frequently requiring her to lay down and press on her hernias or be admitted to the hospital.  Nonsmoker.  Weight has been stable.  Nondiabetic.    She is been seen at multiple outside hospitals including [place] regarding ventral and parastomal hernia repair but has not been offered repair previously.    Socially, she is going through a divorce.  She does live near her family and has a good social support system at home.  She has anxiety which is well controlled with her home medications.    8/7/2023:  Ileostomy revision with relocation of the ileostomy and small-bowel resection  Extensive lysis of adhesions  Omental pedicle flap to the pelvis  Ventral hernia repair with retrorectus mesh placement    FINDINGS:  1. Large midline incisional hernia as well as parastomal hernia with  incarcerated small bowel.  Multiple loops of small bowel densely adherent to pelvis along the sacrum.  Lysis of adhesions was performed in order to divide all of the small bowel loops and free the small bowel from the pelvis.  The incarcerated hernia was reduced.  5 cm of the terminal ileum was resected in order to form a new ostomy.  Incisional hernia x2 and peristomal hernia were repaired.  Prior stoma defect closed in 2 layers with anterior posterior rectus sheath closed individually.  New ostomy was tunneled into the right upper quadrant.  Retrorectus ventral hernia repair was performed with Phasix absorbable mesh placement.  Omental pedicle flap freed and based off of the left gastroepiploic placed down into the pelvis to fill the pelvis to avoid future obstructive symptoms from the small bowel becoming adherent to the sacral promontory     Current status:  8/25/23: presents for first followup visit.  Eager to have the drain removed.  Putting out 2-3 oz per day.  Complains of abdominal pain and cramping.  No issues with her incision or drainage through his incision.  New ostomy working well.  9/8/23:  Presents for evaluation for fatigue.  Lower part of her incision with slight skin separation with mostly serous drainage.  Low-grade fevers at home.  Pain on the left side of her abdomen.  Ostomy working well.   - she was admitted 09/09/2023 for 4 days.  CT scan with 2 fluid collections treated with IR drainage.  Cultures were negative.  9/26/23:  Presents with her friend for follow-up.  She has ongoing difficulty with pain control.  She is very frustrated with how she was treated when she was recently hospitalized.  She feels that she was treated poorly in the ER as well as on the floor and at the CT scanner.  She is 2 drains in place.  One with 30 mL of serous output per day.  The other wound with very small output.  Slight erythema around the drain sites.  Ostomy working well.  Midline incision healing  "well.  10/4/23 to 10/12/23: admitted for IR guided drain placement.  Pan sensitive Staph aureus and Enterococcus.   Seen by ID and started on Meropenem  11/3/23: Presents for follow up with father after CT scan and visit with ID. States that she has continued abdominal wall pain, has flared a bit the last few days. Was taking 15mg oxycodone at home but recently upped to 30mg. Had nausea recently but prior had no nausea. Drains have around 30cc per day of cloudy output. Still on IV daptomycin. No issues around the drains, no incisional issues, has pain under the incision and around the stoma. CT today personally reviewed, drains in good position, no residual collection. She states that she has a yeast infection from the antibiotics and is requesting diflucan.       Review of Systems:  Review of Systems   Constitutional:  Negative for chills, diaphoresis, fever, malaise/fatigue and weight loss.   HENT:  Negative for congestion.    Respiratory:  Negative for shortness of breath.    Cardiovascular:  Negative for chest pain and leg swelling.   Gastrointestinal:  Positive for abdominal pain, blood in stool and diarrhea. Negative for constipation, nausea and vomiting.   Genitourinary:  Negative for dysuria.   Musculoskeletal:  Negative for back pain and myalgias.   Skin:  Negative for rash.   Neurological:  Negative for dizziness and weakness.   Endo/Heme/Allergies:  Does not bruise/bleed easily.   Psychiatric/Behavioral:  Negative for depression. The patient is nervous/anxious.        OBJECTIVE:     Vital Signs (Most Recent)  BP (!) 136/91 (BP Location: Left arm, Patient Position: Sitting, BP Method: Large (Automatic))   Pulse 87   Ht 5' 7" (1.702 m)   LMP 08/07/2012   BMI 41.47 kg/m²     Physical Exam:  General: White female anxious  Neuro: alert and oriented x 4.  Moves all extremities.     HEENT: no icterus.  Trachea midline  Respiratory: respirations are even and unlabored  Cardiac: regular rate  Abdomen:  Large " midline incision healed well.  Prior stoma site in the right lower abdomen is well healed.  No recurrent hernia.  New ostomy in the right upper quadrant is pink and protrudes above the level of the skin.  Abdomen is soft.  Two drain sites in her left lower abdomen without surrounding induration and erythema.  Drains with cloudy, dark purulent output. Both drains flushed and left intact. Drain flushing exiting around the drain site.   Extremities: Warm dry and intact  Skin: no rashes  Anorectal:  Deferred    Labs:  H&H 15 and 48.  Albumin 4.5.  Normal renal function.    Imaging:   CT abdomen pelvis 06/25/2023 reviewed and demonstrates right lower quadrant ileostomy with parastomal hernia as well as a left paramedian ventral hernia and a right paramedian supraumbilical hernia.  Small-bowel follow-through on 06/25/2023 demonstrates passage of contrast to the ostomy bag after 2 hours.  CT abdomen pelvis 06/22/2023 reviewed.  Left-sided rectus is intact.  Right-sided rectus with stoma through the inferior portion.  TAR plane appears intact  CT abd pelvis 11/3/23: personally reviewed, drains in good position. Still has inflammation of the deep collection without drainable fluid.     ASSESSMENT/PLAN:     Diagnoses and all orders for this visit:    Post-op pain  -     oxyCODONE (ROXICODONE) 30 MG Tab; Take 1 tablet (30 mg total) by mouth every 4 (four) hours as needed (pain).    Yeast infection    Other orders  -     fluconazole (DIFLUCAN) 150 MG Tab; Take 1 tablet (150 mg total) by mouth once daily. for 1 day          38 y.o. female with history of FAP status post failed pouch with multiple complex abdominal wall hernias and a peristomal hernia.  She is having obstructive symptoms with multiple hospitalizations.  Obstructive symptoms interfering with her ability to function normally.  Although she is a very complex surgical history, medically, she remains active with normal nutrition, no weight change, nonsmoker,  nondiabetic.  Discussed that she is certainly at high risk for perioperative complication given her extensive past surgical history.    She underwent abdominal wall reconstruction with reciting of her ileostomy on 08/07/2023.    She was readmitted with 2 abdominal fluid collections treated with IR drainage.  Both with negative cultures not consistent with abscess, but rather with post operative seroma.     She then had recurrence of fluid collection more consistent with infected seroma/abscses growning MSSA and enterococcus.  IR drain placed 10/5/23.      She presents for follow up after discharge. She saw ID and plans are pending for abx duration; we will discuss final recommendations with Dr. Holliday. From our standpoint, given the purulent output of the drain and the ongoing deep inflammation, we will keep the drains in place. She will have her PICC dressing changed today. We will refill her oxycodone and send a dose of diflucan for her yeast infection. She will follow up in 2 weeks for wound check and possible drain removal.     KRISH Tracy MD, FACS, FASCRS  Staff Surgeon  Colon & Rectal Surgery

## 2023-11-03 NOTE — PROGRESS NOTES
Pt in clinic today for JULIO PICC dressing change. Site looks well with no redness or swelling;  Dressing changed per sterile tecnique and hospital policies;  pt tolerated well;  pt d/c'd from clinic and is advised to follow up with MD  or their home health in one week for next dressing change;     Labs also drawn per MD orders; Pt tolerated well; From red lumen;  Specimens labeled at bedside and verified by pt and placed in biohazard bag and sent with  to lab;    Pt left in nad;

## 2023-11-03 NOTE — PROGRESS NOTES
Subjective:     Patient ID: Georgette Abrams is a 38 y.o. female    Chief Complaint: intra-abdominal infection    HPI: 38F PMH Familial adenomatous polyposis s/p total proctocolectomy, J pouch, loop ileostomy 2009, complicated by post op fistula, as well as hysterectomy and ventral hernia repair complicated by mesh infection requring excision, and now complex abdominal wall reconstruction and ileostomy re-siting on 8/7/23, complicated by recurrent seromas requiring drain placement. S/p IR drainage on 9/10/23, cultures were negative. Readmitted in October with concerns for new purulent drain output and uncontrolled pain. S/p IR drainage with new drained placed on 10/5 per IR, cultures + MSSA and E. faecalis. She was discharged on 10/12/23 w/ plan for 4 weeks of IV daptomycin with follow up imaging.     Here today for follow up. Repeat CT done this afternoon, radiology read pending.     Patient expressing multiple ongoing frustrations with her care. States she did not have home health arranged until this week due to issues w/ orders. She denies any issues w/ the PICC line or IV antibiotics. Seeing CRS for follow up this afternoon.      There is no immunization history on file for this patient.     Review of Systems   All other systems reviewed and are negative.       Past Medical History:   Diagnosis Date    Anxiety     Familial polyposis     S/P total colectomy      Past Surgical History:   Procedure Laterality Date    Davinci Assisted Bilateral Ovarian Cystectomy, with extension  SEAN   8/2011    HYSTERECTOMY  8/23/2012    DAVINCI     ILEOSTOMY REVISION N/A 8/7/2023    Procedure: REVISION, ILEOSTOMY;  Surgeon: KRISH Tracy MD;  Location: [facility] OR 2ND FLR;  Service: Colon and Rectal;  Laterality: N/A;    Illeotomy Creation  2009    LYSIS OF ADHESIONS N/A 8/7/2023    Procedure: LYSIS, ADHESIONS;  Surgeon: KRISH Tracy MD;  Location: [facility] OR 2ND FLR;  Service: Colon and Rectal;  Laterality: N/A;     OOPHORECTOMY  8/23/2012    DAvinci removal with DLH     REPAIR OF RECURRENT VENTRAL HERNIA N/A 8/7/2023    Procedure: REPAIR, HERNIA, VENTRAL, RECURRENT, ERAS low;  Surgeon: KRISH Tracy MD;  Location: [facility] OR [address] [place] [address];  Service: Colon and Rectal;  Laterality: N/A;  w/ mesh and omental pedical flap    TOTAL COLECTOMY      Iloanal pouch creation     Family History   Problem Relation Age of Onset    Colon cancer Maternal Grandmother         near late 60's    Lung cancer Maternal Grandfather         lung cancer     Social History     Tobacco Use    Smoking status: Former    Smokeless tobacco: Never   Substance Use Topics    Alcohol use: Not Currently     Comment: seldom    Drug use: No       Objective:     Physical Exam  Constitutional:       General: She is not in acute distress.     Appearance: Normal appearance. She is well-developed. She is not ill-appearing or diaphoretic.   HENT:      Head: Normocephalic and atraumatic.      Right Ear: External ear normal.      Left Ear: External ear normal.      Nose: Nose normal.   Eyes:      General: No scleral icterus.        Right eye: No discharge.         Left eye: No discharge.      Extraocular Movements: Extraocular movements intact.      Conjunctiva/sclera: Conjunctivae normal.   Pulmonary:      Effort: Pulmonary effort is normal. No respiratory distress.      Breath sounds: No stridor.   Abdominal:      Comments: 2 CONSTANZA drains with cloudy serous/serosanginous drainage   Musculoskeletal:         General: Normal range of motion.   Skin:     Findings: No erythema or rash.   Neurological:      General: No focal deficit present.      Mental Status: She is alert and oriented to person, place, and time. Mental status is at baseline.      Cranial Nerves: No cranial nerve deficit.   Psychiatric:         Mood and Affect: Mood normal.         Behavior: Behavior normal.         Thought Content: Thought content normal.         Judgment: Judgment normal.         Data:    All  data, including recent labs, radiology, and pathology, has been independently reviewed.    Labs:    Recent Labs   Lab Result Units 09/09/23  1545 09/09/23  1550 09/10/23  0858 09/11/23  0544 10/04/23  1757 10/05/23  0348 10/05/23  0952 10/06/23  0538 10/10/23  1000 10/11/23  0352 10/12/23  0504   WBC K/uL 7.04  --  7.01 4.98 7.69 6.44  --   --   --   --   --    Hemoglobin g/dL 11.0*  --  12.3 11.0* 10.8* 10.6*  --   --   --   --   --    POC Hematocrit   --    < >  --   --   --   --   --   --   --   --   --    Hematocrit % 35.8*  --  39.4 35.4* 35.4* 34.3*  --   --   --   --   --    Sodium mmol/L 140  --  137 139 135* 136  --    < > 137 137 137   Potassium mmol/L 4.2  --  4.0 4.0 3.9 4.0  --    < > 4.3 4.0 4.1   Chloride mmol/L 104  --  101 103 104 101  --    < > 102 101 101   BUN mg/dL 7  --  6 6 5* 4*  --    < > 4* 6 6   Creatinine mg/dL 0.7  --  0.7 0.7 0.7 0.7  --    < > 0.7 0.6 0.7   AST U/L 14  --  11 8* 11  --   --   --   --   --   --    ALT U/L 19  --  19 13 9*  --   --   --   --   --   --    Alkaline Phosphatase U/L 104  --  105 93 136*  --   --   --   --   --   --    Total Bilirubin mg/dL 0.7  --  0.7 0.2 0.2  --   --   --   --   --   --    CRP mg/L 100.2*  --  121.5* 89.5* 65.2* 60.2*  --    < > 54.0* 47.1* 31.8*   INR   --   --   --   --   --   --  1.0  --   --   --   --    HIV 1/2 Ag/Ab  Non-reactive  --   --   --   --   --   --   --   --   --   --     < > = values in this interval not displayed.        Radiology:    No results found in the last 24 hours.     Assessment:    1. Intraabdominal fluid collection  Extend daptomycin for intra-abdominal / abdominal wall infection with MSSA and E. Faecalis.   Discussed w/ CRS - drains to remain in place w/ 2 week follow up  Follow up CT read  Confirmed all home health orders are in place for patient to receive weekly labs and dressing changes            CBC    Comprehensive Metabolic Panel    CK    Nursing communication - dressing change      2. Candidiasis   miconazole (MICONAZOLE-7) 2 % vaginal cream    fluconazole (DIFLUCAN) 150 MG Tab           Follow up in 2 weeks    The total time for evaluation and management services performed on 11/3/23 was greater than 140 minutes.     Aminata Cocco DO  Transplant Infectious Disease

## 2023-11-08 ENCOUNTER — PATIENT MESSAGE (OUTPATIENT)
Dept: SURGERY | Facility: CLINIC | Age: 38
End: 2023-11-08
Payer: COMMERCIAL

## 2023-11-09 ENCOUNTER — PATIENT MESSAGE (OUTPATIENT)
Dept: SURGERY | Facility: CLINIC | Age: 38
End: 2023-11-09
Payer: COMMERCIAL

## 2023-11-09 DIAGNOSIS — N73.9 PELVIC ABSCESS IN FEMALE: Primary | ICD-10-CM

## 2023-11-10 DIAGNOSIS — G89.18 POST-OP PAIN: ICD-10-CM

## 2023-11-10 RX ORDER — OXYCODONE HYDROCHLORIDE 30 MG/1
30 TABLET ORAL EVERY 4 HOURS PRN
Qty: 25 TABLET | Refills: 0 | Status: ON HOLD | OUTPATIENT
Start: 2023-11-10 | End: 2023-11-21 | Stop reason: SDUPTHER

## 2023-11-10 NOTE — TELEPHONE ENCOUNTER
----- Message from Gloria Sanford sent at 11/10/2023  9:40 AM CST -----  Regarding: Rx refill request  Contact: @ [phone]  Pt is calling to speak to someone in the office to request a refill to be sent to the pharmacy. Pt states that she is in a lot of pain. Pt is asking for the medication to be sent in asap. Please call to advise. Thanks.      Refill Needed: oxyCODONE (ROXICODONE) 30 MG Tab    Pharmacy: [facility]

## 2023-11-11 ENCOUNTER — PATIENT MESSAGE (OUTPATIENT)
Dept: INFECTIOUS DISEASES | Facility: CLINIC | Age: 38
End: 2023-11-11
Payer: COMMERCIAL

## 2023-11-14 ENCOUNTER — TELEPHONE (OUTPATIENT)
Dept: SURGERY | Facility: CLINIC | Age: 38
End: 2023-11-14
Payer: COMMERCIAL

## 2023-11-14 ENCOUNTER — PATIENT MESSAGE (OUTPATIENT)
Dept: SURGERY | Facility: CLINIC | Age: 38
End: 2023-11-14
Payer: COMMERCIAL

## 2023-11-14 NOTE — TELEPHONE ENCOUNTER
Pt called about getting an appt with Dr Dominguez for a 2nd opinion. She is having very bad abd pain and drainage from around her  drainage tube. Appt made for her this Thursday with Dr Dominguez.

## 2023-11-14 NOTE — TELEPHONE ENCOUNTER
----- Message from Elodia Small LPN sent at 11/13/2023  4:40 PM CST -----  Regarding: FW: pt call back  Contact: [phone]    ----- Message -----  From: Rhina Garner RN  Sent: 11/13/2023   1:17 PM CST  To: Elodia Small LPN  Subject: FW: pt call back                                 Hi - Chris is currently treating her. It's fine with him if she wants to get a second opinion.  ----- Message -----  From: Herlinda Dye  Sent: 11/13/2023   1:16 PM CST  To: Angelica VILLEDA Staff  Subject: pt call back                                     Pt would like a call back, pt is looking for a second opinion. She was a pt a while back, please give pt a call back thanks.

## 2023-11-14 NOTE — TELEPHONE ENCOUNTER
Left message for patient regarding if appointment needed to be cancelled on Friday 11/17 with Dr Tracy

## 2023-11-15 ENCOUNTER — HOSPITAL ENCOUNTER (INPATIENT)
Facility: HOSPITAL | Age: 38
LOS: 6 days | Discharge: HOME HEALTH | DRG: 863 | End: 2023-11-21
Attending: EMERGENCY MEDICINE | Admitting: COLON & RECTAL SURGERY
Payer: COMMERCIAL

## 2023-11-15 ENCOUNTER — PATIENT MESSAGE (OUTPATIENT)
Dept: SURGERY | Facility: CLINIC | Age: 38
End: 2023-11-15
Payer: COMMERCIAL

## 2023-11-15 DIAGNOSIS — R11.2 INTRACTABLE NAUSEA AND VOMITING: ICD-10-CM

## 2023-11-15 DIAGNOSIS — R10.9 ABDOMINAL PAIN, UNSPECIFIED ABDOMINAL LOCATION: Primary | ICD-10-CM

## 2023-11-15 DIAGNOSIS — R18.8 ABDOMINAL WALL FLUID COLLECTIONS: ICD-10-CM

## 2023-11-15 LAB
ALBUMIN SERPL BCP-MCNC: 4.4 G/DL (ref 3.5–5.2)
ALLENS TEST: NORMAL
ALP SERPL-CCNC: 81 U/L (ref 55–135)
ALT SERPL W/O P-5'-P-CCNC: 15 U/L (ref 10–44)
ANION GAP SERPL CALC-SCNC: 13 MMOL/L (ref 8–16)
AST SERPL-CCNC: 14 U/L (ref 10–40)
BACTERIA #/AREA URNS AUTO: NORMAL /HPF
BASOPHILS # BLD AUTO: 0.04 K/UL (ref 0–0.2)
BASOPHILS NFR BLD: 0.7 % (ref 0–1.9)
BILIRUB SERPL-MCNC: 1 MG/DL (ref 0.1–1)
BILIRUB UR QL STRIP: NEGATIVE
BUN SERPL-MCNC: 8 MG/DL (ref 6–30)
BUN SERPL-MCNC: 9 MG/DL (ref 6–20)
CALCIUM SERPL-MCNC: 10.1 MG/DL (ref 8.7–10.5)
CHLORIDE SERPL-SCNC: 100 MMOL/L (ref 95–110)
CHLORIDE SERPL-SCNC: 102 MMOL/L (ref 95–110)
CLARITY UR REFRACT.AUTO: CLEAR
CO2 SERPL-SCNC: 24 MMOL/L (ref 23–29)
COLOR UR AUTO: YELLOW
CREAT SERPL-MCNC: 0.6 MG/DL (ref 0.5–1.4)
CREAT SERPL-MCNC: 0.8 MG/DL (ref 0.5–1.4)
DIFFERENTIAL METHOD: ABNORMAL
EOSINOPHIL # BLD AUTO: 0 K/UL (ref 0–0.5)
EOSINOPHIL NFR BLD: 0.7 % (ref 0–8)
ERYTHROCYTE [DISTWIDTH] IN BLOOD BY AUTOMATED COUNT: 15.9 % (ref 11.5–14.5)
EST. GFR  (NO RACE VARIABLE): >60 ML/MIN/1.73 M^2
GLUCOSE SERPL-MCNC: 100 MG/DL (ref 70–110)
GLUCOSE SERPL-MCNC: 108 MG/DL (ref 70–110)
GLUCOSE UR QL STRIP: NEGATIVE
HCT VFR BLD AUTO: 44.3 % (ref 37–48.5)
HCT VFR BLD CALC: 46 %PCV (ref 36–54)
HGB BLD-MCNC: 14.3 G/DL (ref 12–16)
HGB UR QL STRIP: NEGATIVE
HYALINE CASTS UR QL AUTO: 0 /LPF
IMM GRANULOCYTES # BLD AUTO: 0.01 K/UL (ref 0–0.04)
IMM GRANULOCYTES NFR BLD AUTO: 0.2 % (ref 0–0.5)
KETONES UR QL STRIP: ABNORMAL
LDH SERPL L TO P-CCNC: 1.08 MMOL/L (ref 0.5–2.2)
LEUKOCYTE ESTERASE UR QL STRIP: NEGATIVE
LIPASE SERPL-CCNC: 16 U/L (ref 4–60)
LYMPHOCYTES # BLD AUTO: 1.6 K/UL (ref 1–4.8)
LYMPHOCYTES NFR BLD: 27.4 % (ref 18–48)
MAGNESIUM SERPL-MCNC: 1.8 MG/DL (ref 1.6–2.6)
MCH RBC QN AUTO: 27.5 PG (ref 27–31)
MCHC RBC AUTO-ENTMCNC: 32.3 G/DL (ref 32–36)
MCV RBC AUTO: 85 FL (ref 82–98)
MICROSCOPIC COMMENT: NORMAL
MONOCYTES # BLD AUTO: 0.4 K/UL (ref 0.3–1)
MONOCYTES NFR BLD: 6.8 % (ref 4–15)
NEUTROPHILS # BLD AUTO: 3.7 K/UL (ref 1.8–7.7)
NEUTROPHILS NFR BLD: 64.2 % (ref 38–73)
NITRITE UR QL STRIP: NEGATIVE
NRBC BLD-RTO: 0 /100 WBC
PH UR STRIP: 7 [PH] (ref 5–8)
PLATELET # BLD AUTO: 288 K/UL (ref 150–450)
PMV BLD AUTO: 11.5 FL (ref 9.2–12.9)
POC IONIZED CALCIUM: 1.15 MMOL/L (ref 1.06–1.42)
POC TCO2 (MEASURED): 23 MMOL/L (ref 23–29)
POTASSIUM BLD-SCNC: 4 MMOL/L (ref 3.5–5.1)
POTASSIUM SERPL-SCNC: 4 MMOL/L (ref 3.5–5.1)
PROT SERPL-MCNC: 8.3 G/DL (ref 6–8.4)
PROT UR QL STRIP: ABNORMAL
RBC # BLD AUTO: 5.2 M/UL (ref 4–5.4)
RBC #/AREA URNS AUTO: 2 /HPF (ref 0–4)
SAMPLE: NORMAL
SAMPLE: NORMAL
SITE: NORMAL
SODIUM BLD-SCNC: 137 MMOL/L (ref 136–145)
SODIUM SERPL-SCNC: 139 MMOL/L (ref 136–145)
SP GR UR STRIP: >1.03 (ref 1–1.03)
SQUAMOUS #/AREA URNS AUTO: 2 /HPF
URN SPEC COLLECT METH UR: ABNORMAL
WBC # BLD AUTO: 5.7 K/UL (ref 3.9–12.7)
WBC #/AREA URNS AUTO: 0 /HPF (ref 0–5)

## 2023-11-15 PROCEDURE — 12000002 HC ACUTE/MED SURGE SEMI-PRIVATE ROOM

## 2023-11-15 PROCEDURE — 87077 CULTURE AEROBIC IDENTIFY: CPT | Performed by: PHYSICIAN ASSISTANT

## 2023-11-15 PROCEDURE — 87154 CUL TYP ID BLD PTHGN 6+ TRGT: CPT | Performed by: PHYSICIAN ASSISTANT

## 2023-11-15 PROCEDURE — 99900035 HC TECH TIME PER 15 MIN (STAT)

## 2023-11-15 PROCEDURE — 96375 TX/PRO/DX INJ NEW DRUG ADDON: CPT

## 2023-11-15 PROCEDURE — 99285 EMERGENCY DEPT VISIT HI MDM: CPT | Mod: 25

## 2023-11-15 PROCEDURE — 83735 ASSAY OF MAGNESIUM: CPT | Performed by: NURSE PRACTITIONER

## 2023-11-15 PROCEDURE — 83605 ASSAY OF LACTIC ACID: CPT

## 2023-11-15 PROCEDURE — 87040 BLOOD CULTURE FOR BACTERIA: CPT | Mod: 59 | Performed by: PHYSICIAN ASSISTANT

## 2023-11-15 PROCEDURE — 81001 URINALYSIS AUTO W/SCOPE: CPT | Performed by: NURSE PRACTITIONER

## 2023-11-15 PROCEDURE — 25500020 PHARM REV CODE 255: Performed by: EMERGENCY MEDICINE

## 2023-11-15 PROCEDURE — 96374 THER/PROPH/DIAG INJ IV PUSH: CPT

## 2023-11-15 PROCEDURE — 83690 ASSAY OF LIPASE: CPT | Performed by: NURSE PRACTITIONER

## 2023-11-15 PROCEDURE — 63600175 PHARM REV CODE 636 W HCPCS: Performed by: PHYSICIAN ASSISTANT

## 2023-11-15 PROCEDURE — 85025 COMPLETE CBC W/AUTO DIFF WBC: CPT | Performed by: NURSE PRACTITIONER

## 2023-11-15 PROCEDURE — 80053 COMPREHEN METABOLIC PANEL: CPT | Performed by: NURSE PRACTITIONER

## 2023-11-15 PROCEDURE — 63600175 PHARM REV CODE 636 W HCPCS: Performed by: STUDENT IN AN ORGANIZED HEALTH CARE EDUCATION/TRAINING PROGRAM

## 2023-11-15 RX ORDER — PROCHLORPERAZINE EDISYLATE 5 MG/ML
5 INJECTION INTRAMUSCULAR; INTRAVENOUS EVERY 6 HOURS PRN
Status: DISCONTINUED | OUTPATIENT
Start: 2023-11-15 | End: 2023-11-19

## 2023-11-15 RX ORDER — HYDROMORPHONE HYDROCHLORIDE 1 MG/ML
2 INJECTION, SOLUTION INTRAMUSCULAR; INTRAVENOUS; SUBCUTANEOUS
Status: COMPLETED | OUTPATIENT
Start: 2023-11-15 | End: 2023-11-15

## 2023-11-15 RX ORDER — HYDROMORPHONE HYDROCHLORIDE 1 MG/ML
1 INJECTION, SOLUTION INTRAMUSCULAR; INTRAVENOUS; SUBCUTANEOUS
Status: COMPLETED | OUTPATIENT
Start: 2023-11-15 | End: 2023-11-15

## 2023-11-15 RX ORDER — ACETAMINOPHEN 325 MG/1
650 TABLET ORAL EVERY 6 HOURS
Status: DISCONTINUED | OUTPATIENT
Start: 2023-11-16 | End: 2023-11-21 | Stop reason: HOSPADM

## 2023-11-15 RX ORDER — HYDRALAZINE HYDROCHLORIDE 20 MG/ML
10 INJECTION INTRAMUSCULAR; INTRAVENOUS EVERY 6 HOURS PRN
Status: DISCONTINUED | OUTPATIENT
Start: 2023-11-15 | End: 2023-11-21 | Stop reason: HOSPADM

## 2023-11-15 RX ORDER — ENOXAPARIN SODIUM 100 MG/ML
40 INJECTION SUBCUTANEOUS EVERY 24 HOURS
Status: DISCONTINUED | OUTPATIENT
Start: 2023-11-15 | End: 2023-11-21 | Stop reason: HOSPADM

## 2023-11-15 RX ORDER — SODIUM CHLORIDE 0.9 % (FLUSH) 0.9 %
3 SYRINGE (ML) INJECTION
Status: DISCONTINUED | OUTPATIENT
Start: 2023-11-15 | End: 2023-11-21 | Stop reason: HOSPADM

## 2023-11-15 RX ORDER — LIDOCAINE HYDROCHLORIDE 10 MG/ML
1 INJECTION, SOLUTION EPIDURAL; INFILTRATION; INTRACAUDAL; PERINEURAL ONCE AS NEEDED
Status: DISCONTINUED | OUTPATIENT
Start: 2023-11-15 | End: 2023-11-21 | Stop reason: HOSPADM

## 2023-11-15 RX ORDER — HYDROMORPHONE HYDROCHLORIDE 1 MG/ML
0.5 INJECTION, SOLUTION INTRAMUSCULAR; INTRAVENOUS; SUBCUTANEOUS EVERY 6 HOURS PRN
Status: DISCONTINUED | OUTPATIENT
Start: 2023-11-15 | End: 2023-11-16

## 2023-11-15 RX ORDER — ONDANSETRON 2 MG/ML
4 INJECTION INTRAMUSCULAR; INTRAVENOUS EVERY 6 HOURS PRN
Status: DISCONTINUED | OUTPATIENT
Start: 2023-11-15 | End: 2023-11-19

## 2023-11-15 RX ORDER — TALC
6 POWDER (GRAM) TOPICAL NIGHTLY PRN
Status: DISCONTINUED | OUTPATIENT
Start: 2023-11-15 | End: 2023-11-21 | Stop reason: HOSPADM

## 2023-11-15 RX ORDER — ONDANSETRON 2 MG/ML
4 INJECTION INTRAMUSCULAR; INTRAVENOUS
Status: COMPLETED | OUTPATIENT
Start: 2023-11-15 | End: 2023-11-15

## 2023-11-15 RX ORDER — OXYCODONE HYDROCHLORIDE 10 MG/1
30 TABLET ORAL EVERY 4 HOURS PRN
Status: DISCONTINUED | OUTPATIENT
Start: 2023-11-15 | End: 2023-11-21 | Stop reason: HOSPADM

## 2023-11-15 RX ORDER — SODIUM CHLORIDE, SODIUM LACTATE, POTASSIUM CHLORIDE, CALCIUM CHLORIDE 600; 310; 30; 20 MG/100ML; MG/100ML; MG/100ML; MG/100ML
INJECTION, SOLUTION INTRAVENOUS CONTINUOUS
Status: DISCONTINUED | OUTPATIENT
Start: 2023-11-15 | End: 2023-11-16

## 2023-11-15 RX ORDER — IPRATROPIUM BROMIDE AND ALBUTEROL SULFATE 2.5; .5 MG/3ML; MG/3ML
3 SOLUTION RESPIRATORY (INHALATION) EVERY 6 HOURS PRN
Status: DISCONTINUED | OUTPATIENT
Start: 2023-11-15 | End: 2023-11-21 | Stop reason: HOSPADM

## 2023-11-15 RX ADMIN — SODIUM CHLORIDE, POTASSIUM CHLORIDE, SODIUM LACTATE AND CALCIUM CHLORIDE 1000 ML: 600; 310; 30; 20 INJECTION, SOLUTION INTRAVENOUS at 09:11

## 2023-11-15 RX ADMIN — ONDANSETRON 4 MG: 2 INJECTION INTRAMUSCULAR; INTRAVENOUS at 06:11

## 2023-11-15 RX ADMIN — IOHEXOL 100 ML: 350 INJECTION, SOLUTION INTRAVENOUS at 07:11

## 2023-11-15 RX ADMIN — HYDROMORPHONE HYDROCHLORIDE 0.5 MG: 1 INJECTION, SOLUTION INTRAMUSCULAR; INTRAVENOUS; SUBCUTANEOUS at 11:11

## 2023-11-15 RX ADMIN — SODIUM CHLORIDE, POTASSIUM CHLORIDE, SODIUM LACTATE AND CALCIUM CHLORIDE: 600; 310; 30; 20 INJECTION, SOLUTION INTRAVENOUS at 11:11

## 2023-11-15 RX ADMIN — HYDROMORPHONE HYDROCHLORIDE 2 MG: 1 INJECTION, SOLUTION INTRAMUSCULAR; INTRAVENOUS; SUBCUTANEOUS at 06:11

## 2023-11-15 RX ADMIN — ENOXAPARIN SODIUM 40 MG: 40 INJECTION SUBCUTANEOUS at 11:11

## 2023-11-15 RX ADMIN — HYDROMORPHONE HYDROCHLORIDE 1 MG: 1 INJECTION, SOLUTION INTRAMUSCULAR; INTRAVENOUS; SUBCUTANEOUS at 09:11

## 2023-11-15 NOTE — ED PROVIDER NOTES
Encounter Date: 11/15/2023       History     Chief Complaint   Patient presents with    Nausea     Nausea w/ dizziness, decreased PO intake and generalized weakness. Drains in place from abscess; concerned she has a fistula. Ileostomy in place. Significant gi hx. 10/10 pain.     Post-op Problem     The history is provided by medical records and the patient. No  was used.     Georgette Abrams is a 38 y.o. female with medical history of familial polyposis, complex abdominal wall reconstruction with recurrent seromas requiring IR drain, Hx of colectomy, ileostomy in place presenting to the ED with the chief complaint of nausea.    Arrives from home in [place] for further evaluation of worsening abdominal pain with nausea and vomiting that is unrelieved with her home medications. +Decreased PO intake. She has two CONSTANZA drains in place from her abdomen. Reports the inferior drain output has changed to a darker brown color and has increased output. Reports chills. No fever. She is currently receiving Daptomycin via PICC line to her RUE. Yoselin s f/b Dr. Tracy in CRS.     Review of patient's allergies indicates:   Allergen Reactions    Levofloxacin Nausea And Vomiting and Rash    Morphine Anaphylaxis, Hives, Shortness Of Breath and Hallucinations           Piperacillin-tazobactam Rash    Sulfa (sulfonamide antibiotics) Nausea And Vomiting and Rash    Opioids - morphine analogues     Hydrocodone-acetaminophen Itching     Past Medical History:   Diagnosis Date    Anxiety     Familial polyposis     S/P total colectomy      Past Surgical History:   Procedure Laterality Date    Davinci Assisted Bilateral Ovarian Cystectomy, with extension  SEAN   8/2011    HYSTERECTOMY  8/23/2012    DAVINCI     ILEOSTOMY REVISION N/A 8/7/2023    Procedure: REVISION, ILEOSTOMY;  Surgeon: KRISH Tracy MD;  Location: [facility] OR [address] [place] [address];  Service: Colon and Rectal;  Laterality: N/A;    Illeotomy Creation  2009    LYSIS  OF ADHESIONS N/A 8/7/2023    Procedure: LYSIS, ADHESIONS;  Surgeon: KRISH Tracy MD;  Location: [facility] OR 2ND FLR;  Service: Colon and Rectal;  Laterality: N/A;    OOPHORECTOMY  8/23/2012    DAvinci removal with DLH     REPAIR OF RECURRENT VENTRAL HERNIA N/A 8/7/2023    Procedure: REPAIR, HERNIA, VENTRAL, RECURRENT, ERAS low;  Surgeon: KRISH Tracy MD;  Location: [facility] OR 2ND FLR;  Service: Colon and Rectal;  Laterality: N/A;  w/ mesh and omental pedical flap    TOTAL COLECTOMY      Iloanal pouch creation     Family History   Problem Relation Age of Onset    Colon cancer Maternal Grandmother         near late 60's    Lung cancer Maternal Grandfather         lung cancer     Social History     Tobacco Use    Smoking status: Former    Smokeless tobacco: Never   Substance Use Topics    Alcohol use: Not Currently     Comment: seldom    Drug use: No     Review of Systems   Gastrointestinal:  Positive for abdominal pain.       Physical Exam     Initial Vitals   BP Pulse Resp Temp SpO2   11/15/23 1630 11/15/23 1629 11/15/23 1629 11/15/23 1629 11/15/23 1630   (!) 153/82 108 18 99.1 °F (37.3 °C) 99 %      MAP       --                Physical Exam    Constitutional: She appears well-developed and well-nourished. She is not diaphoretic. She has a sickly appearance. No distress.   HENT:   Head: Normocephalic and atraumatic.   Mouth/Throat: Oropharynx is clear and moist. No oropharyngeal exudate.   Eyes: Conjunctivae and EOM are normal. Pupils are equal, round, and reactive to light. No scleral icterus.   Neck: Neck supple.   Normal range of motion.  Cardiovascular:  Normal rate and regular rhythm.           Pulmonary/Chest: Breath sounds normal. No respiratory distress. She has no wheezes.   Abdominal: Abdomen is soft. She exhibits no distension. There is abdominal tenderness.   Enterostomy in place  Two CONSTANZA drains to L abdomen with light brown fluid There is no rebound.   Musculoskeletal:         General: No  tenderness or edema. Normal range of motion.      Cervical back: Normal range of motion and neck supple.     Neurological: She is alert and oriented to person, place, and time. She has normal strength. No sensory deficit.   Skin: Skin is warm and dry. No rash noted. No erythema.   Psychiatric: She has a normal mood and affect.       ED Course   Procedures  Labs Reviewed   CBC W/ AUTO DIFFERENTIAL - Abnormal; Notable for the following components:       Result Value    RDW 15.9 (*)     All other components within normal limits    Narrative:     Add on MG per Dr. Bowen @ 18:10 pm to order # [id number]   CULTURE, BLOOD   CULTURE, BLOOD   CULTURE, FUNGUS   CULTURE, ANAEROBIC   CULTURE, AEROBIC  (SPECIFY SOURCE)   COMPREHENSIVE METABOLIC PANEL    Narrative:     Add on MG per Dr. Bowen @ 18:10 pm to order # [id number]   LIPASE    Narrative:     Add on MG per Dr. Bowen @ 18:10 pm to order # [id number]   MAGNESIUM   MAGNESIUM    Narrative:     Add on MG per Dr. Bowen @ 18:10 pm to order # [id number]   URINALYSIS, REFLEX TO URINE CULTURE   ISTAT LACTATE   ISTAT PROCEDURE   ISTAT CHEM8          Imaging Results              CT Abdomen Pelvis With IV Contrast (Final result)  Result time 11/15/23 20:15:31      Final result by Deng Velazquez MD (11/15/23 20:15:31)                   Impression:      1. Postop change of total colectomy with a right lower quadrant ileostomy.  Stable position of pigtail catheter along the left of midline anterior abdominal wall within the previous subcutaneous fluid collection with minimal residual fluid.  Stable position of additional pigtail catheter within midline fluid collection deep to the rectus abdominus muscle which measures slightly smaller when compared to most recent CT from 11/03/2023.  No new or enlarging fluid collections.  2. Similar size of presacral fluid collection.  3. Additional findings, as above.    Electronically signed by resident: Madisyn  Knafl  Date:    11/15/2023  Time:    19:47    Electronically signed by: Deng Velazquez MD  Date:    11/15/2023  Time:    20:15               Narrative:    EXAMINATION:  CT ABDOMEN PELVIS WITH IV CONTRAST    CLINICAL HISTORY:  Abdominal abscess/infection suspected;    TECHNIQUE:  Routine axial CT images of the abdomen were obtained after administration 100cc of IV Omnipaque 350.  No oral contrast was administered.  Coronal and Sagittal reformatted images were also obtained.    COMPARISON:  CT abdomen pelvis 11/03/2023 and 10/05/2023.    FINDINGS:  Heart: Normal in size with no pericardial effusion.    Lungs: No consolidation.  No pleural effusion.  Few bandlike opacifications within the left lung base, likely representing subsegmental atelectasis or scarring.    Liver: Normal in size and attenuation, with no focal hepatic lesions.    Gallbladder/biliary system: No calcified gallstones.  No intrahepatic or extrahepatic ductal dilatation.    Pancreas: No mass or peripancreatic fat stranding.    Spleen: Unremarkable.    GI Tract/ Mesentery: Small hiatal hernia.  The stomach is within normal limits.  Small duodenal diverticulum unchanged.  Postop change of total colectomy with right lower quadrant ileostomy.  No evidence of bowel obstruction or inflammation.  Similar size of presacral fluid density lesion measuring 2.2 cm (axial series 2, image 146), previously 2.3 cm.    Adrenals: Unremarkable.    Kidneys/ Ureters: Normal in size and location. No hydronephrosis or nephrolithiasis. Both ureters are normal in course caliber with no ureteral stones or hydroureter.    Bladder: Normal in contour with no bladder wall thickening.    Reproductive organs: The uterus is surgically absent.  Stable hypodense lesion in the left adnexa with simple fluid density during 2.8 cm, possibly representing ovarian cyst or pelvic peritoneal versus mesenteric inclusion cyst.    Retroperitoneum: No significant adenopathy.    Peritoneal space: No  ascites. No free air.    Abdominal wall: Postop change of the anterior abdominal wall including right lower quadrant ileostomy.  Mild stranding within the right lower quadrant abdominal wall adjacent to the ileostomy site, similar to prior.  Pigtail catheter coursing along the anterior left abdominal wall remains in place in the area of the previously demonstrated subcutaneous fluid and air collection with minimal residual fluid in this region.  Additional pigtail catheter remains in place within fluid collection deep to the rectus abdominus muscle along the midline of the anterior abdominal wall.  This fluid collection is slightly smaller measuring 7.6 x 1.8 cm (previously 7.7 x 2.9 cm).  No new or enlarging fluid collection.    Vasculature: Minimal calcific atherosclerosis of the abdominal aorta.  No evidence of aneurysmal dilatation.    Bones: Degenerative disease of the spine with no acute fractures or lytic lesions.Stable anterior wedging of the T11 vertebral body.                                       Medications   lactated ringers bolus 1,000 mL (has no administration in time range)   HYDROmorphone injection 1 mg (has no administration in time range)   LIDOcaine (PF) 10 mg/ml (1%) injection 10 mg (has no administration in time range)   sodium chloride 0.9% flush 3 mL (has no administration in time range)   lactated ringers infusion (has no administration in time range)   ondansetron injection 4 mg (has no administration in time range)   prochlorperazine injection Soln 5 mg (has no administration in time range)   melatonin tablet 6 mg (has no administration in time range)   acetaminophen tablet 650 mg (has no administration in time range)   enoxaparin injection 40 mg (has no administration in time range)   oxyCODONE immediate release tablet 30 mg (has no administration in time range)   HYDROmorphone injection 0.5 mg (has no administration in time range)   albuterol-ipratropium 2.5 mg-0.5 mg/3 mL nebulizer  solution 3 mL (has no administration in time range)   DAPTOmycin (CUBICIN) 6 mg/kg in sodium chloride 0.9% SolP 50 mL IVPB (has no administration in time range)   hydrALAZINE injection 10 mg (has no administration in time range)   HYDROmorphone injection 2 mg (2 mg Intravenous Given 11/15/23 1852)   ondansetron injection 4 mg (4 mg Intravenous Given 11/15/23 1853)   iohexoL (OMNIPAQUE 350) injection 100 mL (100 mLs Intravenous Given 11/15/23 1931)     Medical Decision Making  38 y.o. female with medical history of complex abdominal wall reconstruction with recurrent seromas requiring IR drain, Hx of colectomy, ileostomy in place presenting to the ED c/o worsening abdominal pain with N/V and decreased PO intake.    DDx includes but not limited to sepsis, intra-abdominal abscess, seroma, obstruction, dehydration, JOY, electrolyte disturbance.     Amount and/or Complexity of Data Reviewed  External Data Reviewed: labs, radiology and notes.  Labs: ordered. Decision-making details documented in ED Course.  Radiology: ordered and independent interpretation performed. Decision-making details documented in ED Course.  Discussion of management or test interpretation with external provider(s): CRS regarding abdominal pain    Risk  Prescription drug management.  Decision regarding hospitalization.               ED Course as of 11/15/23 2055   Wed Nov 15, 2023   2043 WBC: 5.70 [BA]   2043 POC Lactate: 1.08 [BA]   2043 POC Creatinine: 0.6 [BA]   2044 CT showing total colectomy with a right lower quadrant ileostomy. No new or enlarging fluid collections to abdominal wall. [BA]   2045 Limited evaluation of fistula track due to lack of oral contrast despite CT being ordered with PO contrast. [BA]   2046 CRS consulted and evaluated at bedside. Will admit for ongoing management. Patient expresses understanding and agreeable to the plan. I have discussed the care of this patient with my supervising physician.  [BA]      ED Course User  Index  [BA] Deng Tsang PA-C                    Clinical Impression:   Final diagnoses:  [R10.9] Abdominal pain, unspecified abdominal location (Primary)  [R18.8] Abdominal wall fluid collections        ED Disposition Condition    Admit Stable                Deng Tsang PA-C  11/15/23 2055

## 2023-11-15 NOTE — FIRST PROVIDER EVALUATION
Emergency Department TeleTriage Encounter Note      CHIEF COMPLAINT    Chief Complaint   Patient presents with    Nausea     Nausea w/ dizziness, decreased PO intake and generalized weakness. Drains in place from abscess; concerned she has a fistula. Ileostomy in place. Significant gi hx. 10/10 pain.     Post-op Problem       VITAL SIGNS   Initial Vitals   BP Pulse Resp Temp SpO2   11/15/23 1630 11/15/23 1629 11/15/23 1629 11/15/23 1629 11/15/23 1630   (!) 153/82 108 18 99.1 °F (37.3 °C) 99 %      MAP       --                   ALLERGIES    Review of patient's allergies indicates:   Allergen Reactions    Levofloxacin Nausea And Vomiting and Rash    Morphine Anaphylaxis, Hives, Shortness Of Breath and Hallucinations           Piperacillin-tazobactam Rash    Sulfa (sulfonamide antibiotics) Nausea And Vomiting and Rash    Opioids - morphine analogues     Hydrocodone-acetaminophen Itching       PROVIDER TRIAGE NOTE  This is a teletriage evaluation of a 38 y.o. female presenting to the ED with c/o nausea, dizziness, headache and generalized weakness.  Pt states she has not been able to eat or drink for the past 2 days.      PE: Distressed due to symptoms and nausea. VSS.  Sickly appearing    Plan: labs, imaging    Limited physical exam via telehealth: The patient is awake, alert, answering questions appropriately and is not in respiratory distress. All ED beds are full at present; patient notified of this status.  Patient seen and medically screened by Nurse Practitioner via teletriage.      Initial orders will be placed and care will be transferred to an alternate provider when patient is roomed for a full evaluation. Any additional orders and the final disposition will be determined by that provider.        ORDERS  Labs Reviewed   CBC W/ AUTO DIFFERENTIAL   COMPREHENSIVE METABOLIC PANEL   LIPASE   URINALYSIS, REFLEX TO URINE CULTURE   ISTAT CHEM8       ED Orders (720h ago, onward)      Start Ordered     Status  Ordering Provider    11/15/23 1651 11/15/23 1650  Vital signs  Every 2 hours         Ordered RUBÉN MICHELLE KASSY    11/15/23 1651 11/15/23 1650  Orthostatic vital signs  Once         Ordered TAYEDSON MICHELLE KASSY    11/15/23 1650 11/15/23 1650  Diet NPO  Diet effective now         Ordered TAYEDSON MICHELLE KASSY    11/15/23 1650 11/15/23 1650  Insert peripheral IV  Once         Ordered TAYEDSON MICHELLE L.    11/15/23 1650 11/15/23 1650  CBC W/ AUTO DIFFERENTIAL  STAT         Ordered TAYEDSON MICHELLE KASSY    11/15/23 1650 11/15/23 1650  Comp. Metabolic Panel  STAT         Ordered TAYEDSON MICHELLE L.    11/15/23 1650 11/15/23 1650  ISTAT CHEM8  Once         Ordered TAYEDSON MICHELLE KASSY    11/15/23 1650 11/15/23 1650  Lipase  STAT         Ordered TAYEDSON MICHELLE L.    11/15/23 1650 11/15/23 1650  Urinalysis, Reflex to Urine Culture Urine, Clean Catch  STAT         Ordered TAYEDSON MICHELLE KASSY    11/15/23 1650 11/15/23 1650  CT Abdomen Pelvis With IV Contrast  1 time imaging         Ordered RUBÉN MICHELLE L.              Virtual Visit Note: The provider triage portion of this emergency department evaluation and documentation was performed via [employer], a HIPAA-compliant telemedicine application, in concert with a tele-presenter in the room. A face to face patient evaluation with one of my colleagues will occur once the patient is placed in an emergency department room.      DISCLAIMER: This note was prepared with [employer] voice recognition transcription software. Garbled syntax, mangled pronouns, and other bizarre constructions may be attributed to that software system.

## 2023-11-16 ENCOUNTER — TELEPHONE (OUTPATIENT)
Dept: SURGERY | Facility: CLINIC | Age: 38
End: 2023-11-16
Payer: COMMERCIAL

## 2023-11-16 LAB
ACINETOBACTER CALCOACETICUS/BAUMANNII COMPLEX: NOT DETECTED
ANION GAP SERPL CALC-SCNC: 9 MMOL/L (ref 8–16)
BACTEROIDES FRAGILIS: NOT DETECTED
BASOPHILS # BLD AUTO: 0.04 K/UL (ref 0–0.2)
BASOPHILS NFR BLD: 0.8 % (ref 0–1.9)
BUN SERPL-MCNC: 9 MG/DL (ref 6–20)
CALCIUM SERPL-MCNC: 9 MG/DL (ref 8.7–10.5)
CANDIDA ALBICANS: NOT DETECTED
CANDIDA AURIS: NOT DETECTED
CANDIDA GLABRATA: NOT DETECTED
CANDIDA KRUSEI: NOT DETECTED
CANDIDA PARAPSILOSIS: NOT DETECTED
CANDIDA TROPICALIS: NOT DETECTED
CHLORIDE SERPL-SCNC: 103 MMOL/L (ref 95–110)
CO2 SERPL-SCNC: 26 MMOL/L (ref 23–29)
CREAT SERPL-MCNC: 0.7 MG/DL (ref 0.5–1.4)
CRP SERPL-MCNC: 4.2 MG/L (ref 0–8.2)
CRYPTOCOCCUS NEOFORMANS/GATTII: NOT DETECTED
CTX-M GENE: NORMAL
DIFFERENTIAL METHOD: ABNORMAL
ENTEROBACTER CLOACAE COMPLEX: NOT DETECTED
ENTEROBACTERALES: NOT DETECTED
ENTEROCOCCUS FAECALIS: NOT DETECTED
ENTEROCOCCUS FAECIUM: NOT DETECTED
EOSINOPHIL # BLD AUTO: 0.1 K/UL (ref 0–0.5)
EOSINOPHIL NFR BLD: 2 % (ref 0–8)
ERYTHROCYTE [DISTWIDTH] IN BLOOD BY AUTOMATED COUNT: 16 % (ref 11.5–14.5)
ESCHERICHIA COLI: NOT DETECTED
EST. GFR  (NO RACE VARIABLE): >60 ML/MIN/1.73 M^2
GLUCOSE SERPL-MCNC: 106 MG/DL (ref 70–110)
HAEMOPHILUS INFLUENZAE: NOT DETECTED
HCT VFR BLD AUTO: 37.9 % (ref 37–48.5)
HGB BLD-MCNC: 12.1 G/DL (ref 12–16)
IMM GRANULOCYTES # BLD AUTO: 0.01 K/UL (ref 0–0.04)
IMM GRANULOCYTES NFR BLD AUTO: 0.2 % (ref 0–0.5)
IMP GENE: NORMAL
KLEBSIELLA AEROGENES: NOT DETECTED
KLEBSIELLA OXYTOCA: NOT DETECTED
KLEBSIELLA PNEUMONIAE GROUP: NOT DETECTED
KPC: NORMAL
LISTERIA MONOCYTOGENES: NOT DETECTED
LYMPHOCYTES # BLD AUTO: 1.6 K/UL (ref 1–4.8)
LYMPHOCYTES NFR BLD: 31.7 % (ref 18–48)
MAGNESIUM SERPL-MCNC: 1.7 MG/DL (ref 1.6–2.6)
MCH RBC QN AUTO: 27.1 PG (ref 27–31)
MCHC RBC AUTO-ENTMCNC: 31.9 G/DL (ref 32–36)
MCR-1: NORMAL
MCV RBC AUTO: 85 FL (ref 82–98)
MEC A/C AND MREJ (MRSA): NORMAL
MEC A/C: NORMAL
MONOCYTES # BLD AUTO: 0.4 K/UL (ref 0.3–1)
MONOCYTES NFR BLD: 8.8 % (ref 4–15)
NDM GENE: NORMAL
NEISSERIA MENINGITIDIS: NOT DETECTED
NEUTROPHILS # BLD AUTO: 2.8 K/UL (ref 1.8–7.7)
NEUTROPHILS NFR BLD: 56.5 % (ref 38–73)
NRBC BLD-RTO: 0 /100 WBC
OXA-48-LIKE: NORMAL
PHOSPHATE SERPL-MCNC: 3.4 MG/DL (ref 2.7–4.5)
PLATELET # BLD AUTO: 236 K/UL (ref 150–450)
PMV BLD AUTO: 10.7 FL (ref 9.2–12.9)
POTASSIUM SERPL-SCNC: 3.7 MMOL/L (ref 3.5–5.1)
PROTEUS SPECIES: NOT DETECTED
PSEUDOMONAS AERUGINOSA: NOT DETECTED
RBC # BLD AUTO: 4.46 M/UL (ref 4–5.4)
SALMONELLA SP: NOT DETECTED
SERRATIA MARCESCENS: NOT DETECTED
SODIUM SERPL-SCNC: 138 MMOL/L (ref 136–145)
STAPHYLOCOCCUS AUREUS: NOT DETECTED
STAPHYLOCOCCUS EPIDERMIDIS: NOT DETECTED
STAPHYLOCOCCUS LUGDUNESIS: NOT DETECTED
STAPHYLOCOCCUS SPECIES: NOT DETECTED
STENOTROPHOMONAS MALTOPHILIA: NOT DETECTED
STREPTOCOCCUS AGALACTIAE: NOT DETECTED
STREPTOCOCCUS PNEUMONIAE: NOT DETECTED
STREPTOCOCCUS PYOGENES: NOT DETECTED
STREPTOCOCCUS SPECIES: NOT DETECTED
VAN A/B: NORMAL
VIM GENE: NORMAL
WBC # BLD AUTO: 5.01 K/UL (ref 3.9–12.7)

## 2023-11-16 PROCEDURE — 63600175 PHARM REV CODE 636 W HCPCS: Performed by: SURGERY

## 2023-11-16 PROCEDURE — 87040 BLOOD CULTURE FOR BACTERIA: CPT | Mod: 59 | Performed by: PHYSICIAN ASSISTANT

## 2023-11-16 PROCEDURE — 80048 BASIC METABOLIC PNL TOTAL CA: CPT | Performed by: STUDENT IN AN ORGANIZED HEALTH CARE EDUCATION/TRAINING PROGRAM

## 2023-11-16 PROCEDURE — 25500020 PHARM REV CODE 255: Performed by: COLON & RECTAL SURGERY

## 2023-11-16 PROCEDURE — 84100 ASSAY OF PHOSPHORUS: CPT | Performed by: STUDENT IN AN ORGANIZED HEALTH CARE EDUCATION/TRAINING PROGRAM

## 2023-11-16 PROCEDURE — 87070 CULTURE OTHR SPECIMN AEROBIC: CPT | Performed by: STUDENT IN AN ORGANIZED HEALTH CARE EDUCATION/TRAINING PROGRAM

## 2023-11-16 PROCEDURE — 87102 FUNGUS ISOLATION CULTURE: CPT | Performed by: STUDENT IN AN ORGANIZED HEALTH CARE EDUCATION/TRAINING PROGRAM

## 2023-11-16 PROCEDURE — 85025 COMPLETE CBC W/AUTO DIFF WBC: CPT | Performed by: STUDENT IN AN ORGANIZED HEALTH CARE EDUCATION/TRAINING PROGRAM

## 2023-11-16 PROCEDURE — 87077 CULTURE AEROBIC IDENTIFY: CPT | Mod: 59 | Performed by: STUDENT IN AN ORGANIZED HEALTH CARE EDUCATION/TRAINING PROGRAM

## 2023-11-16 PROCEDURE — 25000003 PHARM REV CODE 250: Performed by: STUDENT IN AN ORGANIZED HEALTH CARE EDUCATION/TRAINING PROGRAM

## 2023-11-16 PROCEDURE — 86140 C-REACTIVE PROTEIN: CPT | Performed by: SURGERY

## 2023-11-16 PROCEDURE — 99223 1ST HOSP IP/OBS HIGH 75: CPT | Mod: ,,, | Performed by: PHYSICIAN ASSISTANT

## 2023-11-16 PROCEDURE — 99223 PR INITIAL HOSPITAL CARE,LEVL III: ICD-10-PCS | Mod: ,,, | Performed by: PHYSICIAN ASSISTANT

## 2023-11-16 PROCEDURE — 12000002 HC ACUTE/MED SURGE SEMI-PRIVATE ROOM

## 2023-11-16 PROCEDURE — 83735 ASSAY OF MAGNESIUM: CPT | Performed by: STUDENT IN AN ORGANIZED HEALTH CARE EDUCATION/TRAINING PROGRAM

## 2023-11-16 PROCEDURE — 63600175 PHARM REV CODE 636 W HCPCS: Performed by: STUDENT IN AN ORGANIZED HEALTH CARE EDUCATION/TRAINING PROGRAM

## 2023-11-16 PROCEDURE — 87186 SC STD MICRODIL/AGAR DIL: CPT | Mod: 59 | Performed by: STUDENT IN AN ORGANIZED HEALTH CARE EDUCATION/TRAINING PROGRAM

## 2023-11-16 PROCEDURE — 87075 CULTR BACTERIA EXCEPT BLOOD: CPT | Performed by: STUDENT IN AN ORGANIZED HEALTH CARE EDUCATION/TRAINING PROGRAM

## 2023-11-16 RX ORDER — DEXTROSE MONOHYDRATE, SODIUM CHLORIDE, AND POTASSIUM CHLORIDE 50; 1.49; 4.5 G/1000ML; G/1000ML; G/1000ML
INJECTION, SOLUTION INTRAVENOUS CONTINUOUS
Status: DISCONTINUED | OUTPATIENT
Start: 2023-11-16 | End: 2023-11-21 | Stop reason: HOSPADM

## 2023-11-16 RX ORDER — SCOLOPAMINE TRANSDERMAL SYSTEM 1 MG/1
1 PATCH, EXTENDED RELEASE TRANSDERMAL
Status: DISCONTINUED | OUTPATIENT
Start: 2023-11-16 | End: 2023-11-17

## 2023-11-16 RX ORDER — HYDROMORPHONE HYDROCHLORIDE 1 MG/ML
1 INJECTION, SOLUTION INTRAMUSCULAR; INTRAVENOUS; SUBCUTANEOUS
Status: DISCONTINUED | OUTPATIENT
Start: 2023-11-16 | End: 2023-11-18

## 2023-11-16 RX ORDER — HYDROMORPHONE HYDROCHLORIDE 1 MG/ML
0.2 INJECTION, SOLUTION INTRAMUSCULAR; INTRAVENOUS; SUBCUTANEOUS ONCE
Status: COMPLETED | OUTPATIENT
Start: 2023-11-16 | End: 2023-11-16

## 2023-11-16 RX ADMIN — ONDANSETRON 4 MG: 2 INJECTION INTRAMUSCULAR; INTRAVENOUS at 03:11

## 2023-11-16 RX ADMIN — DAPTOMYCIN 690 MG: 350 INJECTION, POWDER, LYOPHILIZED, FOR SOLUTION INTRAVENOUS at 01:11

## 2023-11-16 RX ADMIN — HYDROMORPHONE HYDROCHLORIDE 0.5 MG: 1 INJECTION, SOLUTION INTRAMUSCULAR; INTRAVENOUS; SUBCUTANEOUS at 06:11

## 2023-11-16 RX ADMIN — ENOXAPARIN SODIUM 40 MG: 40 INJECTION SUBCUTANEOUS at 06:11

## 2023-11-16 RX ADMIN — HYDROMORPHONE HYDROCHLORIDE 0.2 MG: 1 INJECTION, SOLUTION INTRAMUSCULAR; INTRAVENOUS; SUBCUTANEOUS at 03:11

## 2023-11-16 RX ADMIN — HYDROMORPHONE HYDROCHLORIDE 1 MG: 1 INJECTION, SOLUTION INTRAMUSCULAR; INTRAVENOUS; SUBCUTANEOUS at 11:11

## 2023-11-16 RX ADMIN — IOHEXOL 35 ML: 350 INJECTION, SOLUTION INTRAVENOUS at 02:11

## 2023-11-16 RX ADMIN — POTASSIUM CHLORIDE, DEXTROSE MONOHYDRATE AND SODIUM CHLORIDE 40 ML/HR: 150; 5; 450 INJECTION, SOLUTION INTRAVENOUS at 03:11

## 2023-11-16 RX ADMIN — SODIUM CHLORIDE, POTASSIUM CHLORIDE, SODIUM LACTATE AND CALCIUM CHLORIDE: 600; 310; 30; 20 INJECTION, SOLUTION INTRAVENOUS at 06:11

## 2023-11-16 RX ADMIN — OXYCODONE HYDROCHLORIDE 30 MG: 10 TABLET ORAL at 03:11

## 2023-11-16 RX ADMIN — HYDROMORPHONE HYDROCHLORIDE 1 MG: 1 INJECTION, SOLUTION INTRAMUSCULAR; INTRAVENOUS; SUBCUTANEOUS at 01:11

## 2023-11-16 RX ADMIN — ONDANSETRON 4 MG: 2 INJECTION INTRAMUSCULAR; INTRAVENOUS at 02:11

## 2023-11-16 RX ADMIN — HYDROMORPHONE HYDROCHLORIDE 1 MG: 1 INJECTION, SOLUTION INTRAMUSCULAR; INTRAVENOUS; SUBCUTANEOUS at 08:11

## 2023-11-16 RX ADMIN — OXYCODONE HYDROCHLORIDE 30 MG: 10 TABLET ORAL at 04:11

## 2023-11-16 RX ADMIN — ONDANSETRON 4 MG: 2 INJECTION INTRAMUSCULAR; INTRAVENOUS at 11:11

## 2023-11-16 RX ADMIN — HYDROMORPHONE HYDROCHLORIDE 1 MG: 1 INJECTION, SOLUTION INTRAMUSCULAR; INTRAVENOUS; SUBCUTANEOUS at 06:11

## 2023-11-16 RX ADMIN — ONDANSETRON 4 MG: 2 INJECTION INTRAMUSCULAR; INTRAVENOUS at 08:11

## 2023-11-16 NOTE — SUBJECTIVE & OBJECTIVE
Past Medical History:   Diagnosis Date    Anxiety     Familial polyposis     S/P total colectomy        Past Surgical History:   Procedure Laterality Date    Davinci Assisted Bilateral Ovarian Cystectomy, with extension  SEAN   8/2011    HYSTERECTOMY  8/23/2012    DAVINCI     ILEOSTOMY REVISION N/A 8/7/2023    Procedure: REVISION, ILEOSTOMY;  Surgeon: KRISH Tracy MD;  Location: [facility] OR 2ND FLR;  Service: Colon and Rectal;  Laterality: N/A;    Illeotomy Creation  2009    LYSIS OF ADHESIONS N/A 8/7/2023    Procedure: LYSIS, ADHESIONS;  Surgeon: KRISH Tracy MD;  Location: [facility] OR 2ND FLR;  Service: Colon and Rectal;  Laterality: N/A;    OOPHORECTOMY  8/23/2012    DAvinci removal with DLH     REPAIR OF RECURRENT VENTRAL HERNIA N/A 8/7/2023    Procedure: REPAIR, HERNIA, VENTRAL, RECURRENT, ERAS low;  Surgeon: KRISH Tracy MD;  Location: [facility] OR 2ND FLR;  Service: Colon and Rectal;  Laterality: N/A;  w/ mesh and omental pedical flap    TOTAL COLECTOMY      Iloanal pouch creation       Review of patient's allergies indicates:   Allergen Reactions    Levofloxacin Nausea And Vomiting and Rash    Morphine Anaphylaxis, Hives, Shortness Of Breath and Hallucinations           Piperacillin-tazobactam Rash    Sulfa (sulfonamide antibiotics) Nausea And Vomiting and Rash    Opioids - morphine analogues     Hydrocodone-acetaminophen Itching       Medications:  (Not in a hospital admission)    Antibiotics (From admission, onward)      Start     Stop Route Frequency Ordered    11/16/23 0100  DAPTOmycin (CUBICIN) 690 mg in sodium chloride 0.9% SolP 50 mL IVPB         -- IV Every 24 hours (non-standard times) 11/15/23 2053          Antifungals (From admission, onward)      None          Antivirals (From admission, onward)      None               There is no immunization history on file for this patient.    Family History       Problem Relation (Age of Onset)    Colon cancer Maternal Grandmother    Lung cancer  Maternal Grandfather          Social History     Socioeconomic History    Marital status:    Tobacco Use    Smoking status: Former    Smokeless tobacco: Never   Substance and Sexual Activity    Alcohol use: Not Currently     Comment: seldom    Drug use: No    Sexual activity: Yes     Partners: Male     Birth control/protection: None     Comment: , lives in [place]     Social Determinants of Health     Financial Resource Strain: Low Risk  (10/7/2023)    Overall Financial Resource Strain (CARDIA)     Difficulty of Paying Living Expenses: Not hard at all   Food Insecurity: No Food Insecurity (10/7/2023)    Hunger Vital Sign     Worried About Running Out of Food in the Last Year: Never true     Ran Out of Food in the Last Year: Never true   Transportation Needs: No Transportation Needs (10/7/2023)    PRAPARE - Transportation     Lack of Transportation (Medical): No     Lack of Transportation (Non-Medical): No   Physical Activity: Inactive (10/7/2023)    Exercise Vital Sign     Days of Exercise per Week: 0 days     Minutes of Exercise per Session: 0 min   Stress: Stress Concern Present (10/7/2023)    [employer] [place] [employer] - Occupational Stress Questionnaire     Feeling of Stress : To some extent   Social Connections: Moderately Integrated (10/7/2023)    Social Connection and Isolation Panel [NHANES]     Frequency of Communication with Friends and Family: More than three times a week     Frequency of Social Gatherings with Friends and Family: More than three times a week     Attends Orthodoxy Services: More than 4 times per year     Active Member of Clubs or Organizations: No     Attends Club or Organization Meetings: Never     Marital Status:    Housing Stability: Unknown (10/7/2023)    Housing Stability Vital Sign     Unable to Pay for Housing in the Last Year: No     Unstable Housing in the Last Year: No     Review of Systems   Constitutional:  Positive for activity change,  appetite change and fatigue. Negative for chills, diaphoresis and fever.   Respiratory:  Negative for cough and shortness of breath.    Gastrointestinal:  Positive for abdominal pain. Negative for diarrhea, nausea and vomiting.   Genitourinary:  Negative for dysuria.   Musculoskeletal:  Negative for back pain.   Skin:  Positive for wound. Negative for color change, pallor and rash.   Neurological:  Negative for dizziness and headaches.   All other systems reviewed and are negative.    Objective:     Vital Signs (Most Recent):  Temp: 97.8 °F (36.6 °C) (11/16/23 1224)  Pulse: 75 (11/16/23 1224)  Resp: 16 (11/16/23 1306)  BP: 129/71 (11/16/23 1224)  SpO2: 97 % (11/16/23 1224) Vital Signs (24h Range):  Temp:  [97.8 °F (36.6 °C)-99.1 °F (37.3 °C)] 97.8 °F (36.6 °C)  Pulse:  [] 75  Resp:  [16-20] 16  SpO2:  [97 %-100 %] 97 %  BP: (107-153)/(58-82) 129/71     Weight: 115.1 kg (253 lb 12 oz)  Body mass index is 39.74 kg/m².    Estimated Creatinine Clearance: 140 mL/min (based on SCr of 0.7 mg/dL).     Physical Exam  Vitals and nursing note reviewed.   Constitutional:       General: She is not in acute distress.     Appearance: She is well-developed. She is not diaphoretic.   HENT:      Head: Normocephalic and atraumatic.   Eyes:      Pupils: Pupils are equal, round, and reactive to light.   Cardiovascular:      Rate and Rhythm: Normal rate and regular rhythm.      Heart sounds: Normal heart sounds.   Pulmonary:      Effort: Pulmonary effort is normal. No respiratory distress.      Breath sounds: Normal breath sounds. No wheezing or rales.   Chest:      Chest wall: No tenderness.   Abdominal:      General: Bowel sounds are normal. There is no distension.      Palpations: Abdomen is soft. There is no mass.      Tenderness: There is no abdominal tenderness. There is no guarding or rebound.      Hernia: No hernia is present.      Comments: Two drains in place with mucopurulent output  Erythema around drain  Ileostomy with  loose output   Musculoskeletal:         General: No tenderness or deformity. Normal range of motion.      Cervical back: Normal range of motion and neck supple.   Skin:     General: Skin is warm and dry.      Coloration: Skin is not pale.      Findings: No erythema.   Neurological:      Mental Status: She is alert and oriented to person, place, and time.      Cranial Nerves: No cranial nerve deficit.      Coordination: Coordination normal.   Psychiatric:         Mood and Affect: Mood normal.         Behavior: Behavior normal.         Thought Content: Thought content normal.          Significant Labs: All pertinent labs within the past 24 hours have been reviewed.    Significant Imaging: I have reviewed all pertinent imaging results/findings within the past 24 hours.

## 2023-11-16 NOTE — ED TRIAGE NOTES
Pt presents to ED endorsing nausea, abdominal pain, and dizziness upon standing. Pt has significant GI hx, presents with two CONSTANZA drains, ileostomy, and PICC line.

## 2023-11-16 NOTE — PROGRESS NOTES
Patient Name: Georgette Abrams  Date: 11/16/2023  Service: Colon and Rectal Surgery    38F Hx FAP s/p TPC w J pouch, J bowel takedown for pouch failure, VHR with mesh c/b mesh infection, and ultimately VHR with bilateral component separation (Phasix), SBR, ileostomy re-siting 8/7/23. Course was complicated by multiple abdominal wall fluid collections requiring drain placement and long term Daptomycin as well as ongoing chronic postoperative pain. Admitted with ongoing pain, nausea, and headaches.     Subjective:  Continues to have pain, unable to tolerate PO contrast for CT scan. Having stoma output. Drains with minimal output. Frustrated with her ongoing clinical course.     Medications:    Current Facility-Administered Medications:     acetaminophen tablet 650 mg, 650 mg, Oral, Q6H, Gilles Mccormick MD    albuterol-ipratropium 2.5 mg-0.5 mg/3 mL nebulizer solution 3 mL, 3 mL, Nebulization, Q6H PRN, Gilles Mccormick MD    DAPTOmycin (CUBICIN) 690 mg in sodium chloride 0.9% SolP 50 mL IVPB, 6 mg/kg, Intravenous, Q24H, Gilles Mccormick MD, Stopped at 11/16/23 0225    enoxaparin injection 40 mg, 40 mg, Subcutaneous, Daily, Gilles Mccormick MD, 40 mg at 11/15/23 2330    hydrALAZINE injection 10 mg, 10 mg, Intravenous, Q6H PRN, Gilles Mccormick MD    HYDROmorphone injection 1 mg, 1 mg, Intravenous, Q2H PRN, Robert Cruz MD, 1 mg at 11/16/23 1306    iohexoL (OMNIPAQUE 350) injection 15 mL, 15 mL, Oral, PRN, Berenice Vazquez MD    LIDOcaine (PF) 10 mg/ml (1%) injection 10 mg, 1 mL, Intradermal, Once PRN, Gilles Mccormick MD    melatonin tablet 6 mg, 6 mg, Oral, Nightly PRN, Gilles Mccormick MD    ondansetron injection 4 mg, 4 mg, Intravenous, Q6H PRN, Gilles Mccormick MD, 4 mg at 11/16/23 0830    oxyCODONE immediate release tablet Tab 30 mg, 30 mg, Oral, Q4H PRN, Gilles Mccormick MD, 30 mg at 11/16/23 0405    prochlorperazine injection Soln 5 mg, 5 mg, Intravenous, Q6H PRN, Gilles Mccormick MD     scopolamine 1.3-1.5 mg (1 mg over 3 days) 1 patch, 1 patch, Transdermal, Q3 Days, Gilles Mccormick MD    sodium chloride 0.9% flush 3 mL, 3 mL, Intravenous, PRN, Gilles Mccormick MD    Current Outpatient Medications:     clonazepam (KLONOPIN) 1 MG tablet, Take 1 mg by mouth 2 (two) times daily as needed.  , Disp: , Rfl:     cyanocobalamin 1,000 mcg/mL injection, 1,000 mcg twice a week., Disp: , Rfl:     fluconazole (DIFLUCAN) 150 MG Tab, Take 1 tablet (150 mg total) by mouth once daily., Disp: 3 tablet, Rfl: 3    ibuprofen (ADVIL,MOTRIN) 800 MG tablet, Take 1 tablet (800 mg total) by mouth every 8 (eight) hours. (Patient not taking: Reported on 11/3/2023), Disp: 30 tablet, Rfl: 3    LIDOcaine (LIDODERM) 5 %, Place 1 patch onto the skin once daily. Remove & Discard patch within 12 hours or as directed by MD (Patient not taking: Reported on 10/16/2023), Disp: 30 patch, Rfl: 3    losartan (COZAAR) 100 MG tablet, Take 100 mg by mouth., Disp: , Rfl:     miconazole (MICONAZOLE-7) 2 % vaginal cream, Place 1 applicator vaginally every evening., Disp: 1 kit, Rfl: 3    miconazole (MICOTIN) 100 mg vaginal suppository, Place 1 suppository (100 mg total) vaginally every evening. (Patient not taking: Reported on 10/16/2023), Disp: 7 suppository, Rfl: 0    ondansetron (ZOFRAN) 4 MG tablet, Take 4 mg by mouth every 6 (six) hours as needed for Nausea., Disp: , Rfl:     ondansetron (ZOFRAN-ODT) 4 MG TbDL, Dissolve 1 tablet (4 mg total) by mouth every 6 (six) hours as needed., Disp: 40 tablet, Rfl: 3    oxyCODONE (ROXICODONE) 30 MG Tab, Take 1 tablet (30 mg total) by mouth every 4 (four) hours as needed (pain)., Disp: 25 tablet, Rfl: 0    progesterone (PROMETRIUM) 100 MG capsule, Take 400 mg by mouth every evening., Disp: , Rfl:     venlafaxine (EFFEXOR) 100 MG Tab, Take 150 mg by mouth Daily., Disp: , Rfl:     Facility-Administered Medications Ordered in Other Encounters:     LIDOcaine (PF) 10 mg/ml (1%) injection 10 mg, 1 mL,  Intradermal, On Call Procedure, Rhina Saldana, NP    Vital Signs:  Vitals:    11/16/23 1306   BP:    Pulse:    Resp: 16   Temp:        In/Out:  Intake/Output - Last 3 Shifts         11/14 0700  11/15 0659 11/15 0700  11/16 0659 11/16 0700 11/17 0659    Urine (mL/kg/hr)  0     Drains  25     Total Output  25     Net  -25            Urine Occurrence  1 x             Physical Exam:  General: Alert, oriented, in no apparent distress  HEENT: Sclera anicteric, trachea midline  Lungs: Normal respiratory rate and effort on room air  Abdomen: Soft, mild TTP throughout, similar to previous exams. Ileostomy viable with stool in the bag. Drains with cloudy output, when flushed the saline refluxes around the tubes. Incision well healed, no cellulitis  Extremities: Warm, well perfused, no edema  Neuro: Grossly intact, moves all extremities  Psych: Appropriate affect    Laboratory Studies:  Complete Blood Count:  Lab Results   Component Value Date/Time    WBC 5.01 11/16/2023 03:15 AM    HGB 12.1 11/16/2023 03:15 AM    HCT 37.9 11/16/2023 03:15 AM    HCT 46 11/15/2023 06:39 PM    RBC 4.46 11/16/2023 03:15 AM     11/16/2023 03:15 AM       Basic Chemistry Panel:  Lab Results   Component Value Date/Time     11/16/2023 03:15 AM    K 3.7 11/16/2023 03:15 AM     11/16/2023 03:15 AM    CO2 26 11/16/2023 03:15 AM    BUN 9 11/16/2023 03:15 AM    CREATININE 0.7 11/16/2023 03:15 AM    CALCIUM 9.0 11/16/2023 03:15 AM       Lab Results   Component Value Date/Time    CRP 4.2 11/16/2023 09:16 AM       Imaging Studies:  CT abdomen pelvis personally reviewed. No undrained collections, inflammatory changes in the abdominal wall noted and somewhat improved. No dilated bowel.     Assessment:  38F Hx FAP s/p TPC w J pouch, J bowel takedown for pouch failure, VHR with mesh c/b mesh infection, and ultimately VHR with bilateral component separation (Phasix), SBR, ileostomy re-siting 8/7/23. Course was complicated by multiple  abdominal wall fluid collections requiring drain placement and long term Daptomycin as well as ongoing chronic postoperative pain. Admitted with ongoing pain, nausea, and headaches.     Plan:  Continue home oxycodone dose, OK for dilaudid if unable to tolerate oral intake  For headaches, if not improving may need to consult neurology for recommendations  HDS, no tachycardia  EUINCE  OK for regular diet as tolerated  Upper GI today to evaluate for gastric dysmotility, follow up KUB for contrast passage to rule out obstruction --> may need GI consult for ongoing nausea  Remove subQ drain today  ID consulted for daptomycin, will resend fluid for culture  BCx x1 with GP rods, ID following, f/u final cultures as this may be contaminant, continue daptomycin --> repeat BCx pending    Robert Cruz MD  Colon and Rectal Surgery Fellow

## 2023-11-16 NOTE — PROGRESS NOTES
Pt A&Ox4. VSS, on RA. Two CONSTANZA drains to abdomen intact. Ileostomy C/D/I. Pt complains of abdominal pain. PRN Dilaudid/oxy given throughout shift with mild relief. Unable to obtain cultures from CONSTANZA, currently not draining around site.     0400: Oral contrast for CT started.  0500: Pt stated she is unable to complete oral contrast. Stated she only took a few small sips and is unable to tolerate it. MD notified.

## 2023-11-16 NOTE — CONSULTS
[facility] - Emergency Dept  Infectious Disease  Consult Note    Patient Name: Georgette Abrams  MRN: [id number]  Admission Date: 11/15/2023  Hospital Length of Stay: 1 days  Attending Physician: KRISH Tracy MD  Primary Care Provider: No, Primary Doctor     Isolation Status: No active isolations      Inpatient consult to Infectious Diseases  Consult performed by: Braulio Arreola PA-C  Consult ordered by: Gilles Mccormick MD        Assessment/Plan:     GI  * Abdominal wall fluid collections  Ms. Abrams is a 37 y/o female with hx of FAP (familial adenomatous polyposis) s/p total proctocolectomy with J pouch and ileostomy 7/2009. This was complicated with post op fistula s/p small bowl resection 6/2010. Hx of ventral hernia repair and hysterectomy 2012 c/b mesh infection requiring excision 2016. She ultimately underwent ventral hernia repair with mesh placement on 8/7/2023. This was complicated with intra-abdominal abscesses s/p IR drainage. Most recent IR drainage was on 10/5/23. Cultures +MSSA and E.faecalis. She is followed by ID and has been on IV daptomycin for 4 weeks or until resolution of abscesses.     She presents to ED for nausea,fatigue, abdominal pain and headaches. Drains continue to have murky dark output. No issues with her ostomy. ID consulted as pt remains on daptomycin for OPAT. Pt remained afebrile, hemodynamically stable, no leukocytosis. Colorectal surgery following.       CT A/P with contrast 11/15 fluid collection is slightly smaller measuring 7.6 x 1.8 cm (previously 7.7 x 2.9 cm).  No new or enlarging fluid collection.       Recommendations  Continue daptomycin. Weekly cpk while on daptomycin.   CRS following, CT A/P today. Consider drain catheter exchange if able and for repeat cultures from output to further guide abx therapy   Blood cultures on admission 1/4 +GPRs. May likely be contaminants. Repeat blood cultures x2 today.   ID will follow          Thank you for your consult.  I will follow-up with patient. Please contact us if you have any additional questions.    Braulio Arreola PA-C  Infectious Disease  [facility] - Emergency Dept    Subjective:     Principal Problem: Abdominal wall fluid collections    HPI: Ms. Abrams is a 39 y/o female with hx of FAP (familial adenomatous polyposis) s/p total proctocolectomy with J pouch and ileostomy 7/2009. This was complicated with post op fistula s/p small bowl resection 6/2010. Hx of ventral hernia repair and hysterectomy 2012 c/b mesh infection requiring excision 2016. She ultimately underwnet ventral hernia repair with mesh placement on 8/7/2023. This was complicated with intra-abdominal abscesses s/p IR drainage. Most recent IR drainage was on 10/5/23. Cultures +MSSA and E.faecalis. she is followed by ID and has been on IV daptomycin for 4 weeks or until resolution of abscesses. She presents to ED for nausea,fatigue, abdominal pain and headaches. Drains continue to have murky dark output. No issues with her ostomy. ID consulted as pt remains on daptomycin for OPAT. Pt remained afebrile, hemodynamically stable, no leukocytosis. Colorectal surgery following.       CT A/P with contrast 11/15 Postop change of the anterior abdominal wall including right lower quadrant ileostomy.  Mild stranding within the right lower quadrant abdominal wall adjacent to the ileostomy site, similar to prior.  Pigtail catheter coursing along the anterior left abdominal wall remains in place in the area of the previously demonstrated subcutaneous fluid and air collection with minimal residual fluid in this region.  Additional pigtail catheter remains in place within fluid collection deep to the rectus abdominus muscle along the midline of the anterior abdominal wall.  This fluid collection is slightly smaller measuring 7.6 x 1.8 cm (previously 7.7 x 2.9 cm).  No new or enlarging fluid collection.     Past Medical History:   Diagnosis Date    Anxiety     Familial  polyposis     S/P total colectomy        Past Surgical History:   Procedure Laterality Date    Davinci Assisted Bilateral Ovarian Cystectomy, with extension  SEAN   8/2011    HYSTERECTOMY  8/23/2012    DAVINCI     ILEOSTOMY REVISION N/A 8/7/2023    Procedure: REVISION, ILEOSTOMY;  Surgeon: KRISH Tracy MD;  Location: [facility] OR 2ND FLR;  Service: Colon and Rectal;  Laterality: N/A;    Illeotomy Creation  2009    LYSIS OF ADHESIONS N/A 8/7/2023    Procedure: LYSIS, ADHESIONS;  Surgeon: KRISH Tracy MD;  Location: [facility] OR 2ND FLR;  Service: Colon and Rectal;  Laterality: N/A;    OOPHORECTOMY  8/23/2012    DAvinci removal with DLH     REPAIR OF RECURRENT VENTRAL HERNIA N/A 8/7/2023    Procedure: REPAIR, HERNIA, VENTRAL, RECURRENT, ERAS low;  Surgeon: KRISH Tracy MD;  Location: [facility] OR 2ND FLR;  Service: Colon and Rectal;  Laterality: N/A;  w/ mesh and omental pedical flap    TOTAL COLECTOMY      Iloanal pouch creation       Review of patient's allergies indicates:   Allergen Reactions    Levofloxacin Nausea And Vomiting and Rash    Morphine Anaphylaxis, Hives, Shortness Of Breath and Hallucinations           Piperacillin-tazobactam Rash    Sulfa (sulfonamide antibiotics) Nausea And Vomiting and Rash    Opioids - morphine analogues     Hydrocodone-acetaminophen Itching       Medications:  (Not in a hospital admission)    Antibiotics (From admission, onward)      Start     Stop Route Frequency Ordered    11/16/23 0100  DAPTOmycin (CUBICIN) 690 mg in sodium chloride 0.9% SolP 50 mL IVPB         -- IV Every 24 hours (non-standard times) 11/15/23 2053          Antifungals (From admission, onward)      None          Antivirals (From admission, onward)      None               There is no immunization history on file for this patient.    Family History       Problem Relation (Age of Onset)    Colon cancer Maternal Grandmother    Lung cancer Maternal Grandfather          Social History     Socioeconomic  History    Marital status:    Tobacco Use    Smoking status: Former    Smokeless tobacco: Never   Substance and Sexual Activity    Alcohol use: Not Currently     Comment: seldom    Drug use: No    Sexual activity: Yes     Partners: Male     Birth control/protection: None     Comment: , lives in [place]     Social Determinants of Health     Financial Resource Strain: Low Risk  (10/7/2023)    Overall Financial Resource Strain (CARDIA)     Difficulty of Paying Living Expenses: Not hard at all   Food Insecurity: No Food Insecurity (10/7/2023)    Hunger Vital Sign     Worried About Running Out of Food in the Last Year: Never true     Ran Out of Food in the Last Year: Never true   Transportation Needs: No Transportation Needs (10/7/2023)    PRAPARE - Transportation     Lack of Transportation (Medical): No     Lack of Transportation (Non-Medical): No   Physical Activity: Inactive (10/7/2023)    Exercise Vital Sign     Days of Exercise per Week: 0 days     Minutes of Exercise per Session: 0 min   Stress: Stress Concern Present (10/7/2023)    [employer] - Occupational Stress Questionnaire     Feeling of Stress : To some extent   Social Connections: Moderately Integrated (10/7/2023)    Social Connection and Isolation Panel [NHANES]     Frequency of Communication with Friends and Family: More than three times a week     Frequency of Social Gatherings with Friends and Family: More than three times a week     Attends [employer] Services: More than 4 times per year     Active Member of Clubs or Organizations: No     Attends Club or Organization Meetings: Never     Marital Status:    Housing Stability: Unknown (10/7/2023)    Housing Stability Vital Sign     Unable to Pay for Housing in the Last Year: No     Unstable Housing in the Last Year: No     Review of Systems   Constitutional:  Positive for activity change, appetite change and fatigue. Negative for chills, diaphoresis and  fever.   Respiratory:  Negative for cough and shortness of breath.    Gastrointestinal:  Positive for abdominal pain. Negative for diarrhea, nausea and vomiting.   Genitourinary:  Negative for dysuria.   Musculoskeletal:  Negative for back pain.   Skin:  Positive for wound. Negative for color change, pallor and rash.   Neurological:  Negative for dizziness and headaches.   All other systems reviewed and are negative.    Objective:     Vital Signs (Most Recent):  Temp: 97.8 °F (36.6 °C) (11/16/23 1224)  Pulse: 75 (11/16/23 1224)  Resp: 16 (11/16/23 1306)  BP: 129/71 (11/16/23 1224)  SpO2: 97 % (11/16/23 1224) Vital Signs (24h Range):  Temp:  [97.8 °F (36.6 °C)-99.1 °F (37.3 °C)] 97.8 °F (36.6 °C)  Pulse:  [] 75  Resp:  [16-20] 16  SpO2:  [97 %-100 %] 97 %  BP: (107-153)/(58-82) 129/71     Weight: 115.1 kg (253 lb 12 oz)  Body mass index is 39.74 kg/m².    Estimated Creatinine Clearance: 140 mL/min (based on SCr of 0.7 mg/dL).     Physical Exam  Vitals and nursing note reviewed.   Constitutional:       General: She is not in acute distress.     Appearance: She is well-developed. She is not diaphoretic.   HENT:      Head: Normocephalic and atraumatic.   Eyes:      Pupils: Pupils are equal, round, and reactive to light.   Cardiovascular:      Rate and Rhythm: Normal rate and regular rhythm.      Heart sounds: Normal heart sounds.   Pulmonary:      Effort: Pulmonary effort is normal. No respiratory distress.      Breath sounds: Normal breath sounds. No wheezing or rales.   Chest:      Chest wall: No tenderness.   Abdominal:      General: Bowel sounds are normal. There is no distension.      Palpations: Abdomen is soft. There is no mass.      Tenderness: There is no abdominal tenderness. There is no guarding or rebound.      Hernia: No hernia is present.      Comments: Two drains in place with mucopurulent output  Erythema around drain  Ileostomy with loose output   Musculoskeletal:         General: No tenderness or  deformity. Normal range of motion.      Cervical back: Normal range of motion and neck supple.   Skin:     General: Skin is warm and dry.      Coloration: Skin is not pale.      Findings: No erythema.   Neurological:      Mental Status: She is alert and oriented to person, place, and time.      Cranial Nerves: No cranial nerve deficit.      Coordination: Coordination normal.   Psychiatric:         Mood and Affect: Mood normal.         Behavior: Behavior normal.         Thought Content: Thought content normal.          Significant Labs: All pertinent labs within the past 24 hours have been reviewed.    Significant Imaging: I have reviewed all pertinent imaging results/findings within the past 24 hours.

## 2023-11-16 NOTE — HPI
Ms. Abrams is a 39 y/o female with hx of FAP (familial adenomatous polyposis) s/p total proctocolectomy with J pouch and ileostomy 7/2009. This was complicated with post op fistula s/p small bowl resection 6/2010. Hx of ventral hernia repair and hysterectomy 2012 c/b mesh infection requiring excision 2016. She ultimately underwnet ventral hernia repair with mesh placement on 8/7/2023. This was complicated with intra-abdominal abscesses s/p IR drainage. Most recent IR drainage was on 10/5/23. Cultures +MSSA and E.faecalis. she is followed by ID and has been on IV daptomycin for 4 weeks or until resolution of abscesses. She presents to ED for nausea,fatigue, abdominal pain and headaches. Drains continue to have murky dark output. No issues with her ostomy. ID consulted as pt remains on daptomycin for OPAT. Pt remained afebrile, hemodynamically stable, no leukocytosis. Colorectal surgery following.       CT A/P with contrast 11/15 Postop change of the anterior abdominal wall including right lower quadrant ileostomy.  Mild stranding within the right lower quadrant abdominal wall adjacent to the ileostomy site, similar to prior.  Pigtail catheter coursing along the anterior left abdominal wall remains in place in the area of the previously demonstrated subcutaneous fluid and air collection with minimal residual fluid in this region.  Additional pigtail catheter remains in place within fluid collection deep to the rectus abdominus muscle along the midline of the anterior abdominal wall.  This fluid collection is slightly smaller measuring 7.6 x 1.8 cm (previously 7.7 x 2.9 cm).  No new or enlarging fluid collection.

## 2023-11-16 NOTE — TELEPHONE ENCOUNTER
Lab called with results. 11/16/23 in ED --Aerobic culture has Positive Rods.  Ami Tracy notified at 10:54 on 11/16/23.

## 2023-11-16 NOTE — CONSULTS
[facility] - Emergency Dept  General Surgery  Consult Note    Inpatient consult to Colorectal Surgery  Consult performed by: Gilles Mccormick MD  Consult ordered by: Deng Tsang PA-C        Subjective:     Chief Complaint/Reason for Admission: Abdominal Pain    History of Present Illness: Ms. 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  Eventually had pouch failure and had a pouch excision a couple months later in September.  Developed a ventral hernia and had this repaired during a hysterectomy operation in 2012 which was complicated with a mesh infection requiring excision in 2016.  Since this hernia repair she had developed multiple hernia recurrences and was having obstructive symptoms.  Ultimately she had a ileostomy revision with relocation and small bowel resection and a ventral hernia repair with retrorectus mesh placement on 8/7/2023 at [facility].  Retrorectus ventral hernia repair was performed using Phasix absorbable mesh.  In September patient was admitted and found to have 2 fluid collections (one subcutaneous and the other retrorectus) which drains were placed within.  Cultures from these drains were negative.  In October she was again admitted and new drains were placed.  This time cultures from the drains grew pan sensitive staph aureus and enterococcus and she was started on meropenem at this time by ID.  This was transitioned to daptomycin and she was to continue this for a four week course after discharge.  At follow up appointment on 11/3 she endorsed having continued abdominal pain and drains had began to become cloudy with output.  Had a CT at that time showed good position of the prior placed IR drains with improvement in the size of  the fluid collections.  Now presents to the ED with continued abdominal pain, nausea, emesis, and a headache.  Drains continue to have murky dark output.  Endorses that her ostomy continues to function well without issues.  Has been having decreased oral intake secondary to her nausea and emesis.  Workup in the ED at this time she is hemodynamically stable and afebrile.  CBC without a leukocytosis or left shift.  Renal and hepatic panels are unremarkable.  No lactic acidosis.  CT with IV and NO oral contrast shows improvement of her subq and subrectus fluid collections with the drains in similar position.  Similar size of her known presacral fluid collection.  No other new collections noted.  Colon and Rectal surgery consulted for evaluation.    Current Facility-Administered Medications on File Prior to Encounter   Medication    LIDOcaine (PF) 10 mg/ml (1%) injection 10 mg     Current Outpatient Medications on File Prior to Encounter   Medication Sig    clonazepam (KLONOPIN) 1 MG tablet Take 1 mg by mouth 2 (two) times daily as needed.      cyanocobalamin 1,000 mcg/mL injection 1,000 mcg twice a week.    fluconazole (DIFLUCAN) 150 MG Tab Take 1 tablet (150 mg total) by mouth once daily.    ibuprofen (ADVIL,MOTRIN) 800 MG tablet Take 1 tablet (800 mg total) by mouth every 8 (eight) hours. (Patient not taking: Reported on 11/3/2023)    LIDOcaine (LIDODERM) 5 % Place 1 patch onto the skin once daily. Remove & Discard patch within 12 hours or as directed by MD (Patient not taking: Reported on 10/16/2023)    losartan (COZAAR) 100 MG tablet Take 100 mg by mouth.    miconazole (MICONAZOLE-7) 2 % vaginal cream Place 1 applicator vaginally every evening.    miconazole (MICOTIN) 100 mg vaginal suppository Place 1 suppository (100 mg total) vaginally every evening. (Patient not taking: Reported on 10/16/2023)    ondansetron (ZOFRAN) 4 MG tablet Take 4 mg by mouth every 6 (six) hours as needed for Nausea.    ondansetron  (ZOFRAN-ODT) 4 MG TbDL Dissolve 1 tablet (4 mg total) by mouth every 6 (six) hours as needed.    oxyCODONE (ROXICODONE) 30 MG Tab Take 1 tablet (30 mg total) by mouth every 4 (four) hours as needed (pain).    progesterone (PROMETRIUM) 100 MG capsule Take 400 mg by mouth every evening.    venlafaxine (EFFEXOR) 100 MG Tab Take 150 mg by mouth Daily.       Review of patient's allergies indicates:   Allergen Reactions    Levofloxacin Nausea And Vomiting and Rash    Morphine Anaphylaxis, Hives, Shortness Of Breath and Hallucinations           Piperacillin-tazobactam Rash    Sulfa (sulfonamide antibiotics) Nausea And Vomiting and Rash    Opioids - morphine analogues     Hydrocodone-acetaminophen Itching       Past Medical History:   Diagnosis Date    Anxiety     Familial polyposis     S/P total colectomy      Past Surgical History:   Procedure Laterality Date    Davinci Assisted Bilateral Ovarian Cystectomy, with extension  SEAN   8/2011    HYSTERECTOMY  8/23/2012    DAVINCI     ILEOSTOMY REVISION N/A 8/7/2023    Procedure: REVISION, ILEOSTOMY;  Surgeon: KRISH Tracy MD;  Location: [facility] OR 2ND FLR;  Service: Colon and Rectal;  Laterality: N/A;    Illeotomy Creation  2009    LYSIS OF ADHESIONS N/A 8/7/2023    Procedure: LYSIS, ADHESIONS;  Surgeon: KRISH Tracy MD;  Location: [facility] OR 2ND FLR;  Service: Colon and Rectal;  Laterality: N/A;    OOPHORECTOMY  8/23/2012    DAvinci removal with DLH     REPAIR OF RECURRENT VENTRAL HERNIA N/A 8/7/2023    Procedure: REPAIR, HERNIA, VENTRAL, RECURRENT, ERAS low;  Surgeon: KRISH Tracy MD;  Location: [facility] OR 2ND FLR;  Service: Colon and Rectal;  Laterality: N/A;  w/ mesh and omental pedical flap    TOTAL COLECTOMY      Iloanal pouch creation     Family History       Problem Relation (Age of Onset)    Colon cancer Maternal Grandmother    Lung cancer Maternal Grandfather          Tobacco Use    Smoking status: Former    Smokeless tobacco: Never   Substance and  Sexual Activity    Alcohol use: Not Currently     Comment: seldom    Drug use: No    Sexual activity: Yes     Partners: Male     Birth control/protection: None     Comment: , lives in [place]     Review of Systems   Constitutional:  Positive for appetite change. Negative for fever.   HENT: Negative.     Respiratory:  Negative for cough, chest tightness and shortness of breath.    Cardiovascular:  Negative for chest pain and palpitations.   Gastrointestinal:  Positive for abdominal pain, nausea and vomiting. Negative for constipation and diarrhea.   Genitourinary:  Negative for dysuria and hematuria.   Musculoskeletal: Negative.    Skin: Negative.    Neurological:  Positive for light-headedness and headaches. Negative for weakness and numbness.   Psychiatric/Behavioral:  Negative for confusion.      Objective:     Vital Signs (Most Recent):  Temp: 99.1 °F (37.3 °C) (11/15/23 1629)  Pulse: 80 (11/15/23 1854)  Resp: 18 (11/15/23 1852)  BP: 122/70 (11/15/23 1854)  SpO2: 97 % (11/15/23 1857) Vital Signs (24h Range):  Temp:  [99.1 °F (37.3 °C)] 99.1 °F (37.3 °C)  Pulse:  [] 80  Resp:  [18] 18  SpO2:  [97 %-99 %] 97 %  BP: (122-153)/(70-82) 122/70        There is no height or weight on file to calculate BMI.    No intake or output data in the 24 hours ending 11/15/23 2007    Physical Exam  Constitutional:       Appearance: Normal appearance.   HENT:      Head: Normocephalic and atraumatic.   Eyes:      Extraocular Movements: Extraocular movements intact.   Cardiovascular:      Rate and Rhythm: Normal rate and regular rhythm.      Pulses: Normal pulses.   Pulmonary:      Effort: Pulmonary effort is normal. No respiratory distress.   Abdominal:      General: There is no distension.      Palpations: Abdomen is soft.      Tenderness: There is abdominal tenderness.      Comments: Left abdominal drains with brown murky fluid within  Right abdomen ostomy with liquid output  Midline incision well healed    Musculoskeletal:         General: Normal range of motion.      Cervical back: Normal range of motion.   Skin:     General: Skin is warm and dry.   Neurological:      General: No focal deficit present.      Mental Status: She is alert and oriented to person, place, and time.         Significant Labs:  Recent Lab Results         11/15/23  1839   11/15/23  1833   11/15/23  1809        Albumin     4.4       ALP     81       Allens Test   N/A         ALT     15       Anion Gap     13       AST     14       Baso #     0.04       Basophil %     0.7       BILIRUBIN TOTAL     1.0  Comment: For infants and newborns, interpretation of results should be based  on gestational age, weight and in agreement with clinical  observations.    Premature Infant recommended reference ranges:  Up to 24 hours.............<8.0 mg/dL  Up to 48 hours............<12.0 mg/dL  3-5 days..................<15.0 mg/dL  6-29 days.................<15.0 mg/dL         Site   Other         BUN     9       Calcium     10.1       Chloride     102       CO2     24       Creatinine     0.8       Differential Method     Automated       eGFR     >60.0       Eos #     0.0       Eosinophil %     0.7       Glucose     100       Gran # (ANC)     3.7       Gran %     64.2       Hematocrit     44.3       Hemoglobin     14.3       Immature Grans (Abs)     0.01  Comment: Mild elevation in immature granulocytes is non specific and   can be seen in a variety of conditions including stress response,   acute inflammation, trauma and pregnancy. Correlation with other   laboratory and clinical findings is essential.         Immature Granulocytes     0.2       Lipase     16       Lymph #     1.6       Lymph %     27.4       Magnesium      1.8       MCH     27.5       MCHC     32.3       MCV     85       Mono #     0.4       Mono %     6.8       MPV     11.5       nRBC     0       Platelet Count     288       POC BUN 8           POC Chloride 100           POC Creatinine  0.6           POC Glucose 108           POC Hematocrit 46           POC Ionized Calcium 1.15           POC Lactate   1.08         Potassium, Blood Gas 4.0           Sodium, Blood Gas 137           POC TCO2 (MEASURED) 23           Potassium     4.0       PROTEIN TOTAL     8.3       RBC     5.20       RDW     15.9       Sample MARC   VENOUS         Sodium     139       WBC     5.70               Significant Diagnostics:  I have reviewed all pertinent imaging results/findings within the past 24 hours.    Assessment/Plan:     Active Diagnoses:    Diagnosis Date Noted POA    PRINCIPAL PROBLEM:  Abdominal wall fluid collections [R18.8] 10/11/2023 Yes    Abdominal pain [R10.9] 11/15/2023 Unknown    S/P colectomy [Z90.49] 09/13/2012 Not Applicable      Problems Resolved During this Admission:     Ms. Abrams is a 39yo female s/p proctocolectomy with J pouch ultimately developing a fistula from her J pouch.  Has since had the J pouch removed and an ileostomy which has since been revised along with her abdominal wall hernias in August.  Since this time she has developed subcutaneous and retrorectus fluid collections which are now being drained with IR placed drains.  These drains have changed in character to now a murky brown colour and last culture grew pan sensitive staph aureus and enterococcus.  Has been on IV daptomycin for this.  Now presenting with worsening abdominal pain, nausea, emesis, and intolerance of PO.  Possible concern for developing EC fistula given drain appearance however hard to evaluate given no oral contrast on CT scan.  Fluid collections are overall improved in size on most recent CT imaging.    Plan:  - Will admit to CRS  - Obtain cultures of drains for new sensitivities  - Will obtain CT with oral contrast  - Keep NPO for now  - mIV fluids  - PRN pain/nausea meds  - DVT ppx: lovenox  - Will consult ID and continue IV daptomycin for now    Thank you for your consult. I will follow-up with patient.  Please contact us if you have any additional questions.    Gilles Mccormick MD  General Surgery  [facility] - Emergency Dept

## 2023-11-16 NOTE — ASSESSMENT & PLAN NOTE
Ms. Abrams is a 37 y/o female with hx of FAP (familial adenomatous polyposis) s/p total proctocolectomy with J pouch and ileostomy 7/2009. This was complicated with post op fistula s/p small bowl resection 6/2010. Hx of ventral hernia repair and hysterectomy 2012 c/b mesh infection requiring excision 2016. She ultimately underwent ventral hernia repair with mesh placement on 8/7/2023. This was complicated with intra-abdominal abscesses s/p IR drainage. Most recent IR drainage was on 10/5/23. Cultures +MSSA and E.faecalis. She is followed by ID and has been on IV daptomycin for 4 weeks or until resolution of abscesses.     She presents to ED for nausea,fatigue, abdominal pain and headaches. Drains continue to have murky dark output. No issues with her ostomy. ID consulted as pt remains on daptomycin for OPAT. Pt remained afebrile, hemodynamically stable, no leukocytosis. Colorectal surgery following.       CT A/P with contrast 11/15 fluid collection is slightly smaller measuring 7.6 x 1.8 cm (previously 7.7 x 2.9 cm).  No new or enlarging fluid collection.       Recommendations  Continue daptomycin. Weekly cpk while on daptomycin.   CRS following, CT A/P today. Consider drain catheter exchange if able and for repeat cultures from output to further guide abx therapy   Blood cultures on admission 1/4 +GPRs. May likely be contaminants. Repeat blood cultures x2 today.   ID will follow

## 2023-11-16 NOTE — ED NOTES
I-STAT Chem-8+ Results:   Value Reference Range   Sodium 137 136-145 mmol/L   Potassium  4.0 3.5-5.1 mmol/L   Chloride 100  mmol/L   Ionized Calcium 1.15 1.06-1.42 mmol/L   CO2 (measured) 23 23-29 mmol/L   Glucose 108  mg/dL   BUN 8 6-30 mg/dL   Creatinine 0.6 0.5-1.4 mg/dL   Hematocrit 46 36-54%

## 2023-11-17 PROBLEM — R11.2 INTRACTABLE NAUSEA AND VOMITING: Status: ACTIVE | Noted: 2023-11-17

## 2023-11-17 LAB
ANION GAP SERPL CALC-SCNC: 11 MMOL/L (ref 8–16)
BASOPHILS # BLD AUTO: 0.05 K/UL (ref 0–0.2)
BASOPHILS NFR BLD: 0.8 % (ref 0–1.9)
BUN SERPL-MCNC: 8 MG/DL (ref 6–20)
CALCIUM SERPL-MCNC: 9.5 MG/DL (ref 8.7–10.5)
CHLORIDE SERPL-SCNC: 103 MMOL/L (ref 95–110)
CO2 SERPL-SCNC: 24 MMOL/L (ref 23–29)
CREAT SERPL-MCNC: 0.8 MG/DL (ref 0.5–1.4)
CRP SERPL-MCNC: 4.6 MG/L (ref 0–8.2)
DIFFERENTIAL METHOD: ABNORMAL
EOSINOPHIL # BLD AUTO: 0.2 K/UL (ref 0–0.5)
EOSINOPHIL NFR BLD: 3.2 % (ref 0–8)
ERYTHROCYTE [DISTWIDTH] IN BLOOD BY AUTOMATED COUNT: 15.5 % (ref 11.5–14.5)
EST. GFR  (NO RACE VARIABLE): >60 ML/MIN/1.73 M^2
GLUCOSE SERPL-MCNC: 118 MG/DL (ref 70–110)
HCT VFR BLD AUTO: 40.9 % (ref 37–48.5)
HGB BLD-MCNC: 13 G/DL (ref 12–16)
IMM GRANULOCYTES # BLD AUTO: 0.01 K/UL (ref 0–0.04)
IMM GRANULOCYTES NFR BLD AUTO: 0.2 % (ref 0–0.5)
LYMPHOCYTES # BLD AUTO: 2 K/UL (ref 1–4.8)
LYMPHOCYTES NFR BLD: 34 % (ref 18–48)
MAGNESIUM SERPL-MCNC: 1.7 MG/DL (ref 1.6–2.6)
MCH RBC QN AUTO: 27.4 PG (ref 27–31)
MCHC RBC AUTO-ENTMCNC: 31.8 G/DL (ref 32–36)
MCV RBC AUTO: 86 FL (ref 82–98)
MONOCYTES # BLD AUTO: 0.5 K/UL (ref 0.3–1)
MONOCYTES NFR BLD: 9.1 % (ref 4–15)
NEUTROPHILS # BLD AUTO: 3.1 K/UL (ref 1.8–7.7)
NEUTROPHILS NFR BLD: 52.7 % (ref 38–73)
NRBC BLD-RTO: 0 /100 WBC
PHOSPHATE SERPL-MCNC: 4.4 MG/DL (ref 2.7–4.5)
PLATELET # BLD AUTO: 248 K/UL (ref 150–450)
PMV BLD AUTO: 10.5 FL (ref 9.2–12.9)
POTASSIUM SERPL-SCNC: 4.1 MMOL/L (ref 3.5–5.1)
RBC # BLD AUTO: 4.74 M/UL (ref 4–5.4)
SODIUM SERPL-SCNC: 138 MMOL/L (ref 136–145)
WBC # BLD AUTO: 5.92 K/UL (ref 3.9–12.7)

## 2023-11-17 PROCEDURE — 99233 PR SUBSEQUENT HOSPITAL CARE,LEVL III: ICD-10-PCS | Mod: ,,, | Performed by: PHYSICIAN ASSISTANT

## 2023-11-17 PROCEDURE — 84100 ASSAY OF PHOSPHORUS: CPT | Performed by: STUDENT IN AN ORGANIZED HEALTH CARE EDUCATION/TRAINING PROGRAM

## 2023-11-17 PROCEDURE — 63600175 PHARM REV CODE 636 W HCPCS: Performed by: STUDENT IN AN ORGANIZED HEALTH CARE EDUCATION/TRAINING PROGRAM

## 2023-11-17 PROCEDURE — 85025 COMPLETE CBC W/AUTO DIFF WBC: CPT | Performed by: STUDENT IN AN ORGANIZED HEALTH CARE EDUCATION/TRAINING PROGRAM

## 2023-11-17 PROCEDURE — 63600175 PHARM REV CODE 636 W HCPCS: Performed by: PHYSICIAN ASSISTANT

## 2023-11-17 PROCEDURE — 80048 BASIC METABOLIC PNL TOTAL CA: CPT | Performed by: STUDENT IN AN ORGANIZED HEALTH CARE EDUCATION/TRAINING PROGRAM

## 2023-11-17 PROCEDURE — 25000003 PHARM REV CODE 250: Performed by: STUDENT IN AN ORGANIZED HEALTH CARE EDUCATION/TRAINING PROGRAM

## 2023-11-17 PROCEDURE — 20600001 HC STEP DOWN PRIVATE ROOM

## 2023-11-17 PROCEDURE — 63600175 PHARM REV CODE 636 W HCPCS: Performed by: SURGERY

## 2023-11-17 PROCEDURE — 86140 C-REACTIVE PROTEIN: CPT | Performed by: SURGERY

## 2023-11-17 PROCEDURE — 25000003 PHARM REV CODE 250: Performed by: PHYSICIAN ASSISTANT

## 2023-11-17 PROCEDURE — 83735 ASSAY OF MAGNESIUM: CPT | Performed by: STUDENT IN AN ORGANIZED HEALTH CARE EDUCATION/TRAINING PROGRAM

## 2023-11-17 PROCEDURE — 99233 SBSQ HOSP IP/OBS HIGH 50: CPT | Mod: ,,, | Performed by: PHYSICIAN ASSISTANT

## 2023-11-17 PROCEDURE — 99223 PR INITIAL HOSPITAL CARE,LEVL III: ICD-10-PCS | Mod: ,,, | Performed by: INTERNAL MEDICINE

## 2023-11-17 PROCEDURE — 99223 1ST HOSP IP/OBS HIGH 75: CPT | Mod: ,,, | Performed by: INTERNAL MEDICINE

## 2023-11-17 PROCEDURE — 25000003 PHARM REV CODE 250

## 2023-11-17 RX ORDER — SCOLOPAMINE TRANSDERMAL SYSTEM 1 MG/1
1 PATCH, EXTENDED RELEASE TRANSDERMAL
Status: DISCONTINUED | OUTPATIENT
Start: 2023-11-17 | End: 2023-11-21 | Stop reason: HOSPADM

## 2023-11-17 RX ORDER — DRONABINOL 2.5 MG/1
2.5 CAPSULE ORAL 2 TIMES DAILY
Status: DISCONTINUED | OUTPATIENT
Start: 2023-11-17 | End: 2023-11-21 | Stop reason: HOSPADM

## 2023-11-17 RX ORDER — PANTOPRAZOLE SODIUM 40 MG/1
40 TABLET, DELAYED RELEASE ORAL
Status: DISCONTINUED | OUTPATIENT
Start: 2023-11-17 | End: 2023-11-21 | Stop reason: HOSPADM

## 2023-11-17 RX ORDER — HYDROXYZINE PAMOATE 25 MG/1
25 CAPSULE ORAL EVERY 8 HOURS PRN
Status: DISCONTINUED | OUTPATIENT
Start: 2023-11-17 | End: 2023-11-21 | Stop reason: HOSPADM

## 2023-11-17 RX ADMIN — POTASSIUM CHLORIDE, DEXTROSE MONOHYDRATE AND SODIUM CHLORIDE: 150; 5; 450 INJECTION, SOLUTION INTRAVENOUS at 05:11

## 2023-11-17 RX ADMIN — HYDROMORPHONE HYDROCHLORIDE 1 MG: 1 INJECTION, SOLUTION INTRAMUSCULAR; INTRAVENOUS; SUBCUTANEOUS at 07:11

## 2023-11-17 RX ADMIN — ONDANSETRON 4 MG: 2 INJECTION INTRAMUSCULAR; INTRAVENOUS at 01:11

## 2023-11-17 RX ADMIN — ACETAMINOPHEN 650 MG: 325 TABLET ORAL at 01:11

## 2023-11-17 RX ADMIN — DAPTOMYCIN 690 MG: 350 INJECTION, POWDER, LYOPHILIZED, FOR SOLUTION INTRAVENOUS at 12:11

## 2023-11-17 RX ADMIN — HYDROMORPHONE HYDROCHLORIDE 1 MG: 1 INJECTION, SOLUTION INTRAMUSCULAR; INTRAVENOUS; SUBCUTANEOUS at 01:11

## 2023-11-17 RX ADMIN — PROCHLORPERAZINE EDISYLATE 5 MG: 5 INJECTION INTRAMUSCULAR; INTRAVENOUS at 04:11

## 2023-11-17 RX ADMIN — CEFEPIME 2 G: 2 INJECTION, POWDER, FOR SOLUTION INTRAVENOUS at 09:11

## 2023-11-17 RX ADMIN — OXYCODONE HYDROCHLORIDE 30 MG: 10 TABLET ORAL at 10:11

## 2023-11-17 RX ADMIN — ONDANSETRON 4 MG: 2 INJECTION INTRAMUSCULAR; INTRAVENOUS at 07:11

## 2023-11-17 RX ADMIN — HYDROMORPHONE HYDROCHLORIDE 1 MG: 1 INJECTION, SOLUTION INTRAMUSCULAR; INTRAVENOUS; SUBCUTANEOUS at 03:11

## 2023-11-17 RX ADMIN — HYDROXYZINE PAMOATE 25 MG: 25 CAPSULE ORAL at 09:11

## 2023-11-17 RX ADMIN — HYDROMORPHONE HYDROCHLORIDE 1 MG: 1 INJECTION, SOLUTION INTRAMUSCULAR; INTRAVENOUS; SUBCUTANEOUS at 10:11

## 2023-11-17 RX ADMIN — HYDROMORPHONE HYDROCHLORIDE 1 MG: 1 INJECTION, SOLUTION INTRAMUSCULAR; INTRAVENOUS; SUBCUTANEOUS at 09:11

## 2023-11-17 RX ADMIN — OXYCODONE HYDROCHLORIDE 30 MG: 10 TABLET ORAL at 01:11

## 2023-11-17 RX ADMIN — HYDROMORPHONE HYDROCHLORIDE 1 MG: 1 INJECTION, SOLUTION INTRAMUSCULAR; INTRAVENOUS; SUBCUTANEOUS at 04:11

## 2023-11-17 RX ADMIN — CEFEPIME 2 G: 2 INJECTION, POWDER, FOR SOLUTION INTRAVENOUS at 12:11

## 2023-11-17 NOTE — ASSESSMENT & PLAN NOTE
37 yo F w/ FAP and complicated abdominal surgical history including total proctocolectomy w/ J pound and loop ileostomy in 7/2009 c/b post operative fistula s/p small bowel resection in 6/2019, ventral hernia repair and hysterectomy in 2012 c/b mesh infection s/p mesh excision in 2016, ventral hernia repair with mesh placement in 8/2023 c/b intra-abdominal abscesses s/p IR guided drainage and percutaneous drain placement (most recent on 10/5/2023) on IV daptomycin presenting w/ abdominal pain associated w/ N/V. Remains HDS and without overt lab abnormalities. CT A/P appears stable w/o overt signs of obstruction. GI consulted for endoscopic evaluation of symptoms.     Recommendations:  Although upper GI series suggestive of GERD and esophageal dysmotility, patient would require endoscopic evaluation for more definitive diagnosis. Will tentatively plan for 11/20. Please keep NPO at midnight on 11/19. Start PO PPI BID.

## 2023-11-17 NOTE — ED NOTES
Assumed pt care at this time. Pt is currently in XRAY. Family at bedside waiting for pt to return. Family states no needs at this time.

## 2023-11-17 NOTE — CONSULTS
[facility] - Emergency Dept  Gastroenterology  Consult Note    Patient Name: Georgette Abrams  MRN: [id number]  Admission Date: 11/15/2023  Hospital Length of Stay: 2 days  Code Status: Full Code   Attending Provider: KRISH Tracy MD   Consulting Provider: Mira Bates MD  Primary Care Physician: Coretta, Primary Doctor  Principal Problem:Abdominal wall fluid collections    Inpatient consult to Gastroenterology  Consult performed by: Mira Bates MD  Consult ordered by: Robert Cruz MD        Subjective:     HPI:  Ms. Abrams is a 38 year old female w/ familial adenomatous polyposis s/p total proctocolectomy w/ J pouch and loop ileostomy in 7/2009 c/b post operative fistula s/p small bowel resection in 6/2019, ventral hernia repair and hysterectomy in 2012 c/b mesh infection s/p mesh excision in 2016, ventral hernia repair with mesh placement in 8/2023 c/b intra-abdominal abscesses s/p IR guided drainage and percutaneous drain placement (most recent on 10/5/2023) on IV daptomycin presenting w/ nausea and vomiting. Patient resides in [place] but [place] [facility] She reports decreased PO intake, ongoing nausea/vomiting and abdominal pain since shortly after her most recent surgery in August. Abdominal pain is localized to left lower quadrant and is worse when patient is ambulating. States ostomy is functioning well w/ stable output. HDS since arrival to ED. Labs stable and w/o anemia, leukocytosis, electrolyte abnormalities or renal dysfunction. CT A/P on 11/15 w/ appropriate post operative changes, stable drain placement and stable/resolving fluid collections. Upper GI series from 11/16 showed gastroesophageal reflux to the proximal esophagus w/ mild tertiary contractions and proximal escape of contrast. GI consulted for endoscopic evaluation.     Past Medical History:   Diagnosis Date    Anxiety     Familial polyposis     S/P total colectomy        Past Surgical History:    Procedure Laterality Date    Davinci Assisted Bilateral Ovarian Cystectomy, with extension  SEAN   8/2011    HYSTERECTOMY  8/23/2012    DAVINCI     ILEOSTOMY REVISION N/A 8/7/2023    Procedure: REVISION, ILEOSTOMY;  Surgeon: KRISH Tracy MD;  Location: [facility] OR 2ND FLR;  Service: Colon and Rectal;  Laterality: N/A;    Illeotomy Creation  2009    LYSIS OF ADHESIONS N/A 8/7/2023    Procedure: LYSIS, ADHESIONS;  Surgeon: KRISH Tracy MD;  Location: [facility] OR 2ND FLR;  Service: Colon and Rectal;  Laterality: N/A;    OOPHORECTOMY  8/23/2012    DAvinci removal with DLH     REPAIR OF RECURRENT VENTRAL HERNIA N/A 8/7/2023    Procedure: REPAIR, HERNIA, VENTRAL, RECURRENT, ERAS low;  Surgeon: KRISH Tracy MD;  Location: [facility] OR 2ND FLR;  Service: Colon and Rectal;  Laterality: N/A;  w/ mesh and omental pedical flap    TOTAL COLECTOMY      Iloanal pouch creation       Review of patient's allergies indicates:   Allergen Reactions    Levofloxacin Nausea And Vomiting and Rash    Morphine Anaphylaxis, Hives, Shortness Of Breath and Hallucinations           Piperacillin-tazobactam Rash    Sulfa (sulfonamide antibiotics) Nausea And Vomiting and Rash    Opioids - morphine analogues     Hydrocodone-acetaminophen Itching     Family History       Problem Relation (Age of Onset)    Colon cancer Maternal Grandmother    Lung cancer Maternal Grandfather          Tobacco Use    Smoking status: Former    Smokeless tobacco: Never   Substance and Sexual Activity    Alcohol use: Not Currently     Comment: seldom    Drug use: No    Sexual activity: Yes     Partners: Male     Birth control/protection: None     Comment: , lives in [place]     Review of Systems   Constitutional:  Positive for appetite change and chills. Negative for fever.   Gastrointestinal:  Positive for abdominal pain, nausea and vomiting. Negative for blood in stool, constipation and diarrhea.   Skin:  Negative for color change and pallor.    Neurological:  Negative for dizziness, light-headedness and headaches.     Objective:     Vital Signs (Most Recent):  Temp: 97.9 °F (36.6 °C) (11/17/23 0808)  Pulse: 62 (11/17/23 0808)  Resp: 17 (11/17/23 1017)  BP: 124/68 (11/17/23 0808)  SpO2: 99 % (11/17/23 0808) Vital Signs (24h Range):  Temp:  [97.8 °F (36.6 °C)-98.5 °F (36.9 °C)] 97.9 °F (36.6 °C)  Pulse:  [62-84] 62  Resp:  [16-18] 17  SpO2:  [95 %-99 %] 99 %  BP: (113-159)/(59-85) 124/68     Weight: 115.1 kg (253 lb 12 oz) (11/15/23 2308)  Body mass index is 39.74 kg/m².    No intake or output data in the 24 hours ending 11/17/23 1043    Lines/Drains/Airways       Peripherally Inserted Central Catheter Line  Duration             PICC Double Lumen 10/09/23 1120 right basilic 39 days              Drain  Duration                  Closed/Suction Drain 08/07/23 1709 Left Abdomen Bulb 19 Fr. 101 days         Ileostomy 08/07/23 1708 Standard (Marian, end)  days         Closed/Suction Drain 10/05/23 1203 Superior RUQ Bulb 14 Fr. 42 days         Closed/Suction Drain 10/05/23 1204 Inferior RUQ Bulb 14 Fr. 42 days              Peripheral Intravenous Line  Duration                  Peripheral IV - Single Lumen 11/15/23 1818 18 G Left;Posterior Forearm 1 day                     Physical Exam  Constitutional:       Appearance: Normal appearance. She is obese.   HENT:      Head: Normocephalic and atraumatic.      Mouth/Throat:      Mouth: Mucous membranes are moist.      Pharynx: Oropharynx is clear.   Pulmonary:      Effort: Pulmonary effort is normal. No respiratory distress.   Abdominal:      General: Abdomen is flat.      Palpations: Abdomen is soft.      Tenderness: There is abdominal tenderness (LLQ).      Comments: Percutaneous left sided drain   Colostomy    Skin:     General: Skin is warm and dry.   Neurological:      General: No focal deficit present.      Mental Status: She is alert and oriented to person, place, and time.          Significant  Labs:  CBC:   Recent Labs   Lab 11/15/23  1809 11/15/23  1839 11/16/23  0315 11/17/23  0336   WBC 5.70  --  5.01 5.92   HGB 14.3  --  12.1 13.0   HCT 44.3 46 37.9 40.9     --  236 248     CMP:   Recent Labs   Lab 11/15/23  1809 11/16/23  0315 11/17/23  0336      < > 118*   CALCIUM 10.1   < > 9.5   ALBUMIN 4.4  --   --    PROT 8.3  --   --       < > 138   K 4.0   < > 4.1   CO2 24   < > 24      < > 103   BUN 9   < > 8   CREATININE 0.8   < > 0.8   ALKPHOS 81  --   --    ALT 15  --   --    AST 14  --   --    BILITOT 1.0  --   --     < > = values in this interval not displayed.       Significant Imaging:  Imaging results within the past 24 hours have been reviewed.    Assessment/Plan:     GI  Intractable nausea and vomiting  FAP  39 yo F w/ FAP and complicated abdominal surgical history including total proctocolectomy w/ J pound and loop ileostomy in 7/2009 c/b post operative fistula s/p small bowel resection in 6/2019, ventral hernia repair and hysterectomy in 2012 c/b mesh infection s/p mesh excision in 2016, ventral hernia repair with mesh placement in 8/2023 c/b intra-abdominal abscesses s/p IR guided drainage and percutaneous drain placement (most recent on 10/5/2023) on IV daptomycin presenting w/ abdominal pain associated w/ N/V. Remains HDS and without overt lab abnormalities. CT A/P appears stable w/o overt signs of obstruction. GI consulted for endoscopic evaluation of symptoms.     Recommendations:  Although upper GI series suggestive of GERD and esophageal dysmotility, patient would require endoscopic evaluation for more definitive diagnosis. Will tentatively plan for 11/20. Please keep NPO at midnight on 11/19. Start PO PPI BID.       Thank you for your consult. I will follow-up with patient. Please contact us if you have any additional questions.    Mira Bates MD  Gastroenterology  [facility] - Emergency Dept

## 2023-11-17 NOTE — ED NOTES
The patient is awake, alert and cooperative with a calm affect, patient is aware of environment. Airway is open and patent, respirations are spontaneous, normal respiratory effort and rate noted, skin warm and dry, moves all extremities well, appearance: no apparent distress noted. Pt states no needs at this time. Family at bedside. Bed locked and in lowest position with side rails up, call light within reach.

## 2023-11-17 NOTE — SUBJECTIVE & OBJECTIVE
Interval History:   Nausea and headache slightly improved  EGD today  Cultures from drain +GNR  Started cefepime today. Has tolerated cefriaxone and keflex in the past.   No fevers      Review of Systems   Constitutional:  Positive for activity change, appetite change and fatigue. Negative for chills, diaphoresis and fever.   Respiratory:  Negative for cough and shortness of breath.    Gastrointestinal:  Positive for abdominal distention, abdominal pain, nausea and vomiting. Negative for diarrhea.   Genitourinary:  Negative for dysuria.   Musculoskeletal:  Negative for back pain.   Skin:  Positive for wound. Negative for color change, pallor and rash.   Neurological:  Positive for dizziness, light-headedness and headaches.   All other systems reviewed and are negative.    Objective:     Vital Signs (Most Recent):  Temp: 98.3 °F (36.8 °C) (11/17/23 1143)  Pulse: 67 (11/17/23 1143)  Resp: 19 (11/17/23 1308)  BP: 132/72 (11/17/23 1143)  SpO2: 98 % (11/17/23 1143) Vital Signs (24h Range):  Temp:  [97.9 °F (36.6 °C)-98.5 °F (36.9 °C)] 98.3 °F (36.8 °C)  Pulse:  [62-84] 67  Resp:  [16-19] 19  SpO2:  [95 %-99 %] 98 %  BP: (113-159)/(59-85) 132/72     Weight: 115.1 kg (253 lb 12 oz)  Body mass index is 39.74 kg/m².    Estimated Creatinine Clearance: 122.5 mL/min (based on SCr of 0.8 mg/dL).     Physical Exam  Vitals and nursing note reviewed.   Constitutional:       General: She is not in acute distress.     Appearance: She is well-developed. She is not diaphoretic.   HENT:      Head: Normocephalic and atraumatic.   Eyes:      Pupils: Pupils are equal, round, and reactive to light.   Cardiovascular:      Rate and Rhythm: Normal rate and regular rhythm.      Heart sounds: Normal heart sounds. No murmur heard.     No friction rub. No gallop.   Pulmonary:      Effort: Pulmonary effort is normal. No respiratory distress.      Breath sounds: Normal breath sounds. No wheezing or rales.   Chest:      Chest wall: No tenderness.    Abdominal:      General: Bowel sounds are normal. There is no distension.      Palpations: Abdomen is soft. There is no mass.      Tenderness: There is no abdominal tenderness. There is no guarding or rebound.      Hernia: No hernia is present.      Comments: One drain in place. Erythema around drain cath   Musculoskeletal:         General: No tenderness or deformity. Normal range of motion.      Cervical back: Normal range of motion and neck supple.   Skin:     General: Skin is warm and dry.      Coloration: Skin is not pale.      Findings: No erythema.   Neurological:      Mental Status: She is alert and oriented to person, place, and time.      Cranial Nerves: No cranial nerve deficit.      Coordination: Coordination normal.   Psychiatric:         Behavior: Behavior normal.         Thought Content: Thought content normal.          Significant Labs: All pertinent labs within the past 24 hours have been reviewed.    Significant Imaging: I have reviewed all pertinent imaging results/findings within the past 24 hours.

## 2023-11-17 NOTE — ED NOTES
Care assumed from ANDRE Mercado    APPEARANCE: awake, alert, and appears to be in moderate discomfort. Pain score 8/10. Bed in lowest and locked position w/ supportive family members at bedside.   SKIN: warm, dry. +well-healed past surgical incision noted to midline of abdomen. +erythema, crusting, and serous drainage from CONSTANZA drain site. +erythema and crusting @ LLQ of CONSTANZA drain removal  MUSCULOSKELETAL: Patient moving all extremities spontaneously, no obvious swelling or deformities noted. Ambulates independently.  RESPIRATORY: Airway open and patent. Denies shortness of breath. Respirations even, unlabored, equal bilaterally on inspiration and expiration.   CARDIAC: Regular HR. Denies CP, 2+ DP pulses bilaterally; no peripheral edema  ABDOMEN: S/ND. Generalized abdominal pain. +nausea w/o vomiting. +ileostomy - denies changes in output. +decrease in appetite   : Pt voids spontaneously, denies dysuria, hematuria, urinary frequency  NEUROLOGIC: AAO x 4; speaking and following commands appropriately. Equal strength in all extremities; denies numbness/tingling.

## 2023-11-17 NOTE — PROGRESS NOTES
[facility] - Emergency Dept  Infectious Disease  Progress Note    Patient Name: Georgette Abrams  MRN: [id number]  Admission Date: 11/15/2023  Length of Stay: 2 days  Attending Physician: KRISH Tracy MD  Primary Care Provider: No, Primary Doctor    Isolation Status: No active isolations  Assessment/Plan:      GI  * Abdominal wall fluid collections  Ms. Abrams is a 37 y/o female with hx of FAP (familial adenomatous polyposis) s/p total proctocolectomy with J pouch and ileostomy 7/2009. This was complicated with post op fistula s/p small bowl resection 6/2010. Hx of ventral hernia repair and hysterectomy 2012 c/b mesh infection requiring excision 2016. She ultimately underwent ventral hernia repair with mesh placement on 8/7/2023. This was complicated with intra-abdominal abscesses s/p IR drainage. Most recent IR drainage was on 10/5/23. Cultures +MSSA and E.faecalis. She is followed by ID and has been on IV daptomycin for 4 weeks or until resolution of abscesses.     She presents to ED for nausea,fatigue, abdominal pain and headaches. Drains continue to have murky dark output. No issues with her ostomy. ID consulted as pt remains on daptomycin for OPAT. Pt remained afebrile, hemodynamically stable, no leukocytosis. Colorectal surgery following.       CT A/P with contrast 11/15 fluid collection is slightly smaller measuring 7.6 x 1.8 cm (previously 7.7 x 2.9 cm).  No new or enlarging fluid collection.       Recommendations  Continue daptomycin. Weekly cpk while on daptomycin.   CRS following, repeat cultures from drain +GNR. Started cefepime today.   GI following for EGD today   Blood cultures on admission 1/4 +GPRs. May likely be contaminants. Repeat blood cultures NGTD.   ID will follow with you Monday           Thank you for your consult. I will follow-up with patient. Please contact us if you have any additional questions.    Braulio Arreola PA-C  Infectious Disease  [facility] - Emergency  Dept    Subjective:     Principal Problem:Abdominal wall fluid collections    HPI: Ms. Abrams is a 37 y/o female with hx of FAP (familial adenomatous polyposis) s/p total proctocolectomy with J pouch and ileostomy 7/2009. This was complicated with post op fistula s/p small bowl resection 6/2010. Hx of ventral hernia repair and hysterectomy 2012 c/b mesh infection requiring excision 2016. She ultimately underwnet ventral hernia repair with mesh placement on 8/7/2023. This was complicated with intra-abdominal abscesses s/p IR drainage. Most recent IR drainage was on 10/5/23. Cultures +MSSA and E.faecalis. she is followed by ID and has been on IV daptomycin for 4 weeks or until resolution of abscesses. She presents to ED for nausea,fatigue, abdominal pain and headaches. Drains continue to have murky dark output. No issues with her ostomy. ID consulted as pt remains on daptomycin for OPAT. Pt remained afebrile, hemodynamically stable, no leukocytosis. Colorectal surgery following.       CT A/P with contrast 11/15 Postop change of the anterior abdominal wall including right lower quadrant ileostomy.  Mild stranding within the right lower quadrant abdominal wall adjacent to the ileostomy site, similar to prior.  Pigtail catheter coursing along the anterior left abdominal wall remains in place in the area of the previously demonstrated subcutaneous fluid and air collection with minimal residual fluid in this region.  Additional pigtail catheter remains in place within fluid collection deep to the rectus abdominus muscle along the midline of the anterior abdominal wall.  This fluid collection is slightly smaller measuring 7.6 x 1.8 cm (previously 7.7 x 2.9 cm).  No new or enlarging fluid collection.   Interval History:   Nausea and headache slightly improved  EGD today  Cultures from drain +GNR  Started cefepime today. Has tolerated cefriaxone and keflex in the past.   No fevers      Review of Systems   Constitutional:   Positive for activity change, appetite change and fatigue. Negative for chills, diaphoresis and fever.   Respiratory:  Negative for cough and shortness of breath.    Gastrointestinal:  Positive for abdominal distention, abdominal pain, nausea and vomiting. Negative for diarrhea.   Genitourinary:  Negative for dysuria.   Musculoskeletal:  Negative for back pain.   Skin:  Positive for wound. Negative for color change, pallor and rash.   Neurological:  Positive for dizziness, light-headedness and headaches.   All other systems reviewed and are negative.    Objective:     Vital Signs (Most Recent):  Temp: 98.3 °F (36.8 °C) (11/17/23 1143)  Pulse: 67 (11/17/23 1143)  Resp: 19 (11/17/23 1308)  BP: 132/72 (11/17/23 1143)  SpO2: 98 % (11/17/23 1143) Vital Signs (24h Range):  Temp:  [97.9 °F (36.6 °C)-98.5 °F (36.9 °C)] 98.3 °F (36.8 °C)  Pulse:  [62-84] 67  Resp:  [16-19] 19  SpO2:  [95 %-99 %] 98 %  BP: (113-159)/(59-85) 132/72     Weight: 115.1 kg (253 lb 12 oz)  Body mass index is 39.74 kg/m².    Estimated Creatinine Clearance: 122.5 mL/min (based on SCr of 0.8 mg/dL).     Physical Exam  Vitals and nursing note reviewed.   Constitutional:       General: She is not in acute distress.     Appearance: She is well-developed. She is not diaphoretic.   HENT:      Head: Normocephalic and atraumatic.   Eyes:      Pupils: Pupils are equal, round, and reactive to light.   Cardiovascular:      Rate and Rhythm: Normal rate and regular rhythm.      Heart sounds: Normal heart sounds. No murmur heard.     No friction rub. No gallop.   Pulmonary:      Effort: Pulmonary effort is normal. No respiratory distress.      Breath sounds: Normal breath sounds. No wheezing or rales.   Chest:      Chest wall: No tenderness.   Abdominal:      General: Bowel sounds are normal. There is no distension.      Palpations: Abdomen is soft. There is no mass.      Tenderness: There is no abdominal tenderness. There is no guarding or rebound.      Hernia:  No hernia is present.      Comments: One drain in place. Erythema around drain cath   Musculoskeletal:         General: No tenderness or deformity. Normal range of motion.      Cervical back: Normal range of motion and neck supple.   Skin:     General: Skin is warm and dry.      Coloration: Skin is not pale.      Findings: No erythema.   Neurological:      Mental Status: She is alert and oriented to person, place, and time.      Cranial Nerves: No cranial nerve deficit.      Coordination: Coordination normal.   Psychiatric:         Behavior: Behavior normal.         Thought Content: Thought content normal.          Significant Labs: All pertinent labs within the past 24 hours have been reviewed.    Significant Imaging: I have reviewed all pertinent imaging results/findings within the past 24 hours.

## 2023-11-17 NOTE — PROGRESS NOTES
Patient Name: Georgette Abrams  Date: 11/17/2023  Service: Colon and Rectal Surgery    38F Hx FAP s/p TPC w J pouch, J bowel takedown for pouch failure, VHR with mesh c/b mesh infection, and ultimately VHR with bilateral component separation (Phasix), SBR, ileostomy re-siting 8/7/23. Course was complicated by multiple abdominal wall fluid collections requiring drain placement and long term Daptomycin as well as ongoing chronic postoperative pain. Admitted with ongoing pain, nausea, and headaches.     Subjective:  Pain stable on current regimen, having stoma output, nausea slightly improved but still minimal intake. Drains minimal output. Headache improving.  SubQ drain removed yesterday.    Medications:    Current Facility-Administered Medications:     acetaminophen tablet 650 mg, 650 mg, Oral, Q6H, Gilles Mccormick MD, 650 mg at 11/17/23 1300    albuterol-ipratropium 2.5 mg-0.5 mg/3 mL nebulizer solution 3 mL, 3 mL, Nebulization, Q6H PRN, Gilles Mccormick MD    ceFEPIme (MAXIPIME) 2 g in dextrose 5 % in water (D5W) 100 mL IVPB (MB+), 2 g, Intravenous, Q8H, Braulio Arreola PA-C, Stopped at 11/17/23 1328    [START ON 11/18/2023] DAPTOmycin (CUBICIN) 920 mg in sodium chloride 0.9% SolP 50 mL IVPB, 8 mg/kg, Intravenous, Q24H, Braulio Arreola PA-C    dextrose 5 % and 0.45 % NaCl with KCl 20 mEq infusion, , Intravenous, Continuous, Robert Cruz MD, Last Rate: 40 mL/hr at 11/16/23 1554, 40 mL/hr at 11/16/23 1554    enoxaparin injection 40 mg, 40 mg, Subcutaneous, Daily, Gilles Mccormick MD, 40 mg at 11/16/23 1809    hydrALAZINE injection 10 mg, 10 mg, Intravenous, Q6H PRN, Gilles Mccormick MD    HYDROmorphone injection 1 mg, 1 mg, Intravenous, Q2H PRN, Robert Cruz MD, 1 mg at 11/17/23 1308    iohexoL (OMNIPAQUE 350) injection 15 mL, 15 mL, Oral, PRN, Berenice Vazquez MD    LIDOcaine (PF) 10 mg/ml (1%) injection 10 mg, 1 mL, Intradermal, Once PRN, Gilles Mccormick MD    melatonin tablet 6 mg, 6 mg, Oral,  Nightly PRN, Gilles Mccormick MD    ondansetron injection 4 mg, 4 mg, Intravenous, Q6H PRN, Gilles Mccormick MD, 4 mg at 11/17/23 1307    oxyCODONE immediate release tablet Tab 30 mg, 30 mg, Oral, Q4H PRN, Gilles Mccormick MD, 30 mg at 11/17/23 0106    pantoprazole EC tablet 40 mg, 40 mg, Oral, BID AC, Mira Bates MD    prochlorperazine injection Soln 5 mg, 5 mg, Intravenous, Q6H PRN, Gilles Mccormick MD    scopolamine 1.3-1.5 mg (1 mg over 3 days) 1 patch, 1 patch, Transdermal, Q3 Days, Gilles Mccormick MD    sodium chloride 0.9% flush 3 mL, 3 mL, Intravenous, PRN, Gilles Mccormick MD    Current Outpatient Medications:     clonazepam (KLONOPIN) 1 MG tablet, Take 1 mg by mouth 2 (two) times daily as needed.  , Disp: , Rfl:     cyanocobalamin 1,000 mcg/mL injection, 1,000 mcg twice a week., Disp: , Rfl:     fluconazole (DIFLUCAN) 150 MG Tab, Take 1 tablet (150 mg total) by mouth once daily., Disp: 3 tablet, Rfl: 3    ibuprofen (ADVIL,MOTRIN) 800 MG tablet, Take 1 tablet (800 mg total) by mouth every 8 (eight) hours. (Patient not taking: Reported on 11/3/2023), Disp: 30 tablet, Rfl: 3    LIDOcaine (LIDODERM) 5 %, Place 1 patch onto the skin once daily. Remove & Discard patch within 12 hours or as directed by MD (Patient not taking: Reported on 10/16/2023), Disp: 30 patch, Rfl: 3    losartan (COZAAR) 100 MG tablet, Take 100 mg by mouth., Disp: , Rfl:     miconazole (MICONAZOLE-7) 2 % vaginal cream, Place 1 applicator vaginally every evening., Disp: 1 kit, Rfl: 3    miconazole (MICOTIN) 100 mg vaginal suppository, Place 1 suppository (100 mg total) vaginally every evening. (Patient not taking: Reported on 10/16/2023), Disp: 7 suppository, Rfl: 0    ondansetron (ZOFRAN) 4 MG tablet, Take 4 mg by mouth every 6 (six) hours as needed for Nausea., Disp: , Rfl:     ondansetron (ZOFRAN-ODT) 4 MG TbDL, Dissolve 1 tablet (4 mg total) by mouth every 6 (six) hours as needed., Disp: 40 tablet, Rfl: 3    oxyCODONE  (ROXICODONE) 30 MG Tab, Take 1 tablet (30 mg total) by mouth every 4 (four) hours as needed (pain)., Disp: 25 tablet, Rfl: 0    progesterone (PROMETRIUM) 100 MG capsule, Take 400 mg by mouth every evening., Disp: , Rfl:     venlafaxine (EFFEXOR) 100 MG Tab, Take 150 mg by mouth Daily., Disp: , Rfl:     Facility-Administered Medications Ordered in Other Encounters:     LIDOcaine (PF) 10 mg/ml (1%) injection 10 mg, 1 mL, Intradermal, On Call Procedure, Rhina Saldana NP    Vital Signs:  Vitals:    11/17/23 1308   BP:    Pulse:    Resp: 19   Temp:        In/Out:  Intake/Output - Last 3 Shifts         11/15 0700 11/16 0659 11/16 0700 11/17 0659 11/17 0700 11/18 0659    IV Piggyback   100    Total Intake(mL/kg)   100 (0.9)    Urine (mL/kg/hr) 0      Drains 25  3    Total Output 25  3    Net -25  +97           Urine Occurrence 1 x              Physical Exam:  General: Alert, oriented, in no apparent distress  HEENT: Sclera anicteric, trachea midline  Lungs: Normal respiratory rate and effort on room air  Abdomen: Soft, mild TTP throughout, similar to previous exams. Ileostomy viable with stool in the bag. Drain with murky output similar to previous exam. Minimal output in bulb.   Extremities: Warm, well perfused, no edema  Neuro: Grossly intact, moves all extremities  Psych: Appropriate affect    Laboratory Studies:  Complete Blood Count:  Lab Results   Component Value Date/Time    WBC 5.92 11/17/2023 03:36 AM    HGB 13.0 11/17/2023 03:36 AM    HCT 40.9 11/17/2023 03:36 AM    HCT 46 11/15/2023 06:39 PM    RBC 4.74 11/17/2023 03:36 AM     11/17/2023 03:36 AM       Basic Chemistry Panel:  Lab Results   Component Value Date/Time     11/17/2023 03:36 AM    K 4.1 11/17/2023 03:36 AM     11/17/2023 03:36 AM    CO2 24 11/17/2023 03:36 AM    BUN 8 11/17/2023 03:36 AM    CREATININE 0.8 11/17/2023 03:36 AM    CALCIUM 9.5 11/17/2023 03:36 AM       Lab Results   Component Value Date/Time    CRP 4.6  11/17/2023 03:36 AM       Imaging Studies:  CT abdomen pelvis personally reviewed. No undrained collections, inflammatory changes in the abdominal wall noted and somewhat improved. No dilated bowel.     Assessment:  38F Hx FAP s/p TPC w J pouch, J bowel takedown for pouch failure, VHR with mesh c/b mesh infection, and ultimately VHR with bilateral component separation (Phasix), SBR, ileostomy re-siting 8/7/23. Course was complicated by multiple abdominal wall fluid collections requiring drain placement and long term Daptomycin as well as ongoing chronic postoperative pain. Admitted with ongoing pain, nausea, and headaches.     Plan:  Continue home oxycodone dose, OK for dilaudid if unable to tolerate oral intake  For headaches, improving so will not consult neurology  HDS, no tachycardia  EUNICE  OK for regular diet as tolerated  GI consulted for ongoing nausea, suspect may be related to reflux seen on UGI. GI recommended BID PPI and EGD Monday.   SubQ drain out, sub-fascial drain remains  ID consulted for daptomycin, added cefepime for GNR in drain, speciation pending  BCx x1 with GP rods, ID following, grew bacillus megaterium (likely contaminant), will maintain PICC line. Further BCx pending and NGTD    Robert Cruz MD  Colon and Rectal Surgery Fellow

## 2023-11-17 NOTE — ED NOTES
Nurses Note -- 4 Eyes      11/17/2023   07:50 AM      Skin assessed during: Admit      [] No Altered Skin Integrity Present    []Prevention Measures Documented      [x] Yes- Altered Skin Integrity Present or Discovered   [] LDA Added if Not in Epic (Describe Wound)   [] New Altered Skin Integrity was Present on Admit and Documented in LDA   [x] Wound Image Taken    Wound Care Consulted? No    Attending Nurse:  BHARGAV Rushing     Second RN/Staff Member:  BHARGAV Guallpa

## 2023-11-17 NOTE — HPI
Ms. Abrams is a 38 year old female w/ familial adenomatous polyposis s/p total proctocolectomy w/ J pouch and loop ileostomy in 7/2009 c/b post operative fistula s/p small bowel resection in 6/2019, ventral hernia repair and hysterectomy in 2012 c/b mesh infection s/p mesh excision in 2016, ventral hernia repair with mesh placement in 8/2023 c/b intra-abdominal abscesses s/p IR guided drainage and percutaneous drain placement (most recent on 10/5/2023) on IV daptomycin presenting w/ nausea and vomiting. Patient resides in [place] but is closely followed by CRS here at [facility]. She reports decreased PO intake, ongoing nausea/vomiting and abdominal pain since shortly after her most recent surgery in August. Abdominal pain is localized to left lower quadrant and is worse when patient is ambulating. States ostomy is functioning well w/ stable output. HDS since arrival to ED. Labs stable and w/o anemia, leukocytosis, electrolyte abnormalities or renal dysfunction. CT A/P on 11/15 w/ appropriate post operative changes, stable drain placement and stable/resolving fluid collections. Upper GI series from 11/16 showed gastroesophageal reflux to the proximal esophagus w/ mild tertiary contractions and proximal escape of contrast. GI consulted for endoscopic evaluation.

## 2023-11-17 NOTE — PROGRESS NOTES
Patient arrived to room, walked to bed from stretcher. Fall contract signed, oriented to room, how to call and reach staff. Fall contract signed, pain and nausea medication requested. Vss, 4 eyes performed. Central line dressing change performed, tolerated well

## 2023-11-17 NOTE — ASSESSMENT & PLAN NOTE
Ms. Abrams is a 39 y/o female with hx of FAP (familial adenomatous polyposis) s/p total proctocolectomy with J pouch and ileostomy 7/2009. This was complicated with post op fistula s/p small bowl resection 6/2010. Hx of ventral hernia repair and hysterectomy 2012 c/b mesh infection requiring excision 2016. She ultimately underwent ventral hernia repair with mesh placement on 8/7/2023. This was complicated with intra-abdominal abscesses s/p IR drainage. Most recent IR drainage was on 10/5/23. Cultures +MSSA and E.faecalis. She is followed by ID and has been on IV daptomycin for 4 weeks or until resolution of abscesses.     She presents to ED for nausea,fatigue, abdominal pain and headaches. Drains continue to have murky dark output. No issues with her ostomy. ID consulted as pt remains on daptomycin for OPAT. Pt remained afebrile, hemodynamically stable, no leukocytosis. Colorectal surgery following.       CT A/P with contrast 11/15 fluid collection is slightly smaller measuring 7.6 x 1.8 cm (previously 7.7 x 2.9 cm).  No new or enlarging fluid collection.       Recommendations  Continue daptomycin. Weekly cpk while on daptomycin.   CRS following, repeat cultures from drain +GNR. Started cefepime today.   GI following for EGD today   Blood cultures on admission 1/4 +GPRs. May likely be contaminants. Repeat blood cultures NGTD.   ID will follow with you Monday

## 2023-11-17 NOTE — ED NOTES
Patient given toiletries and towels to take shower, per request. Assisted back in bed by pt's mother. Remains endorsing 8/10 pain score and unable to tolerate PO meds d/t unrelieved nausea. PRN dilaudid administered at this time.

## 2023-11-17 NOTE — SUBJECTIVE & OBJECTIVE
Past Medical History:   Diagnosis Date    Anxiety     Familial polyposis     S/P total colectomy        Past Surgical History:   Procedure Laterality Date    Davinci Assisted Bilateral Ovarian Cystectomy, with extension  SEAN   8/2011    HYSTERECTOMY  8/23/2012    DAVINCI     ILEOSTOMY REVISION N/A 8/7/2023    Procedure: REVISION, ILEOSTOMY;  Surgeon: KRISH Tracy MD;  Location: [facility] OR 2ND FLR;  Service: Colon and Rectal;  Laterality: N/A;    Illeotomy Creation  2009    LYSIS OF ADHESIONS N/A 8/7/2023    Procedure: LYSIS, ADHESIONS;  Surgeon: KRISH Tracy MD;  Location: [facility] OR 2ND FLR;  Service: Colon and Rectal;  Laterality: N/A;    OOPHORECTOMY  8/23/2012    DAvinci removal with DLH     REPAIR OF RECURRENT VENTRAL HERNIA N/A 8/7/2023    Procedure: REPAIR, HERNIA, VENTRAL, RECURRENT, ERAS low;  Surgeon: KRISH Tracy MD;  Location: [facility] OR 2ND FLR;  Service: Colon and Rectal;  Laterality: N/A;  w/ mesh and omental pedical flap    TOTAL COLECTOMY      Iloanal pouch creation       Review of patient's allergies indicates:   Allergen Reactions    Levofloxacin Nausea And Vomiting and Rash    Morphine Anaphylaxis, Hives, Shortness Of Breath and Hallucinations           Piperacillin-tazobactam Rash    Sulfa (sulfonamide antibiotics) Nausea And Vomiting and Rash    Opioids - morphine analogues     Hydrocodone-acetaminophen Itching     Family History       Problem Relation (Age of Onset)    Colon cancer Maternal Grandmother    Lung cancer Maternal Grandfather          Tobacco Use    Smoking status: Former    Smokeless tobacco: Never   Substance and Sexual Activity    Alcohol use: Not Currently     Comment: seldom    Drug use: No    Sexual activity: Yes     Partners: Male     Birth control/protection: None     Comment: , lives in [place]     Review of Systems   Constitutional:  Positive for appetite change and chills. Negative for fever.   Gastrointestinal:  Positive for abdominal pain,  nausea and vomiting. Negative for blood in stool, constipation and diarrhea.   Skin:  Negative for color change and pallor.   Neurological:  Negative for dizziness, light-headedness and headaches.     Objective:     Vital Signs (Most Recent):  Temp: 97.9 °F (36.6 °C) (11/17/23 0808)  Pulse: 62 (11/17/23 0808)  Resp: 17 (11/17/23 1017)  BP: 124/68 (11/17/23 0808)  SpO2: 99 % (11/17/23 0808) Vital Signs (24h Range):  Temp:  [97.8 °F (36.6 °C)-98.5 °F (36.9 °C)] 97.9 °F (36.6 °C)  Pulse:  [62-84] 62  Resp:  [16-18] 17  SpO2:  [95 %-99 %] 99 %  BP: (113-159)/(59-85) 124/68     Weight: 115.1 kg (253 lb 12 oz) (11/15/23 2308)  Body mass index is 39.74 kg/m².    No intake or output data in the 24 hours ending 11/17/23 1043    Lines/Drains/Airways       Peripherally Inserted Central Catheter Line  Duration             PICC Double Lumen 10/09/23 1120 right basilic 39 days              Drain  Duration                  Closed/Suction Drain 08/07/23 1709 Left Abdomen Bulb 19 Fr. 101 days         Ileostomy 08/07/23 1708 Standard (Marian, end)  days         Closed/Suction Drain 10/05/23 1203 Superior RUQ Bulb 14 Fr. 42 days         Closed/Suction Drain 10/05/23 1204 Inferior RUQ Bulb 14 Fr. 42 days              Peripheral Intravenous Line  Duration                  Peripheral IV - Single Lumen 11/15/23 1818 18 G Left;Posterior Forearm 1 day                     Physical Exam  Constitutional:       Appearance: Normal appearance. She is obese.   HENT:      Head: Normocephalic and atraumatic.      Mouth/Throat:      Mouth: Mucous membranes are moist.      Pharynx: Oropharynx is clear.   Pulmonary:      Effort: Pulmonary effort is normal. No respiratory distress.   Abdominal:      General: Abdomen is flat.      Palpations: Abdomen is soft.      Tenderness: There is abdominal tenderness (LLQ).      Comments: Percutaneous left sided drain   Colostomy    Skin:     General: Skin is warm and dry.   Neurological:      General: No  focal deficit present.      Mental Status: She is alert and oriented to person, place, and time.          Significant Labs:  CBC:   Recent Labs   Lab 11/15/23  1809 11/15/23  1839 11/16/23  0315 11/17/23  0336   WBC 5.70  --  5.01 5.92   HGB 14.3  --  12.1 13.0   HCT 44.3 46 37.9 40.9     --  236 248     CMP:   Recent Labs   Lab 11/15/23  1809 11/16/23  0315 11/17/23  0336      < > 118*   CALCIUM 10.1   < > 9.5   ALBUMIN 4.4  --   --    PROT 8.3  --   --       < > 138   K 4.0   < > 4.1   CO2 24   < > 24      < > 103   BUN 9   < > 8   CREATININE 0.8   < > 0.8   ALKPHOS 81  --   --    ALT 15  --   --    AST 14  --   --    BILITOT 1.0  --   --     < > = values in this interval not displayed.       Significant Imaging:  Imaging results within the past 24 hours have been reviewed.

## 2023-11-18 LAB
ANION GAP SERPL CALC-SCNC: 7 MMOL/L (ref 8–16)
BACTERIA BLD CULT: ABNORMAL
BASOPHILS # BLD AUTO: 0.04 K/UL (ref 0–0.2)
BASOPHILS NFR BLD: 0.7 % (ref 0–1.9)
BUN SERPL-MCNC: 5 MG/DL (ref 6–20)
CALCIUM SERPL-MCNC: 9.5 MG/DL (ref 8.7–10.5)
CHLORIDE SERPL-SCNC: 104 MMOL/L (ref 95–110)
CO2 SERPL-SCNC: 26 MMOL/L (ref 23–29)
CREAT SERPL-MCNC: 0.7 MG/DL (ref 0.5–1.4)
CRP SERPL-MCNC: 3.4 MG/L (ref 0–8.2)
DIFFERENTIAL METHOD: ABNORMAL
EOSINOPHIL # BLD AUTO: 0.2 K/UL (ref 0–0.5)
EOSINOPHIL NFR BLD: 3.2 % (ref 0–8)
ERYTHROCYTE [DISTWIDTH] IN BLOOD BY AUTOMATED COUNT: 15.2 % (ref 11.5–14.5)
EST. GFR  (NO RACE VARIABLE): >60 ML/MIN/1.73 M^2
GLUCOSE SERPL-MCNC: 108 MG/DL (ref 70–110)
HCT VFR BLD AUTO: 38.2 % (ref 37–48.5)
HGB BLD-MCNC: 12.1 G/DL (ref 12–16)
IMM GRANULOCYTES # BLD AUTO: 0.01 K/UL (ref 0–0.04)
IMM GRANULOCYTES NFR BLD AUTO: 0.2 % (ref 0–0.5)
LYMPHOCYTES # BLD AUTO: 1.6 K/UL (ref 1–4.8)
LYMPHOCYTES NFR BLD: 26.5 % (ref 18–48)
MAGNESIUM SERPL-MCNC: 1.7 MG/DL (ref 1.6–2.6)
MCH RBC QN AUTO: 27.3 PG (ref 27–31)
MCHC RBC AUTO-ENTMCNC: 31.7 G/DL (ref 32–36)
MCV RBC AUTO: 86 FL (ref 82–98)
MONOCYTES # BLD AUTO: 0.6 K/UL (ref 0.3–1)
MONOCYTES NFR BLD: 10.2 % (ref 4–15)
NEUTROPHILS # BLD AUTO: 3.5 K/UL (ref 1.8–7.7)
NEUTROPHILS NFR BLD: 59.2 % (ref 38–73)
NRBC BLD-RTO: 0 /100 WBC
PHOSPHATE SERPL-MCNC: 3.9 MG/DL (ref 2.7–4.5)
PLATELET # BLD AUTO: 233 K/UL (ref 150–450)
PMV BLD AUTO: 10.4 FL (ref 9.2–12.9)
POTASSIUM SERPL-SCNC: 3.9 MMOL/L (ref 3.5–5.1)
RBC # BLD AUTO: 4.44 M/UL (ref 4–5.4)
SODIUM SERPL-SCNC: 137 MMOL/L (ref 136–145)
WBC # BLD AUTO: 5.97 K/UL (ref 3.9–12.7)

## 2023-11-18 PROCEDURE — 63600175 PHARM REV CODE 636 W HCPCS: Performed by: STUDENT IN AN ORGANIZED HEALTH CARE EDUCATION/TRAINING PROGRAM

## 2023-11-18 PROCEDURE — 25000003 PHARM REV CODE 250: Performed by: PHYSICIAN ASSISTANT

## 2023-11-18 PROCEDURE — 63600175 PHARM REV CODE 636 W HCPCS: Performed by: PHYSICIAN ASSISTANT

## 2023-11-18 PROCEDURE — 84100 ASSAY OF PHOSPHORUS: CPT | Performed by: STUDENT IN AN ORGANIZED HEALTH CARE EDUCATION/TRAINING PROGRAM

## 2023-11-18 PROCEDURE — 25000003 PHARM REV CODE 250: Performed by: STUDENT IN AN ORGANIZED HEALTH CARE EDUCATION/TRAINING PROGRAM

## 2023-11-18 PROCEDURE — 80048 BASIC METABOLIC PNL TOTAL CA: CPT | Performed by: STUDENT IN AN ORGANIZED HEALTH CARE EDUCATION/TRAINING PROGRAM

## 2023-11-18 PROCEDURE — 25000003 PHARM REV CODE 250

## 2023-11-18 PROCEDURE — 85025 COMPLETE CBC W/AUTO DIFF WBC: CPT | Performed by: STUDENT IN AN ORGANIZED HEALTH CARE EDUCATION/TRAINING PROGRAM

## 2023-11-18 PROCEDURE — 20600001 HC STEP DOWN PRIVATE ROOM

## 2023-11-18 PROCEDURE — 63600175 PHARM REV CODE 636 W HCPCS: Performed by: SURGERY

## 2023-11-18 PROCEDURE — 83735 ASSAY OF MAGNESIUM: CPT | Performed by: STUDENT IN AN ORGANIZED HEALTH CARE EDUCATION/TRAINING PROGRAM

## 2023-11-18 PROCEDURE — 86140 C-REACTIVE PROTEIN: CPT | Performed by: SURGERY

## 2023-11-18 RX ORDER — HYDROMORPHONE HYDROCHLORIDE 1 MG/ML
1 INJECTION, SOLUTION INTRAMUSCULAR; INTRAVENOUS; SUBCUTANEOUS
Status: DISCONTINUED | OUTPATIENT
Start: 2023-11-18 | End: 2023-11-18

## 2023-11-18 RX ORDER — HYDROMORPHONE HYDROCHLORIDE 1 MG/ML
1 INJECTION, SOLUTION INTRAMUSCULAR; INTRAVENOUS; SUBCUTANEOUS
Status: DISCONTINUED | OUTPATIENT
Start: 2023-11-18 | End: 2023-11-21 | Stop reason: HOSPADM

## 2023-11-18 RX ADMIN — PANTOPRAZOLE SODIUM 40 MG: 40 TABLET, DELAYED RELEASE ORAL at 05:11

## 2023-11-18 RX ADMIN — HYDROMORPHONE HYDROCHLORIDE 1 MG: 1 INJECTION, SOLUTION INTRAMUSCULAR; INTRAVENOUS; SUBCUTANEOUS at 01:11

## 2023-11-18 RX ADMIN — CEFEPIME 2 G: 2 INJECTION, POWDER, FOR SOLUTION INTRAVENOUS at 08:11

## 2023-11-18 RX ADMIN — HYDROMORPHONE HYDROCHLORIDE 1 MG: 1 INJECTION, SOLUTION INTRAMUSCULAR; INTRAVENOUS; SUBCUTANEOUS at 10:11

## 2023-11-18 RX ADMIN — HYDROXYZINE PAMOATE 25 MG: 25 CAPSULE ORAL at 10:11

## 2023-11-18 RX ADMIN — HYDROMORPHONE HYDROCHLORIDE 1 MG: 1 INJECTION, SOLUTION INTRAMUSCULAR; INTRAVENOUS; SUBCUTANEOUS at 05:11

## 2023-11-18 RX ADMIN — ONDANSETRON 4 MG: 2 INJECTION INTRAMUSCULAR; INTRAVENOUS at 12:11

## 2023-11-18 RX ADMIN — HYDROMORPHONE HYDROCHLORIDE 1 MG: 1 INJECTION, SOLUTION INTRAMUSCULAR; INTRAVENOUS; SUBCUTANEOUS at 08:11

## 2023-11-18 RX ADMIN — OXYCODONE HYDROCHLORIDE 30 MG: 10 TABLET ORAL at 09:11

## 2023-11-18 RX ADMIN — CEFEPIME 2 G: 2 INJECTION, POWDER, FOR SOLUTION INTRAVENOUS at 05:11

## 2023-11-18 RX ADMIN — DAPTOMYCIN 920 MG: 350 INJECTION, POWDER, LYOPHILIZED, FOR SOLUTION INTRAVENOUS at 08:11

## 2023-11-18 RX ADMIN — ENOXAPARIN SODIUM 40 MG: 40 INJECTION SUBCUTANEOUS at 05:11

## 2023-11-18 RX ADMIN — OXYCODONE HYDROCHLORIDE 30 MG: 10 TABLET ORAL at 02:11

## 2023-11-18 RX ADMIN — ONDANSETRON 4 MG: 2 INJECTION INTRAMUSCULAR; INTRAVENOUS at 08:11

## 2023-11-18 RX ADMIN — HYDROMORPHONE HYDROCHLORIDE 1 MG: 1 INJECTION, SOLUTION INTRAMUSCULAR; INTRAVENOUS; SUBCUTANEOUS at 02:11

## 2023-11-18 RX ADMIN — CEFEPIME 2 G: 2 INJECTION, POWDER, FOR SOLUTION INTRAVENOUS at 12:11

## 2023-11-18 RX ADMIN — POTASSIUM CHLORIDE, DEXTROSE MONOHYDRATE AND SODIUM CHLORIDE: 150; 5; 450 INJECTION, SOLUTION INTRAVENOUS at 04:11

## 2023-11-18 RX ADMIN — HYDROMORPHONE HYDROCHLORIDE 1 MG: 1 INJECTION, SOLUTION INTRAMUSCULAR; INTRAVENOUS; SUBCUTANEOUS at 07:11

## 2023-11-18 NOTE — PROGRESS NOTES
[facility]  Colorectal Surgery  Progress Note    Patient Name: Georgette Abrams  MRN: [id number]  Admission Date: 11/15/2023  Hospital Length of Stay: 3 days  Attending Physician: KRISH Tracy MD    Subjective:     Interval History: Reports feeling better this AM. Nausea and abdominal pain have improved. Was able to tolerate some PO intake last night. Remaining drain with minimal output. Ostomy with appropriate output.    Post-Op Info:  * No surgery found *          Medications:  Continuous Infusions:   dextrose 5 % and 0.45 % NaCl with KCl 20 mEq 40 mL/hr at 11/17/23 1956     Scheduled Meds:   acetaminophen  650 mg Oral Q6H    ceFEPime (MAXIPIME) IVPB  2 g Intravenous Q8H    DAPTOmycin (CUBICIN) IV (PEDS and ADULTS)  8 mg/kg Intravenous Q24H    droNABinol  2.5 mg Oral BID    enoxparin  40 mg Subcutaneous Daily    pantoprazole  40 mg Oral BID AC    scopolamine  1 patch Transdermal Q3 Days     PRN Meds:   albuterol-ipratropium    hydrALAZINE    HYDROmorphone    hydrOXYzine pamoate    iohexoL    LIDOcaine (PF) 10 mg/ml (1%)    melatonin    ondansetron    oxyCODONE    prochlorperazine    sodium chloride 0.9%        Objective:     Vital Signs (Most Recent):  Temp: 97.8 °F (36.6 °C) (11/18/23 0453)  Pulse: 67 (11/18/23 0453)  Resp: 18 (11/18/23 0747)  BP: (!) 106/57 (11/18/23 0453)  SpO2: 95 % (11/18/23 0453) Vital Signs (24h Range):  Temp:  [97.8 °F (36.6 °C)-98.3 °F (36.8 °C)] 97.8 °F (36.6 °C)  Pulse:  [65-86] 67  Resp:  [17-19] 18  SpO2:  [95 %-98 %] 95 %  BP: (106-138)/(57-74) 106/57     Intake/Output - Last 3 Shifts         11/16 0700  11/17 0659 11/17 0700 11/18 0659 11/18 0700 11/19 0659    P.O.  240     I.V. (mL/kg)  560.7 (4.8)     IV Piggyback  393.8     Total Intake(mL/kg)  1194.5 (10.2)     Urine (mL/kg/hr)  0 (0)     Drains  3     Total Output  3     Net  +1191.5            Urine Occurrence  1 x     Stool Occurrence  0 x              Physical Exam  Constitutional:       General: She is  not in acute distress.     Appearance: Normal appearance.   HENT:      Head: Normocephalic and atraumatic.      Nose: Nose normal.      Mouth/Throat:      Mouth: Mucous membranes are moist.   Eyes:      Extraocular Movements: Extraocular movements intact.   Cardiovascular:      Rate and Rhythm: Normal rate.      Pulses: Normal pulses.   Pulmonary:      Effort: Pulmonary effort is normal. No respiratory distress.   Abdominal:      Comments: Soft, mild TTP throughout, similar to previous exams. Ileostomy viable with stool in the bag. Drain with murky output similar to previous exam. Minimal output in bulb.    Musculoskeletal:         General: Normal range of motion.   Skin:     General: Skin is warm.   Neurological:      General: No focal deficit present.      Mental Status: She is alert and oriented to person, place, and time.            Significant Labs:  All pertinent lab results within the last 24 hours have been reviewed.     Significant Diagnostics:  I have reviewed all pertinent imaging results/findings within the past 24 hours.  Assessment/Plan:     * Abdominal wall fluid collections  38F Hx FAP s/p TPC w J pouch, J bowel takedown for pouch failure, VHR with mesh c/b mesh infection, and ultimately VHR with bilateral component separation (Phasix), SBR, ileostomy re-siting 8/7/23. Course was complicated by multiple abdominal wall fluid collections requiring drain placement and long term Daptomycin as well as ongoing chronic postoperative pain. Admitted with ongoing pain, nausea, and headaches.      Plan:  -Continue home oxycodone dose, OK for dilaudid if unable to tolerate oral intake  -HDS, no tachycardia  -Continue regular diet as tolerated, mIVF  -GI consulted for ongoing nausea, suspect may be related to reflux seen on UGI. Fu recs  -BID PPI   -Plan for EGD Monday.   - SubQ drain out, sub-fascial drain remains with minimal murky output  -ID consulted for daptomycin, added cefepime for GNR in drain,  speciation pending  -BCx x1 with GP rods, ID following, grew bacillus megaterium (likely contaminant), will maintain PICC line. Further BCx pending and NGTD  - DVT ppx  - daily labs          Alexandra Wright MD  Colorectal Surgery  [facility]

## 2023-11-18 NOTE — PLAN OF CARE
Provided billing and finical assistance info to pt at bedside.     Ora Bowman LCSW  Case Management/[facility]  [phone]

## 2023-11-18 NOTE — PLAN OF CARE
[facility]  Discharge Assessment    Primary Care Provider: No, Primary Doctor     Discharge Assessment (most recent)       BRIEF DISCHARGE ASSESSMENT - 11/18/23 1106          Discharge Planning    Assessment Type Discharge Planning Brief Assessment     Resource/Environmental Concerns none     Support Systems Family members     Equipment Currently Used at Home none     Patient/Family Anticipates Transition to home     Patient/Family Anticipated Services at Transition none     DME Needed Upon Discharge  none     Discharge Plan A Home with family     Discharge Plan B Home        Physical Activity    On average, how many days per week do you engage in moderate to strenuous exercise (like a brisk walk)? 0 days     On average, how many minutes do you engage in exercise at this level? 0 min        Financial Resource Strain    How hard is it for you to pay for the very basics like food, housing, medical care, and heating? Somewhat hard        Housing Stability    In the last 12 months, was there a time when you were not able to pay the mortgage or rent on time? No     In the last 12 months, was there a time when you did not have a steady place to sleep or slept in a shelter (including now)? No        Transportation Needs    In the past 12 months, has lack of transportation kept you from medical appointments or from getting medications? No     In the past 12 months, has lack of transportation kept you from meetings, work, or from getting things needed for daily living? No        Food Insecurity    Within the past 12 months, you worried that your food would run out before you got the money to buy more. Never true     Within the past 12 months, the food you bought just didn't last and you didn't have money to get more. Never true        Stress    Do you feel stress - tense, restless, nervous, or anxious, or unable to sleep at night because your mind is troubled all the time - these days? To some extent        Social  Connections    In a typical week, how many times do you talk on the phone with family, friends, or neighbors? More than three times a week     How often do you get together with friends or relatives? More than three times a week     How often do you attend Buddhist or Confucianist services? More than 4 times per year     Do you belong to any clubs or organizations such as Buddhist groups, unions, fraternal or athletic groups, or school groups? Yes     How often do you attend meetings of the clubs or organizations you belong to? Never     Are you , , , , never , or living with a partner?         Alcohol Use    Q1: How often do you have a drink containing alcohol? Never     Q2: How many drinks containing alcohol do you have on a typical day when you are drinking? Patient does not drink     Q3: How often do you have six or more drinks on one occasion? Never                     Spoke to pt. Pt lives at home with parents. Post hospital  stay family will be pt support person and pt. has transportation at d/c with family. There have been hospitalizations within the last 30 days per pt. Verified pt PCP and preferred pharmacy. Pt stated not on Coumadin and is not receiving dialysis. All questions answered regarding case management/ discharge planning , pt verbalized understanding. Discharge booklet with SW contact information given to pt.     Discharge Plan A and Plan B have been determined by review of patient's clinical status, future medical and therapeutic needs, and coverage/benefits for post-acute care in coordination with multidisciplinary team members.       Ora Bowman LCSW  Case Management/[facility]  [phone]

## 2023-11-18 NOTE — SUBJECTIVE & OBJECTIVE
Subjective:     Interval History: Reports feeling better this AM. Nausea and abdominal pain have improved. Was able to tolerate some PO intake last night. Remaining drain with minimal output. Ostomy with appropriate output.    Post-Op Info:  * No surgery found *          Medications:  Continuous Infusions:   dextrose 5 % and 0.45 % NaCl with KCl 20 mEq 40 mL/hr at 11/17/23 1956     Scheduled Meds:   acetaminophen  650 mg Oral Q6H    ceFEPime (MAXIPIME) IVPB  2 g Intravenous Q8H    DAPTOmycin (CUBICIN) IV (PEDS and ADULTS)  8 mg/kg Intravenous Q24H    droNABinol  2.5 mg Oral BID    enoxparin  40 mg Subcutaneous Daily    pantoprazole  40 mg Oral BID AC    scopolamine  1 patch Transdermal Q3 Days     PRN Meds:   albuterol-ipratropium    hydrALAZINE    HYDROmorphone    hydrOXYzine pamoate    iohexoL    LIDOcaine (PF) 10 mg/ml (1%)    melatonin    ondansetron    oxyCODONE    prochlorperazine    sodium chloride 0.9%        Objective:     Vital Signs (Most Recent):  Temp: 97.8 °F (36.6 °C) (11/18/23 0453)  Pulse: 67 (11/18/23 0453)  Resp: 18 (11/18/23 0747)  BP: (!) 106/57 (11/18/23 0453)  SpO2: 95 % (11/18/23 0453) Vital Signs (24h Range):  Temp:  [97.8 °F (36.6 °C)-98.3 °F (36.8 °C)] 97.8 °F (36.6 °C)  Pulse:  [65-86] 67  Resp:  [17-19] 18  SpO2:  [95 %-98 %] 95 %  BP: (106-138)/(57-74) 106/57     Intake/Output - Last 3 Shifts         11/16 0700 11/17 0659 11/17 0700 11/18 0659 11/18 0700 11/19 0659    P.O.  240     I.V. (mL/kg)  560.7 (4.8)     IV Piggyback  393.8     Total Intake(mL/kg)  1194.5 (10.2)     Urine (mL/kg/hr)  0 (0)     Drains  3     Total Output  3     Net  +1191.5            Urine Occurrence  1 x     Stool Occurrence  0 x              Physical Exam  Constitutional:       General: She is not in acute distress.     Appearance: Normal appearance.   HENT:      Head: Normocephalic and atraumatic.      Nose: Nose normal.      Mouth/Throat:      Mouth: Mucous membranes are moist.   Eyes:      Extraocular  Movements: Extraocular movements intact.   Cardiovascular:      Rate and Rhythm: Normal rate.      Pulses: Normal pulses.   Pulmonary:      Effort: Pulmonary effort is normal. No respiratory distress.   Abdominal:      Comments: Soft, mild TTP throughout, similar to previous exams. Ileostomy viable with stool in the bag. Drain with murky output similar to previous exam. Minimal output in bulb.    Musculoskeletal:         General: Normal range of motion.   Skin:     General: Skin is warm.   Neurological:      General: No focal deficit present.      Mental Status: She is alert and oriented to person, place, and time.            Significant Labs:  All pertinent lab results within the last 24 hours have been reviewed.     Significant Diagnostics:  I have reviewed all pertinent imaging results/findings within the past 24 hours.

## 2023-11-18 NOTE — ASSESSMENT & PLAN NOTE
38F Hx FAP s/p TPC w J pouch, J bowel takedown for pouch failure, VHR with mesh c/b mesh infection, and ultimately VHR with bilateral component separation (Phasix), SBR, ileostomy re-siting 8/7/23. Course was complicated by multiple abdominal wall fluid collections requiring drain placement and long term Daptomycin as well as ongoing chronic postoperative pain. Admitted with ongoing pain, nausea, and headaches.      Plan:  -Continue home oxycodone dose, OK for dilaudid if unable to tolerate oral intake  -HDS, no tachycardia  -Continue regular diet as tolerated, mIVF  -GI consulted for ongoing nausea, suspect may be related to reflux seen on UGI. Fu recs  -BID PPI   -Plan for EGD Monday.   - SubQ drain out, sub-fascial drain remains with minimal murky output  -ID consulted for daptomycin, added cefepime for GNR in drain, speciation pending  -BCx x1 with GP rods, ID following, grew bacillus megaterium (likely contaminant), will maintain PICC line. Further BCx pending and NGTD  - DVT ppx  - daily labs

## 2023-11-18 NOTE — PROGRESS NOTES
Nurses Note -- 4 Eyes      11/17/2023   6:07 PM      Skin assessed during: Admit      [] No Altered Skin Integrity Present    []Prevention Measures Documented      [] Yes- Altered Skin Integrity Present or Discovered   [] LDA Added if Not in Epic (Describe Wound)   [] New Altered Skin Integrity was Present on Admit and Documented in LDA   [] Wound Image Taken    Wound Care Consulted? No    Attending Nurse:  Camryn Payan RN/Staff Member:  Gisselle DURANT

## 2023-11-18 NOTE — PROGRESS NOTES
Patient called this nurse requesting pain medication. At this time, this nurse educated patient on order for IV dilaudid only indicated if patient is not tolerating PO. Patient upset and states she would not have stayed if she knew that was how her pain medication was going to be managed, and that she needs IV dilauded for breakthrough pain. She requested this nurse reach out to Dr and have them come speak with her. Patient has been drinking fluids/ eating and took PO oxy for pain this am. This nurse paged colorectal oncall

## 2023-11-18 NOTE — PROGRESS NOTES
[facility] Plan of Care Note  Dx:, ABD wall fluid collection intractable N/V     Shift Events IV pain management, nausea management, admission assessment performed, central line dressing changed     Goals of Care: Ambulate, pain control, infection control, nausea management     Neuro: AOx4    Vital Signs: VSS     Respiratory: RA     Diet: Regular     Is patient tolerating current diet? Yes     GTTS: D5 1/2 NS +20K @ 40    Urine Output/Bowel Movement: ileostomy/ voids oob to toilet independent     Drains/Tubes/Tube Feeds (include total output/shift): SARAH  drain x1, 0 output this shift     Lines: JULIO PICC, dressing change due 11/24, LFA 18 G SL      Accuchecks:NA     Skin: Abdominal sarah drain     Fall Risk Score: NA     Activity level? Oobx1     Any scheduled procedures? EGD Monday 11/19    Any safety concerns? NA     Other: NA

## 2023-11-18 NOTE — PLAN OF CARE
[facility] Plan of Care Note  Dx: Abdominal wall fluid collections    Shift Events: PRN PO Vistaril ordered for anxiety. No complaints of nausea.    Goals of Care: Pain management, antiemetics, drain care, IV abx, EGD    Neuro: AAOx4    Vital Signs: Stable    Respiratory: RA    Diet: Regular    Is patient tolerating current diet? Yes    GTTS: mIVF    Urine Output/Bowel Movement: UOP adequate / LBM - 11/17    Drains/Tubes/Tube Feeds (include total output/shift): CONSTANZA drain, ostomy    Lines: PICC    Accuchecks: None    Skin: Drain site    Fall Risk Score: 6    Activity level? Independent    Any scheduled procedures? EGD 11/20    Any safety concerns? Fall risk    Other: n/a    Problem: Adult Inpatient Plan of Care  Goal: Plan of Care Review  Outcome: Ongoing, Progressing  Goal: Patient-Specific Goal (Individualized)  Outcome: Ongoing, Progressing  Goal: Absence of Hospital-Acquired Illness or Injury  Outcome: Ongoing, Progressing  Goal: Optimal Comfort and Wellbeing  Outcome: Ongoing, Progressing  Goal: Readiness for Transition of Care  Outcome: Ongoing, Progressing     Problem: Bariatric Environmental Safety  Goal: Safety Maintained with Care  Outcome: Ongoing, Progressing     Problem: Infection  Goal: Absence of Infection Signs and Symptoms  Outcome: Ongoing, Progressing     Problem: Impaired Wound Healing  Goal: Optimal Wound Healing  Outcome: Ongoing, Progressing

## 2023-11-18 NOTE — PLAN OF CARE
11/18/23 1111   Readmission   Why were you hospitalized in the last 30 days? yes   Why were you readmitted? Alarmed about signs/symptoms   When you left the hospital where did you go? Home with Family   Did patient/caregiver refused recommended DC plan? No   Tell me about what happened between when you left the hospital and the day you returned. Pt beganhaving pain   When did you start not feeling well? 11/15/23   Did you try to manage your symptoms your self? Yes   Did you call anyone? Yes   Who did you call? Other (comments)   Did you try to see or did see a doctor or nurse before you came? No   Did you have  a follow-up appointment on discharge? Yes   Was this a planned readmission? No       Ora oBwman LCSW  Case Management/[facility]  [phone]

## 2023-11-19 LAB
ANION GAP SERPL CALC-SCNC: 9 MMOL/L (ref 8–16)
BACTERIA SPEC AEROBE CULT: ABNORMAL
BACTERIA SPEC AEROBE CULT: ABNORMAL
BASOPHILS # BLD AUTO: 0.04 K/UL (ref 0–0.2)
BASOPHILS NFR BLD: 0.8 % (ref 0–1.9)
BUN SERPL-MCNC: 5 MG/DL (ref 6–20)
CALCIUM SERPL-MCNC: 9.1 MG/DL (ref 8.7–10.5)
CHLORIDE SERPL-SCNC: 106 MMOL/L (ref 95–110)
CO2 SERPL-SCNC: 24 MMOL/L (ref 23–29)
CREAT SERPL-MCNC: 0.8 MG/DL (ref 0.5–1.4)
CRP SERPL-MCNC: 3.7 MG/L (ref 0–8.2)
DIFFERENTIAL METHOD: ABNORMAL
EOSINOPHIL # BLD AUTO: 0.3 K/UL (ref 0–0.5)
EOSINOPHIL NFR BLD: 5.6 % (ref 0–8)
ERYTHROCYTE [DISTWIDTH] IN BLOOD BY AUTOMATED COUNT: 15.5 % (ref 11.5–14.5)
EST. GFR  (NO RACE VARIABLE): >60 ML/MIN/1.73 M^2
GLUCOSE SERPL-MCNC: 122 MG/DL (ref 70–110)
HCT VFR BLD AUTO: 36.5 % (ref 37–48.5)
HGB BLD-MCNC: 11.6 G/DL (ref 12–16)
IMM GRANULOCYTES # BLD AUTO: 0 K/UL (ref 0–0.04)
IMM GRANULOCYTES NFR BLD AUTO: 0 % (ref 0–0.5)
LYMPHOCYTES # BLD AUTO: 1.8 K/UL (ref 1–4.8)
LYMPHOCYTES NFR BLD: 35.5 % (ref 18–48)
MAGNESIUM SERPL-MCNC: 1.6 MG/DL (ref 1.6–2.6)
MCH RBC QN AUTO: 27.6 PG (ref 27–31)
MCHC RBC AUTO-ENTMCNC: 31.8 G/DL (ref 32–36)
MCV RBC AUTO: 87 FL (ref 82–98)
MONOCYTES # BLD AUTO: 0.5 K/UL (ref 0.3–1)
MONOCYTES NFR BLD: 8.7 % (ref 4–15)
NEUTROPHILS # BLD AUTO: 2.6 K/UL (ref 1.8–7.7)
NEUTROPHILS NFR BLD: 49.4 % (ref 38–73)
NRBC BLD-RTO: 0 /100 WBC
PHOSPHATE SERPL-MCNC: 3.6 MG/DL (ref 2.7–4.5)
PLATELET # BLD AUTO: 210 K/UL (ref 150–450)
PMV BLD AUTO: 10.7 FL (ref 9.2–12.9)
POTASSIUM SERPL-SCNC: 3.9 MMOL/L (ref 3.5–5.1)
RBC # BLD AUTO: 4.21 M/UL (ref 4–5.4)
SODIUM SERPL-SCNC: 139 MMOL/L (ref 136–145)
WBC # BLD AUTO: 5.18 K/UL (ref 3.9–12.7)

## 2023-11-19 PROCEDURE — 25000003 PHARM REV CODE 250

## 2023-11-19 PROCEDURE — 63600175 PHARM REV CODE 636 W HCPCS: Performed by: STUDENT IN AN ORGANIZED HEALTH CARE EDUCATION/TRAINING PROGRAM

## 2023-11-19 PROCEDURE — 25000003 PHARM REV CODE 250: Performed by: PHYSICIAN ASSISTANT

## 2023-11-19 PROCEDURE — 83735 ASSAY OF MAGNESIUM: CPT | Performed by: STUDENT IN AN ORGANIZED HEALTH CARE EDUCATION/TRAINING PROGRAM

## 2023-11-19 PROCEDURE — 86140 C-REACTIVE PROTEIN: CPT | Performed by: SURGERY

## 2023-11-19 PROCEDURE — 63600175 PHARM REV CODE 636 W HCPCS: Performed by: PHYSICIAN ASSISTANT

## 2023-11-19 PROCEDURE — 84100 ASSAY OF PHOSPHORUS: CPT | Performed by: STUDENT IN AN ORGANIZED HEALTH CARE EDUCATION/TRAINING PROGRAM

## 2023-11-19 PROCEDURE — 63600175 PHARM REV CODE 636 W HCPCS

## 2023-11-19 PROCEDURE — 63600175 PHARM REV CODE 636 W HCPCS: Performed by: SURGERY

## 2023-11-19 PROCEDURE — 25000003 PHARM REV CODE 250: Performed by: STUDENT IN AN ORGANIZED HEALTH CARE EDUCATION/TRAINING PROGRAM

## 2023-11-19 PROCEDURE — 20600001 HC STEP DOWN PRIVATE ROOM

## 2023-11-19 PROCEDURE — 85025 COMPLETE CBC W/AUTO DIFF WBC: CPT | Performed by: STUDENT IN AN ORGANIZED HEALTH CARE EDUCATION/TRAINING PROGRAM

## 2023-11-19 PROCEDURE — 80048 BASIC METABOLIC PNL TOTAL CA: CPT | Performed by: STUDENT IN AN ORGANIZED HEALTH CARE EDUCATION/TRAINING PROGRAM

## 2023-11-19 RX ORDER — ONDANSETRON 2 MG/ML
4 INJECTION INTRAMUSCULAR; INTRAVENOUS EVERY 4 HOURS PRN
Status: DISCONTINUED | OUTPATIENT
Start: 2023-11-19 | End: 2023-11-21

## 2023-11-19 RX ORDER — VENLAFAXINE 75 MG/1
150 TABLET ORAL DAILY
Status: DISCONTINUED | OUTPATIENT
Start: 2023-11-19 | End: 2023-11-21 | Stop reason: HOSPADM

## 2023-11-19 RX ORDER — ONDANSETRON 2 MG/ML
8 INJECTION INTRAMUSCULAR; INTRAVENOUS EVERY 6 HOURS PRN
Status: DISCONTINUED | OUTPATIENT
Start: 2023-11-19 | End: 2023-11-19

## 2023-11-19 RX ORDER — ONDANSETRON 2 MG/ML
8 INJECTION INTRAMUSCULAR; INTRAVENOUS EVERY 8 HOURS PRN
Status: DISCONTINUED | OUTPATIENT
Start: 2023-11-19 | End: 2023-11-19

## 2023-11-19 RX ADMIN — HYDROMORPHONE HYDROCHLORIDE 1 MG: 1 INJECTION, SOLUTION INTRAMUSCULAR; INTRAVENOUS; SUBCUTANEOUS at 01:11

## 2023-11-19 RX ADMIN — OXYCODONE HYDROCHLORIDE 30 MG: 10 TABLET ORAL at 10:11

## 2023-11-19 RX ADMIN — ACETAMINOPHEN 650 MG: 325 TABLET ORAL at 11:11

## 2023-11-19 RX ADMIN — HYDROMORPHONE HYDROCHLORIDE 1 MG: 1 INJECTION, SOLUTION INTRAMUSCULAR; INTRAVENOUS; SUBCUTANEOUS at 12:11

## 2023-11-19 RX ADMIN — OXYCODONE HYDROCHLORIDE 30 MG: 10 TABLET ORAL at 03:11

## 2023-11-19 RX ADMIN — HYDROMORPHONE HYDROCHLORIDE 1 MG: 1 INJECTION, SOLUTION INTRAMUSCULAR; INTRAVENOUS; SUBCUTANEOUS at 05:11

## 2023-11-19 RX ADMIN — ONDANSETRON 4 MG: 2 INJECTION INTRAMUSCULAR; INTRAVENOUS at 07:11

## 2023-11-19 RX ADMIN — ENOXAPARIN SODIUM 40 MG: 40 INJECTION SUBCUTANEOUS at 04:11

## 2023-11-19 RX ADMIN — DAPTOMYCIN 920 MG: 350 INJECTION, POWDER, LYOPHILIZED, FOR SOLUTION INTRAVENOUS at 07:11

## 2023-11-19 RX ADMIN — OXYCODONE HYDROCHLORIDE 30 MG: 10 TABLET ORAL at 06:11

## 2023-11-19 RX ADMIN — HYDROMORPHONE HYDROCHLORIDE 1 MG: 1 INJECTION, SOLUTION INTRAMUSCULAR; INTRAVENOUS; SUBCUTANEOUS at 11:11

## 2023-11-19 RX ADMIN — PANTOPRAZOLE SODIUM 40 MG: 40 TABLET, DELAYED RELEASE ORAL at 03:11

## 2023-11-19 RX ADMIN — CEFEPIME 2 G: 2 INJECTION, POWDER, FOR SOLUTION INTRAVENOUS at 05:11

## 2023-11-19 RX ADMIN — OXYCODONE HYDROCHLORIDE 30 MG: 10 TABLET ORAL at 02:11

## 2023-11-19 RX ADMIN — ONDANSETRON 4 MG: 2 INJECTION INTRAMUSCULAR; INTRAVENOUS at 06:11

## 2023-11-19 RX ADMIN — POTASSIUM CHLORIDE, DEXTROSE MONOHYDRATE AND SODIUM CHLORIDE: 150; 5; 450 INJECTION, SOLUTION INTRAVENOUS at 01:11

## 2023-11-19 RX ADMIN — ACETAMINOPHEN 650 MG: 325 TABLET ORAL at 06:11

## 2023-11-19 RX ADMIN — PANTOPRAZOLE SODIUM 40 MG: 40 TABLET, DELAYED RELEASE ORAL at 05:11

## 2023-11-19 RX ADMIN — CEFEPIME 2 G: 2 INJECTION, POWDER, FOR SOLUTION INTRAVENOUS at 10:11

## 2023-11-19 RX ADMIN — CEFEPIME 2 G: 2 INJECTION, POWDER, FOR SOLUTION INTRAVENOUS at 12:11

## 2023-11-19 RX ADMIN — HYDROMORPHONE HYDROCHLORIDE 1 MG: 1 INJECTION, SOLUTION INTRAMUSCULAR; INTRAVENOUS; SUBCUTANEOUS at 07:11

## 2023-11-19 RX ADMIN — VENLAFAXINE 150 MG: 75 TABLET ORAL at 10:11

## 2023-11-19 RX ADMIN — ONDANSETRON 4 MG: 2 INJECTION INTRAMUSCULAR; INTRAVENOUS at 01:11

## 2023-11-19 NOTE — PLAN OF CARE
[facility] Plan of Care Note  Dx: Abdominal wall fluid collections     Shift Events: PRN antiemetic and PRN anxiety med administered. Alternated between IV and PO PRN pain meds. Refused scheduled Tylenol and dronabinol. No measurable output from CONSTANZA drain.     Goals of Care: Pain management, antiemetics, drain care, IV abx, EGD     Neuro: AAOx4     Vital Signs: Stable     Respiratory: RA     Diet: Regular     Is patient tolerating current diet? Yes     GTTS: mIVF     Urine Output/Bowel Movement: UOP adequate / LBM - 11/19 (ileostomy)     Drains/Tubes/Tube Feeds (include total output/shift): CONSTANZA drain, ostomy     Lines: PICC     Accuchecks: None     Skin: Drain site     Fall Risk Score: 6     Activity level? Independent     Any scheduled procedures? EGD 11/20     Any safety concerns? Fall risk     Other: n/a     Problem: Adult Inpatient Plan of Care  Goal: Plan of Care Review  Outcome: Ongoing, Progressing  Goal: Patient-Specific Goal (Individualized)  Outcome: Ongoing, Progressing  Goal: Absence of Hospital-Acquired Illness or Injury  Outcome: Ongoing, Progressing  Goal: Optimal Comfort and Wellbeing  Outcome: Ongoing, Progressing  Goal: Readiness for Transition of Care  Outcome: Ongoing, Progressing     Problem: Bariatric Environmental Safety  Goal: Safety Maintained with Care  Outcome: Ongoing, Progressing     Problem: Infection  Goal: Absence of Infection Signs and Symptoms  Outcome: Ongoing, Progressing     Problem: Impaired Wound Healing  Goal: Optimal Wound Healing  Outcome: Ongoing, Progressing

## 2023-11-19 NOTE — PLAN OF CARE
Problem: Adult Inpatient Plan of Care  Goal: Plan of Care Review  Outcome: Ongoing, Progressing  Goal: Patient-Specific Goal (Individualized)  Outcome: Ongoing, Progressing  Goal: Absence of Hospital-Acquired Illness or Injury  Outcome: Ongoing, Progressing  Goal: Optimal Comfort and Wellbeing  Outcome: Ongoing, Progressing  Goal: Readiness for Transition of Care  Outcome: Ongoing, Progressing   [facility] Plan of Care Note  Dx ABD wall fluid collection    Shift Events CONSTANZA drain removed by MD- no emesis but pt requested anti-nausea Zofran around the clock     Goals of Care: pain controlled, nausea controlled    Neuro: AAOx4    Vital Signs: stable    Respiratory: RA    Diet: regular diet now but will NPO after midnight    Is patient tolerating current diet? Poor- consumed about 25% of her meals due to nausea    GTTS: D51/[id number] KCl at 40    Urine Output/Bowel Movement: urinated without difficulty, moderate amount of liquid brown stool and flatus per ileostomy bag - pt independent with ostomy care    Drains/Tubes/Tube Feeds (include total output/shift): NA    Lines: PICC line       Accuchecks:NA    Skin: intact except for the old CONSTANZA drain site and  ileostomy bag     Fall Risk Score: 6    Activity level? Up ad ayanna- ambulated the hallway and in the room frequently     Any scheduled procedures? EGD tmr 11/20    Any safety concerns? none    Other: none

## 2023-11-19 NOTE — SUBJECTIVE & OBJECTIVE
Subjective:     Interval History: Reports feeling fine this AM. Nausea and abdominal pain have improved. Was able to tolerate some PO intake last night. Remaining drain with minimal output. Ostomy with appropriate output.    Post-Op Info:  Procedure(s) (LRB):  EGD (ESOPHAGOGASTRODUODENOSCOPY) (N/A)          Medications:  Continuous Infusions:   dextrose 5 % and 0.45 % NaCl with KCl 20 mEq 40 mL/hr at 11/18/23 1853     Scheduled Meds:   acetaminophen  650 mg Oral Q6H    ceFEPime (MAXIPIME) IVPB  2 g Intravenous Q8H    DAPTOmycin (CUBICIN) IV (PEDS and ADULTS)  8 mg/kg Intravenous Q24H    droNABinol  2.5 mg Oral BID    enoxparin  40 mg Subcutaneous Daily    pantoprazole  40 mg Oral BID AC    scopolamine  1 patch Transdermal Q3 Days     PRN Meds:   albuterol-ipratropium    hydrALAZINE    HYDROmorphone    hydrOXYzine pamoate    iohexoL    LIDOcaine (PF) 10 mg/ml (1%)    melatonin    ondansetron    oxyCODONE    prochlorperazine    sodium chloride 0.9%        Objective:     Vital Signs (Most Recent):  Temp: 97.9 °F (36.6 °C) (11/19/23 0719)  Pulse: 75 (11/19/23 0719)  Resp: 18 (11/19/23 0719)  BP: 119/76 (11/19/23 0719)  SpO2: 100 % (11/19/23 0719) Vital Signs (24h Range):  Temp:  [97.5 °F (36.4 °C)-98.4 °F (36.9 °C)] 97.9 °F (36.6 °C)  Pulse:  [73-95] 75  Resp:  [18] 18  SpO2:  [95 %-100 %] 100 %  BP: (100-128)/(55-85) 119/76     Intake/Output - Last 3 Shifts         11/17 0700 11/18 0659 11/18 0700 11/19 0659 11/19 0700 11/20 0659    P.O. 240 120     I.V. (mL/kg) 560.7 (4.8) 980.7 (8.4)     IV Piggyback 393.8 442.9     Total Intake(mL/kg) 1194.5 (10.2) 1543.6 (13.2)     Urine (mL/kg/hr) 0 (0) 0 (0)     Drains 3 0     Stool  0     Total Output 3 0     Net +1191.5 +1543.6            Urine Occurrence 1 x 4 x     Stool Occurrence 0 x 1 x              Physical Exam  Constitutional:       General: She is not in acute distress.     Appearance: Normal appearance.   HENT:      Head: Normocephalic and atraumatic.      Nose:  Nose normal.      Mouth/Throat:      Mouth: Mucous membranes are moist.   Eyes:      Extraocular Movements: Extraocular movements intact.   Cardiovascular:      Rate and Rhythm: Normal rate.      Pulses: Normal pulses.   Pulmonary:      Effort: Pulmonary effort is normal. No respiratory distress.   Abdominal:      Comments: Soft, mild TTP throughout, similar to previous exams. Ileostomy viable with stool in the bag. Drain with murky output similar to previous exam. Minimal output in bulb.    Musculoskeletal:         General: Normal range of motion.   Skin:     General: Skin is warm.   Neurological:      General: No focal deficit present.      Mental Status: She is alert and oriented to person, place, and time.            Significant Labs:  All pertinent lab results within the last 24 hours have been reviewed.     Significant Diagnostics:  I have reviewed all pertinent imaging results/findings within the past 24 hours.

## 2023-11-19 NOTE — PLAN OF CARE
[facility] Plan of Care Note  Dx: Abdominal wall fluid collections    Shift Events: Dilaudid and oxycodone prn. No c/o nausea. Ambulated halls frequently     Goals of Care: Pain management, antiemetics, drain care, IV abx, EGD    Neuro: AAOx4    Vital Signs: Stable    Respiratory: RA    Diet: Regular    Is patient tolerating current diet? Yes    GTTS: mIVF    Urine Output/Bowel Movement: UOP adequate / LBM - 11/17    Drains/Tubes/Tube Feeds (include total output/shift): CONSTANZA drain, ostomy    Lines: PICC    Accuchecks: None    Skin: Drain site    Fall Risk Score: 6    Activity level? Independent    Any scheduled procedures? EGD 11/20    Any safety concerns? Fall risk    Other: n/a    Problem: Adult Inpatient Plan of Care  Goal: Plan of Care Review  Outcome: Ongoing, Progressing  Goal: Patient-Specific Goal (Individualized)  Outcome: Ongoing, Progressing  Goal: Absence of Hospital-Acquired Illness or Injury  Outcome: Ongoing, Progressing  Goal: Optimal Comfort and Wellbeing  Outcome: Ongoing, Progressing  Goal: Readiness for Transition of Care  Outcome: Ongoing, Progressing     Problem: Bariatric Environmental Safety  Goal: Safety Maintained with Care  Outcome: Ongoing, Progressing     Problem: Infection  Goal: Absence of Infection Signs and Symptoms  Outcome: Ongoing, Progressing     Problem: Impaired Wound Healing  Goal: Optimal Wound Healing  Outcome: Ongoing, Progressing

## 2023-11-19 NOTE — ASSESSMENT & PLAN NOTE
38F Hx FAP s/p TPC w J pouch, J bowel takedown for pouch failure, VHR with mesh c/b mesh infection, and ultimately VHR with bilateral component separation (Phasix), SBR, ileostomy re-siting 8/7/23. Course was complicated by multiple abdominal wall fluid collections requiring drain placement and long term Daptomycin as well as ongoing chronic postoperative pain. Admitted with ongoing pain, nausea, and headaches.      Plan:  -Continue home oxycodone dose, OK for dilaudid PRN  -HDS, no tachycardia  -Continue regular diet as tolerated, mIVF  -GI consulted for ongoing nausea, suspect may be related to reflux seen on UGI. Plan for EGD tomorrow  - NPO at midnight  -BID PPI   - Will remove sub-fascial drain today   -ID consulted for daptomycin, added cefepime for GNR in drain, speciation pending  -BCx x1 with GP rods, ID following, grew bacillus megaterium (likely contaminant), will maintain PICC line. Further BCx pending and NGTD  - DVT ppx  - daily labs

## 2023-11-19 NOTE — PROGRESS NOTES
[facility]  Colorectal Surgery  Progress Note    Patient Name: Georgette Abrams  MRN: [id number]  Admission Date: 11/15/2023  Hospital Length of Stay: 4 days  Attending Physician: RKISH Tracy MD    Subjective:     Interval History: Reports feeling fine this AM. Nausea and abdominal pain have improved. Was able to tolerate some PO intake last night. Remaining drain with minimal output. Ostomy with appropriate output.    Post-Op Info:  Procedure(s) (LRB):  EGD (ESOPHAGOGASTRODUODENOSCOPY) (N/A)          Medications:  Continuous Infusions:   dextrose 5 % and 0.45 % NaCl with KCl 20 mEq 40 mL/hr at 11/18/23 1853     Scheduled Meds:   acetaminophen  650 mg Oral Q6H    ceFEPime (MAXIPIME) IVPB  2 g Intravenous Q8H    DAPTOmycin (CUBICIN) IV (PEDS and ADULTS)  8 mg/kg Intravenous Q24H    droNABinol  2.5 mg Oral BID    enoxparin  40 mg Subcutaneous Daily    pantoprazole  40 mg Oral BID AC    scopolamine  1 patch Transdermal Q3 Days     PRN Meds:   albuterol-ipratropium    hydrALAZINE    HYDROmorphone    hydrOXYzine pamoate    iohexoL    LIDOcaine (PF) 10 mg/ml (1%)    melatonin    ondansetron    oxyCODONE    prochlorperazine    sodium chloride 0.9%        Objective:     Vital Signs (Most Recent):  Temp: 97.9 °F (36.6 °C) (11/19/23 0719)  Pulse: 75 (11/19/23 0719)  Resp: 18 (11/19/23 0719)  BP: 119/76 (11/19/23 0719)  SpO2: 100 % (11/19/23 0719) Vital Signs (24h Range):  Temp:  [97.5 °F (36.4 °C)-98.4 °F (36.9 °C)] 97.9 °F (36.6 °C)  Pulse:  [73-95] 75  Resp:  [18] 18  SpO2:  [95 %-100 %] 100 %  BP: (100-128)/(55-85) 119/76     Intake/Output - Last 3 Shifts         11/17 0700  11/18 0659 11/18 0700  11/19 0659 11/19 0700  11/20 0659    P.O. 240 120     I.V. (mL/kg) 560.7 (4.8) 980.7 (8.4)     IV Piggyback 393.8 442.9     Total Intake(mL/kg) 1194.5 (10.2) 1543.6 (13.2)     Urine (mL/kg/hr) 0 (0) 0 (0)     Drains 3 0     Stool  0     Total Output 3 0     Net +1191.5 +1543.6            Urine Occurrence 1 x 4 x      Stool Occurrence 0 x 1 x              Physical Exam  Constitutional:       General: She is not in acute distress.     Appearance: Normal appearance.   HENT:      Head: Normocephalic and atraumatic.      Nose: Nose normal.      Mouth/Throat:      Mouth: Mucous membranes are moist.   Eyes:      Extraocular Movements: Extraocular movements intact.   Cardiovascular:      Rate and Rhythm: Normal rate.      Pulses: Normal pulses.   Pulmonary:      Effort: Pulmonary effort is normal. No respiratory distress.   Abdominal:      Comments: Soft, mild TTP throughout, similar to previous exams. Ileostomy viable with stool in the bag. Drain with murky output similar to previous exam. Minimal output in bulb.    Musculoskeletal:         General: Normal range of motion.   Skin:     General: Skin is warm.   Neurological:      General: No focal deficit present.      Mental Status: She is alert and oriented to person, place, and time.            Significant Labs:  All pertinent lab results within the last 24 hours have been reviewed.     Significant Diagnostics:  I have reviewed all pertinent imaging results/findings within the past 24 hours.  Assessment/Plan:     * Abdominal wall fluid collections  38F Hx FAP s/p TPC w J pouch, J bowel takedown for pouch failure, VHR with mesh c/b mesh infection, and ultimately VHR with bilateral component separation (Phasix), SBR, ileostomy re-siting 8/7/23. Course was complicated by multiple abdominal wall fluid collections requiring drain placement and long term Daptomycin as well as ongoing chronic postoperative pain. Admitted with ongoing pain, nausea, and headaches.      Plan:  -Continue home oxycodone dose, OK for dilaudid PRN  -HDS, no tachycardia  -Continue regular diet as tolerated, mIVF  -GI consulted for ongoing nausea, suspect may be related to reflux seen on UGI. Plan for EGD tomorrow  - NPO at midnight  -BID PPI   - Will remove sub-fascial drain today   -ID consulted for daptomycin,  added cefepime for GNR in drain, speciation pending  -BCx x1 with GP rods, ID following, grew bacillus megaterium (likely contaminant), will maintain PICC line. Further BCx pending and NGTD  - DVT ppx  - daily labs          Reji Meyer MD  Colorectal Surgery  [facility]

## 2023-11-20 ENCOUNTER — ANESTHESIA (OUTPATIENT)
Dept: ENDOSCOPY | Facility: HOSPITAL | Age: 38
DRG: 863 | End: 2023-11-20
Payer: COMMERCIAL

## 2023-11-20 ENCOUNTER — ANESTHESIA EVENT (OUTPATIENT)
Dept: ENDOSCOPY | Facility: HOSPITAL | Age: 38
DRG: 863 | End: 2023-11-20
Payer: COMMERCIAL

## 2023-11-20 PROBLEM — E66.01 SEVERE OBESITY (BMI >= 40): Status: ACTIVE | Noted: 2023-11-20

## 2023-11-20 LAB
ANION GAP SERPL CALC-SCNC: 6 MMOL/L (ref 8–16)
BACTERIA BLD CULT: NORMAL
BACTERIA SPEC AEROBE CULT: ABNORMAL
BACTERIA SPEC AEROBE CULT: ABNORMAL
BACTERIA SPEC ANAEROBE CULT: NORMAL
BASOPHILS # BLD AUTO: 0.05 K/UL (ref 0–0.2)
BASOPHILS NFR BLD: 1.3 % (ref 0–1.9)
BUN SERPL-MCNC: 5 MG/DL (ref 6–20)
CALCIUM SERPL-MCNC: 9.1 MG/DL (ref 8.7–10.5)
CHLORIDE SERPL-SCNC: 105 MMOL/L (ref 95–110)
CO2 SERPL-SCNC: 25 MMOL/L (ref 23–29)
CREAT SERPL-MCNC: 0.8 MG/DL (ref 0.5–1.4)
CRP SERPL-MCNC: 3.5 MG/L (ref 0–8.2)
DIFFERENTIAL METHOD: ABNORMAL
EOSINOPHIL # BLD AUTO: 0.3 K/UL (ref 0–0.5)
EOSINOPHIL NFR BLD: 7.3 % (ref 0–8)
ERYTHROCYTE [DISTWIDTH] IN BLOOD BY AUTOMATED COUNT: 15.3 % (ref 11.5–14.5)
EST. GFR  (NO RACE VARIABLE): >60 ML/MIN/1.73 M^2
GLUCOSE SERPL-MCNC: 109 MG/DL (ref 70–110)
HCT VFR BLD AUTO: 36.2 % (ref 37–48.5)
HGB BLD-MCNC: 11.5 G/DL (ref 12–16)
IMM GRANULOCYTES # BLD AUTO: 0.01 K/UL (ref 0–0.04)
IMM GRANULOCYTES NFR BLD AUTO: 0.3 % (ref 0–0.5)
LYMPHOCYTES # BLD AUTO: 1.4 K/UL (ref 1–4.8)
LYMPHOCYTES NFR BLD: 34.1 % (ref 18–48)
MAGNESIUM SERPL-MCNC: 1.7 MG/DL (ref 1.6–2.6)
MCH RBC QN AUTO: 27.5 PG (ref 27–31)
MCHC RBC AUTO-ENTMCNC: 31.8 G/DL (ref 32–36)
MCV RBC AUTO: 87 FL (ref 82–98)
MONOCYTES # BLD AUTO: 0.4 K/UL (ref 0.3–1)
MONOCYTES NFR BLD: 10.9 % (ref 4–15)
NEUTROPHILS # BLD AUTO: 1.8 K/UL (ref 1.8–7.7)
NEUTROPHILS NFR BLD: 46.1 % (ref 38–73)
NRBC BLD-RTO: 0 /100 WBC
PHOSPHATE SERPL-MCNC: 3.3 MG/DL (ref 2.7–4.5)
PLATELET # BLD AUTO: 228 K/UL (ref 150–450)
PMV BLD AUTO: 11.8 FL (ref 9.2–12.9)
POTASSIUM SERPL-SCNC: 3.9 MMOL/L (ref 3.5–5.1)
RBC # BLD AUTO: 4.18 M/UL (ref 4–5.4)
SODIUM SERPL-SCNC: 136 MMOL/L (ref 136–145)
WBC # BLD AUTO: 3.96 K/UL (ref 3.9–12.7)

## 2023-11-20 PROCEDURE — D9220A PRA ANESTHESIA: ICD-10-PCS | Mod: ANES,,, | Performed by: STUDENT IN AN ORGANIZED HEALTH CARE EDUCATION/TRAINING PROGRAM

## 2023-11-20 PROCEDURE — 25000003 PHARM REV CODE 250: Performed by: STUDENT IN AN ORGANIZED HEALTH CARE EDUCATION/TRAINING PROGRAM

## 2023-11-20 PROCEDURE — 99233 SBSQ HOSP IP/OBS HIGH 50: CPT | Mod: ,,, | Performed by: NURSE PRACTITIONER

## 2023-11-20 PROCEDURE — 25000003 PHARM REV CODE 250

## 2023-11-20 PROCEDURE — 63600175 PHARM REV CODE 636 W HCPCS: Performed by: SURGERY

## 2023-11-20 PROCEDURE — D9220A PRA ANESTHESIA: ICD-10-PCS | Mod: CRNA,,, | Performed by: NURSE ANESTHETIST, CERTIFIED REGISTERED

## 2023-11-20 PROCEDURE — 43239 EGD BIOPSY SINGLE/MULTIPLE: CPT | Mod: ,,, | Performed by: STUDENT IN AN ORGANIZED HEALTH CARE EDUCATION/TRAINING PROGRAM

## 2023-11-20 PROCEDURE — 63600175 PHARM REV CODE 636 W HCPCS: Performed by: NURSE ANESTHETIST, CERTIFIED REGISTERED

## 2023-11-20 PROCEDURE — 20600001 HC STEP DOWN PRIVATE ROOM

## 2023-11-20 PROCEDURE — 88305 TISSUE EXAM BY PATHOLOGIST: ICD-10-PCS | Mod: 26,,, | Performed by: PATHOLOGY

## 2023-11-20 PROCEDURE — 37000009 HC ANESTHESIA EA ADD 15 MINS: Performed by: STUDENT IN AN ORGANIZED HEALTH CARE EDUCATION/TRAINING PROGRAM

## 2023-11-20 PROCEDURE — D9220A PRA ANESTHESIA: Mod: CRNA,,, | Performed by: NURSE ANESTHETIST, CERTIFIED REGISTERED

## 2023-11-20 PROCEDURE — 43239 PR EGD, FLEX, W/BIOPSY, SGL/MULTI: ICD-10-PCS | Mod: ,,, | Performed by: STUDENT IN AN ORGANIZED HEALTH CARE EDUCATION/TRAINING PROGRAM

## 2023-11-20 PROCEDURE — 63600175 PHARM REV CODE 636 W HCPCS: Performed by: STUDENT IN AN ORGANIZED HEALTH CARE EDUCATION/TRAINING PROGRAM

## 2023-11-20 PROCEDURE — 25000003 PHARM REV CODE 250: Performed by: NURSE ANESTHETIST, CERTIFIED REGISTERED

## 2023-11-20 PROCEDURE — 63600175 PHARM REV CODE 636 W HCPCS: Performed by: PHYSICIAN ASSISTANT

## 2023-11-20 PROCEDURE — 83735 ASSAY OF MAGNESIUM: CPT | Performed by: STUDENT IN AN ORGANIZED HEALTH CARE EDUCATION/TRAINING PROGRAM

## 2023-11-20 PROCEDURE — 88305 TISSUE EXAM BY PATHOLOGIST: CPT | Performed by: PATHOLOGY

## 2023-11-20 PROCEDURE — 27201012 HC FORCEPS, HOT/COLD, DISP: Performed by: STUDENT IN AN ORGANIZED HEALTH CARE EDUCATION/TRAINING PROGRAM

## 2023-11-20 PROCEDURE — 85025 COMPLETE CBC W/AUTO DIFF WBC: CPT | Performed by: STUDENT IN AN ORGANIZED HEALTH CARE EDUCATION/TRAINING PROGRAM

## 2023-11-20 PROCEDURE — 84100 ASSAY OF PHOSPHORUS: CPT | Performed by: STUDENT IN AN ORGANIZED HEALTH CARE EDUCATION/TRAINING PROGRAM

## 2023-11-20 PROCEDURE — 88305 TISSUE EXAM BY PATHOLOGIST: CPT | Mod: 26,,, | Performed by: PATHOLOGY

## 2023-11-20 PROCEDURE — 94761 N-INVAS EAR/PLS OXIMETRY MLT: CPT

## 2023-11-20 PROCEDURE — 80048 BASIC METABOLIC PNL TOTAL CA: CPT | Performed by: STUDENT IN AN ORGANIZED HEALTH CARE EDUCATION/TRAINING PROGRAM

## 2023-11-20 PROCEDURE — 63600175 PHARM REV CODE 636 W HCPCS

## 2023-11-20 PROCEDURE — D9220A PRA ANESTHESIA: Mod: ANES,,, | Performed by: STUDENT IN AN ORGANIZED HEALTH CARE EDUCATION/TRAINING PROGRAM

## 2023-11-20 PROCEDURE — 86140 C-REACTIVE PROTEIN: CPT | Performed by: SURGERY

## 2023-11-20 PROCEDURE — 25000003 PHARM REV CODE 250: Performed by: PHYSICIAN ASSISTANT

## 2023-11-20 PROCEDURE — 43239 EGD BIOPSY SINGLE/MULTIPLE: CPT | Performed by: STUDENT IN AN ORGANIZED HEALTH CARE EDUCATION/TRAINING PROGRAM

## 2023-11-20 PROCEDURE — 99233 PR SUBSEQUENT HOSPITAL CARE,LEVL III: ICD-10-PCS | Mod: ,,, | Performed by: NURSE PRACTITIONER

## 2023-11-20 PROCEDURE — 25000003 PHARM REV CODE 250: Performed by: NURSE PRACTITIONER

## 2023-11-20 PROCEDURE — 37000008 HC ANESTHESIA 1ST 15 MINUTES: Performed by: STUDENT IN AN ORGANIZED HEALTH CARE EDUCATION/TRAINING PROGRAM

## 2023-11-20 RX ORDER — FENTANYL CITRATE 50 UG/ML
INJECTION, SOLUTION INTRAMUSCULAR; INTRAVENOUS
Status: DISCONTINUED | OUTPATIENT
Start: 2023-11-20 | End: 2023-11-20

## 2023-11-20 RX ORDER — MIDAZOLAM HYDROCHLORIDE 1 MG/ML
INJECTION, SOLUTION INTRAMUSCULAR; INTRAVENOUS
Status: DISCONTINUED | OUTPATIENT
Start: 2023-11-20 | End: 2023-11-20

## 2023-11-20 RX ORDER — HYDROMORPHONE HYDROCHLORIDE 1 MG/ML
INJECTION, SOLUTION INTRAMUSCULAR; INTRAVENOUS; SUBCUTANEOUS
Status: DISPENSED
Start: 2023-11-20 | End: 2023-11-20

## 2023-11-20 RX ORDER — SODIUM CHLORIDE 0.9 % (FLUSH) 0.9 %
3 SYRINGE (ML) INJECTION EVERY 4 HOURS PRN
Status: DISCONTINUED | OUTPATIENT
Start: 2023-11-20 | End: 2023-11-20 | Stop reason: HOSPADM

## 2023-11-20 RX ORDER — PROPOFOL 10 MG/ML
VIAL (ML) INTRAVENOUS
Status: DISCONTINUED | OUTPATIENT
Start: 2023-11-20 | End: 2023-11-20

## 2023-11-20 RX ORDER — HALOPERIDOL 5 MG/ML
0.5 INJECTION INTRAMUSCULAR EVERY 10 MIN PRN
Status: DISCONTINUED | OUTPATIENT
Start: 2023-11-20 | End: 2023-11-20 | Stop reason: HOSPADM

## 2023-11-20 RX ORDER — ONDANSETRON 2 MG/ML
INJECTION INTRAMUSCULAR; INTRAVENOUS
Status: DISCONTINUED | OUTPATIENT
Start: 2023-11-20 | End: 2023-11-20

## 2023-11-20 RX ORDER — FLUCONAZOLE 150 MG/1
150 TABLET ORAL DAILY
Status: DISCONTINUED | OUTPATIENT
Start: 2023-11-20 | End: 2023-11-21 | Stop reason: HOSPADM

## 2023-11-20 RX ORDER — DEXAMETHASONE SODIUM PHOSPHATE 4 MG/ML
INJECTION, SOLUTION INTRA-ARTICULAR; INTRALESIONAL; INTRAMUSCULAR; INTRAVENOUS; SOFT TISSUE
Status: DISCONTINUED | OUTPATIENT
Start: 2023-11-20 | End: 2023-11-20

## 2023-11-20 RX ORDER — SUCCINYLCHOLINE CHLORIDE 20 MG/ML
INJECTION INTRAMUSCULAR; INTRAVENOUS
Status: DISCONTINUED | OUTPATIENT
Start: 2023-11-20 | End: 2023-11-20

## 2023-11-20 RX ORDER — HYDROMORPHONE HYDROCHLORIDE 1 MG/ML
0.5 INJECTION, SOLUTION INTRAMUSCULAR; INTRAVENOUS; SUBCUTANEOUS EVERY 10 MIN PRN
Status: DISCONTINUED | OUTPATIENT
Start: 2023-11-20 | End: 2023-11-20 | Stop reason: HOSPADM

## 2023-11-20 RX ADMIN — OXYCODONE HYDROCHLORIDE 30 MG: 10 TABLET ORAL at 10:11

## 2023-11-20 RX ADMIN — CEFEPIME 2 G: 2 INJECTION, POWDER, FOR SOLUTION INTRAVENOUS at 03:11

## 2023-11-20 RX ADMIN — HYDROMORPHONE HYDROCHLORIDE 1 MG: 1 INJECTION, SOLUTION INTRAMUSCULAR; INTRAVENOUS; SUBCUTANEOUS at 01:11

## 2023-11-20 RX ADMIN — ACETAMINOPHEN 650 MG: 325 TABLET ORAL at 06:11

## 2023-11-20 RX ADMIN — CEFEPIME 2 G: 2 INJECTION, POWDER, FOR SOLUTION INTRAVENOUS at 08:11

## 2023-11-20 RX ADMIN — ACETAMINOPHEN 650 MG: 325 TABLET ORAL at 05:11

## 2023-11-20 RX ADMIN — PROPOFOL 200 MG: 10 INJECTION, EMULSION INTRAVENOUS at 09:11

## 2023-11-20 RX ADMIN — FLUCONAZOLE 150 MG: 150 TABLET ORAL at 12:11

## 2023-11-20 RX ADMIN — HYDROMORPHONE HYDROCHLORIDE 0.5 MG: 1 INJECTION, SOLUTION INTRAMUSCULAR; INTRAVENOUS; SUBCUTANEOUS at 10:11

## 2023-11-20 RX ADMIN — VENLAFAXINE 150 MG: 75 TABLET ORAL at 10:11

## 2023-11-20 RX ADMIN — OXYCODONE HYDROCHLORIDE 30 MG: 10 TABLET ORAL at 03:11

## 2023-11-20 RX ADMIN — HYDROMORPHONE HYDROCHLORIDE 1 MG: 1 INJECTION, SOLUTION INTRAMUSCULAR; INTRAVENOUS; SUBCUTANEOUS at 12:11

## 2023-11-20 RX ADMIN — POTASSIUM CHLORIDE, DEXTROSE MONOHYDRATE AND SODIUM CHLORIDE: 150; 5; 450 INJECTION, SOLUTION INTRAVENOUS at 05:11

## 2023-11-20 RX ADMIN — HYDROMORPHONE HYDROCHLORIDE 1 MG: 1 INJECTION, SOLUTION INTRAMUSCULAR; INTRAVENOUS; SUBCUTANEOUS at 08:11

## 2023-11-20 RX ADMIN — HYDROMORPHONE HYDROCHLORIDE 1 MG: 1 INJECTION, SOLUTION INTRAMUSCULAR; INTRAVENOUS; SUBCUTANEOUS at 05:11

## 2023-11-20 RX ADMIN — FENTANYL CITRATE 100 MCG: 50 INJECTION, SOLUTION INTRAMUSCULAR; INTRAVENOUS at 09:11

## 2023-11-20 RX ADMIN — MIDAZOLAM HYDROCHLORIDE 2 MG: 1 INJECTION, SOLUTION INTRAMUSCULAR; INTRAVENOUS at 09:11

## 2023-11-20 RX ADMIN — CEFEPIME 2 G: 2 INJECTION, POWDER, FOR SOLUTION INTRAVENOUS at 12:11

## 2023-11-20 RX ADMIN — HYDROXYZINE PAMOATE 25 MG: 25 CAPSULE ORAL at 10:11

## 2023-11-20 RX ADMIN — HYDROMORPHONE HYDROCHLORIDE 1 MG: 1 INJECTION, SOLUTION INTRAMUSCULAR; INTRAVENOUS; SUBCUTANEOUS at 02:11

## 2023-11-20 RX ADMIN — HYDROMORPHONE HYDROCHLORIDE 1 MG: 1 INJECTION, SOLUTION INTRAMUSCULAR; INTRAVENOUS; SUBCUTANEOUS at 07:11

## 2023-11-20 RX ADMIN — SODIUM CHLORIDE: 0.9 INJECTION, SOLUTION INTRAVENOUS at 09:11

## 2023-11-20 RX ADMIN — PANTOPRAZOLE SODIUM 40 MG: 40 TABLET, DELAYED RELEASE ORAL at 04:11

## 2023-11-20 RX ADMIN — SUCCINYLCHOLINE CHLORIDE 200 MG: 20 INJECTION, SOLUTION INTRAMUSCULAR; INTRAVENOUS at 09:11

## 2023-11-20 RX ADMIN — ONDANSETRON 4 MG: 2 INJECTION INTRAMUSCULAR; INTRAVENOUS at 10:11

## 2023-11-20 RX ADMIN — ONDANSETRON 4 MG: 2 INJECTION INTRAMUSCULAR; INTRAVENOUS at 09:11

## 2023-11-20 RX ADMIN — DAPTOMYCIN 920 MG: 350 INJECTION, POWDER, LYOPHILIZED, FOR SOLUTION INTRAVENOUS at 07:11

## 2023-11-20 RX ADMIN — PANTOPRAZOLE SODIUM 40 MG: 40 TABLET, DELAYED RELEASE ORAL at 05:11

## 2023-11-20 RX ADMIN — ONDANSETRON 4 MG: 2 INJECTION INTRAMUSCULAR; INTRAVENOUS at 07:11

## 2023-11-20 RX ADMIN — DEXAMETHASONE SODIUM PHOSPHATE 4 MG: 4 INJECTION, SOLUTION INTRAMUSCULAR; INTRAVENOUS at 09:11

## 2023-11-20 RX ADMIN — ENOXAPARIN SODIUM 40 MG: 40 INJECTION SUBCUTANEOUS at 04:11

## 2023-11-20 RX ADMIN — OXYCODONE HYDROCHLORIDE 30 MG: 10 TABLET ORAL at 04:11

## 2023-11-20 RX ADMIN — ACETAMINOPHEN 650 MG: 325 TABLET ORAL at 12:11

## 2023-11-20 RX ADMIN — ONDANSETRON 4 MG: 2 INJECTION INTRAMUSCULAR; INTRAVENOUS at 12:11

## 2023-11-20 NOTE — TREATMENT PLAN
GI Post Procedure Treatment Plan  Procedure Performed: EGD    Impression:    - LA Grade A esophagitis.                          - Z-line, 36 cm from the incisors.                          - Single linear erosion in gastric body.                          - Gastritis. Biopsied.                          - Normal examined duodenum. Biopsied.     Recommendation:                                - Await pathology results.                          - Return patient to [facility] for ongoing care.                          - Resume previous diet.                          - Continue present medications.                          - Repeat upper endoscopy with side-viewing scope                          with AES to screen for periampullary duodenal                          polyps at appointment to be scheduled.     - We will sign-off.      Malou Brown MD  Gastroenterology, PGY-4         [Good] : ~his/her~ current health as good [Yes] : Yes [No] : In the past 12 months have you used drugs other than those required for medical reasons? No [No Retinopathy] : No retinopathy [Patient reported colonoscopy was normal] : Patient reported colonoscopy was normal [Smoke Detector] : smoke detector [Carbon Monoxide Detector] : carbon monoxide detector [Seat Belt] :  uses seat belt [Sunscreen] : uses sunscreen [1] : 1) Little interest or pleasure doing things for several days (1) [0] : 2) Feeling down, depressed, or hopeless: Not at all (0) [Unemployed] : unemployed [] :  [Fully functional (bathing, dressing, toileting, transferring, walking, feeding)] : Fully functional (bathing, dressing, toileting, transferring, walking, feeding) [FreeTextEntry1] : rash over body, bumps on head, physical  [] : No [de-identified] : occasional  [OVO7Oczmu] : 1 [EyeExamDate] : 01/2018 [Sexually Active] : not sexually active [Guns at Home] : no guns at home [ColonoscopyDate] : 05/2019 [FreeTextEntry2] : Teacher, consultant

## 2023-11-20 NOTE — H&P
Endoscopy History and Physical    PCP - No, Primary Doctor    Procedure - EGD  ASA - per anesthesia  Mallampati - per anesthesia  Plan of anesthesia - MAC    HPI:  This is a 38 y.o. female here for evaluation of :  intractable nausea, vomiting  History of FAP    ROS:  Constitutional: No fevers, chills  CV: No chest pain  Pulm: No cough  Ophtho: No vision changes  GI: see HPI  Derm: No rash    Medical History:  has a past medical history of Anxiety, Familial polyposis, and S/P total colectomy.    Surgical History:  has a past surgical history that includes Total colectomy; Illeotomy Creation (2009); Davinci Assisted Bilateral Ovarian Cystectomy, with extension  SEAN  (8/2011); Hysterectomy (8/23/2012); Oophorectomy (8/23/2012); Repair of recurrent ventral hernia (N/A, 8/7/2023); Ileostomy revision (N/A, 8/7/2023); and Lysis of adhesions (N/A, 8/7/2023).    Family History: family history includes Colon cancer in her maternal grandmother; Lung cancer in her maternal grandfather.. Otherwise no colon cancer, inflammatory bowel disease, or GI malignancies.    Social History:  reports that she has quit smoking. She has never used smokeless tobacco. She reports that she does not currently use alcohol. She reports that she does not use drugs.    Review of patient's allergies indicates:   Allergen Reactions    Levofloxacin Nausea And Vomiting and Rash    Morphine Anaphylaxis, Hives, Shortness Of Breath and Hallucinations           Piperacillin-tazobactam Rash    Sulfa (sulfonamide antibiotics) Nausea And Vomiting and Rash    Opioids - morphine analogues     Hydrocodone-acetaminophen Itching       Medications:   Medications Prior to Admission   Medication Sig Dispense Refill Last Dose    clonazepam (KLONOPIN) 1 MG tablet Take 1 mg by mouth 2 (two) times daily as needed.         cyanocobalamin 1,000 mcg/mL injection 1,000 mcg twice a week.       fluconazole (DIFLUCAN) 150 MG Tab Take 1 tablet (150 mg total) by mouth once daily.  3 tablet 3     ibuprofen (ADVIL,MOTRIN) 800 MG tablet Take 1 tablet (800 mg total) by mouth every 8 (eight) hours. (Patient not taking: Reported on 11/3/2023) 30 tablet 3     LIDOcaine (LIDODERM) 5 % Place 1 patch onto the skin once daily. Remove & Discard patch within 12 hours or as directed by MD (Patient not taking: Reported on 10/16/2023) 30 patch 3     losartan (COZAAR) 100 MG tablet Take 100 mg by mouth.       miconazole (MICONAZOLE-7) 2 % vaginal cream Place 1 applicator vaginally every evening. 1 kit 3     miconazole (MICOTIN) 100 mg vaginal suppository Place 1 suppository (100 mg total) vaginally every evening. (Patient not taking: Reported on 10/16/2023) 7 suppository 0     ondansetron (ZOFRAN) 4 MG tablet Take 4 mg by mouth every 6 (six) hours as needed for Nausea.       ondansetron (ZOFRAN-ODT) 4 MG TbDL Dissolve 1 tablet (4 mg total) by mouth every 6 (six) hours as needed. 40 tablet 3     oxyCODONE (ROXICODONE) 30 MG Tab Take 1 tablet (30 mg total) by mouth every 4 (four) hours as needed (pain). 25 tablet 0     progesterone (PROMETRIUM) 100 MG capsule Take 400 mg by mouth every evening.       venlafaxine (EFFEXOR) 100 MG Tab Take 150 mg by mouth Daily.            Vital Signs:   Vitals:    11/20/23 0731   BP: (!) 140/85   Pulse: 73   Resp: 20   Temp: 97.6 °F (36.4 °C)       General Appearance: Well appearing in no acute distress  Eyes:    No scleral icterus  ENT: atraumatic  Abdomen: Soft, nondistended  Extremities: no tenderness  Skin: normal color    Labs:  Lab Results   Component Value Date    WBC 3.96 11/20/2023    HGB 11.5 (L) 11/20/2023    HCT 36.2 (L) 11/20/2023     11/20/2023    ALT 15 11/15/2023    AST 14 11/15/2023     11/20/2023    K 3.9 11/20/2023     11/20/2023    CREATININE 0.8 11/20/2023    BUN 5 (L) 11/20/2023    CO2 25 11/20/2023    TSH 0.848 01/25/2016    INR 1.0 10/05/2023    HGBA1C 5.9 01/25/2016       I have explained the risks and benefits of endoscopy procedures  to the patient/their POA including but not limited to bleeding, perforation, infection, and death.  The patient/POA was asked if they understand and allowed to ask any further questions to their satisfaction.    Proceed with EGD    Ty Kapadia MD

## 2023-11-20 NOTE — ANESTHESIA POSTPROCEDURE EVALUATION
Anesthesia Post Evaluation    Patient: Georgette Abrams    Procedure(s) Performed: Procedure(s) (LRB):  EGD (ESOPHAGOGASTRODUODENOSCOPY) (N/A)    Final Anesthesia Type: general      Patient location during evaluation: PACU  Patient participation: Yes- Able to Participate  Level of consciousness: awake and alert  Post-procedure vital signs: reviewed and stable  Pain management: adequate  Airway patency: patent    PONV status at discharge: No PONV  Anesthetic complications: no      Cardiovascular status: blood pressure returned to baseline  Respiratory status: unassisted  Hydration status: euvolemic  Follow-up not needed.          Vitals Value Taken Time   /76 11/20/23 1229   Temp 36.4 °C (97.5 °F) 11/20/23 1050   Pulse 66 11/20/23 1229   Resp 18 11/20/23 1216   SpO2 98 % 11/20/23 1050         Event Time   Out of Recovery 10:25:59         Pain/Abhijit Score: Pain Rating Prior to Med Admin: 6 (11/20/2023 12:26 PM)  Pain Rating Post Med Admin: 6 (11/20/2023 12:26 PM)  Abhijit Score: 10 (11/20/2023 10:15 AM)

## 2023-11-20 NOTE — ANESTHESIA PROCEDURE NOTES
Intubation    Date/Time: 11/20/2023 9:39 AM    Performed by: Lukas Maddox CRNA  Authorized by: Les Jean-Baptiste MD    Intubation:     Induction:  Intravenous    Intubated:  Postinduction    Mask Ventilation:  Not attempted    Attempts:  1    Attempted By:  CRNA    Method of Intubation:  Direct    Blade:  Barlow 2    Laryngeal View Grade: Grade I - full view of cords      Difficult Airway Encountered?: No      Complications:  None    Airway Device:  Oral endotracheal tube    Airway Device Size:  7.5    Style/Cuff Inflation:  Cuffed    Inflation Amount (mL):  8    Tube secured:  23    Secured at:  The lips    Placement Verified By:  Capnometry    Complicating Factors:  None    Findings Post-Intubation:  BS equal bilateral and atraumatic/condition of teeth unchanged

## 2023-11-20 NOTE — PLAN OF CARE
I have reviewed the chart of Georgette Abrams and collaborated with KRISH Tracy MD in the care of the patient who is hospitalized for the following:    Active Hospital Problems    Diagnosis    *Abdominal wall fluid collections    Severe obesity (BMI >= 40)    Intractable nausea and vomiting    Abdominal pain    S/P colectomy          I have reviewed the Georgette Abrams with the multidisciplinary team during discharge huddle.       Elisabeth Huang PA-C  [facility]

## 2023-11-20 NOTE — NURSING
Nurses Note -- 4 Eyes      11/20/2023   3:20 PM      Skin assessed during: Daily Assessment      [] No Altered Skin Integrity Present    []Prevention Measures Documented      [x] Yes- Altered Skin Integrity Present or Discovered   [] LDA Added if Not in Epic (Describe Wound)   [x] New Altered Skin Integrity was Present on Admit and Documented in LDA. Surgical incision/sites   [] Wound Image Taken    Wound Care Consulted? No    Attending Nurse:  Chantelle VICKERS     Second RN/Staff Member:  Alvarez KATHLEEN

## 2023-11-20 NOTE — TRANSFER OF CARE
"Anesthesia Transfer of Care Note    Patient: Georgette Abrams    Procedure(s) Performed: Procedure(s) (LRB):  EGD (ESOPHAGOGASTRODUODENOSCOPY) (N/A)    Patient location: PACU    Anesthesia Type: general    Transport from OR: Transported from OR on 6-10 L/min O2 by face mask with adequate spontaneous ventilation    Post pain: adequate analgesia    Post assessment: no apparent anesthetic complications    Post vital signs: stable    Level of consciousness: awake, alert and oriented    Nausea/Vomiting: no nausea/vomiting    Complications: none    Transfer of care protocol was followed      Last vitals: Visit Vitals  BP (!) 153/75 (BP Location: Left arm, Patient Position: Lying)   Pulse 74   Temp 36.7 °C (98.1 °F) (Oral)   Resp 18   Ht 5' 7" (1.702 m)   Wt 117 kg (257 lb 15 oz)   LMP 08/07/2012   SpO2 99%   Breastfeeding No   BMI 40.40 kg/m²     "

## 2023-11-20 NOTE — PROGRESS NOTES
[facility] bhavin [facility]  Colorectal Surgery  Progress Note    Patient Name: Georgette Abrams  MRN: [id number]  Admission Date: 11/15/2023  Hospital Length of Stay: 5 days  Attending Physician: KRISH Tracy MD    Subjective:     Interval History: no acute events overnite, requiring oxy 30 mgm and IV dilaudid for pain control, for egd today    Post-Op Info:  Procedure(s) (LRB):  EGD (ESOPHAGOGASTRODUODENOSCOPY) (N/A)   Day of Surgery      Medications:  Continuous Infusions:   dextrose 5 % and 0.45 % NaCl with KCl 20 mEq 40 mL/hr at 11/20/23 0547     Scheduled Meds:   acetaminophen  650 mg Oral Q6H    ceFEPime (MAXIPIME) IVPB  2 g Intravenous Q8H    DAPTOmycin (CUBICIN) IV (PEDS and ADULTS)  8 mg/kg Intravenous Q24H    droNABinol  2.5 mg Oral BID    enoxparin  40 mg Subcutaneous Daily    fluconazole  150 mg Oral Daily    HYDROmorphone        pantoprazole  40 mg Oral BID AC    scopolamine  1 patch Transdermal Q3 Days    venlafaxine  150 mg Oral Daily     PRN Meds:   albuterol-ipratropium    hydrALAZINE    HYDROmorphone    HYDROmorphone    hydrOXYzine pamoate    iohexoL    LIDOcaine (PF) 10 mg/ml (1%)    melatonin    ondansetron    oxyCODONE    sodium chloride 0.9%        Objective:     Vital Signs (Most Recent):  Temp: 97.5 °F (36.4 °C) (11/20/23 1050)  Pulse: 96 (11/20/23 1050)  Resp: 18 (11/20/23 1052)  BP: (!) 165/87 (11/20/23 1050)  SpO2: 98 % (11/20/23 1050) Vital Signs (24h Range):  Temp:  [97.5 °F (36.4 °C)-98.6 °F (37 °C)] 97.5 °F (36.4 °C)  Pulse:  [] 96  Resp:  [10-22] 18  SpO2:  [95 %-100 %] 98 %  BP: (117-165)/(68-87) 165/87     Intake/Output - Last 3 Shifts         11/18 0700  11/19 0659 11/19 0700  11/20 0659 11/20 0700 11/21 0659    P.O. 120 240     I.V. (mL/kg) 980.7 (8.4) 436.8 (3.7)     IV Piggyback 442.9      Total Intake(mL/kg) 1543.6 (13.2) 676.8 (5.8)     Urine (mL/kg/hr) 0 (0) 0 (0)     Drains 0 0     Stool 0      Total Output 0 0     Net +1543.6 +676.8            Urine Occurrence 4 x 8  x 1 x    Stool Occurrence 1 x               Physical Exam  Vitals and nursing note reviewed.   Constitutional:       Appearance: She is well-developed.   HENT:      Head: Normocephalic.   Eyes:      Pupils: Pupils are equal, round, and reactive to light.   Cardiovascular:      Rate and Rhythm: Normal rate and regular rhythm.      Heart sounds: Normal heart sounds.   Pulmonary:      Effort: Pulmonary effort is normal. No respiratory distress.      Breath sounds: Normal breath sounds. No wheezing or rales.   Abdominal:      Palpations: Abdomen is soft. There is no mass.      Tenderness: There is no guarding or rebound.      Comments: IR drain brownish drainage   Skin:     General: Skin is warm and dry.   Neurological:      Mental Status: She is alert and oriented to person, place, and time.   Psychiatric:      Comments: Hx of short term memory loss, in nursing home  Mentality at baseline            Significant Labs:  BMP (Last 3 Results):   Recent Labs   Lab 11/18/23  0143 11/19/23  0130 11/20/23  0541    122* 109    139 136   K 3.9 3.9 3.9    106 105   CO2 26 24 25   BUN 5* 5* 5*   CREATININE 0.7 0.8 0.8   CALCIUM 9.5 9.1 9.1   MG 1.7 1.6 1.7     CBC (Last 3 Results):   Recent Labs   Lab 11/18/23  0143 11/19/23  0130 11/20/23  0541   WBC 5.97 5.18 3.96   RBC 4.44 4.21 4.18   HGB 12.1 11.6* 11.5*   HCT 38.2 36.5* 36.2*    210 228   MCV 86 87 87   MCH 27.3 27.6 27.5   MCHC 31.7* 31.8* 31.8*       Significant Diagnostics:  None  Assessment/Plan:     * Abdominal wall fluid collections  38F Hx FAP s/p TPC w J pouch, J bowel takedown for pouch failure, VHR with mesh c/b mesh infection, and ultimately VHR with bilateral component separation (Phasix), SBR, ileostomy re-siting 8/7/23. Course was complicated by multiple abdominal wall fluid collections requiring drain placement and long term Daptomycin as well as ongoing chronic postoperative pain. Admitted with ongoing pain, nausea, and headaches.       Plan:  -Continue home oxycodone dose, OK for dilaudid PRN  -HDS, no tachycardia  -Continue regular diet as tolerated, mIVF  -GI consulted for ongoing nausea, suspect may be related to reflux seen on UGI. Plan for EGD tomorrow  - NPO at midnight  -BID PPI   - Will remove sub-fascial drain today   -ID consulted for daptomycin, added cefepime for GNR in drain, speciation pending  -BCx x1 with GP rods, ID following, grew bacillus megaterium (likely contaminant), will maintain PICC line. Further BCx pending and NGTD  - DVT ppx  - daily labs    Intractable nausea and vomiting  Asking for diet  No nausea at present    Abdominal pain  Home does of oxy and IV dilaudid          Nell Gaytan NP  Colorectal Surgery  [facility] - [facility]

## 2023-11-20 NOTE — ANESTHESIA PREPROCEDURE EVALUATION
Pre-operative evaluation for Procedure(s) (LRB):  EGD (ESOPHAGOGASTRODUODENOSCOPY) (N/A)    Georgette Abrams is a 38 y.o. female with complex medical and surgical history including familial adenomatous polyposis (s/p multiple abdominal surgeries with significant complications), who presents with severe abdominal pain and N/V. Plan for the above procedure.     Patient Active Problem List   Diagnosis    S/P colectomy    Attention to ileostomy    Incisional hernia with obstruction but no gangrene    Ileostomy dysfunction    Small bowel obstruction    Pelvic abscess in female    Chronic pain syndrome    Abdominal wall fluid collections    Abdominal pain    Intractable nausea and vomiting        Current Facility-Administered Medications on File Prior to Encounter   Medication Dose Route Frequency Provider Last Rate Last Admin    LIDOcaine (PF) 10 mg/ml (1%) injection 10 mg  1 mL Intradermal On Call Procedure Rhina Saldana, NP         Current Outpatient Medications on File Prior to Encounter   Medication Sig Dispense Refill    clonazepam (KLONOPIN) 1 MG tablet Take 1 mg by mouth 2 (two) times daily as needed.        cyanocobalamin 1,000 mcg/mL injection 1,000 mcg twice a week.      fluconazole (DIFLUCAN) 150 MG Tab Take 1 tablet (150 mg total) by mouth once daily. 3 tablet 3    ibuprofen (ADVIL,MOTRIN) 800 MG tablet Take 1 tablet (800 mg total) by mouth every 8 (eight) hours. (Patient not taking: Reported on 11/3/2023) 30 tablet 3    LIDOcaine (LIDODERM) 5 % Place 1 patch onto the skin once daily. Remove & Discard patch within 12 hours or as directed by MD (Patient not taking: Reported on 10/16/2023) 30 patch 3    losartan (COZAAR) 100 MG tablet Take 100 mg by mouth.      miconazole (MICONAZOLE-7) 2 % vaginal cream Place 1 applicator vaginally every evening. 1 kit 3    miconazole (MICOTIN) 100 mg vaginal suppository Place 1 suppository (100 mg total) vaginally every  evening. (Patient not taking: Reported on 10/16/2023) 7 suppository 0    ondansetron (ZOFRAN) 4 MG tablet Take 4 mg by mouth every 6 (six) hours as needed for Nausea.      ondansetron (ZOFRAN-ODT) 4 MG TbDL Dissolve 1 tablet (4 mg total) by mouth every 6 (six) hours as needed. 40 tablet 3    oxyCODONE (ROXICODONE) 30 MG Tab Take 1 tablet (30 mg total) by mouth every 4 (four) hours as needed (pain). 25 tablet 0    progesterone (PROMETRIUM) 100 MG capsule Take 400 mg by mouth every evening.      venlafaxine (EFFEXOR) 100 MG Tab Take 150 mg by mouth Daily.         Past Surgical History:   Procedure Laterality Date    Davinci Assisted Bilateral Ovarian Cystectomy, with extension  SEAN   8/2011    HYSTERECTOMY  8/23/2012    DAVINCI     ILEOSTOMY REVISION N/A 8/7/2023    Procedure: REVISION, ILEOSTOMY;  Surgeon: KRISH Tracy MD;  Location: [facility] OR 2ND FLR;  Service: Colon and Rectal;  Laterality: N/A;    Illeotomy Creation  2009    LYSIS OF ADHESIONS N/A 8/7/2023    Procedure: LYSIS, ADHESIONS;  Surgeon: KRISH Tracy MD;  Location: [facility] OR 2ND FLR;  Service: Colon and Rectal;  Laterality: N/A;    OOPHORECTOMY  8/23/2012    DAvinci removal with DLH     REPAIR OF RECURRENT VENTRAL HERNIA N/A 8/7/2023    Procedure: REPAIR, HERNIA, VENTRAL, RECURRENT, ERAS low;  Surgeon: KRISH Tracy MD;  Location: [facility] OR 2ND FLR;  Service: Colon and Rectal;  Laterality: N/A;  w/ mesh and omental pedical flap    TOTAL COLECTOMY      Iloanal pouch creation           Pre-op Assessment    I have reviewed the Patient Summary Reports.     I have reviewed the Nursing Notes. I have reviewed the NPO Status.   I have reviewed the Medications.     Review of Systems  Anesthesia Hx:    System negative unless otherwise specified in the HPI or problem list above           Denies Family Hx of Anesthesia complications.    Denies Personal Hx of Anesthesia complications.                    Hematology/Oncology:                    Hematology Comments: System negative unless otherwise specified in the HPI or problem list above                Oncology Comments: System negative unless otherwise specified in the HPI or problem list above     EENT/Dental:   System negative unless otherwise specified in the HPI or problem list above          Cardiovascular:                    System negative unless otherwise specified in the HPI or problem list above                         Pulmonary:         System negative unless otherwise specified in the HPI or problem list above               Renal/:     System negative unless otherwise specified in the HPI or problem list above             Hepatic/GI:        System negative unless otherwise specified in the HPI or problem list above          Musculoskeletal:     System negative unless otherwise specified in the HPI or problem list above            OB/GYN/PEDS:          System negative unless otherwise specified in the HPI or problem list above   Neurological:           System negative unless otherwise specified in the HPI or problem list above                            Endocrine:     System negative unless otherwise specified in the HPI or problem list above        Dermatological:  System negative unless otherwise specified in the HPI or problem list above   Psych:     System negative unless otherwise specified in the HPI or problem list above               Physical Exam  General: Well nourished, Cooperative, Alert and Oriented    Airway:  Mouth Opening: Normal  TM Distance: Normal  Tongue: Normal  Neck ROM: Normal ROM    Chest/Lungs:  Clear to auscultation, Normal Respiratory Rate    Heart:  Rate: Normal  Rhythm: Regular Rhythm  Sounds: Normal        Anesthesia Plan  Type of Anesthesia, risks & benefits discussed:    Anesthesia Type: Gen ETT  Intra-op Monitoring Plan: Standard ASA Monitors  Post Op Pain Control Plan: multimodal analgesia and IV/PO Opioids PRN  Induction:  IV  Airway Plan: Direct,  Post-Induction  Informed Consent: Informed consent signed with the Patient and all parties understand the risks and agree with anesthesia plan.  All questions answered.   ASA Score: 3  Day of Surgery Review of History & Physical: H&P Update referred to the surgeon/provider.    Ready For Surgery From Anesthesia Perspective.     .

## 2023-11-20 NOTE — NURSING TRANSFER
Nursing Transfer Note      11/20/2023   10:25 AM    Nurse giving handoff:BHARGAV Conn  Nurse receiving handoff:Mariana    Reason patient is being transferred: Out of PACU    Transfer To: [place] [id number]    Transfer via stretcher    Transported by jah maher    Order for Tele Monitor? No    4eyes on Skin: yes    Any special needs or follow-up needed: No    Patient belongings transferred with patient: No    Chart send with patient: Yes    Notified: father    Patient reassessed at: 102

## 2023-11-20 NOTE — SUBJECTIVE & OBJECTIVE
Subjective:     Interval History: no acute events overnite, requiring oxy 30 mgm and IV dilaudid for pain control, for egd today    Post-Op Info:  Procedure(s) (LRB):  EGD (ESOPHAGOGASTRODUODENOSCOPY) (N/A)   Day of Surgery      Medications:  Continuous Infusions:   dextrose 5 % and 0.45 % NaCl with KCl 20 mEq 40 mL/hr at 11/20/23 0547     Scheduled Meds:   acetaminophen  650 mg Oral Q6H    ceFEPime (MAXIPIME) IVPB  2 g Intravenous Q8H    DAPTOmycin (CUBICIN) IV (PEDS and ADULTS)  8 mg/kg Intravenous Q24H    droNABinol  2.5 mg Oral BID    enoxparin  40 mg Subcutaneous Daily    fluconazole  150 mg Oral Daily    HYDROmorphone        pantoprazole  40 mg Oral BID AC    scopolamine  1 patch Transdermal Q3 Days    venlafaxine  150 mg Oral Daily     PRN Meds:   albuterol-ipratropium    hydrALAZINE    HYDROmorphone    HYDROmorphone    hydrOXYzine pamoate    iohexoL    LIDOcaine (PF) 10 mg/ml (1%)    melatonin    ondansetron    oxyCODONE    sodium chloride 0.9%        Objective:     Vital Signs (Most Recent):  Temp: 97.5 °F (36.4 °C) (11/20/23 1050)  Pulse: 96 (11/20/23 1050)  Resp: 18 (11/20/23 1052)  BP: (!) 165/87 (11/20/23 1050)  SpO2: 98 % (11/20/23 1050) Vital Signs (24h Range):  Temp:  [97.5 °F (36.4 °C)-98.6 °F (37 °C)] 97.5 °F (36.4 °C)  Pulse:  [] 96  Resp:  [10-22] 18  SpO2:  [95 %-100 %] 98 %  BP: (117-165)/(68-87) 165/87     Intake/Output - Last 3 Shifts         11/18 0700  11/19 0659 11/19 0700  11/20 0659 11/20 0700  11/21 0659    P.O. 120 240     I.V. (mL/kg) 980.7 (8.4) 436.8 (3.7)     IV Piggyback 442.9      Total Intake(mL/kg) 1543.6 (13.2) 676.8 (5.8)     Urine (mL/kg/hr) 0 (0) 0 (0)     Drains 0 0     Stool 0      Total Output 0 0     Net +1543.6 +676.8            Urine Occurrence 4 x 8 x 1 x    Stool Occurrence 1 x               Physical Exam  Vitals and nursing note reviewed.   Constitutional:       Appearance: She is well-developed.   HENT:      Head: Normocephalic.   Eyes:      Pupils: Pupils  are equal, round, and reactive to light.   Cardiovascular:      Rate and Rhythm: Normal rate and regular rhythm.      Heart sounds: Normal heart sounds.   Pulmonary:      Effort: Pulmonary effort is normal. No respiratory distress.      Breath sounds: Normal breath sounds. No wheezing or rales.   Abdominal:      Palpations: Abdomen is soft. There is no mass.      Tenderness: There is no guarding or rebound.      Comments: IR drain brownish drainage   Skin:     General: Skin is warm and dry.   Neurological:      Mental Status: She is alert and oriented to person, place, and time.   Psychiatric:      Comments: Hx of short term memory loss, in nursing home  Mentality at baseline            Significant Labs:  BMP (Last 3 Results):   Recent Labs   Lab 11/18/23 0143 11/19/23 0130 11/20/23  0541    122* 109    139 136   K 3.9 3.9 3.9    106 105   CO2 26 24 25   BUN 5* 5* 5*   CREATININE 0.7 0.8 0.8   CALCIUM 9.5 9.1 9.1   MG 1.7 1.6 1.7     CBC (Last 3 Results):   Recent Labs   Lab 11/18/23 0143 11/19/23 0130 11/20/23  0541   WBC 5.97 5.18 3.96   RBC 4.44 4.21 4.18   HGB 12.1 11.6* 11.5*   HCT 38.2 36.5* 36.2*    210 228   MCV 86 87 87   MCH 27.3 27.6 27.5   MCHC 31.7* 31.8* 31.8*       Significant Diagnostics:  None

## 2023-11-20 NOTE — NURSING TRANSFER
Nursing Transfer Note      11/20/2023   8:39 AM    Nurse giving handoff:BHARGAV Peña  Nurse receiving handoff:Endoscopy nurse    Reason patient is being transferred: scheduled EGD    Transfer From: 1052 to Endoscopy    Transfer via stretcher        Transported by transporter        Chart send with patient: Yes    IVF infusing at 40cc/hr per PICC line. Ileostomy bag intact

## 2023-11-20 NOTE — PLAN OF CARE
Problem: Adult Inpatient Plan of Care  Goal: Plan of Care Review  Outcome: Ongoing, Progressing  Goal: Patient-Specific Goal (Individualized)  Outcome: Ongoing, Progressing  Goal: Absence of Hospital-Acquired Illness or Injury  Outcome: Ongoing, Progressing  Goal: Optimal Comfort and Wellbeing  Outcome: Ongoing, Progressing  Goal: Readiness for Transition of Care  Outcome: Ongoing, Progressing    [facility] Plan of Care Note  Dx ABD wall fluid collection     Shift Events EGD+ biopsy done ; requested pain meds around the clock - only requested Zofran x2 for nausea- no emesis     Goals of Care: pain controlled, nausea controlled     Neuro: AAOx4     Vital Signs: stable     Respiratory: RA     Diet: was NPO then regular diet     Is patient tolerating current diet?  poor, consumed about 25% of her meals  GTTS: [id number] KCl at 40     Urine Output/Bowel Movement: urinated without difficulty, moderate amount of liquid brown stool and flatus per ileostomy bag - pt independent with ostomy care     Drains/Tubes/Tube Feeds (include total output/shift): NA     Lines: PICC line         Accuchecks:NA     Skin: intact except for the old CONSTANZA drain site and  ileostomy bag      Fall Risk Score: 6     Activity level? Up ad ayanna- ambulated the hallway and in the room frequently      Any scheduled procedures? none     Any safety concerns? none     Other: none

## 2023-11-20 NOTE — SUBJECTIVE & OBJECTIVE
Interval History:   Still complaining of nausea.  Generally feeling poorly.  Very frustrated  EGD today - noted esophagitis, gastritis (lesion biopsied)  Cultures from drains - E. Cloacae and stenotrophomonas.  Drains have been removed  Currently on approx week 7 of daptomycin.  Cefepime started 11/17   Afebrile, no leukocytosis      Review of Systems   Constitutional:  Positive for activity change, appetite change and fatigue. Negative for chills, diaphoresis and fever.   HENT: Negative.     Respiratory:  Negative for cough and shortness of breath.    Gastrointestinal:  Positive for abdominal distention, abdominal pain, nausea and vomiting. Negative for constipation and diarrhea.   Genitourinary:  Negative for dysuria and flank pain.   Musculoskeletal:  Negative for back pain.   Skin:  Positive for wound. Negative for color change, pallor and rash.   Neurological:  Positive for light-headedness and headaches.   All other systems reviewed and are negative.    Objective:     Vital Signs (Most Recent):  Temp: 98.7 °F (37.1 °C) (11/20/23 1702)  Pulse: 93 (11/20/23 1702)  Resp: 18 (11/20/23 1746)  BP: (!) 142/91 (11/20/23 1702)  SpO2: (!) 93 % (11/20/23 1702) Vital Signs (24h Range):  Temp:  [97.5 °F (36.4 °C)-98.7 °F (37.1 °C)] 98.7 °F (37.1 °C)  Pulse:  [] 93  Resp:  [10-22] 18  SpO2:  [93 %-100 %] 93 %  BP: (117-165)/(68-91) 142/91     Weight: 117 kg (257 lb 15 oz)  Body mass index is 40.4 kg/m².    Estimated Creatinine Clearance: 123.7 mL/min (based on SCr of 0.8 mg/dL).     Physical Exam  Vitals and nursing note reviewed.   Constitutional:       General: She is not in acute distress.     Appearance: She is well-developed. She is obese. She is ill-appearing (chronically ill appearing). She is not toxic-appearing or diaphoretic.   HENT:      Head: Normocephalic and atraumatic.   Eyes:      General: No scleral icterus.     Conjunctiva/sclera: Conjunctivae normal.   Cardiovascular:      Rate and Rhythm: Normal  rate and regular rhythm.   Pulmonary:      Effort: Pulmonary effort is normal. No respiratory distress.      Breath sounds: No stridor. No wheezing.   Abdominal:      General: There is no distension.      Palpations: Abdomen is soft.      Tenderness: There is no abdominal tenderness.      Comments: Drains removed  Ostomy RLQ   Musculoskeletal:         General: Normal range of motion.      Cervical back: Normal range of motion.      Right lower leg: No edema.      Left lower leg: No edema.   Skin:     General: Skin is warm and dry.      Coloration: Skin is not pale.      Findings: No erythema.      Comments: PICC LUE c/d/i   Neurological:      Mental Status: She is alert and oriented to person, place, and time.   Psychiatric:         Behavior: Behavior normal.         Thought Content: Thought content normal.          Significant Labs: All pertinent labs within the past 24 hours have been reviewed.    Significant Imaging: I have reviewed all pertinent imaging results/findings within the past 24 hours.

## 2023-11-20 NOTE — PROVATION PATIENT INSTRUCTIONS
Discharge Summary/Instructions after an Endoscopic Procedure  Patient Name: Georgette Abrams  Patient MRN: [id number]  Patient YOB: 1985 Monday, November 20, 2023  Isela Perez MD  Dear patient,  As a result of recent federal legislation (The Federal Cures Act), you may   receive lab or pathology results from your procedure in your [facility]   account before your physician is able to contact you. Your physician or   their representative will relay the results to you with their   recommendations at their soonest availability.  Thank you,  RESTRICTIONS:  During your procedure today, you received medications for sedation.  These   medications may affect your judgment, balance and coordination.  Therefore,   for 24 hours, you have the following restrictions:   - DO NOT drive a car, operate machinery, make legal/financial decisions,   sign important papers or drink alcohol.    ACTIVITY:  Today: no heavy lifting, straining or running due to procedural   sedation/anesthesia.  The following day: return to full activity including work.  DIET:  Eat and drink normally unless instructed otherwise.     TREATMENT FOR COMMON SIDE EFFECTS:  - Mild abdominal pain, nausea, belching, bloating or excessive gas:  rest,   eat lightly and use a heating pad.  - Sore Throat: treat with throat lozenges and/or gargle with warm salt   water.  - Because air was used during the procedure, expelling large amounts of air   from your rectum or belching is normal.  - If a bowel prep was taken, you may not have a bowel movement for 1-3 days.    This is normal.  SYMPTOMS TO WATCH FOR AND REPORT TO YOUR PHYSICIAN:  1. Abdominal pain or bloating, other than gas cramps.  2. Chest pain.  3. Back pain.  4. Signs of infection such as: chills or fever occurring within 24 hours   after the procedure.  5. Rectal bleeding, which would show as bright red, maroon, or black stools.   (A tablespoon of blood from the rectum is not serious, especially  if   hemorrhoids are present.)  6. Vomiting.  7. Weakness or dizziness.  GO DIRECTLY TO THE NEAREST EMERGENCY ROOM IF YOU HAVE ANY OF THE FOLLOWING:      Difficulty breathing              Chills and/or fever over 101 F   Persistent vomiting and/or vomiting blood   Severe abdominal pain   Severe chest pain   Black, tarry stools   Bleeding- more than one tablespoon   Any other symptom or condition that you feel may need urgent attention  Your doctor recommends these additional instructions:  If any biopsies were taken, your doctors clinic will contact you in 1 to 2   weeks with any results.  - Await pathology results.   - Return patient to hospital [facility] for ongoing care.   - Resume previous diet.   - Continue present medications.   - Repeat upper endoscopy with side-viewing scope with AES to screen for   periampullary duodenal polyps at appointment to be scheduled.  For questions, problems or results please call your physician - Isela Perez MD at Work:  [phone].  [facility] [place], EMERGENCY ROOM PHONE NUMBER: [phone]  IF A COMPLICATION OR EMERGENCY SITUATION ARISES AND YOU ARE UNABLE TO REACH   YOUR PHYSICIAN - GO DIRECTLY TO THE EMERGENCY ROOM.  Isela Perez MD  11/20/2023 10:07:20 AM  This report has been verified and signed electronically.  Dear patient,  As a result of recent federal legislation (The Federal Cures Act), you may   receive lab or pathology results from your procedure in your [facility]   account before your physician is able to contact you. Your physician or   their representative will relay the results to you with their   recommendations at their soonest availability.  Thank you,  PROVATION

## 2023-11-21 VITALS
HEART RATE: 90 BPM | HEIGHT: 67 IN | RESPIRATION RATE: 18 BRPM | DIASTOLIC BLOOD PRESSURE: 87 MMHG | WEIGHT: 257.94 LBS | TEMPERATURE: 98 F | OXYGEN SATURATION: 97 % | SYSTOLIC BLOOD PRESSURE: 166 MMHG | BODY MASS INDEX: 40.48 KG/M2

## 2023-11-21 LAB
ANION GAP SERPL CALC-SCNC: 8 MMOL/L (ref 8–16)
BACTERIA BLD CULT: NORMAL
BACTERIA BLD CULT: NORMAL
BASOPHILS # BLD AUTO: 0.06 K/UL (ref 0–0.2)
BASOPHILS NFR BLD: 1.2 % (ref 0–1.9)
BUN SERPL-MCNC: 5 MG/DL (ref 6–20)
CALCIUM SERPL-MCNC: 9.2 MG/DL (ref 8.7–10.5)
CHLORIDE SERPL-SCNC: 103 MMOL/L (ref 95–110)
CK SERPL-CCNC: 60 U/L (ref 20–180)
CO2 SERPL-SCNC: 25 MMOL/L (ref 23–29)
CREAT SERPL-MCNC: 0.8 MG/DL (ref 0.5–1.4)
CRP SERPL-MCNC: 3 MG/L (ref 0–8.2)
DIFFERENTIAL METHOD: ABNORMAL
EOSINOPHIL # BLD AUTO: 0.5 K/UL (ref 0–0.5)
EOSINOPHIL NFR BLD: 10 % (ref 0–8)
ERYTHROCYTE [DISTWIDTH] IN BLOOD BY AUTOMATED COUNT: 15.3 % (ref 11.5–14.5)
EST. GFR  (NO RACE VARIABLE): >60 ML/MIN/1.73 M^2
GLUCOSE SERPL-MCNC: 105 MG/DL (ref 70–110)
HCT VFR BLD AUTO: 38.3 % (ref 37–48.5)
HGB BLD-MCNC: 12.2 G/DL (ref 12–16)
IMM GRANULOCYTES # BLD AUTO: 0.01 K/UL (ref 0–0.04)
IMM GRANULOCYTES NFR BLD AUTO: 0.2 % (ref 0–0.5)
LYMPHOCYTES # BLD AUTO: 1.7 K/UL (ref 1–4.8)
LYMPHOCYTES NFR BLD: 36.1 % (ref 18–48)
MAGNESIUM SERPL-MCNC: 1.7 MG/DL (ref 1.6–2.6)
MCH RBC QN AUTO: 27.5 PG (ref 27–31)
MCHC RBC AUTO-ENTMCNC: 31.9 G/DL (ref 32–36)
MCV RBC AUTO: 87 FL (ref 82–98)
MONOCYTES # BLD AUTO: 0.5 K/UL (ref 0.3–1)
MONOCYTES NFR BLD: 9.8 % (ref 4–15)
NEUTROPHILS # BLD AUTO: 2.1 K/UL (ref 1.8–7.7)
NEUTROPHILS NFR BLD: 42.7 % (ref 38–73)
NRBC BLD-RTO: 0 /100 WBC
PHOSPHATE SERPL-MCNC: 3.9 MG/DL (ref 2.7–4.5)
PLATELET # BLD AUTO: 253 K/UL (ref 150–450)
PMV BLD AUTO: 11.6 FL (ref 9.2–12.9)
POTASSIUM SERPL-SCNC: 4.1 MMOL/L (ref 3.5–5.1)
RBC # BLD AUTO: 4.43 M/UL (ref 4–5.4)
SODIUM SERPL-SCNC: 136 MMOL/L (ref 136–145)
WBC # BLD AUTO: 4.82 K/UL (ref 3.9–12.7)

## 2023-11-21 PROCEDURE — 25000003 PHARM REV CODE 250: Performed by: SURGERY

## 2023-11-21 PROCEDURE — 83735 ASSAY OF MAGNESIUM: CPT | Performed by: STUDENT IN AN ORGANIZED HEALTH CARE EDUCATION/TRAINING PROGRAM

## 2023-11-21 PROCEDURE — 63600175 PHARM REV CODE 636 W HCPCS

## 2023-11-21 PROCEDURE — 99233 SBSQ HOSP IP/OBS HIGH 50: CPT | Mod: ,,, | Performed by: NURSE PRACTITIONER

## 2023-11-21 PROCEDURE — 63600175 PHARM REV CODE 636 W HCPCS: Performed by: STUDENT IN AN ORGANIZED HEALTH CARE EDUCATION/TRAINING PROGRAM

## 2023-11-21 PROCEDURE — 25000003 PHARM REV CODE 250: Performed by: STUDENT IN AN ORGANIZED HEALTH CARE EDUCATION/TRAINING PROGRAM

## 2023-11-21 PROCEDURE — 63600175 PHARM REV CODE 636 W HCPCS: Performed by: PHYSICIAN ASSISTANT

## 2023-11-21 PROCEDURE — 80048 BASIC METABOLIC PNL TOTAL CA: CPT | Performed by: STUDENT IN AN ORGANIZED HEALTH CARE EDUCATION/TRAINING PROGRAM

## 2023-11-21 PROCEDURE — 25000003 PHARM REV CODE 250

## 2023-11-21 PROCEDURE — 84100 ASSAY OF PHOSPHORUS: CPT | Performed by: STUDENT IN AN ORGANIZED HEALTH CARE EDUCATION/TRAINING PROGRAM

## 2023-11-21 PROCEDURE — 99233 PR SUBSEQUENT HOSPITAL CARE,LEVL III: ICD-10-PCS | Mod: ,,, | Performed by: NURSE PRACTITIONER

## 2023-11-21 PROCEDURE — 85025 COMPLETE CBC W/AUTO DIFF WBC: CPT | Performed by: STUDENT IN AN ORGANIZED HEALTH CARE EDUCATION/TRAINING PROGRAM

## 2023-11-21 PROCEDURE — 25000003 PHARM REV CODE 250: Performed by: NURSE PRACTITIONER

## 2023-11-21 PROCEDURE — 86140 C-REACTIVE PROTEIN: CPT | Performed by: SURGERY

## 2023-11-21 PROCEDURE — 82550 ASSAY OF CK (CPK): CPT | Performed by: COLON & RECTAL SURGERY

## 2023-11-21 PROCEDURE — 25000003 PHARM REV CODE 250: Performed by: PHYSICIAN ASSISTANT

## 2023-11-21 RX ORDER — SCOLOPAMINE TRANSDERMAL SYSTEM 1 MG/1
1 PATCH, EXTENDED RELEASE TRANSDERMAL
Qty: 10 PATCH | Refills: 3 | Status: SHIPPED | OUTPATIENT
Start: 2023-11-23

## 2023-11-21 RX ORDER — OXYCODONE HYDROCHLORIDE 30 MG/1
30 TABLET ORAL EVERY 4 HOURS PRN
Qty: 20 TABLET | Refills: 0 | Status: SHIPPED | OUTPATIENT
Start: 2023-11-21 | End: 2023-12-02 | Stop reason: SDUPTHER

## 2023-11-21 RX ORDER — PANTOPRAZOLE SODIUM 40 MG/1
40 TABLET, DELAYED RELEASE ORAL
Qty: 60 TABLET | Refills: 6 | Status: SHIPPED | OUTPATIENT
Start: 2023-11-21 | End: 2024-11-20

## 2023-11-21 RX ORDER — ONDANSETRON 8 MG/1
8 TABLET, ORALLY DISINTEGRATING ORAL EVERY 6 HOURS PRN
Status: DISCONTINUED | OUTPATIENT
Start: 2023-11-21 | End: 2023-11-21 | Stop reason: HOSPADM

## 2023-11-21 RX ORDER — METHOCARBAMOL 750 MG/1
750 TABLET, FILM COATED ORAL 4 TIMES DAILY
Qty: 40 TABLET | Refills: 0 | Status: SHIPPED | OUTPATIENT
Start: 2023-11-21 | End: 2023-12-01

## 2023-11-21 RX ORDER — ONDANSETRON 4 MG/1
8 TABLET, ORALLY DISINTEGRATING ORAL EVERY 6 HOURS PRN
Qty: 30 TABLET | Refills: 3 | Status: SHIPPED | OUTPATIENT
Start: 2023-11-21 | End: 2023-12-02 | Stop reason: SDUPTHER

## 2023-11-21 RX ADMIN — ACETAMINOPHEN 650 MG: 325 TABLET ORAL at 12:11

## 2023-11-21 RX ADMIN — HYDROMORPHONE HYDROCHLORIDE 1 MG: 1 INJECTION, SOLUTION INTRAMUSCULAR; INTRAVENOUS; SUBCUTANEOUS at 08:11

## 2023-11-21 RX ADMIN — HYDROMORPHONE HYDROCHLORIDE 1 MG: 1 INJECTION, SOLUTION INTRAMUSCULAR; INTRAVENOUS; SUBCUTANEOUS at 06:11

## 2023-11-21 RX ADMIN — ONDANSETRON 8 MG: 8 TABLET, ORALLY DISINTEGRATING ORAL at 12:11

## 2023-11-21 RX ADMIN — FLUCONAZOLE 150 MG: 150 TABLET ORAL at 08:11

## 2023-11-21 RX ADMIN — HYDROMORPHONE HYDROCHLORIDE 1 MG: 1 INJECTION, SOLUTION INTRAMUSCULAR; INTRAVENOUS; SUBCUTANEOUS at 12:11

## 2023-11-21 RX ADMIN — ONDANSETRON 4 MG: 2 INJECTION INTRAMUSCULAR; INTRAVENOUS at 06:11

## 2023-11-21 RX ADMIN — PANTOPRAZOLE SODIUM 40 MG: 40 TABLET, DELAYED RELEASE ORAL at 06:11

## 2023-11-21 RX ADMIN — CEFEPIME 2 G: 2 INJECTION, POWDER, FOR SOLUTION INTRAVENOUS at 04:11

## 2023-11-21 RX ADMIN — VENLAFAXINE 150 MG: 75 TABLET ORAL at 08:11

## 2023-11-21 RX ADMIN — OXYCODONE HYDROCHLORIDE 30 MG: 10 TABLET ORAL at 04:11

## 2023-11-21 RX ADMIN — OXYCODONE HYDROCHLORIDE 30 MG: 10 TABLET ORAL at 10:11

## 2023-11-21 NOTE — SUBJECTIVE & OBJECTIVE
Interval History:   Still complaining of nausea, though improved.  Sitting up in chair.   Still complaining of abdominal pain.   Pending d/c today.  Generally feeling poorly.  Very frustrated  Afebrile, no leukocytosis      Review of Systems   Constitutional:  Positive for activity change, appetite change and fatigue. Negative for chills, diaphoresis and fever.   HENT: Negative.     Respiratory:  Negative for cough and shortness of breath.    Gastrointestinal:  Positive for abdominal distention, abdominal pain, nausea and vomiting. Negative for constipation and diarrhea.   Genitourinary:  Negative for dysuria and flank pain.   Musculoskeletal:  Negative for back pain.   Skin:  Positive for wound. Negative for color change, pallor and rash.   Neurological:  Positive for light-headedness and headaches.   All other systems reviewed and are negative.    Objective:     Vital Signs (Most Recent):  Temp: 98.3 °F (36.8 °C) (11/21/23 1107)  Pulse: 90 (11/21/23 1107)  Resp: 18 (11/21/23 1107)  BP: (!) 166/87 (11/21/23 1107)  SpO2: 97 % (11/21/23 1107) Vital Signs (24h Range):  Temp:  [97.4 °F (36.3 °C)-98.7 °F (37.1 °C)] 98.3 °F (36.8 °C)  Pulse:  [69-93] 90  Resp:  [17-20] 18  SpO2:  [93 %-100 %] 97 %  BP: (122-166)/(73-91) 166/87     Weight: 117 kg (257 lb 15 oz)  Body mass index is 40.4 kg/m².    Estimated Creatinine Clearance: 123.7 mL/min (based on SCr of 0.8 mg/dL).     Physical Exam  Vitals and nursing note reviewed.   Constitutional:       General: She is not in acute distress.     Appearance: She is well-developed. She is obese. She is ill-appearing (chronically ill appearing). She is not toxic-appearing or diaphoretic.   HENT:      Head: Normocephalic and atraumatic.   Eyes:      General: No scleral icterus.     Conjunctiva/sclera: Conjunctivae normal.   Cardiovascular:      Rate and Rhythm: Normal rate and regular rhythm.   Pulmonary:      Effort: Pulmonary effort is normal. No respiratory distress.      Breath  sounds: No stridor. No wheezing.   Abdominal:      General: There is no distension.      Palpations: Abdomen is soft.      Tenderness: There is no abdominal tenderness.      Comments: Drains removed - sites clear, no drainage  Ostomy   Musculoskeletal:         General: Normal range of motion.      Cervical back: Normal range of motion.      Right lower leg: No edema.      Left lower leg: No edema.   Skin:     General: Skin is warm and dry.      Coloration: Skin is not pale.      Findings: No erythema.      Comments: PICC LUE c/d/i   Neurological:      Mental Status: She is alert and oriented to person, place, and time.   Psychiatric:         Behavior: Behavior normal.         Thought Content: Thought content normal.          Significant Labs: All pertinent labs within the past 24 hours have been reviewed.    Significant Imaging: I have reviewed all pertinent imaging results/findings within the past 24 hours.

## 2023-11-21 NOTE — ASSESSMENT & PLAN NOTE
39 y/o female with hx of FAP (familial adenomatous polyposis) s/p total proctocolectomy with J pouch and ileostomy 7/2009. This was complicated with post op fistula s/p small bowl resection 6/2010. Hx of ventral hernia repair and hysterectomy 2012 c/b mesh infection requiring excision 2016. She ultimately underwent ventral hernia repair with mesh placement (Phasix absorbable mesh) on 8/7/2023. This was complicated with intra-abdominal abscesses s/p IR drainage. Most recent IR drainage was on 10/5/23. Cultures +MSSA and E.faecalis. She is followed by ID and has been on IV daptomycin since drainage on 10/5 pending abscess resolution.     She presented to ED 11/15 with intractable nausea,fatigue, abdominal pain and headaches. Drains continued to have murky dark output. No issues with her ostomy.  Admission blood cx + for bacillus species - likely contaminant.  Repeat blood cultures NGTD.     A repeat CT A/P with contrast 11/15 -  fluid collection is slightly smaller measuring 7.6 x 1.8 cm (previously 7.7 x 2.9 cm).  No new or enlarging fluid collection.     New cultures taken from drain 11/16 (per CRS, culture taken from subfascial drain) which has been in place since 10/5.  Cx + for E Cloacae and Stenotrophomonas.  Drains have been removed.     Underwent EGD today to further investigate source of intractable nausea and vomiting.  Noted grade A esophagitis and gastritis.  Biopsied.     She is afebrile, no leukocytosis.  CRP wnl.  Currently on IV daptomycin and cefepime.      Recommendations  Recommend discontinuing daptomycin.    Continue IV cefepime.  Unclear significance of E. Cloacae and stenotrophomonas from abdominal drain given  culture was taken from drain bulb that has been present for weeks.  Nonetheless,cefepime will cover prior MSSA and new E. Cloacae.  Low suspicion stenotrophomonas is clinically significant  Will plan on 3 weeks of IV cefepime with repeat imaging at 2 weeks to assess fluid  collection  Will follow up tomorrow to discuss with primary team and patient and leave final OPAT recommendations.     Data reviewed and plan discussed with ID staff, Dr. Richard  Secure chat/Discussed above plan with Primary Team, Dr Cruz

## 2023-11-21 NOTE — PLAN OF CARE
[facility]      HOME HEALTH ORDERS  FACE TO FACE ENCOUNTER    Patient Name: Georgette Abrams  YOB: 1985    PCP: No, Primary Doctor   PCP Address: None  PCP Phone Number: None  PCP Fax: None    Encounter Date: 11/15/23    Admit to Home Health    Diagnoses:  Active Hospital Problems    Diagnosis  POA    *Abdominal wall fluid collections [R18.8]  Yes    Severe obesity (BMI >= 40) [E66.01]  Yes    Intractable nausea and vomiting [R11.2]  Unknown    Abdominal pain [R10.9]  Unknown    S/P colectomy [Z90.49]  Not Applicable      Resolved Hospital Problems   No resolved problems to display.       Follow Up Appointments:  No future appointments.    Allergies:  Review of patient's allergies indicates:   Allergen Reactions    Levofloxacin Nausea And Vomiting and Rash    Morphine Anaphylaxis, Hives, Shortness Of Breath and Hallucinations           Piperacillin-tazobactam Rash    Sulfa (sulfonamide antibiotics) Nausea And Vomiting and Rash    Opioids - morphine analogues     Hydrocodone-acetaminophen Itching       Medications: Review discharge medications with patient and family and provide education.    Current Facility-Administered Medications   Medication Dose Route Frequency Provider Last Rate Last Admin    acetaminophen tablet 650 mg  650 mg Oral Q6H Gilles Mccormick MD   650 mg at 11/21/23 0022    albuterol-ipratropium 2.5 mg-0.5 mg/3 mL nebulizer solution 3 mL  3 mL Nebulization Q6H PRN Gilles Mccormick MD        ceFEPIme (MAXIPIME) 2 g in dextrose 5 % in water (D5W) 100 mL IVPB (MB+)  2 g Intravenous Q8H Braulio Arreola PA-C   Stopped at 11/21/23 0517    dextrose 5 % and 0.45 % NaCl with KCl 20 mEq infusion   Intravenous Continuous Robert Cruz MD 40 mL/hr at 11/20/23 0547 New Bag at 11/20/23 0547    droNABinol capsule 2.5 mg  2.5 mg Oral BID KRISH Tracy MD        enoxaparin injection 40 mg  40 mg Subcutaneous Daily Gilles Mccormick MD   40 mg at 11/20/23 1617    fluconazole  tablet 150 mg  150 mg Oral Daily Nell Gaytan NP   150 mg at 11/21/23 0816    hydrALAZINE injection 10 mg  10 mg Intravenous Q6H PRN Gilles Mccormick MD        HYDROmorphone injection 1 mg  1 mg Intravenous Q2H PRN eRji Meyer MD   1 mg at 11/21/23 0821    hydrOXYzine pamoate capsule 25 mg  25 mg Oral Q8H PRN Ora Pierre MD   25 mg at 11/20/23 2225    iohexoL (OMNIPAQUE 350) injection 15 mL  15 mL Oral PRN Berenice Vazquez MD        LIDOcaine (PF) 10 mg/ml (1%) injection 10 mg  1 mL Intradermal Once PRN Gilles Mccormick MD        melatonin tablet 6 mg  6 mg Oral Nightly PRN Gilles Mccormick MD        ondansetron disintegrating tablet 8 mg  8 mg Oral Q6H PRN Robert Cruz MD        oxyCODONE immediate release tablet Tab 30 mg  30 mg Oral Q4H PRN Gilles Mccormick MD   30 mg at 11/21/23 0441    pantoprazole EC tablet 40 mg  40 mg Oral BID AC Mira Bates MD   40 mg at 11/21/23 0609    scopolamine 1.3-1.5 mg (1 mg over 3 days) 1 patch  1 patch Transdermal Q3 Days KRISH Tracy MD        sodium chloride 0.9% flush 3 mL  3 mL Intravenous PRN iGlles Mccormick MD        venlafaxine tablet 150 mg  150 mg Oral Daily Alexandra Wright MD   150 mg at 11/21/23 0817     Facility-Administered Medications Ordered in Other Encounters   Medication Dose Route Frequency Provider Last Rate Last Admin    LIDOcaine (PF) 10 mg/ml (1%) injection 10 mg  1 mL Intradermal On Call Procedure Rhina Saldana NP         Current Discharge Medication List        CONTINUE these medications which have NOT CHANGED    Details   clonazepam (KLONOPIN) 1 MG tablet Take 1 mg by mouth 2 (two) times daily as needed.        cyanocobalamin 1,000 mcg/mL injection 1,000 mcg twice a week.      fluconazole (DIFLUCAN) 150 MG Tab Take 1 tablet (150 mg total) by mouth once daily.  Qty: 3 tablet, Refills: 3    Associated Diagnoses: Candidiasis      ibuprofen (ADVIL,MOTRIN) 800 MG tablet Take 1 tablet (800 mg total) by mouth  every 8 (eight) hours.  Qty: 30 tablet, Refills: 3      LIDOcaine (LIDODERM) 5 % Place 1 patch onto the skin once daily. Remove & Discard patch within 12 hours or as directed by MD  Qty: 30 patch, Refills: 3      losartan (COZAAR) 100 MG tablet Take 100 mg by mouth.      miconazole (MICONAZOLE-7) 2 % vaginal cream Place 1 applicator vaginally every evening.  Qty: 1 kit, Refills: 3    Associated Diagnoses: Candidiasis      miconazole (MICOTIN) 100 mg vaginal suppository Place 1 suppository (100 mg total) vaginally every evening.  Qty: 7 suppository, Refills: 0      ondansetron (ZOFRAN) 4 MG tablet Take 4 mg by mouth every 6 (six) hours as needed for Nausea.      ondansetron (ZOFRAN-ODT) 4 MG TbDL Dissolve 1 tablet (4 mg total) by mouth every 6 (six) hours as needed.  Qty: 40 tablet, Refills: 3      oxyCODONE (ROXICODONE) 30 MG Tab Take 1 tablet (30 mg total) by mouth every 4 (four) hours as needed (pain).  Qty: 25 tablet, Refills: 0    Comments: Quantity prescribed more than 7 day supply? Yes, quantity medically necessary  Associated Diagnoses: Post-op pain      progesterone (PROMETRIUM) 100 MG capsule Take 400 mg by mouth every evening.      venlafaxine (EFFEXOR) 100 MG Tab Take 150 mg by mouth Daily.               I have seen and examined this patient within the last 30 days. My clinical findings that support the need for the home health skilled services and home bound status are the following:no   Weakness/numbness causing balance and gait disturbance due to Infection making it taxing to leave home.     Diet:   regular diet    Labs:  Weekly, cbc, cmp, crp, fax results to Dr. Tracy  [phone] and to Courtney Jean-Baptiste  [phone]    Referrals/ Consults   to evaluate for community resources/long-range planning.    Activities:   activity as tolerated    Nursing:   Agency to admit patient within 24 hours of hospital discharge unless specified on physician order or at patient request    SN to complete  comprehensive assessment including routine vital signs. Instruct on disease process and s/s of complications to report to MD. Review/verify medication list sent home with the patient at time of discharge  and instruct patient/caregiver as needed. Frequency may be adjusted depending on start of care date.     Skilled nurse to perform up to 3 visits PRN for symptoms related to diagnosis    Notify MD if SBP > 160 or < 90; DBP > 90 or < 50; HR > 120 or < 50; Temp > 101; O2 < 88%; Other:       Ok to schedule additional visits based on staff availability and patient request on consecutive days within the home health episode.    When multiple disciplines ordered:    Start of Care occurs on Sunday - Wednesday schedule remaining discipline evaluations as ordered on separate consecutive days following the start of care.    Thursday SOC -schedule subsequent evaluations Friday and Monday the following week.     Friday - Saturday SOC - schedule subsequent discipline evaluations on consecutive days starting Monday of the following week.    For all post-discharge communication and subsequent orders please contact patient's primary care physician. If unable to reach primary care physician or do not receive response within 30 minutes, please contact Dr. Tracy for clinical staff order clarification    Miscellaneous   Home Infusion Therapy:   SN to perform Infusion Therapy/Central Line Care.  Review Central Line Care & Central Line Flush with patient.    Dc daptomycin  Cefepime 2 gms every 8 hours for 3 weeks  Scrub the Hub: Prior to accessing the line, always perform a 30 second alcohol scrub  Each lumen of the central line is to be flushed at least daily with 10 mL Normal Saline and 3 mL Heparin flush (10 units/mL)  Skilled Nurse (SN) may draw blood from IV access  Blood Draw Procedure:   - Aspirate at least 5 mL of blood   - Discard   - Obtain specimen   - Change injection cap   - Flush with 20 mL Normal Saline followed by a                  3-5 mL Heparin flush (10 units/mL)  Central :   - Sterile dressing changes are done weekly and as needed.   - Use chlor-hexadine scrub to cleanse site, apply Biopatch to insertion site,       apply securement device dressing   - Injection caps are changed weekly and after EVERY lab draw.   - If sterile gauze is under dressing to control oozing,                 dressing change must be performed every 24 hours until gauze is not needed.            I certify that this patient is confined to her home and needs intermittent skilled nursing care.

## 2023-11-21 NOTE — PLAN OF CARE
Pt slept well this shift. A&Ox 4. VSS. Pain managed with PRN's. YVONNE dual PICC in place and patent. Adelaide C/D/I dated 11/17/23. T&RQ2. Skin care completed. Pt took shower at HS. Safety precautions in place. Call light in reach. No further concerns noted at this time.    Problem: Adult Inpatient Plan of Care  Goal: Plan of Care Review  Outcome: Ongoing, Progressing  Goal: Patient-Specific Goal (Individualized)  Outcome: Ongoing, Progressing  Goal: Absence of Hospital-Acquired Illness or Injury  Outcome: Ongoing, Progressing  Goal: Optimal Comfort and Wellbeing  Outcome: Ongoing, Progressing  Goal: Readiness for Transition of Care  Outcome: Ongoing, Progressing     Problem: Bariatric Environmental Safety  Goal: Safety Maintained with Care  Outcome: Ongoing, Progressing     Problem: Infection  Goal: Absence of Infection Signs and Symptoms  Outcome: Ongoing, Progressing     Problem: Impaired Wound Healing  Goal: Optimal Wound Healing  Outcome: Ongoing, Progressing     Problem: Pain Acute  Goal: Acceptable Pain Control and Functional Ability  Outcome: Ongoing, Progressing

## 2023-11-21 NOTE — HOSPITAL COURSE
Ms. Abrams is a 39yo female s/p proctocolectomy with J pouch ultimately developing a fistula from her J pouch.  Has since had the J pouch removed and an ileostomy which has since been revised along with her abdominal wall hernias in August.  Since this time she has developed subcutaneous and retrorectus fluid collections which are now being drained with IR placed drains.  These drains have changed in character to now a murky brown colour and last culture grew pan sensitive staph aureus and enterococcus.  Has been on IV daptomycin for this.  Now presenting with worsening abdominal pain, nausea, emesis, and intolerance of PO.  Possible concern for developing EC fistula given drain appearance however hard to evaluate given no oral contrast on CT scan.  Fluid collections are overall improved in size on most recent CT imaging. Admitted to CRS for pain control, consult to ID and cont ADOLPH.  IR drains removed, per ID reommendations dc daptomycin and continue Cefepine for 3 more weeks.  On day of discharge pt is norma regular diet, ileostomy functional, ambulating in mendoza without difficulty, adequate pain control with oral medication, VS stable and afebrile.  FU 2 weeks Dr. Tracy and Courtney Wise in ID

## 2023-11-21 NOTE — ASSESSMENT & PLAN NOTE
39 y/o female with hx of FAP (familial adenomatous polyposis) s/p total proctocolectomy with J pouch and ileostomy 7/2009. This was complicated with post op fistula s/p small bowl resection 6/2010. Hx of ventral hernia repair and hysterectomy 2012 c/b mesh infection requiring excision 2016. She ultimately underwent ventral hernia repair with mesh placement (Phasix absorbable mesh) on 8/7/2023. This was complicated with intra-abdominal abscesses s/p IR drainage. Most recent IR drainage was on 10/5/23. Cultures +MSSA and E.faecalis. She is followed by ID and has been on IV daptomycin since drainage on 10/5 pending abscess resolution.     She presented to ED 11/15 with intractable nausea,fatigue, abdominal pain and headaches. Drains continued to have murky dark output. No issues with her ostomy.  Admission blood cx + for bacillus species - likely contaminant.  Repeat blood cultures NGTD.     A repeat CT A/P with contrast 11/15 -  fluid collection is slightly smaller measuring 7.6 x 1.8 cm (previously 7.7 x 2.9 cm).  No new or enlarging fluid collection.     New cultures taken from drain 11/16 (per CRS, culture taken from subfascial drain) which has been in place since 10/5.  Cx + for E Cloacae and Stenotrophomonas.  Drains removed 11/20    Underwent EGD 11/20 to further investigate source of intractable nausea and vomiting.  Noted grade A esophagitis and gastritis.  Biopsied.      She is afebrile, no leukocytosis.  Sitting up in chair today, tolerating diet.  Still c/o pain.  Pending possible d/c today.  Daptomycin discontinued yesterday    Plan/Discharge Recommendations    Continue IV cefepime 2 g q 8 hours.  Unclear significance of E. Cloacae and stenotrophomonas from abdominal drain given  culture was taken from drain bulb that has been present for weeks.  Nonetheless,cefepime will cover prior MSSA and new E. Cloacae.  Low suspicion stenotrophomonas is clinically significant  Anticipate 3 weeks of IV cefepime  with repeat imaging at 2 weeks to assess fluid collection. Estimated EOC 12/12/23.  If persistent fluid collections or increase, likely recommend new IR drainage with new cultures.   Weekly cbc, cmp, crp and fax results to ID, siva Jean-Baptiste NP, at fax [phone]. ([facility] [id number], office [phone])    ID follow up 14 days.  ID will make the appointment.   See OPAT summary below.    Plan discussed at length with patient and her parents.  She is in agreement.  She has my contact information.     Data reviewed and plan discussed with ID staff, Dr. Richard  Secure chat/Discussed above plan with JEROME Gaytan

## 2023-11-21 NOTE — PROGRESS NOTES
[facility]  Infectious Disease  Progress Note    Patient Name: Georgette Abrams  MRN: [id number]  Admission Date: 11/15/2023  Length of Stay: 6 days  Attending Physician: KRISH Tracy MD  Primary Care Provider: No, Primary Doctor    Isolation Status: No active isolations  Assessment/Plan:      GI  * Abdominal wall fluid collections     39 y/o female with hx of FAP (familial adenomatous polyposis) s/p total proctocolectomy with J pouch and ileostomy 7/2009. This was complicated with post op fistula s/p small bowl resection 6/2010. Hx of ventral hernia repair and hysterectomy 2012 c/b mesh infection requiring excision 2016. She ultimately underwent ventral hernia repair with mesh placement (Phasix absorbable mesh) on 8/7/2023. This was complicated with intra-abdominal abscesses s/p IR drainage. Most recent IR drainage was on 10/5/23. Cultures +MSSA and E.faecalis. She is followed by ID and has been on IV daptomycin since drainage on 10/5 pending abscess resolution.     She presented to ED 11/15 with intractable nausea,fatigue, abdominal pain and headaches. Drains continued to have murky dark output. No issues with her ostomy.  Admission blood cx + for bacillus species - likely contaminant.  Repeat blood cultures NGTD.     A repeat CT A/P with contrast 11/15 -  fluid collection is slightly smaller measuring 7.6 x 1.8 cm (previously 7.7 x 2.9 cm).  No new or enlarging fluid collection.     New cultures taken from drain 11/16 (per CRS, culture taken from subfascial drain) which has been in place since 10/5.  Cx + for E Cloacae and Stenotrophomonas.  Drains removed 11/20    Underwent EGD 11/20 to further investigate source of intractable nausea and vomiting.  Noted grade A esophagitis and gastritis.  Biopsied.      She is afebrile, no leukocytosis.  Sitting up in chair today, tolerating diet.  Still c/o pain.  Pending possible d/c today.  Daptomycin discontinued yesterday    Plan/Discharge  Recommendations    Continue IV cefepime 2 g q 8 hours.  Unclear significance of E. Cloacae and stenotrophomonas from abdominal drain given  culture was taken from drain bulb that has been present for weeks.  Nonetheless,cefepime will cover prior MSSA and new E. Cloacae.  Low suspicion stenotrophomonas is clinically significant  Anticipate 3 weeks of IV cefepime with repeat imaging at 2 weeks to assess fluid collection. Estimated EOC 12/12/23.  If persistent fluid collections or increase, likely recommend new IR drainage with new cultures.   Weekly cbc, cmp, crp and fax results to ID, attention James Jean-Baptiste NP, at fax [phone]. ([facility] [id number], office [phone])    ID follow up 14 days.  ID will make the appointment.   See OPAT summary below.    Plan discussed at length with patient and her parents.  She is in agreement.  She has my contact information.     Data reviewed and plan discussed with ID staff, Dr. Richard  Secure chat/Discussed above plan with JEROME Gaytan        Outpatient Antibiotic Therapy Plan:    Please send referral to [facility].    1) Infection: Abdominal wall fluid collections    2) Discharge Antibiotics:    Intravenous antibiotics:  Cefepime 2 g IV q 8 hours     3) Therapy Duration:  3 weeks     Estimated end date of IV antibiotics: 12/12/23    4) Outpatient Weekly Labs:    Order the following labs to be drawn on Mondays:   CBC  CMP   CRP      5) Fax Lab Results to Infectious Diseases Provider: James Jean-Baptiste NP    [facility] Fax Number: [phone]    6) Outpatient Infectious Diseases Follow-up    Follow-up appointment will be arranged by the ID clinic and will be found in the patient's appointments tab.  Prior to discharge, please ensure the patient's follow-up has been scheduled.    If there is still no follow-up scheduled prior to discharge, please send an EPIC message to Karlee Arteaga in Infectious Diseases.      Thank you.   Please Secure Chat for any  questions or concerns.  JA Montero, ANP-C  Infectious Disease     The total time for evaluation and management services performed on 11/21/23  was greater than 50 minutes which includes face to face time and non-face to face time preparing to see the patient (eg, review of tests), Obtaining and/or reviewing separately obtained history, documenting clinical information in the electronic or other health record, independently interpreting results (not separately reported) and communicating results to the patient/family/caregiver, or care coordination (not separately reported).     Subjective:     Principal Problem:Abdominal wall fluid collections    HPI: Ms. Abrams is a 39 y/o female with hx of FAP (familial adenomatous polyposis) s/p total proctocolectomy with J pouch and ileostomy 7/2009. This was complicated with post op fistula s/p small bowl resection 6/2010. Hx of ventral hernia repair and hysterectomy 2012 c/b mesh infection requiring excision 2016. She ultimately underwnet ventral hernia repair with mesh placement on 8/7/2023. This was complicated with intra-abdominal abscesses s/p IR drainage. Most recent IR drainage was on 10/5/23. Cultures +MSSA and E.faecalis. she is followed by ID and has been on IV daptomycin for 4 weeks or until resolution of abscesses. She presents to ED for nausea,fatigue, abdominal pain and headaches. Drains continue to have murky dark output. No issues with her ostomy. ID consulted as pt remains on daptomycin for OPAT. Pt remained afebrile, hemodynamically stable, no leukocytosis. Colorectal surgery following.       CT A/P with contrast 11/15 Postop change of the anterior abdominal wall including right lower quadrant ileostomy.  Mild stranding within the right lower quadrant abdominal wall adjacent to the ileostomy site, similar to prior.  Pigtail catheter coursing along the anterior left abdominal wall remains in place in the area of the previously demonstrated subcutaneous  fluid and air collection with minimal residual fluid in this region.  Additional pigtail catheter remains in place within fluid collection deep to the rectus abdominus muscle along the midline of the anterior abdominal wall.  This fluid collection is slightly smaller measuring 7.6 x 1.8 cm (previously 7.7 x 2.9 cm).  No new or enlarging fluid collection.   Interval History:   Still complaining of nausea, though improved.  Sitting up in chair.   Still complaining of abdominal pain.   Pending d/c today.  Generally feeling poorly.  Very frustrated  Afebrile, no leukocytosis      Review of Systems   Constitutional:  Positive for activity change, appetite change and fatigue. Negative for chills, diaphoresis and fever.   HENT: Negative.     Respiratory:  Negative for cough and shortness of breath.    Gastrointestinal:  Positive for abdominal distention, abdominal pain, nausea and vomiting. Negative for constipation and diarrhea.   Genitourinary:  Negative for dysuria and flank pain.   Musculoskeletal:  Negative for back pain.   Skin:  Positive for wound. Negative for color change, pallor and rash.   Neurological:  Positive for light-headedness and headaches.   All other systems reviewed and are negative.    Objective:     Vital Signs (Most Recent):  Temp: 98.3 °F (36.8 °C) (11/21/23 1107)  Pulse: 90 (11/21/23 1107)  Resp: 18 (11/21/23 1107)  BP: (!) 166/87 (11/21/23 1107)  SpO2: 97 % (11/21/23 1107) Vital Signs (24h Range):  Temp:  [97.4 °F (36.3 °C)-98.7 °F (37.1 °C)] 98.3 °F (36.8 °C)  Pulse:  [69-93] 90  Resp:  [17-20] 18  SpO2:  [93 %-100 %] 97 %  BP: (122-166)/(73-91) 166/87     Weight: 117 kg (257 lb 15 oz)  Body mass index is 40.4 kg/m².    Estimated Creatinine Clearance: 123.7 mL/min (based on SCr of 0.8 mg/dL).     Physical Exam  Vitals and nursing note reviewed.   Constitutional:       General: She is not in acute distress.     Appearance: She is well-developed. She is obese. She is ill-appearing (chronically  ill appearing). She is not toxic-appearing or diaphoretic.   HENT:      Head: Normocephalic and atraumatic.   Eyes:      General: No scleral icterus.     Conjunctiva/sclera: Conjunctivae normal.   Cardiovascular:      Rate and Rhythm: Normal rate and regular rhythm.   Pulmonary:      Effort: Pulmonary effort is normal. No respiratory distress.      Breath sounds: No stridor. No wheezing.   Abdominal:      General: There is no distension.      Palpations: Abdomen is soft.      Tenderness: There is no abdominal tenderness.      Comments: Drains removed - sites clear, no drainage  Ostomy   Musculoskeletal:         General: Normal range of motion.      Cervical back: Normal range of motion.      Right lower leg: No edema.      Left lower leg: No edema.   Skin:     General: Skin is warm and dry.      Coloration: Skin is not pale.      Findings: No erythema.      Comments: PICC LUE c/d/i   Neurological:      Mental Status: She is alert and oriented to person, place, and time.   Psychiatric:         Behavior: Behavior normal.         Thought Content: Thought content normal.          Significant Labs: All pertinent labs within the past 24 hours have been reviewed.    Significant Imaging: I have reviewed all pertinent imaging results/findings within the past 24 hours.

## 2023-11-21 NOTE — PROGRESS NOTES
[facility] - [facility]  Infectious Disease  Progress Note    Patient Name: Georgette Abrams  MRN: [id number]  Admission Date: 11/15/2023  Length of Stay: 5 days  Attending Physician: KRISH Tracy MD  Primary Care Provider: No, Primary Doctor    Isolation Status: No active isolations  Assessment/Plan:      GI  * Abdominal wall fluid collections     37 y/o female with hx of FAP (familial adenomatous polyposis) s/p total proctocolectomy with J pouch and ileostomy 7/2009. This was complicated with post op fistula s/p small bowl resection 6/2010. Hx of ventral hernia repair and hysterectomy 2012 c/b mesh infection requiring excision 2016. She ultimately underwent ventral hernia repair with mesh placement (Phasix absorbable mesh) on 8/7/2023. This was complicated with intra-abdominal abscesses s/p IR drainage. Most recent IR drainage was on 10/5/23. Cultures +MSSA and E.faecalis. She is followed by ID and has been on IV daptomycin since drainage on 10/5 pending abscess resolution.     She presented to ED 11/15 with intractable nausea,fatigue, abdominal pain and headaches. Drains continued to have murky dark output. No issues with her ostomy.  Admission blood cx + for bacillus species - likely contaminant.  Repeat blood cultures NGTD.     A repeat CT A/P with contrast 11/15 -  fluid collection is slightly smaller measuring 7.6 x 1.8 cm (previously 7.7 x 2.9 cm).  No new or enlarging fluid collection.     New cultures taken from drain 11/16 (per CRS, culture taken from subfascial drain) which has been in place since 10/5.  Cx + for E Cloacae and Stenotrophomonas.  Drains have been removed.     Underwent EGD today to further investigate source of intractable nausea and vomiting.  Noted grade A esophagitis and gastritis.  Biopsied.     She is afebrile, no leukocytosis.  CRP wnl.  Currently on IV daptomycin and cefepime.      Recommendations  Recommend discontinuing daptomycin.    Continue IV cefepime.  Unclear  significance of E. Cloacae and stenotrophomonas from abdominal drain given  culture was taken from drain bulb that has been present for weeks.  Nonetheless,cefepime will cover prior MSSA and new E. Cloacae.  Low suspicion stenotrophomonas is clinically significant  Will plan on 3 weeks of IV cefepime with repeat imaging at 2 weeks to assess fluid collection  Will follow up tomorrow to discuss with primary team and patient and leave final OPAT recommendations.     Data reviewed and plan discussed with ID staff, Dr. Richard  Secure chat/Discussed above plan with Primary Team, Dr Cruz        Thank you.   Please Secure Chat for any questions or concerns.  JA Montero, ANP-C  Infectious Disease     The total time for evaluation and management services performed on 11/15/23 was greater than 50 minutes which includes face to face time and non-face to face time preparing to see the patient (eg, review of tests), Obtaining and/or reviewing separately obtained history, documenting clinical information in the electronic or other health record, independently interpreting results (not separately reported) and communicating results to the patient/family/caregiver, or care coordination (not separately reported).     Subjective:     Principal Problem:Abdominal wall fluid collections    HPI: Ms. Abrams is a 39 y/o female with hx of FAP (familial adenomatous polyposis) s/p total proctocolectomy with J pouch and ileostomy 7/2009. This was complicated with post op fistula s/p small bowl resection 6/2010. Hx of ventral hernia repair and hysterectomy 2012 c/b mesh infection requiring excision 2016. She ultimately underwnet ventral hernia repair with mesh placement on 8/7/2023. This was complicated with intra-abdominal abscesses s/p IR drainage. Most recent IR drainage was on 10/5/23. Cultures +MSSA and E.faecalis. she is followed by ID and has been on IV daptomycin for 4 weeks or until resolution of abscesses. She presents to ED  for nausea,fatigue, abdominal pain and headaches. Drains continue to have murky dark output. No issues with her ostomy. ID consulted as pt remains on daptomycin for OPAT. Pt remained afebrile, hemodynamically stable, no leukocytosis. Colorectal surgery following.       CT A/P with contrast 11/15 Postop change of the anterior abdominal wall including right lower quadrant ileostomy.  Mild stranding within the right lower quadrant abdominal wall adjacent to the ileostomy site, similar to prior.  Pigtail catheter coursing along the anterior left abdominal wall remains in place in the area of the previously demonstrated subcutaneous fluid and air collection with minimal residual fluid in this region.  Additional pigtail catheter remains in place within fluid collection deep to the rectus abdominus muscle along the midline of the anterior abdominal wall.  This fluid collection is slightly smaller measuring 7.6 x 1.8 cm (previously 7.7 x 2.9 cm).  No new or enlarging fluid collection.   Interval History:   Still complaining of nausea.  Generally feeling poorly.  Very frustrated  EGD today - noted esophagitis, gastritis (lesion biopsied)  Cultures from drains - E. Cloacae and stenotrophomonas.  Drains have been removed  Currently on approx week 7 of daptomycin.  Cefepime started 11/17   Afebrile, no leukocytosis      Review of Systems   Constitutional:  Positive for activity change, appetite change and fatigue. Negative for chills, diaphoresis and fever.   HENT: Negative.     Respiratory:  Negative for cough and shortness of breath.    Gastrointestinal:  Positive for abdominal distention, abdominal pain, nausea and vomiting. Negative for constipation and diarrhea.   Genitourinary:  Negative for dysuria and flank pain.   Musculoskeletal:  Negative for back pain.   Skin:  Positive for wound. Negative for color change, pallor and rash.   Neurological:  Positive for light-headedness and headaches.   All other systems reviewed  and are negative.    Objective:     Vital Signs (Most Recent):  Temp: 98.7 °F (37.1 °C) (11/20/23 1702)  Pulse: 93 (11/20/23 1702)  Resp: 18 (11/20/23 1746)  BP: (!) 142/91 (11/20/23 1702)  SpO2: (!) 93 % (11/20/23 1702) Vital Signs (24h Range):  Temp:  [97.5 °F (36.4 °C)-98.7 °F (37.1 °C)] 98.7 °F (37.1 °C)  Pulse:  [] 93  Resp:  [10-22] 18  SpO2:  [93 %-100 %] 93 %  BP: (117-165)/(68-91) 142/91     Weight: 117 kg (257 lb 15 oz)  Body mass index is 40.4 kg/m².    Estimated Creatinine Clearance: 123.7 mL/min (based on SCr of 0.8 mg/dL).     Physical Exam  Vitals and nursing note reviewed.   Constitutional:       General: She is not in acute distress.     Appearance: She is well-developed. She is obese. She is ill-appearing (chronically ill appearing). She is not toxic-appearing or diaphoretic.   HENT:      Head: Normocephalic and atraumatic.   Eyes:      General: No scleral icterus.     Conjunctiva/sclera: Conjunctivae normal.   Cardiovascular:      Rate and Rhythm: Normal rate and regular rhythm.   Pulmonary:      Effort: Pulmonary effort is normal. No respiratory distress.      Breath sounds: No stridor. No wheezing.   Abdominal:      General: There is no distension.      Palpations: Abdomen is soft.      Tenderness: There is no abdominal tenderness.      Comments: Drains removed  Ostomy RLQ   Musculoskeletal:         General: Normal range of motion.      Cervical back: Normal range of motion.      Right lower leg: No edema.      Left lower leg: No edema.   Skin:     General: Skin is warm and dry.      Coloration: Skin is not pale.      Findings: No erythema.      Comments: PICC LUE c/d/i   Neurological:      Mental Status: She is alert and oriented to person, place, and time.   Psychiatric:         Behavior: Behavior normal.         Thought Content: Thought content normal.          Significant Labs: All pertinent labs within the past 24 hours have been reviewed.    Significant Imaging: I have reviewed all  pertinent imaging results/findings within the past 24 hours.

## 2023-11-21 NOTE — DISCHARGE SUMMARY
[facility]  Colorectal Surgery  Discharge Summary      Patient Name: Georgette Abrams  MRN: [id number]  Admission Date: 11/15/2023  Hospital Length of Stay: 6 days  Discharge Date and Time: 11/21/23  Attending Physician: KRISH Tracy MD   Discharging Provider: Nell Gaytan NP  Primary Care Provider: Coretta, Primary Doctor     HPI:  No notes on file    Procedure(s) (LRB):  EGD (ESOPHAGOGASTRODUODENOSCOPY) (N/A)     Hospital Course:  Ms. 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  Has since had the J pouch removed and an ileostomy which has since been revised along with her abdominal wall hernias in August.  Since this time she has developed subcutaneous and retrorectus fluid collections which are now being drained with IR placed drains.  These drains have changed in character to now a murky brown colour and last culture grew pan sensitive staph aureus and enterococcus.  Has been on IV daptomycin for this.  Now presenting with worsening abdominal pain, nausea, emesis, and intolerance of PO.  Possible concern for developing EC fistula given drain appearance however hard to evaluate given no oral contrast on CT scan.  Fluid collections are overall improved in size on most recent CT imaging. Admitted to CRS for pain control, consult to ID and cont ADOLPH.  IR drains removed, per ID reommendations dc daptomycin and continue Cefepine for 3 more weeks.  On day of discharge pt is norma regular diet, ileostomy functional, ambulating in m[place] without difficulty, adequate pain control with oral medication, VS stable and afebrile.  FU 2 weeks Dr. Tracy and Courtney Wise in ID       Goals of Care Treatment Preferences:  Code Status: Full Code      Consults (From admission, onward)          Status Ordering Provider     Inpatient consult to Gastroenterology  Once        Provider:  (Not yet assigned)    Completed JOEY TAVARES     Inpatient consult to  Infectious Diseases  Once        Provider:  (Not yet assigned)    Completed SALVATORE LOZADA     Inpatient consult to Colorectal Surgery  Once        Provider:  (Not yet assigned)    Completed MEHRAN GARNICA            Significant Diagnostic Studies: Labs: BMP:   Recent Labs   Lab 11/20/23  0541 11/21/23  0446    105    136   K 3.9 4.1    103   CO2 25 25   BUN 5* 5*   CREATININE 0.8 0.8   CALCIUM 9.1 9.2   MG 1.7 1.7   , CMP   Recent Labs   Lab 11/20/23  0541 11/21/23  0446    136   K 3.9 4.1    103   CO2 25 25    105   BUN 5* 5*   CREATININE 0.8 0.8   CALCIUM 9.1 9.2   ANIONGAP 6* 8   , and CBC   Recent Labs   Lab 11/20/23  0541 11/21/23  0446   WBC 3.96 4.82   HGB 11.5* 12.2   HCT 36.2* 38.3    253       Pending Diagnostic Studies:       Procedure Component Value Units Date/Time    Specimen to Pathology, Surgery Gastrointestinal tract [[id number]] Collected: 11/20/23 0952    Order Status: Sent Lab Status: In process Updated: 11/20/23 1423    Specimen: Tissue           Final Active Diagnoses:    Diagnosis Date Noted POA    PRINCIPAL PROBLEM:  Abdominal wall fluid collections [R18.8] 10/11/2023 Yes    Severe obesity (BMI >= 40) [E66.01] 11/20/2023 Yes    Intractable nausea and vomiting [R11.2] 11/17/2023 Unknown    Abdominal pain [R10.9] 11/15/2023 Unknown    S/P colectomy [Z90.49] 09/13/2012 Not Applicable      Problems Resolved During this Admission:      Discharged Condition: good    Disposition: Home-Health Care [facility]    Follow Up:   Follow-up Information       Courtney Jean-Baptiste, APRN, ANP Follow up in 2 week(s).    Specialty: Infectious Diseases  Why: to discuss poss repeat CT  Contact information:  [address] [facility] [address] [phone]               KRISH Tracy MD Follow up in 2 week(s).    Specialty: Colon and Rectal Surgery  Why: for poss ct scan  Contact information:  [address] [facility] [address] [phone]                            Patient Instructions:      Diet Adult Regular   Order Comments: Low fiber, no fresh fruit or fresh vegetables, no nuts, grapes, popcorn or raisins     Lifting restrictions   Order Comments: No lifting anything greater than 5 pounds     No dressing needed   Order Comments: May shower, no tub bath     Notify your health care provider if you experience any of the following:  temperature >100.4     Notify your health care provider if you experience any of the following:  persistent nausea and vomiting or diarrhea     Notify your health care provider if you experience any of the following:  severe uncontrolled pain     Notify your health care provider if you experience any of the following:  redness, tenderness, or signs of infection (pain, swelling, redness, odor or green/yellow discharge around incision site)     Notify your health care provider if you experience any of the following:  difficulty breathing or increased cough     Notify your health care provider if you experience any of the following:  severe persistent headache     Notify your health care provider if you experience any of the following:  worsening rash     Notify your health care provider if you experience any of the following:  persistent dizziness, light-headedness, or visual disturbances     Notify your health care provider if you experience any of the following:  increased confusion or weakness     Medications:  Reconciled Home Medications:      Medication List        START taking these medications      methocarbamoL 750 MG Tab  Commonly known as: ROBAXIN  Take 1 tablet (750 mg total) by mouth 4 (four) times daily. for 10 days     pantoprazole 40 MG tablet  Commonly known as: PROTONIX  Take 1 tablet (40 mg total) by mouth 2 (two) times daily before meals.     scopolamine 1.3-1.5 mg (1 mg over 3 days)  Commonly known as: TRANSDERM-SCOP  Place 1 patch onto the skin Every 3 (three) days.  Start taking on: November 23, 2023             CHANGE how you take these medications      * ondansetron 4 MG Tbdl  Commonly known as: ZOFRAN-ODT  Dissolve 1 tablet (4 mg total) by mouth every 6 (six) hours as needed.  What changed: Another medication with the same name was added. Make sure you understand how and when to take each.     * ondansetron 4 MG Tbdl  Commonly known as: ZOFRAN-ODT  Dissolve 2 tablets (8 mg total) by mouth every 6 (six) hours as needed (nausea).  What changed: You were already taking a medication with the same name, and this prescription was added. Make sure you understand how and when to take each.     oxyCODONE 30 MG Tab  Commonly known as: ROXICODONE  Take 1 tablet (30 mg total) by mouth every 4 (four) hours as needed for Pain.  What changed: reasons to take this           * This list has 2 medication(s) that are the same as other medications prescribed for you. Read the directions carefully, and ask your doctor or other care provider to review them with you.                CONTINUE taking these medications      clonazePAM 1 MG tablet  Commonly known as: KlonoPIN  Take 1 mg by mouth 2 (two) times daily as needed.     cyanocobalamin 1,000 mcg/mL injection  1,000 mcg twice a week.     fluconazole 150 MG Tab  Commonly known as: DIFLUCAN  Take 1 tablet (150 mg total) by mouth once daily.     ibuprofen 800 MG tablet  Commonly known as: ADVIL,MOTRIN  Take 1 tablet (800 mg total) by mouth every 8 (eight) hours.     LIDOcaine 5 %  Commonly known as: LIDODERM  Place 1 patch onto the skin once daily. Remove & Discard patch within 12 hours or as directed by MD     losartan 100 MG tablet  Commonly known as: COZAAR  Take 100 mg by mouth.     * miconazole 100 mg vaginal suppository  Commonly known as: MICOTIN  Place 1 suppository (100 mg total) vaginally every evening.     * miconazole 2 % vaginal cream  Commonly known as: MICONAZOLE-7  Place 1 applicator vaginally every evening.     ondansetron 4 MG tablet  Commonly known as: ZOFRAN  Take 4 mg  by mouth every 6 (six) hours as needed for Nausea.     progesterone 100 MG capsule  Commonly known as: PROMETRIUM  Take 400 mg by mouth every evening.     venlafaxine 100 MG Tab  Commonly known as: EFFEXOR  Take 150 mg by mouth Daily.           * This list has 2 medication(s) that are the same as other medications prescribed for you. Read the directions carefully, and ask your doctor or other care provider to review them with you.                  Nell Gaytan NP  Colorectal Surgery  [facility]

## 2023-11-22 LAB
FINAL PATHOLOGIC DIAGNOSIS: NORMAL
GROSS: NORMAL
Lab: NORMAL

## 2023-11-23 LAB
ACID FAST MOD KINY STN SPEC: NORMAL
ACID FAST MOD KINY STN SPEC: NORMAL
MYCOBACTERIUM SPEC QL CULT: NORMAL
MYCOBACTERIUM SPEC QL CULT: NORMAL

## 2023-11-24 ENCOUNTER — TELEPHONE (OUTPATIENT)
Dept: SURGERY | Facility: CLINIC | Age: 38
End: 2023-11-24
Payer: COMMERCIAL

## 2023-11-24 NOTE — TELEPHONE ENCOUNTER
Spoke with pt regarding request for a lower dose of Oxycodone, 30 mg to 20 mg. Pt is requesting that medication be sent to [facility] as they will not fill script in [place]. Pt continues that she has been in pain for sometime but it does not seem like there is any resolution to her problem. Advised to follow up with provider to discuss next steps. Offered pt to be seen on 12/5 at 3 PM at [facility]. Pt verbally confirmed appt date, time, and location. Message sent to Dr. Marie for medication request.       ----- Message from Alana Perez sent at 11/24/2023  9:31 AM CST -----  Regarding: refill  Contact: [phone]  Pt is calling to speak with someone in provider office in regards to asking that the provider refill her pain medication pt stated she is in severe pain pt also stated she would like the provider to drop the dose from oxyCODONE (ROXICODONE) 30 MG Tab to oxyCODONE (ROXICODONE) 20 MG Tab pt is asking for a return call please call pt at  [phone] pt is asking that the script be sent to [facility] Phone: [phone] Fax: [phone] also stated she doesn't have enough medication until her next appt so that is why she is asking that the provider put in another script

## 2023-11-27 ENCOUNTER — TELEPHONE (OUTPATIENT)
Dept: INFECTIOUS DISEASES | Facility: CLINIC | Age: 38
End: 2023-11-27
Payer: COMMERCIAL

## 2023-11-27 NOTE — TELEPHONE ENCOUNTER
----- Message from Mary Alice Portillo sent at 11/27/2023  3:10 PM CST -----  Regarding: Pt advice  Contact: [facility] [phone]  [facility] is calling to leave a message for the provider would like clarification for RX: Cesepime. Please call

## 2023-11-27 NOTE — TELEPHONE ENCOUNTER
----- Message from Mary Alice Portillo sent at 11/27/2023  3:10 PM CST -----  Regarding: Pt advice  Contact: [facility] [phone]  [facility] is calling to leave a message for the provider would like clarification for RX: Cesepime. Please call

## 2023-11-28 ENCOUNTER — TELEPHONE (OUTPATIENT)
Dept: SURGERY | Facility: CLINIC | Age: 38
End: 2023-11-28
Payer: COMMERCIAL

## 2023-11-28 NOTE — PHYSICIAN QUERY
PT Name: Georgette Abrams  MR #: [id number]     Documentation Clarification      CDS/:  Artie Zheng RN, CDS                   Contact Information:  [email or web address]  This form is a permanent document in the medical record.     Query Date: November 28, 2023    By submitting this query, we are merely seeking further clarification of documentation. Please utilize your independent clinical judgment when addressing the question(s) below.    The Medical Record reflects the following:    Supporting Clinical Findings     Location in Medical Record   Abdominal wall fluid collections       38F Hx FAP s/p TPC w J pouch, J bowel takedown for pouch failure, VHR with mesh c/b mesh infection, and ultimately VHR with bilateral component separation (Phasix), SBR, ileostomy re-siting 8/7/23. Course was complicated by multiple abdominal wall fluid collections requiring drain placement and long term Daptomycin as well as ongoing chronic postoperative pain.     Possible concern for developing EC fistula given drain appearance however hard to evaluate given no oral contrast on CT scan.  Fluid collections are overall improved in size on most recent CT imaging.     CT scan reviewed with significant improvement in abdominal fluid collection.  CONSTANZA drains with minimal output.  Main issue is with ongoing nausea and vomiting.  Upper GI demonstrates normal gastric emptying.  She does have significant reflux.  Delayed abdominal imaging with passage of the contrast.  Ostomy working well.  Clinically, she does not appear obstructed.  No elevated white blood cell count or CRP were tachycardia to suggest underlying infection.     We will plan to continue her antibiotics per ID.  Will plan to remove abdominal drains as this may be related to her ongoing abdominal pain and discomfort.   Colon & Rectal PN 11/20          DCS 11/21        Colon & Rectal PN attestation 11/17   She ultimately underwent ventral hernia repair with  "mesh placement (Phasix absorbable mesh) on 8/7/2023. This was complicated with intra-abdominal abscesses s/p IR drainage. Most recent IR drainage was on 10/5/23. Cultures +MSSA and E.faecalis. She is followed by ID and has been on IV daptomycin since drainage on 10/5 pending abscess resolution.      Drains continue to have murky dark output.     Continue IV cefepime.  Unclear significance of E. Cloacae and stenotrophomonas from abdominal drain given  culture was taken from drain bulb that has been present for weeks.  Nonetheless,cefepime will cover prior MSSA and new E. Cloacae.  Low suspicion stenotrophomonas is clinically significant     Drains removed    Will plan on 3 weeks of IV cefepime with repeat imaging at 2 weeks to assess fluid collection      ID PN 11/20   Impression:     Postop change of total colectomy with a right lower quadrant ileostomy.  Stable position of pigtail catheter along the left of midline anterior abdominal wall within the previous subcutaneous fluid collection with minimal residual fluid.  Stable position of additional pigtail catheter within midline fluid collection deep to the rectus abdominus muscle which measures slightly smaller when compared to most recent CT from 11/03/2023.  No new or enlarging fluid collections.     Abdomen Aerobic culture:               +Enterobacter Cloacae              + Stenotrophomonas (X.) Maltophilia   CT 11/15              Labs 11/16                                                                            Provider, please clarify the diagnosis of "Abdominal wall fluid collections" for the current encounter associated with above clinical findings.    [   ] Intra-abdominal Abscess     [  x ] Abdominal Wall Abscess     [   ] Other (please specify): ____________                                                                                                                         "

## 2023-11-29 ENCOUNTER — TELEPHONE (OUTPATIENT)
Dept: SURGERY | Facility: CLINIC | Age: 38
End: 2023-11-29
Payer: COMMERCIAL

## 2023-11-29 NOTE — TELEPHONE ENCOUNTER
Called pt back to confirm her appt for 12/1.  Pt would like for Dr. Tracy to know that she is more than likely going to come to the ER tomorrow and miss her appt this Friday.  The vicious cycle of pain and N/V is becoming unbearable and she cannot hold on until her appt on Friday.  Her pain is constant and severe and she cannot even keep down water or Gatorade or take care of herself or her kids.  Pt would like a new plan in place to help her get out of this miserable state.  She would also prefer that Dr. Rose not be a part of her care team if possible, only Dr. Tracy or Dr. Dominguez.  RN will relay message to Dr. Tracy for the pt.  Pt verbalized understanding to all.

## 2023-11-29 NOTE — TELEPHONE ENCOUNTER
Called pt back and addressed all questions and concerns and spoke to her about her upcoming appts and possibly coming back to [facility] for pain med refills.  Pt verbalized understanding to all.

## 2023-12-01 ENCOUNTER — PATIENT MESSAGE (OUTPATIENT)
Dept: HEMATOLOGY/ONCOLOGY | Facility: CLINIC | Age: 38
End: 2023-12-01
Payer: COMMERCIAL

## 2023-12-01 ENCOUNTER — TELEPHONE (OUTPATIENT)
Dept: HEMATOLOGY/ONCOLOGY | Facility: CLINIC | Age: 38
End: 2023-12-01
Payer: COMMERCIAL

## 2023-12-01 NOTE — TELEPHONE ENCOUNTER
Pt wanted to let me know she is so sick she is unable to make it to the appt with Dr Tracy today. She is packed and is going to go to the ED at [facility].

## 2023-12-01 NOTE — TELEPHONE ENCOUNTER
----- Message from Yoly Rizzo sent at 12/1/2023 11:11 AM CST -----  Regarding: pt advice  Contact: pt [phone]  PATIENT CALL    Pt called to speak w/ Elodia regarding recurrent N/V. States that it hasn't gotten any better and would like to speak w/ her about next steps. Please call her at [phone] to advise

## 2023-12-02 ENCOUNTER — HOSPITAL ENCOUNTER (EMERGENCY)
Facility: HOSPITAL | Age: 38
Discharge: HOME | End: 2023-12-02
Attending: EMERGENCY MEDICINE
Payer: COMMERCIAL

## 2023-12-02 VITALS
OXYGEN SATURATION: 99 % | HEART RATE: 84 BPM | HEIGHT: 67 IN | BODY MASS INDEX: 34.53 KG/M2 | SYSTOLIC BLOOD PRESSURE: 152 MMHG | WEIGHT: 220 LBS | TEMPERATURE: 99 F | DIASTOLIC BLOOD PRESSURE: 92 MMHG | RESPIRATION RATE: 16 BRPM

## 2023-12-02 DIAGNOSIS — R11.10 VOMITING, UNSPECIFIED VOMITING TYPE, UNSPECIFIED WHETHER NAUSEA PRESENT: ICD-10-CM

## 2023-12-02 DIAGNOSIS — R10.9 ABDOMINAL PAIN, UNSPECIFIED ABDOMINAL LOCATION: Primary | ICD-10-CM

## 2023-12-02 DIAGNOSIS — Z93.2 ILEOSTOMY IN PLACE: ICD-10-CM

## 2023-12-02 DIAGNOSIS — R00.0 TACHYCARDIA: ICD-10-CM

## 2023-12-02 LAB
ALBUMIN SERPL BCP-MCNC: 3.7 G/DL (ref 3.5–5.2)
ALP SERPL-CCNC: 51 U/L (ref 55–135)
ALT SERPL W/O P-5'-P-CCNC: 22 U/L (ref 10–44)
ANION GAP SERPL CALC-SCNC: 10 MMOL/L (ref 8–16)
AST SERPL-CCNC: 16 U/L (ref 10–40)
BASOPHILS # BLD AUTO: 0.04 K/UL (ref 0–0.2)
BASOPHILS NFR BLD: 0.9 % (ref 0–1.9)
BILIRUB SERPL-MCNC: 0.7 MG/DL (ref 0.1–1)
BILIRUB UR QL STRIP: NEGATIVE
BUN SERPL-MCNC: 11 MG/DL (ref 6–20)
CALCIUM SERPL-MCNC: 9.2 MG/DL (ref 8.7–10.5)
CHLORIDE SERPL-SCNC: 105 MMOL/L (ref 95–110)
CLARITY UR REFRACT.AUTO: CLEAR
CO2 SERPL-SCNC: 24 MMOL/L (ref 23–29)
COLOR UR AUTO: YELLOW
CREAT SERPL-MCNC: 0.7 MG/DL (ref 0.5–1.4)
CRP SERPL-MCNC: 1.2 MG/L (ref 0–8.2)
DIFFERENTIAL METHOD: ABNORMAL
EOSINOPHIL # BLD AUTO: 0.1 K/UL (ref 0–0.5)
EOSINOPHIL NFR BLD: 1.5 % (ref 0–8)
ERYTHROCYTE [DISTWIDTH] IN BLOOD BY AUTOMATED COUNT: 16.4 % (ref 11.5–14.5)
EST. GFR  (NO RACE VARIABLE): >60 ML/MIN/1.73 M^2
GLUCOSE SERPL-MCNC: 108 MG/DL (ref 70–110)
GLUCOSE UR QL STRIP: NEGATIVE
HCT VFR BLD AUTO: 38.7 % (ref 37–48.5)
HGB BLD-MCNC: 12.6 G/DL (ref 12–16)
HGB UR QL STRIP: NEGATIVE
IMM GRANULOCYTES # BLD AUTO: 0.01 K/UL (ref 0–0.04)
IMM GRANULOCYTES NFR BLD AUTO: 0.2 % (ref 0–0.5)
KETONES UR QL STRIP: NEGATIVE
LEUKOCYTE ESTERASE UR QL STRIP: NEGATIVE
LIPASE SERPL-CCNC: 15 U/L (ref 4–60)
LYMPHOCYTES # BLD AUTO: 1.6 K/UL (ref 1–4.8)
LYMPHOCYTES NFR BLD: 33.8 % (ref 18–48)
MCH RBC QN AUTO: 27.6 PG (ref 27–31)
MCHC RBC AUTO-ENTMCNC: 32.6 G/DL (ref 32–36)
MCV RBC AUTO: 85 FL (ref 82–98)
MONOCYTES # BLD AUTO: 0.4 K/UL (ref 0.3–1)
MONOCYTES NFR BLD: 7.6 % (ref 4–15)
NEUTROPHILS # BLD AUTO: 2.6 K/UL (ref 1.8–7.7)
NEUTROPHILS NFR BLD: 56 % (ref 38–73)
NITRITE UR QL STRIP: NEGATIVE
NRBC BLD-RTO: 0 /100 WBC
PH UR STRIP: 5 [PH] (ref 5–8)
PLATELET # BLD AUTO: 195 K/UL (ref 150–450)
PMV BLD AUTO: 11.3 FL (ref 9.2–12.9)
POTASSIUM SERPL-SCNC: 3.9 MMOL/L (ref 3.5–5.1)
PROT SERPL-MCNC: 6.7 G/DL (ref 6–8.4)
PROT UR QL STRIP: NEGATIVE
RBC # BLD AUTO: 4.57 M/UL (ref 4–5.4)
SODIUM SERPL-SCNC: 139 MMOL/L (ref 136–145)
SP GR UR STRIP: 1.03 (ref 1–1.03)
URN SPEC COLLECT METH UR: NORMAL
WBC # BLD AUTO: 4.62 K/UL (ref 3.9–12.7)

## 2023-12-02 PROCEDURE — 99285 EMERGENCY DEPT VISIT HI MDM: CPT | Mod: 25

## 2023-12-02 PROCEDURE — 96361 HYDRATE IV INFUSION ADD-ON: CPT

## 2023-12-02 PROCEDURE — 96374 THER/PROPH/DIAG INJ IV PUSH: CPT

## 2023-12-02 PROCEDURE — 96376 TX/PRO/DX INJ SAME DRUG ADON: CPT

## 2023-12-02 PROCEDURE — 93010 EKG 12-LEAD: ICD-10-PCS | Mod: ,,, | Performed by: INTERNAL MEDICINE

## 2023-12-02 PROCEDURE — 93010 ELECTROCARDIOGRAM REPORT: CPT | Mod: ,,, | Performed by: INTERNAL MEDICINE

## 2023-12-02 PROCEDURE — 80053 COMPREHEN METABOLIC PANEL: CPT | Performed by: EMERGENCY MEDICINE

## 2023-12-02 PROCEDURE — 63600175 PHARM REV CODE 636 W HCPCS: Performed by: EMERGENCY MEDICINE

## 2023-12-02 PROCEDURE — 93005 ELECTROCARDIOGRAM TRACING: CPT

## 2023-12-02 PROCEDURE — 85025 COMPLETE CBC W/AUTO DIFF WBC: CPT | Performed by: EMERGENCY MEDICINE

## 2023-12-02 PROCEDURE — 25500020 PHARM REV CODE 255: Performed by: EMERGENCY MEDICINE

## 2023-12-02 PROCEDURE — 81003 URINALYSIS AUTO W/O SCOPE: CPT | Performed by: EMERGENCY MEDICINE

## 2023-12-02 PROCEDURE — 96375 TX/PRO/DX INJ NEW DRUG ADDON: CPT

## 2023-12-02 PROCEDURE — 83690 ASSAY OF LIPASE: CPT | Performed by: EMERGENCY MEDICINE

## 2023-12-02 PROCEDURE — 25000003 PHARM REV CODE 250: Performed by: EMERGENCY MEDICINE

## 2023-12-02 PROCEDURE — 86140 C-REACTIVE PROTEIN: CPT | Performed by: EMERGENCY MEDICINE

## 2023-12-02 RX ORDER — ONDANSETRON 2 MG/ML
4 INJECTION INTRAMUSCULAR; INTRAVENOUS
Status: DISCONTINUED | OUTPATIENT
Start: 2023-12-02 | End: 2023-12-02

## 2023-12-02 RX ORDER — OXYCODONE HYDROCHLORIDE 15 MG/1
15 TABLET ORAL EVERY 6 HOURS PRN
Qty: 12 TABLET | Refills: 0 | Status: SHIPPED | OUTPATIENT
Start: 2023-12-02

## 2023-12-02 RX ORDER — ONDANSETRON 4 MG/1
8 TABLET, ORALLY DISINTEGRATING ORAL EVERY 6 HOURS PRN
Qty: 30 TABLET | Refills: 3 | Status: SHIPPED | OUTPATIENT
Start: 2023-12-02

## 2023-12-02 RX ORDER — HYDROMORPHONE HYDROCHLORIDE 1 MG/ML
0.5 INJECTION, SOLUTION INTRAMUSCULAR; INTRAVENOUS; SUBCUTANEOUS
Status: COMPLETED | OUTPATIENT
Start: 2023-12-02 | End: 2023-12-02

## 2023-12-02 RX ORDER — DROPERIDOL 2.5 MG/ML
0.62 INJECTION, SOLUTION INTRAMUSCULAR; INTRAVENOUS ONCE
Status: COMPLETED | OUTPATIENT
Start: 2023-12-02 | End: 2023-12-02

## 2023-12-02 RX ORDER — HYDROMORPHONE HYDROCHLORIDE 1 MG/ML
1 INJECTION, SOLUTION INTRAMUSCULAR; INTRAVENOUS; SUBCUTANEOUS
Status: COMPLETED | OUTPATIENT
Start: 2023-12-02 | End: 2023-12-02

## 2023-12-02 RX ORDER — ONDANSETRON 2 MG/ML
4 INJECTION INTRAMUSCULAR; INTRAVENOUS
Status: COMPLETED | OUTPATIENT
Start: 2023-12-02 | End: 2023-12-02

## 2023-12-02 RX ADMIN — SODIUM CHLORIDE 1000 ML: 9 INJECTION, SOLUTION INTRAVENOUS at 12:12

## 2023-12-02 RX ADMIN — HYDROMORPHONE HYDROCHLORIDE 0.5 MG: 1 INJECTION, SOLUTION INTRAMUSCULAR; INTRAVENOUS; SUBCUTANEOUS at 12:12

## 2023-12-02 RX ADMIN — IOHEXOL 100 ML: 350 INJECTION, SOLUTION INTRAVENOUS at 03:12

## 2023-12-02 RX ADMIN — ONDANSETRON 4 MG: 2 INJECTION INTRAMUSCULAR; INTRAVENOUS at 07:12

## 2023-12-02 RX ADMIN — DROPERIDOL 0.62 MG: 2.5 INJECTION, SOLUTION INTRAMUSCULAR; INTRAVENOUS at 12:12

## 2023-12-02 RX ADMIN — HYDROMORPHONE HYDROCHLORIDE 1 MG: 1 INJECTION, SOLUTION INTRAMUSCULAR; INTRAVENOUS; SUBCUTANEOUS at 04:12

## 2023-12-02 RX ADMIN — HYDROMORPHONE HYDROCHLORIDE 1 MG: 1 INJECTION, SOLUTION INTRAMUSCULAR; INTRAVENOUS; SUBCUTANEOUS at 07:12

## 2023-12-02 RX ADMIN — HYDROMORPHONE HYDROCHLORIDE 0.5 MG: 1 INJECTION, SOLUTION INTRAMUSCULAR; INTRAVENOUS; SUBCUTANEOUS at 02:12

## 2023-12-02 NOTE — ED NOTES
"[facility] States surgery in August and "has been sick since." Has been on antibiotics since surgery in August    Patient identifiers for Georgette Abrams 38 y.o. female checked and correct.  Chief Complaint   Patient presents with    Multiple complaints     Drove in from [place],  has ileostomy, had pic line on home antibiotics, vomiting green, mult surg on abd,      Past Medical History:   Diagnosis Date    Anxiety     Familial polyposis     S/P total colectomy      Allergies reported:   Review of patient's allergies indicates:   Allergen Reactions    Levofloxacin Nausea And Vomiting and Rash    Morphine Anaphylaxis, Hives, Shortness Of Breath and Hallucinations           Piperacillin-tazobactam Rash    Sulfa (sulfonamide antibiotics) Nausea And Vomiting and Rash    Opioids - morphine analogues     Hydrocodone-acetaminophen Itching       Appearance: Pt awake, alert & oriented to person, place & time. Pt in no acute distress at present time. Pt is clean and well groomed with clothes appropriately fastened.   Skin: Skin warm, dry & intact. Color consistent with ethnicity. Mucous membranes moist. No breakdown or brusing noted.   Musculoskeletal: Patient moving all extremities well, no obvious swelling or deformities noted.   Respiratory: Respirations spontaneous, even, and non-labored. Visible chest rise noted. Airway is open and patent. No accessory muscle use noted.   Neurologic: Sensation is intact. Speech is clear and appropriate. Eyes open spontaneously, behavior appropriate to situation, follows commands, facial expression symmetrical, bilateral hand grasp equal and even, purposeful motor response noted.  Cardiac: All peripheral pulses present. No Bilateral lower extremity edema. Cap refill is <3 seconds.  Abdomen: Abdomen soft, non distended, non tender to palpation. Ileostomy to RUQ, patent, site WNL  : Pt voids independently, denies dysuria, hematuria, " frequency.

## 2023-12-02 NOTE — ED PROVIDER NOTES
Encounter Date: 12/2/2023       History     Chief Complaint   Patient presents with    Multiple complaints     Drove in from [place],  has ileostomy, had pic line on home antibiotics, vomiting green, tammyt surg on abd,      39 yo female presenting with abdominal pain, vomiting.    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  She states she was admitted in November and has been experiencing worsening symptoms since discharge.  She states the emesis appears clear, sometimes green.  She has a PICC line and has been on abx per recent discharge summary.  Denies denies fevers.  Denies change in ostomy output. Denies dysuria or hematuria.   S/p hysterectomy.            The history is provided by the patient and medical records. No  was used.     Review of patient's allergies indicates:   Allergen Reactions    Levofloxacin Nausea And Vomiting and Rash    Morphine Anaphylaxis, Hives, Shortness Of Breath and Hallucinations           Piperacillin-tazobactam Rash    Sulfa (sulfonamide antibiotics) Nausea And Vomiting and Rash    Opioids - morphine analogues     Hydrocodone-acetaminophen Itching     Past Medical History:   Diagnosis Date    Anxiety     Familial polyposis     S/P total colectomy      Past Surgical History:   Procedure Laterality Date    Davinci Assisted Bilateral Ovarian Cystectomy, with extension  SEAN   8/2011    ESOPHAGOGASTRODUODENOSCOPY N/A 11/20/2023    Procedure: EGD (ESOPHAGOGASTRODUODENOSCOPY);  Surgeon: Isela Perez MD;  Location: [facility] ([address] [place] [address]);  Service: Gastroenterology;  Laterality: N/A;    HYSTERECTOMY  8/23/2012    DAVINCI     ILEOSTOMY REVISION N/A 8/7/2023    Procedure: REVISION, ILEOSTOMY;  Surgeon: KRISH Tracy MD;  [facility];  Service: Colon and Rectal;  Laterality: N/A;    Illeotomy Creation  2009     LYSIS OF ADHESIONS N/A 8/7/2023    Procedure: LYSIS, ADHESIONS;  Surgeon: KRISH Tracy MD;  Location: [facility] OR 2ND FLR;  Service: Colon and Rectal;  Laterality: N/A;    OOPHORECTOMY  8/23/2012    DAvinci removal with DLH     REPAIR OF RECURRENT VENTRAL HERNIA N/A 8/7/2023    Procedure: REPAIR, HERNIA, VENTRAL, RECURRENT, ERAS low;  Surgeon: KRISH Tracy MD;  Location: [facility] OR 2ND FLR;  Service: Colon and Rectal;  Laterality: N/A;  w/ mesh and omental pedical flap    TOTAL COLECTOMY      Iloanal pouch creation     Family History   Problem Relation Age of Onset    Colon cancer Maternal Grandmother         near late 60's    Lung cancer Maternal Grandfather         lung cancer     Social History     Tobacco Use    Smoking status: Former    Smokeless tobacco: Never   Substance Use Topics    Alcohol use: Not Currently     Comment: seldom    Drug use: No         Physical Exam     Initial Vitals [12/02/23 1043]   BP Pulse Resp Temp SpO2   (!) 172/88 (!) 118 20 98.2 °F (36.8 °C) 100 %      MAP       --         Physical Exam    Nursing note and vitals reviewed.  Constitutional: She is not diaphoretic. No distress.   Appears uncomfortable due to pain    HENT:   Head: Normocephalic and atraumatic.   Eyes: Right eye exhibits no discharge. Left eye exhibits no discharge.   Neck: Neck supple. No tracheal deviation present.   Cardiovascular:  Normal rate and regular rhythm.           Pulmonary/Chest: Breath sounds normal. No respiratory distress.   Abdominal: Abdomen is soft. Bowel sounds are normal. She exhibits distension. There is abdominal tenderness.   Ostomy site well-appearing, pink  Brown stool in ostomy bag, no gross blood        Musculoskeletal:      Cervical back: Neck supple.      Comments: RUE PIC     Neurological: She is alert and oriented to person, place, and time.   Skin: Skin is warm. No rash noted.   Psychiatric: She has a normal mood and affect. Her behavior is normal.         ED Course    Procedures  Labs Reviewed   CBC W/ AUTO DIFFERENTIAL - Abnormal; Notable for the following components:       Result Value    RDW 16.4 (*)     All other components within normal limits   COMPREHENSIVE METABOLIC PANEL - Abnormal; Notable for the following components:    Alkaline Phosphatase 51 (*)     All other components within normal limits   URINALYSIS, REFLEX TO URINE CULTURE    Narrative:     Specimen Source->Urine   LIPASE   C-REACTIVE PROTEIN   C-REACTIVE PROTEIN    Narrative:     add on per  /order#[id number] @ 12/02/2023  15:43    C-REACTIVE PROTEIN     EKG Readings: (Independently Interpreted)   Initial Reading: No STEMI. Previous EKG: Compared with most recent EKG Previous EKG Date: 8/24/2012. Rhythm: Normal Sinus Rhythm. Heart Rate: 95. Clinical Impression: Normal Sinus Rhythm          Imaging Results              CT Abdomen Pelvis With IV Contrast NO Oral Contrast (In process)  Result time 12/02/23 16:01:58                     Medications   sodium chloride 0.9% bolus 1,000 mL 1,000 mL (0 mLs Intravenous Stopped 12/2/23 1309)   droPERidol injection 0.625 mg (0.625 mg Intravenous Given 12/2/23 1205)   HYDROmorphone injection 0.5 mg (0.5 mg Intravenous Given 12/2/23 1241)   HYDROmorphone injection 0.5 mg (0.5 mg Intravenous Given 12/2/23 1430)   iohexoL (OMNIPAQUE 350) injection 100 mL (100 mLs Intravenous Given 12/2/23 1558)     Medical Decision Making  37 yo female with complex surgical history presenting with abdominal pain and vomiting. On arrival, abdomen non-peritonitic.  Plan for IVF for hydration, sx treatment.  Patient has tolerated dilaudid previously w/o adverse/allergic reaction, but states she is unable to have morphine.     Differential including, but not limited to:  SBO, pancreatitis, gastritis, fistula    No leukocytosis. CRP and lipase WNL.    Plan for CT A/P to assess for emergent intra-abdominal pathology.   Signed out to oncoming attending at  pending CT results, may  need CRS evaluation, reassessment. Disposition pending results/    Amount and/or Complexity of Data Reviewed  External Data Reviewed: notes.     Details: Discharge summary reviewed - admitted 11/15 - 11/21.  Admitted to CRS for pain control, consult to ID and cont ADOLPH.  IR drains removed, per ID reommendations dc daptomycin and continue Cefepine for 3 more weeks.      Labs: ordered. Decision-making details documented in ED Course.  Radiology: ordered.  ECG/medicine tests: ordered and independent interpretation performed.    Risk  Prescription drug management.               ED Course as of 12/02/23 1606   Sat Dec 02, 2023   1240 Pulse(!): 118 [AB]   1240 WBC: 4.62  No leukocytosis  [AB]   1240 Lipase: 15  Normal  [AB]   1240 Potassium: 3.9 [AB]      ED Course User Index  [AB] Jai Gautam MD                           Clinical Impression:  Final diagnoses:  [R00.0] Tachycardia  [R10.9] Abdominal pain, unspecified abdominal location (Primary)  [Z93.2] Ileostomy in place  [R11.10] Vomiting, unspecified vomiting type, unspecified whether nausea present                 Jai Gautam MD  12/02/23 1607

## 2023-12-02 NOTE — PROVIDER PROGRESS NOTES - EMERGENCY DEPT.
Encounter Date: 12/2/2023    ED Physician Progress Notes        Physician Note:   AOC @ 1500. Patient presented w/ abdominal pain around ostomy site. Concern for possible  acute or subacute complication of patient's ostomy surgery versus chronic abdominal pain   Pending studies include CT abdomen    Pending actions include follow-up CT and discussed with colorectal surgery who will evaluate patient    Likely disposition is discharge home    Alfonzo Herrera

## 2023-12-03 NOTE — CONSULTS
[facility] - Emergency Dept  General Surgery  Consult Note    Inpatient consult to Colorectal Surgery  Consult performed by: Gilles Mccormick MD  Consult ordered by: Alfonzo Herrera MD        Subjective:     Chief Complaint/Reason for Admission: Nausea, emesis, abdominal pain    History of Present Illness: Ms. Abrams is a 37yo female well known to the colon and rectal service.  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  Eventually had pouch failure and had a pouch excision a couple months later in September.  Developed a ventral hernia and had this repaired during a hysterectomy operation in 2012 which was complicated with a mesh infection requiring excision in 2016.  Since this hernia repair she had developed multiple hernia recurrences and was having obstructive symptoms.  Ultimately she had a ileostomy revision with relocation and small bowel resection and a ventral hernia repair with retrorectus mesh placement on 8/7/2023 at [facility].  Retrorectus ventral hernia repair was performed using Phasix absorbable mesh.  In September patient was admitted and found to have 2 fluid collections (one subcutaneous and the other retrorectus) which drains were placed within.  Cultures from these drains were negative.  In October she was again admitted and new drains were placed.  This time cultures from the drains grew pan sensitive staph aureus and enterococcus and she was started on meropenem at this time by ID.  This was transitioned to daptomycin and she was to continue this for a four week course after discharge.  At follow up appointment on 11/3 she endorsed having continued abdominal pain and drains had began to become cloudy with  output.  Had a CT at that time showed good position of the prior placed IR drains with improvement in the size of the fluid collections.  Presented to the ED on 11/15 with abdominal pain, nausea, and emesis.  Was admitted at that time to the CRS service and stayed for 6 days.  During that time her IR drains were removed and she was transitioned to cefipime antibiotics.  At the time of discharge she was tolerating a diet, ambulating the halls, and had adequate pain control.  However after discharge the patient states that she continued to have abdominal pain, nausea, and emesis.  Given the continued symptoms she presents to the ED again today for evaluation.  Her abdominal pain she states is now primarily mostly in the RUQ and epigastric region.  She still endorses pain at the prior drain sites.  Whenever she eats she states she has nausea and emesis and walking around her house makes her nauseas and vomit.  Workup in ED today shows no leukocytosis on labs and H/H is stable.  Renal panel and hepatic panel are unremarkable.  Lipase WNL.  Urinalysis is unremarkable.  CT imaging was obtained which shows overall improvement since the last obtained a couple of weeks ago.  Still has some stranding of the anterior abdominal wall but without any fluid collections seen.  Has two small fluid collections in the pelvis however these are relatively unremarkable and stable in appearance from prior scans.  Ileostomy is seen and no evident issues at the site on imaging.  No evidence of a bowel obstruction.  Colorectal surgery has been consulted for evaluation.    Current Facility-Administered Medications on File Prior to Encounter   Medication    LIDOcaine (PF) 10 mg/ml (1%) injection 10 mg     Current Outpatient Medications on File Prior to Encounter   Medication Sig    clonazepam (KLONOPIN) 1 MG tablet Take 1 mg by mouth 2 (two) times daily as needed.      cyanocobalamin 1,000 mcg/mL injection 1,000 mcg twice a week.     fluconazole (DIFLUCAN) 150 MG Tab Take 1 tablet (150 mg total) by mouth once daily.    ibuprofen (ADVIL,MOTRIN) 800 MG tablet Take 1 tablet (800 mg total) by mouth every 8 (eight) hours.    LIDOcaine (LIDODERM) 5 % Place 1 patch onto the skin once daily. Remove & Discard patch within 12 hours or as directed by MD    losartan (COZAAR) 100 MG tablet Take 100 mg by mouth.    [] methocarbamoL (ROBAXIN) 750 MG Tab Take 1 tablet (750 mg total) by mouth 4 (four) times daily. for 10 days    miconazole (MICONAZOLE-7) 2 % vaginal cream Place 1 applicator vaginally every evening.    miconazole (MICOTIN) 100 mg vaginal suppository Place 1 suppository (100 mg total) vaginally every evening.    ondansetron (ZOFRAN) 4 MG tablet Take 4 mg by mouth every 6 (six) hours as needed for Nausea.    ondansetron (ZOFRAN-ODT) 4 MG TbDL Dissolve 1 tablet (4 mg total) by mouth every 6 (six) hours as needed.    ondansetron (ZOFRAN-ODT) 4 MG TbDL Dissolve 2 tablets (8 mg total) by mouth every 6 (six) hours as needed (nausea).    oxyCODONE (ROXICODONE) 30 MG Tab Take 1 tablet (30 mg total) by mouth every 4 (four) hours as needed for Pain.    pantoprazole (PROTONIX) 40 MG tablet Take 1 tablet (40 mg total) by mouth 2 (two) times daily before meals.    progesterone (PROMETRIUM) 100 MG capsule Take 400 mg by mouth every evening.    scopolamine (TRANSDERM-SCOP) 1.3-1.5 mg (1 mg over 3 days) Place 1 patch onto the skin Every 3 (three) days.    venlafaxine (EFFEXOR) 100 MG Tab Take 150 mg by mouth Daily.       Review of patient's allergies indicates:   Allergen Reactions    Levofloxacin Nausea And Vomiting and Rash    Morphine Anaphylaxis, Hives, Shortness Of Breath and Hallucinations           Piperacillin-tazobactam Rash    Sulfa (sulfonamide antibiotics) Nausea And Vomiting and Rash    Opioids - morphine analogues     Hydrocodone-acetaminophen Itching       Past Medical History:   Diagnosis Date    Anxiety     Familial polyposis     S/P total  colectomy      Past Surgical History:   Procedure Laterality Date    Davinci Assisted Bilateral Ovarian Cystectomy, with extension  SEAN   8/2011    ESOPHAGOGASTRODUODENOSCOPY N/A 11/20/2023    Procedure: EGD (ESOPHAGOGASTRODUODENOSCOPY);  Surgeon: Isela Perez MD;  Location: [facility] ENDO (2ND FLR);  Service: Gastroenterology;  Laterality: N/A;    HYSTERECTOMY  8/23/2012    DAVINCI     ILEOSTOMY REVISION N/A 8/7/2023    Procedure: REVISION, ILEOSTOMY;  Surgeon: KRISH Tracy MD;  [facility]: [facility] 2ND FLR;  Service: Colon and Rectal;  Laterality: N/A;    Illeotomy Creation  2009    LYSIS OF ADHESIONS N/A 8/7/2023    Procedure: LYSIS, ADHESIONS;  Surgeon: KRISH Tracy MD;  [facility]: [facility] 2ND FLR;  Service: Colon and Rectal;  Laterality: N/A;    OOPHORECTOMY  8/23/2012    DAvinci removal with DLH     REPAIR OF RECURRENT VENTRAL HERNIA N/A 8/7/2023    Procedure: REPAIR, HERNIA, VENTRAL, RECURRENT, ERAS low;  Surgeon: KRISH Tracy MD;  Location: [facility] OR 2ND FLR;  Service: Colon and Rectal;  Laterality: N/A;  w/ mesh and omental pedical flap    TOTAL COLECTOMY      Iloanal pouch creation     Family History       Problem Relation (Age of Onset)    Colon cancer Maternal Grandmother    Lung cancer Maternal Grandfather          Tobacco Use    Smoking status: Former    Smokeless tobacco: Never   Substance and Sexual Activity    Alcohol use: Not Currently     Comment: seldom    Drug use: No    Sexual activity: Yes     Partners: Male     Birth control/protection: None     Comment: , lives in [place]     Review of Systems   Constitutional:  Positive for chills. Negative for fever.   HENT: Negative.     Respiratory:  Positive for shortness of breath (only when in pain). Negative for cough and chest tightness.    Cardiovascular:  Negative for chest pain and palpitations.   Gastrointestinal:  Positive for abdominal pain, nausea and vomiting. Negative for constipation.   Genitourinary:  Negative  for dysuria and hematuria.   Musculoskeletal: Negative.    Skin: Negative.    Neurological:  Negative for weakness and numbness.   Psychiatric/Behavioral:  Negative for confusion.      Objective:     Vital Signs (Most Recent):  Temp: 98.1 °F (36.7 °C) (12/02/23 1621)  Pulse: 100 (12/02/23 1621)  Resp: 18 (12/02/23 1658)  BP: (!) 143/103 (12/02/23 1621)  SpO2: 100 % (12/02/23 1621) Vital Signs (24h Range):  Temp:  [98.1 °F (36.7 °C)-98.2 °F (36.8 °C)] 98.1 °F (36.7 °C)  Pulse:  [100-118] 100  Resp:  [18-20] 18  SpO2:  [100 %] 100 %  BP: (136-172)/() 143/103     Weight: 99.8 kg (220 lb)  Body mass index is 34.46 kg/m².      Intake/Output Summary (Last 24 hours) at 12/2/2023 1821  Last data filed at 12/2/2023 1309  Gross per 24 hour   Intake 999 ml   Output --   Net 999 ml       Physical Exam  Constitutional:       Appearance: Normal appearance.   HENT:      Head: Normocephalic and atraumatic.   Eyes:      Extraocular Movements: Extraocular movements intact.   Cardiovascular:      Rate and Rhythm: Normal rate and regular rhythm.      Pulses: Normal pulses.   Pulmonary:      Effort: Pulmonary effort is normal. No respiratory distress.   Abdominal:      General: There is no distension.      Palpations: Abdomen is soft.      Tenderness: There is abdominal tenderness (endorses tenderness). There is no guarding or rebound.      Comments: Midline incision well healed, prior drain sites well healed  Ostomy site with pink stoma and output in bag   Musculoskeletal:         General: Normal range of motion.      Cervical back: Normal range of motion.   Skin:     General: Skin is warm and dry.   Neurological:      General: No focal deficit present.      Mental Status: She is alert and oriented to person, place, and time.   Psychiatric:         Mood and Affect: Mood normal.         Behavior: Behavior normal.       Significant Labs:  Recent Lab Results         12/02/23  1251   12/02/23  1209        Albumin   3.7       ALP    51       ALT   22       Anion Gap   10       Appearance, UA Clear         AST   16       Baso #   0.04       Basophil %   0.9       Bilirubin (UA) Negative         BILIRUBIN TOTAL   0.7  Comment: For infants and newborns, interpretation of results should be based  on gestational age, weight and in agreement with clinical  observations.    Premature Infant recommended reference ranges:  Up to 24 hours.............<8.0 mg/dL  Up to 48 hours............<12.0 mg/dL  3-5 days..................<15.0 mg/dL  6-29 days.................<15.0 mg/dL         BUN   11       Calcium   9.2       Chloride   105       CO2   24       Color, UA Yellow         Creatinine   0.7       CRP   1.2       Differential Method   Automated       eGFR   >60.0       Eos #   0.1       Eosinophil %   1.5       Glucose   108       Glucose, UA Negative         Gran # (ANC)   2.6       Gran %   56.0       Hematocrit   38.7       Hemoglobin   12.6       Immature Grans (Abs)   0.01  Comment: Mild elevation in immature granulocytes is non specific and   can be seen in a variety of conditions including stress response,   acute inflammation, trauma and pregnancy. Correlation with other   laboratory and clinical findings is essential.         Immature Granulocytes   0.2       Ketones, UA Negative         Leukocytes, UA Negative         Lipase   15       Lymph #   1.6       Lymph %   33.8       MCH   27.6       MCHC   32.6       MCV   85       Mono #   0.4       Mono %   7.6       MPV   11.3       NITRITE UA Negative         nRBC   0       Occult Blood UA Negative         pH, UA 5.0         Platelet Count   195       Potassium   3.9       PROTEIN TOTAL   6.7       Protein, UA Negative  Comment: Recommend a 24 hour urine protein or a urine   protein/creatinine ratio if globulin induced proteinuria is  clinically suspected.           RBC   4.57       RDW   16.4       Sodium   139       Specific [place], UA 1.030         Specimen UA Urine, Clean Catch         WBC    4.62               Significant Diagnostics:  I have reviewed all pertinent imaging results/findings within the past 24 hours.  Narrative & Impression  EXAMINATION:  CT ABDOMEN PELVIS WITH IV CONTRAST     CLINICAL HISTORY:  Abdominal pain, acute, nonlocalized;     TECHNIQUE:  Low dose axial images, sagittal and coronal reformations were obtained from the lung bases to the pubic symphysis following the IV administration of 100 mL of Omnipaque 350 .  Oral contrast was not given. Axial and coronal images reformatted.     COMPARISON:  11/15/2023 CT abdomen and pelvis     FINDINGS:  The lung bases are clear.  No pericardial effusion.     The liver appears normal.  Gallbladder is distended, no radiopaque stones seen within.  No inflammatory changes of the gallbladder seen.  The biliary ducts are not dilated.     The distal esophagus and stomach appear normal.     The pancreas, spleen and bilateral adrenal glands appear normal.     The bilateral kidneys and ureters appear normal.  There is mild bladder wall thickening which could be due to nondistention, cystitis could have this appearance.  To correlate with urinalysis.     There is and ileostomy in the right lower abdominal region.  Prior colectomy.  The bowel is not distended, no significant stool retention, no bowel dilation.     The uterus is removed.  Small oval area of fluid density left pelvic region 2.1 x 3.5 cm and presacral area low-attenuation 1.9 x 2.1 cm thought to represent possibly scarring postop, it is unchanged.     The aorta tapers normally, there is no significant atherosclerotic plaque.  No para-aortic lymphadenopathy.     The abdominal wall demonstrates stranding and distortion associated with prior abdominal surgeries.  No definite residual measurable fluid collection within the anterior abdominal wall where patient had 2 drains on the previous study 11/15/2023.  This could represent soft tissue phlegmon or scarring.  The inguinal regions appear  normal.     The osseous structures demonstrate no osseous lesions.     Impression:     Status post total colectomy and ileostomy right lower abdominal region.  There is significant stranding involving the anterior abdominal wall without a definite measurable fluid collection.     Hysterectomy.     Mild bladder wall thickening which could be due to nondistention or cystitis, to correlate with urinalysis.     Two small fluid collection measured, one in the left hemipelvis and one in the presacral region most suggestive postop changes, it is unchanged from the prior study    Assessment/Plan:     Ms. Abrams is a 39yo female with the above mentioned surgeries who presents with a prolonged history of nausea, emesis, and abdominal pain.  Labs and imaging without any definitive evident source for this pain at this time outside of her stranding from her prior surgical sites however that continues to improve with each subsequent CT scan.    - Given her continued symptoms and inability to find a great treatment surgically for her would appreciate assistance.  Recommend discussion with hospital medicine for further workup options.  - On discussion with patient she would prefer to not have to stay inpatient.  If she is discharged from the ED she can follow up with our clinic this week.  Patient states she will call and schedule the appointment.    Thank you for your consult.     Gilles Mccormick MD  General Surgery  [facility] - Emergency Dept

## 2023-12-03 NOTE — DISCHARGE INSTRUCTIONS
The gastroenterology and colorectal surgery clinic should contact you to help arrange a follow up appointments     Take your pain and nausea medication as prescribed     If you experience fever, or any new or concerning symptoms, return to the emergency depart

## 2023-12-03 NOTE — ED NOTES
Received report from BHARGAV Rollins. Pt resting in bed with family at bedside. NAD. VSS. Bed locked in lowest position. SR up x 2. Call light within reach.

## 2023-12-03 NOTE — PROVIDER PROGRESS NOTES - EMERGENCY DEPT.
Encounter Date: 12/2/2023    ED Physician Progress Notes        Physician Note:   AOC @ 1500. Patient presented w/ abdominal pain, n/v. Concern for complication of recent colectomy with ileostomy    Pending studies include CT abdomen pelvis    Pending actions include re-evaluate patient and discuss with colorectal surgery    Likely disposition is discharge home    Alfonzo JLuis Eduardo Sessions      7:16 PM  I discussed the patient with colorectal surgery.  The patient's imaging continues to improve.  There is no acute colorectal surgery intervention at this time.  They recommend she was evaluated for a different source of her vomiting abdominal pain.  The patient was concerned she may have gastroparesis.  However, overall presentation maybe less consistent with this as are her risk factors.  I will refer to GI as an outpatient.  She requests a refill of her pain and nausea medication prior to discharge but does want to be discharged and she feels very home sick in his used to making the trip back and forth to obtain her care.  She reports that the 30 mg oxycodone is a little bit too much for her and she is been breaking them in half.  Will prescribe 15 mg oxycodone tablets to cover her until her appointment.  Will refill her Zofran.  She would to cancel her appointment yesterday with [facility] due to her severe symptoms.  CRS assures me they will be able to get her on the clinic schedule this week.

## 2023-12-11 ENCOUNTER — PATIENT MESSAGE (OUTPATIENT)
Dept: SURGERY | Facility: CLINIC | Age: 38
End: 2023-12-11
Payer: COMMERCIAL

## 2023-12-18 ENCOUNTER — TELEPHONE (OUTPATIENT)
Dept: INFECTIOUS DISEASES | Facility: CLINIC | Age: 38
End: 2023-12-18
Payer: COMMERCIAL

## 2023-12-18 NOTE — TELEPHONE ENCOUNTER
Just called her infusion, they stated it does not look like she has HH due to her being back and forth in the hospital. Infusion is going to see if they can contract a nurse to go out and pull line. They will get back to me

## 2023-12-18 NOTE — TELEPHONE ENCOUNTER
----- Message from JA Shi ANP sent at 12/18/2023  4:36 PM CST -----  Thanks.  Can you put this in as a telephone encounter?  ----- Message -----  From: Kae Gupta MA  Sent: 12/18/2023   4:33 PM CST  To: JA Shi ANP    Just called her infusion, they stated it does not look like she has HH due to her being back and forth in the hospital. Infusion is going to see if they can contract a nurse to go out and pull line. They will get back to me   ----- Message -----  From: Courtney Jean-Baptiste APRN, ANP  Sent: 12/18/2023   4:20 PM CST  To: Kae Gupta MA    This lady missed her follow up with me for EOC.  She was on cefepime.  Can you call her or her Home Health.  I think she is in [place].  She needs her PICC removed if it hasn't been already.   I can't do a virtual with her because she is in [place].  Will you find out what is going on?   Thanks.

## 2023-12-19 ENCOUNTER — PATIENT MESSAGE (OUTPATIENT)
Dept: SURGERY | Facility: CLINIC | Age: 38
End: 2023-12-19
Payer: COMMERCIAL

## 2023-12-19 ENCOUNTER — TELEPHONE (OUTPATIENT)
Dept: SURGERY | Facility: CLINIC | Age: 38
End: 2023-12-19
Payer: COMMERCIAL

## 2023-12-19 DIAGNOSIS — G89.18 POST-OP PAIN: Primary | ICD-10-CM

## 2023-12-19 NOTE — TELEPHONE ENCOUNTER
----- Message from Danis Freeman sent at 12/19/2023  9:06 AM CST -----  Regarding: pt advice  Contact: pt @ [phone]  Pt is calling to advise she is quite swollen Home nurse Kimberly DURANT with [facility] health agency is with pt advising that pt is needing top be seen as well. Pt is requesting a call from Mari Lantigua in the office. Pt is advising that ER is only giving pain meds and fluids as pt is post op with another provider. Pt is advising that she is four months post op and is still in extreme pain. Please call to further advise. Thank you for all you are doing.

## 2023-12-19 NOTE — TELEPHONE ENCOUNTER
"Spoke with patient regarding nausea and pain since surgery. Patient states that she needs to "have relief from pain and nausea". She states that she "feels that the mesh is not dissolving properly or her body is rejecting the mesh". She would like her messages relayed to Dr. Tracy to advise and clinic appointment scheduled for this Friday with CT prior. Explained to patient that I will relay her message to Rossana and send her a message with plan of care. Patient verbalizes understanding.  "

## 2023-12-20 ENCOUNTER — TELEPHONE (OUTPATIENT)
Dept: INFECTIOUS DISEASES | Facility: CLINIC | Age: 38
End: 2023-12-20
Payer: COMMERCIAL

## 2023-12-20 ENCOUNTER — TELEPHONE (OUTPATIENT)
Dept: ENDOSCOPY | Facility: HOSPITAL | Age: 38
End: 2023-12-20
Payer: COMMERCIAL

## 2023-12-20 LAB — FUNGUS SPEC CULT: NORMAL

## 2023-12-20 NOTE — TELEPHONE ENCOUNTER
[facility] called and stated they sent a nurse out to pull pt line. Pt stated that she does not want her line pulled due to not feeling well enough for it to come out yet.   Pt wants to wait until she sees her ColoRectal surgeon on Friday 12/22/23.   Pt does not have HH.   Do not have any updated labs on pt.   [facility] sent out supplies for PICC care on 12/4/23 but only enough until December 12th.   Provider stated to pull line out due to it being an infection risk

## 2023-12-20 NOTE — TELEPHONE ENCOUNTER
Amilcar Black MD [place], [place], RN  Caller: Unspecified (Yesterday,  1:11 PM)  Looks like she had no duodenal polyps on her standard EGD exam. Reasonable for repeat EGD with AES in next 3-4 months to look at her ampulla. Can be done at [facility] or [place]. Any MD.          Previous Messages       ----- Message -----  From: Abigail Medellin RN  Sent: 12/19/2023   1:11 PM CST  To: Amilcar Black MD    - Repeat upper endoscopy with side-viewing scope                         with AES to screen for periampullary duodenal                         polyps at appointment to be scheduled.    Please review and advise  Thank you,  [place]

## 2023-12-20 NOTE — TELEPHONE ENCOUNTER
Spoke to MsLuis Eduardo Jose Alberto.regarding antibiotics and PICC line.   She missed her ID follow up appointment because she was in the hospital  Advises she has been in and out of hospital and ED in [place].    She should have finished IV abx on 12/12  She reports because she was in and out of hospital she still had some inventory and actually completed abx about 4 days ago.    She had repeat CT on 12/2 here at [facility].  Imaging showed no residual measurable fluid collection in the abdominal wall.  Drains removed last admission with us.  She has some persistent fat stranding and scarring in abdominal wall.    Labs of 12/2 - crp wnl, no leukocytosis, renal fx wnl, lipase wnl.    Complaining of persistent refractory nausea/vomiting.  Denies fevers.     Discussed her reported refusal to have PICC removed.    Advised her I was concerned for risk of infection/DVT  Patient advises she does have home health ([facility] - [phone]).  Advises she has been flushing the line daily and the PICC dressing has been changed weekly.  She has a sister who is a nurse who is keeping an eye on  it.    She is vehement she does not want the PICC removed before her repeat CT imaging and office visit on Friday with Dr. Capellan because she will need IV contrast and is hard stick.  Extremely difficult to place peripheral IV.  She would like to keep the PICC given she is in and out of the hospital for fluids, blood draws, etc.  She will not let Home Health remove the PICC    I suggested she discuss a more permanent solution with her surgeon if she needs continued IV access given risks of infection with peripheral PICC     Discussed with ID Staff.   Given resolution of fluid collection on 12/2 imaging, do not recommend any further antibiotics at this time   If recurrence of fluid collections or concern for new fluid collections, recommend hold further antibiotics so long as stable, admit for IR drainage for new cultures and cell count, and  consult ID.    If CT imaging stable and no evidence of abscess, fluid collection to drain, no further antibiotics and will again order PICC removal.     Will send message to Dr. Capellan.

## 2023-12-21 NOTE — TELEPHONE ENCOUNTER
Called [facility] and spoke with Fela to update her on pt and pt line. Called HH and pt is getting weekly dressing changes and has supplies she needs

## 2023-12-22 ENCOUNTER — HOSPITAL ENCOUNTER (OUTPATIENT)
Dept: RADIOLOGY | Facility: HOSPITAL | Age: 38
Discharge: HOME | End: 2023-12-22
Attending: COLON & RECTAL SURGERY
Payer: COMMERCIAL

## 2023-12-22 ENCOUNTER — OFFICE VISIT (OUTPATIENT)
Dept: SURGERY | Facility: CLINIC | Age: 38
End: 2023-12-22
Payer: COMMERCIAL

## 2023-12-22 ENCOUNTER — TELEPHONE (OUTPATIENT)
Dept: SURGERY | Facility: CLINIC | Age: 38
End: 2023-12-22
Payer: COMMERCIAL

## 2023-12-22 ENCOUNTER — PATIENT MESSAGE (OUTPATIENT)
Dept: SURGERY | Facility: CLINIC | Age: 38
End: 2023-12-22

## 2023-12-22 ENCOUNTER — TELEPHONE (OUTPATIENT)
Dept: GASTROENTEROLOGY | Facility: CLINIC | Age: 38
End: 2023-12-22
Payer: COMMERCIAL

## 2023-12-22 VITALS — HEART RATE: 130 BPM | DIASTOLIC BLOOD PRESSURE: 97 MMHG | SYSTOLIC BLOOD PRESSURE: 160 MMHG

## 2023-12-22 DIAGNOSIS — R11.10 VOMITING, UNSPECIFIED VOMITING TYPE, UNSPECIFIED WHETHER NAUSEA PRESENT: ICD-10-CM

## 2023-12-22 DIAGNOSIS — G89.18 POST-OP PAIN: ICD-10-CM

## 2023-12-22 DIAGNOSIS — Z87.898 HISTORY OF DIFFICULT VENOUS ACCESS: ICD-10-CM

## 2023-12-22 DIAGNOSIS — R10.9 ABDOMINAL PAIN, UNSPECIFIED ABDOMINAL LOCATION: Primary | ICD-10-CM

## 2023-12-22 PROCEDURE — 99214 PR OFFICE/OUTPT VISIT, EST, LEVL IV, 30-39 MIN: ICD-10-PCS | Mod: S$GLB,,, | Performed by: COLON & RECTAL SURGERY

## 2023-12-22 PROCEDURE — 99999 PR PBB SHADOW E&M-EST. PATIENT-LVL V: ICD-10-PCS | Mod: PBBFAC,,, | Performed by: COLON & RECTAL SURGERY

## 2023-12-22 PROCEDURE — 25500020 PHARM REV CODE 255: Performed by: COLON & RECTAL SURGERY

## 2023-12-22 PROCEDURE — 1160F PR REVIEW ALL MEDS BY PRESCRIBER/CLIN PHARMACIST DOCUMENTED: ICD-10-PCS | Mod: CPTII,S$GLB,, | Performed by: COLON & RECTAL SURGERY

## 2023-12-22 PROCEDURE — 99999 PR PBB SHADOW E&M-EST. PATIENT-LVL V: CPT | Mod: PBBFAC,,, | Performed by: COLON & RECTAL SURGERY

## 2023-12-22 PROCEDURE — 74170 CT ABDOMEN W WO CONTRAST: ICD-10-PCS | Mod: 26,,, | Performed by: RADIOLOGY

## 2023-12-22 PROCEDURE — 74170 CT ABD WO CNTRST FLWD CNTRST: CPT | Mod: 26,,, | Performed by: RADIOLOGY

## 2023-12-22 PROCEDURE — 3080F DIAST BP >= 90 MM HG: CPT | Mod: CPTII,S$GLB,, | Performed by: COLON & RECTAL SURGERY

## 2023-12-22 PROCEDURE — 1160F RVW MEDS BY RX/DR IN RCRD: CPT | Mod: CPTII,S$GLB,, | Performed by: COLON & RECTAL SURGERY

## 2023-12-22 PROCEDURE — 3077F PR MOST RECENT SYSTOLIC BLOOD PRESSURE >= 140 MM HG: ICD-10-PCS | Mod: CPTII,S$GLB,, | Performed by: COLON & RECTAL SURGERY

## 2023-12-22 PROCEDURE — 74170 CT ABD WO CNTRST FLWD CNTRST: CPT | Mod: TC

## 2023-12-22 PROCEDURE — 3077F SYST BP >= 140 MM HG: CPT | Mod: CPTII,S$GLB,, | Performed by: COLON & RECTAL SURGERY

## 2023-12-22 PROCEDURE — 1159F MED LIST DOCD IN RCRD: CPT | Mod: CPTII,S$GLB,, | Performed by: COLON & RECTAL SURGERY

## 2023-12-22 PROCEDURE — 4010F PR ACE/ARB THEARPY RXD/TAKEN: ICD-10-PCS | Mod: CPTII,S$GLB,, | Performed by: COLON & RECTAL SURGERY

## 2023-12-22 PROCEDURE — 99214 OFFICE O/P EST MOD 30 MIN: CPT | Mod: S$GLB,,, | Performed by: COLON & RECTAL SURGERY

## 2023-12-22 PROCEDURE — 3080F PR MOST RECENT DIASTOLIC BLOOD PRESSURE >= 90 MM HG: ICD-10-PCS | Mod: CPTII,S$GLB,, | Performed by: COLON & RECTAL SURGERY

## 2023-12-22 PROCEDURE — 1159F PR MEDICATION LIST DOCUMENTED IN MEDICAL RECORD: ICD-10-PCS | Mod: CPTII,S$GLB,, | Performed by: COLON & RECTAL SURGERY

## 2023-12-22 PROCEDURE — 4010F ACE/ARB THERAPY RXD/TAKEN: CPT | Mod: CPTII,S$GLB,, | Performed by: COLON & RECTAL SURGERY

## 2023-12-22 RX ORDER — PROMETHAZINE HYDROCHLORIDE 25 MG/1
25 TABLET ORAL EVERY 6 HOURS PRN
Qty: 60 TABLET | Refills: 0 | Status: SHIPPED | OUTPATIENT
Start: 2023-12-22 | End: 2024-01-05 | Stop reason: SDUPTHER

## 2023-12-22 RX ORDER — HYDROCODONE BITARTRATE AND ACETAMINOPHEN 10; 325 MG/1; MG/1
1 TABLET ORAL EVERY 6 HOURS PRN
Qty: 28 TABLET | Refills: 0 | Status: SHIPPED | OUTPATIENT
Start: 2023-12-22 | End: 2023-12-30 | Stop reason: SDUPTHER

## 2023-12-22 RX ADMIN — IOHEXOL 100 ML: 350 INJECTION, SOLUTION INTRAVENOUS at 02:12

## 2023-12-22 NOTE — TELEPHONE ENCOUNTER
----- Message from Karlee Baker sent at 12/22/2023  1:14 PM CST -----  Contact: @ [phone]  Pt called in regards to being across the street and they need orders to use her PIC line for the contrast for the CT ....Please call and adv @ [phone]

## 2023-12-22 NOTE — PROGRESS NOTES
CRS Office Visit Followup    Referring Md:   Alfonzo Herrera Md  [address] [place] [address]    SUBJECTIVE:     Chief Complaint: parastomal hernia    History of Present Illness:     Course is as follows:  Patient is a 38 y.o. female presents with parastomal hernia.  She has had 66 hospital admission since 2009.  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  She therefore underwent repeat exploratory laparotomy with small-bowel resection in June 2010.  Ultimately, she had pouch failure requiring pouch excision in September 2010.  She underwent combined hysterectomy and ventral hernia repair with mesh placement in 2012.  This was complicated by mesh infection requiring mesh excision in 2016.  Her last abdominal operation was in 2016.    Since 2016, she is developed recurrent multiple midline hernias with increasing obstructive symptoms.  She is been hospitalized multiple times.  Normally, she empties her bag 7 times per day without Imodium.  Over the past 14 months, she is had increasing obstructive symptoms frequently requiring her to lay down and press on her hernias or be admitted to the hospital.  Nonsmoker.  Weight has been stable.  Nondiabetic.    She is been seen at multiple outside hospitals including [place] regarding ventral and parastomal hernia repair but has not been offered repair previously.    Socially, she is going through a divorce.  She does live near her family and has a good social support system at home.  She has anxiety which is well controlled with her home medications.    8/7/2023:  Ileostomy revision with relocation of the ileostomy and small-bowel resection  Extensive lysis of adhesions  Omental pedicle flap to the pelvis  Ventral hernia repair with retrorectus mesh placement    FINDINGS:  1. Large midline incisional hernia as well as parastomal  hernia with incarcerated small bowel.  Multiple loops of small bowel densely adherent to pelvis along the sacrum.  Lysis of adhesions was performed in order to divide all of the small bowel loops and free the small bowel from the pelvis.  The incarcerated hernia was reduced.  5 cm of the terminal ileum was resected in order to form a new ostomy.  Incisional hernia x2 and peristomal hernia were repaired.  Prior stoma defect closed in 2 layers with anterior posterior rectus sheath closed individually.  New ostomy was tunneled into the right upper quadrant.  Retrorectus ventral hernia repair was performed with Phasix absorbable mesh placement.  Omental pedicle flap freed and based off of the left gastroepiploic placed down into the pelvis to fill the pelvis to avoid future obstructive symptoms from the small bowel becoming adherent to the sacral promontory     Current status:  8/25/23: presents for first followup visit.  Eager to have the drain removed.  Putting out 2-3 oz per day.  Complains of abdominal pain and cramping.  No issues with her incision or drainage through his incision.  New ostomy working well.  9/8/23:  Presents for evaluation for fatigue.  Lower part of her incision with slight skin separation with mostly serous drainage.  Low-grade fevers at home.  Pain on the left side of her abdomen.  Ostomy working well.   - she was admitted 09/09/2023 for 4 days.  CT scan with 2 fluid collections treated with IR drainage.  Cultures were negative.  9/26/23:  Presents with her friend for follow-up.  She has ongoing difficulty with pain control.  She is very frustrated with how she was treated when she was recently hospitalized.  She feels that she was treated poorly in the ER as well as on the floor and at the CT scanner.  She is 2 drains in place.  One with 30 mL of serous output per day.  The other wound with very small output.  Slight erythema around the drain sites.  Ostomy working well.  Midline incision  "healing well.  10/4/23 to 10/12/23: admitted for IR guided drain placement.  Pan sensitive Staph aureus and Enterococcus.   Seen by ID and started on Meropenem  11/3/23: Presents for follow up with father after CT scan and visit with ID. States that she has continued abdominal wall pain, has flared a bit the last few days. Was taking 15mg oxycodone at home but recently upped to 30mg. Had nausea recently but prior had no nausea. Drains have around 30cc per day of cloudy output. Still on IV daptomycin. No issues around the drains, no incisional issues, has pain under the incision and around the stoma. CT today personally reviewed, drains in good position, no residual collection. She states that she has a yeast infection from the antibiotics and is requesting diflucan.   11/15/23 to 11/21/23: admitted for pain management.  IR drains removed, per ID reommendations dc daptomycin and continue Cefepine for 3 more weeks based on repeat cultures  12/2/23: she had an appt with post operative evaluation but went to the ER instead.  CT showed "There is significant stranding involving the anterior abdominal wall without a definite measurable fluid collection."  CRP= 1.2 and no leukocytosis.   12/21/23: She completed IV abx on 12/16/23.  ID recommends no further abx.    ID recs: If recurrence of fluid collections or concern for new fluid collections, recommend hold further antibiotics so long as stable, admit for IR drainage for new cultures and cell count, and consult ID.    If CT imaging stable and no evidence of abscess, fluid collection to drain, no further antibiotics and will again order PICC removal.   - Today, she complains of severe nausea and vomiting on a daily basis. She describes only being able to tolerate 16oz of water prior to the onset of nausea. She also describes an intense pain at the right subcostal margin that extends inferiorly to the lateral aspect of the ileostomy stoma. She continues with brown, " solidifying output from the ileostomy. She was recently admitted to a hospital in [place] for dehydration.     Review of Systems:  Review of Systems   Constitutional:  Negative for chills, diaphoresis, fever, malaise/fatigue and weight loss.   HENT:  Negative for congestion.    Respiratory:  Negative for shortness of breath.    Cardiovascular:  Negative for chest pain and leg swelling.   Gastrointestinal:  Positive for abdominal pain, blood in stool and diarrhea. Negative for constipation, nausea and vomiting.   Genitourinary:  Negative for dysuria.   Musculoskeletal:  Negative for back pain and myalgias.   Skin:  Negative for rash.   Neurological:  Negative for dizziness and weakness.   Endo/Heme/Allergies:  Does not bruise/bleed easily.   Psychiatric/Behavioral:  Negative for depression. The patient is nervous/anxious.        OBJECTIVE:     Vital Signs (Most Recent)  BP (!) 160/97   Pulse (!) 130   LMP 08/07/2012     Physical Exam:  General: White female anxious  Neuro: alert and oriented x 4.  Moves all extremities.     HEENT: no icterus.  Trachea midline  Respiratory: respirations are even and unlabored  Cardiac: regular rate  Abdomen:  Large midline incision healed well.  Prior stoma site in the right lower abdomen is well healed.  No recurrent hernia.  New ostomy in the right upper quadrant is pink and protrudes above the level of the skin.  Abdomen is soft.  No obvious parastomal hernia.  Extremities: Warm dry and intact  Skin: no rashes  Anorectal:  Deferred    Labs:  H&H 15 and 48.  Albumin 4.5.  Normal renal function.    Imaging:   CT abdomen pelvis 06/25/2023 reviewed and demonstrates right lower quadrant ileostomy with parastomal hernia as well as a left paramedian ventral hernia and a right paramedian supraumbilical hernia.  Small-bowel follow-through on 06/25/2023 demonstrates passage of contrast to the ostomy bag after 2 hours.  CT abdomen pelvis 06/22/2023 reviewed.  Left-sided rectus is intact.   Right-sided rectus with stoma through the inferior portion.  TAR plane appears intact  CT abd pelvis 11/3/23: personally reviewed, drains in good position. Still has inflammation of the deep collection without drainable fluid.   CT abd pelvis 12/2/23: personally reviewed. Inflammation with no fluid collection.    CT abd pelvis 12/22/23: personally reviewed: no obvious fluid collections about the mesh. There is reactive inflammatory changes about the midline incision. No parastomal hernia.     ASSESSMENT/PLAN:     Diagnoses and all orders for this visit:    Abdominal pain, unspecified abdominal location  -     Ambulatory referral/consult to Pain Clinic; Future    Vomiting, unspecified vomiting type, unspecified whether nausea present  -     Ambulatory referral/consult to Gastroenterology    History of difficult venous access  -     Ambulatory referral/consult to Interventional RAD; Future    Other orders  -     HYDROcodone-acetaminophen (NORCO)  mg per tablet; Take 1 tablet by mouth every 6 (six) hours as needed for Pain.  -     promethazine (PHENERGAN) 25 MG tablet; Take 1 tablet (25 mg total) by mouth every 6 (six) hours as needed for Nausea.            38 y.o. female with history of FAP status post failed pouch with multiple complex abdominal wall hernias and a peristomal hernia.  She is having obstructive symptoms with multiple hospitalizations.  Obstructive symptoms interfering with her ability to function normally.  Although she is a very complex surgical history, medically, she remains active with normal nutrition, no weight change, nonsmoker, nondiabetic.  Discussed that she is certainly at high risk for perioperative complication given her extensive past surgical history.    She underwent abdominal wall reconstruction with reciting of her ileostomy on 08/07/2023.    She was readmitted with 2 abdominal fluid collections treated with IR drainage.  Both with negative cultures not consistent with abscess,  but rather with post operative seroma.     She then had recurrence of fluid collection more consistent with infected seroma/abscses growning MSSA and enterococcus.  IR drain placed 10/5/23.  She was treated with outpatient IV daptomycin for weeks followed by transition to cefepime.  Drains removed on her hospitalization in mid November as there was no output.      She returns to clinic today with complaints of recurrent dehydration and right-sided abdominal pain. She has completed her outpatient antibiotic therapy, and after discussions with ID, there is no further need for ongoing IV antibiotic therapy. Her PICC line was discontinued in clinic today. As far as her abdominal pain goes, her previous lower midline abdominal pain has largely resolved. She is endorsing right lateral abdominal pain. This could be related to the relationship of the mesh and her neurovascular bundles of the rectus muscle. We recommend evaluation for a nerve block to this area for symptomatic control.      I reiterated that the mesh is absorbable and will completely resolve over 12-16 months.  I would not recommend removal of the mesh as this would be a significant setback in terms of her physical activity and pain control..  Furthermore, Removal of the mesh would result in a large hernia.  Her pain in the midline is significantly improving iand is now transitioned to around the stoma.  It is likely that as her mesh further incorporates, the pain will continue to improve.    Referral to pain management has been placed for evaluation for possible nerve block.    She is very poor vascular access.  PICC was removed today for infection control.  Request for port has been placed.    She continues to have significant pain.  She is amenable to stopping her oxycodone.  Therefore, she will transitioned to [place].  She states she is unable to tolerate gabapentin and Lyrica.    KRISH Tracy MD, FACS, FASCRS  Staff Surgeon  Colon & Rectal  Surgery

## 2023-12-22 NOTE — TELEPHONE ENCOUNTER
----- Message from Rhina Garner RN sent at 12/22/2023  3:45 PM CST -----  Regarding: Referral  Please contact for scheduling. Dr. Tracy has placed referrals.    Thanks!

## 2023-12-22 NOTE — TELEPHONE ENCOUNTER
Spoke with patient and Venus in CT. PICC line is accessed and awaiting RN for contrast administration.

## 2023-12-26 ENCOUNTER — TELEPHONE (OUTPATIENT)
Dept: INTERVENTIONAL RADIOLOGY/VASCULAR | Facility: CLINIC | Age: 38
End: 2023-12-26
Payer: COMMERCIAL

## 2023-12-26 NOTE — TELEPHONE ENCOUNTER
Left message for pt to return my call. Need to schedule IR procedure.  Please forward call to [phone]. Thanks

## 2023-12-27 ENCOUNTER — TELEPHONE (OUTPATIENT)
Dept: SURGERY | Facility: CLINIC | Age: 38
End: 2023-12-27
Payer: COMMERCIAL

## 2023-12-27 DIAGNOSIS — I87.8 POOR VENOUS ACCESS: Primary | ICD-10-CM

## 2023-12-29 ENCOUNTER — TELEPHONE (OUTPATIENT)
Dept: INTERVENTIONAL RADIOLOGY/VASCULAR | Facility: HOSPITAL | Age: 38
End: 2023-12-29
Payer: COMMERCIAL

## 2023-12-29 ENCOUNTER — TELEPHONE (OUTPATIENT)
Dept: SURGERY | Facility: CLINIC | Age: 38
End: 2023-12-29
Payer: COMMERCIAL

## 2023-12-29 NOTE — TELEPHONE ENCOUNTER
Spoke with patient regarding pain management referral in [place]. Appointment scheduled with pain management on 1/8/24 and has port placement on 1/3/24. Patient requests refill on pain medication for Tuesday 1/2/24. PCP in [place] contacted about pain medication without any response. Message sent to Dr. Tracy to advise on pain medication refill. Patient verbalizes understanding.

## 2023-12-29 NOTE — NURSING
Pre-procedure call complete.  Arrival time 1300.  Pt instructed not to eat or drink anything after midnight the night before procedure.  Pt aware will need someone to provide transport home and monitor pt post procedure.  No driving for at least 24 hours after procedure.   Medications reviewed.  Arrival time and location given.  Expected length of stay reviewed.  Covid screening completed.  Pt verbalized understanding.

## 2023-12-29 NOTE — TELEPHONE ENCOUNTER
----- Message from Jerrica Corrigan sent at 12/29/2023  4:08 PM CST -----  Regarding: Patient Advice  Contact: Pt [phone]  Pt is calling to speak to Rhina about they putting port in chest Wednesday states she will be arriving Tuesday and wants a call back about pain meds states there is no appt with pain meds until 1/8  please call

## 2023-12-30 DIAGNOSIS — G89.18 POST-OP PAIN: Primary | ICD-10-CM

## 2023-12-30 RX ORDER — HYDROCODONE BITARTRATE AND ACETAMINOPHEN 10; 325 MG/1; MG/1
1 TABLET ORAL EVERY 6 HOURS PRN
Qty: 28 TABLET | Refills: 0 | Status: SHIPPED | OUTPATIENT
Start: 2023-12-30 | End: 2024-01-05 | Stop reason: SDUPTHER

## 2024-01-02 ENCOUNTER — TELEPHONE (OUTPATIENT)
Dept: SURGERY | Facility: CLINIC | Age: 39
End: 2024-01-02
Payer: COMMERCIAL

## 2024-01-02 NOTE — TELEPHONE ENCOUNTER
Spoke with patient regarding port placement for tomorrow. Patient would like to know if this is a port placed in chest and accessed by Waller needle. Explained to patient that I will speak with Dr. Tracy and call her back. Patient verbalizes understanding.

## 2024-01-02 NOTE — TELEPHONE ENCOUNTER
----- Message from KRISH Tracy MD sent at 1/2/2024  1:03 PM CST -----  Yes I believe so.  That is the only port I know of.    It would allow her not to be stuck for Ivs so many times.      ----- Message -----  From: Rhina Garner RN  Sent: 1/2/2024  10:44 AM CST  To: KRISH Tracy MD    Spoke with her about port placement for tomorrow. She does not want port if it is placed under chest and accessed with Waller needle.    Is this the type of port being placed?

## 2024-01-02 NOTE — TELEPHONE ENCOUNTER
Spoke with patient regarding port placement. Patient states that she does not wish to have port placed at this time. Instructed patient to call radiology to inform them of cancellation. Patient asks that Dr. Tracy place a note in her chart for PICC line placement when arriving to ER instead of IV sticks. Explained to patient that I will relay message to Dr. Tracy. Patient verbalizes understanding.

## 2024-01-02 NOTE — TELEPHONE ENCOUNTER
----- Message from Preston Jean-Baptiste sent at 1/2/2024 10:13 AM CST -----  Regarding: Call back requested  Contact: [phone]  Hi pt is scheduled to have a Port placed on tomorrow and would like to spk with the nurse. Pls call the pt at  [phone] to discuss.  Thank you.

## 2024-01-05 DIAGNOSIS — G89.18 POST-OP PAIN: ICD-10-CM

## 2024-01-05 RX ORDER — HYDROCODONE BITARTRATE AND ACETAMINOPHEN 10; 325 MG/1; MG/1
1 TABLET ORAL EVERY 6 HOURS PRN
Qty: 28 TABLET | Refills: 0 | Status: SHIPPED | OUTPATIENT
Start: 2024-01-05 | End: 2024-01-11 | Stop reason: SDUPTHER

## 2024-01-05 RX ORDER — PROMETHAZINE HYDROCHLORIDE 25 MG/1
25 TABLET ORAL EVERY 6 HOURS PRN
Qty: 60 TABLET | Refills: 0 | Status: SHIPPED | OUTPATIENT
Start: 2024-01-05 | End: 2024-01-19 | Stop reason: SDUPTHER

## 2024-01-08 ENCOUNTER — OFFICE VISIT (OUTPATIENT)
Dept: PAIN MEDICINE | Facility: CLINIC | Age: 39
End: 2024-01-08
Payer: COMMERCIAL

## 2024-01-08 VITALS
TEMPERATURE: 98 F | HEART RATE: 117 BPM | HEIGHT: 67 IN | DIASTOLIC BLOOD PRESSURE: 100 MMHG | SYSTOLIC BLOOD PRESSURE: 150 MMHG | RESPIRATION RATE: 18 BRPM | OXYGEN SATURATION: 100 % | BODY MASS INDEX: 41.52 KG/M2 | WEIGHT: 264.56 LBS

## 2024-01-08 DIAGNOSIS — M79.2 NEURALGIA AND NEURITIS: Primary | ICD-10-CM

## 2024-01-08 DIAGNOSIS — R10.9 ABDOMINAL PAIN, UNSPECIFIED ABDOMINAL LOCATION: ICD-10-CM

## 2024-01-08 DIAGNOSIS — G89.4 CHRONIC PAIN DISORDER: ICD-10-CM

## 2024-01-08 PROCEDURE — 99999 PR PBB SHADOW E&M-EST. PATIENT-LVL IV: CPT | Mod: PBBFAC,,, | Performed by: ANESTHESIOLOGY

## 2024-01-08 PROCEDURE — 1159F MED LIST DOCD IN RCRD: CPT | Mod: CPTII,S$GLB,, | Performed by: ANESTHESIOLOGY

## 2024-01-08 PROCEDURE — 3077F SYST BP >= 140 MM HG: CPT | Mod: CPTII,S$GLB,, | Performed by: ANESTHESIOLOGY

## 2024-01-08 PROCEDURE — 3080F DIAST BP >= 90 MM HG: CPT | Mod: CPTII,S$GLB,, | Performed by: ANESTHESIOLOGY

## 2024-01-08 PROCEDURE — 99204 OFFICE O/P NEW MOD 45 MIN: CPT | Mod: S$GLB,,, | Performed by: ANESTHESIOLOGY

## 2024-01-08 PROCEDURE — 1160F RVW MEDS BY RX/DR IN RCRD: CPT | Mod: CPTII,S$GLB,, | Performed by: ANESTHESIOLOGY

## 2024-01-08 PROCEDURE — 3008F BODY MASS INDEX DOCD: CPT | Mod: CPTII,S$GLB,, | Performed by: ANESTHESIOLOGY

## 2024-01-08 NOTE — PROGRESS NOTES
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  She therefore underwent repeat exploratory laparotomy with small-bowel resection in June 2010.  Ultimately, she had pouch failure requiring pouch excision in September 2010.  She underwent combined hysterectomy and ventral hernia repair with mesh placement in 2012.  This was complicated by mesh infection requiring mesh excision in 2016.  Her last abdominal operation was in 2016. Since 2016, she is developed recurrent multiple midline hernias with increasing obstructive symptoms. She is still followed by colorectal surgery and was last seen by Dr. Tracy on 12/22/23 who referred to us for potential abdominal block. Pain is along right lateral abdomen near stoma, described a burning and with tenderness to palpation without allodynia. Pain is constantly 10/10, worse with movement, and started after her abdominal mesh revision/placement in August. It is interfering with her ability to mother her children and do her ADLs.     Physical Therapy/Home Exercise: no      Pain Disability Index Review:      1/8/2024    12:50 PM   Last 3 PDI Scores   Pain Disability Index (PDI) 23       Pain Medications:    - Opioids: Percocet (Oxycodone/Acetaminophen)     report:  Not applicable    Pain Procedures: None    Imaging: reviewed    Past Medical History:   Diagnosis Date    Anxiety     Familial polyposis     S/P total colectomy      Past Surgical History:   Procedure Laterality Date    Davinci Assisted Bilateral Ovarian Cystectomy, with extension  SEAN    8/2011    ESOPHAGOGASTRODUODENOSCOPY N/A 11/20/2023    Procedure: EGD (ESOPHAGOGASTRODUODENOSCOPY);  Surgeon: Isela Perez MD;  Location: [facility] (2ND FLR);  Service: Gastroenterology;  Laterality: N/A;    HYSTERECTOMY  8/23/2012    DAVINCI     ILEOSTOMY REVISION N/A 8/7/2023    Procedure: REVISION, ILEOSTOMY;  Surgeon: KRISH Tracy MD;  Location: [facility] OR 2ND FLR;  Service: Colon and Rectal;  Laterality: N/A;    Illeotomy Creation  2009    LYSIS OF ADHESIONS N/A 8/7/2023    Procedure: LYSIS, ADHESIONS;  Surgeon: KRISH Tracy MD;  Location: [facility] OR 2ND FLR;  Service: Colon and Rectal;  Laterality: N/A;    OOPHORECTOMY  8/23/2012    DAvinci removal with DLH     REPAIR OF RECURRENT VENTRAL HERNIA N/A 8/7/2023    Procedure: REPAIR, HERNIA, VENTRAL, RECURRENT, ERAS low;  Surgeon: KRISH Tracy MD;  Location: [facility] OR 2ND FLR;  Service: Colon and Rectal;  Laterality: N/A;  w/ mesh and omental pedical flap    TOTAL COLECTOMY      Iloanal pouch creation     Social History     Socioeconomic History    Marital status:    Tobacco Use    Smoking status: Former    Smokeless tobacco: Never   Substance and Sexual Activity    Alcohol use: Not Currently     Comment: seldom    Drug use: No    Sexual activity: Yes     Partners: Male     Birth control/protection: None     Comment: , lives in [place]     Social Determinants of Health     Financial Resource Strain: Medium Risk (11/18/2023)    Overall Financial Resource Strain (CARDIA)     Difficulty of Paying Living Expenses: Somewhat hard   Food Insecurity: No Food Insecurity (11/18/2023)    Hunger Vital Sign     Worried About Running Out of Food in the Last Year: Never true     Ran Out of Food in the Last Year: Never true   Transportation Needs: No Transportation Needs (11/18/2023)    PRAPARE - Transportation     Lack of Transportation (Medical): No     Lack of Transportation (Non-Medical): No   Physical Activity: Inactive (11/18/2023)     Exercise Vital Sign     Days of Exercise per Week: 0 days     Minutes of Exercise per Session: 0 min   Stress: Stress Concern Present (11/18/2023)    [employer] - Occupational Stress Questionnaire     Feeling of Stress : To some extent   Social Connections: Socially Integrated (11/18/2023)    Social Connection and Isolation Panel [NHANES]     Frequency of Communication with Friends and Family: More than three times a week     Frequency of Social Gatherings with Friends and Family: More than three times a week     Attends [employer] Services: More than 4 times per year     Active Member of Clubs or Organizations: Yes     Attends Club or Organization Meetings: Never     Marital Status:    Housing Stability: Unknown (11/18/2023)    Housing Stability Vital Sign     Unable to Pay for Housing in the Last Year: No     Unstable Housing in the Last Year: No     Family History   Problem Relation Age of Onset    Colon cancer Maternal Grandmother         near late 60's    Lung cancer Maternal Grandfather         lung cancer       Review of patient's allergies indicates:   Allergen Reactions    Levofloxacin Nausea And Vomiting and Rash    Morphine Anaphylaxis, Hives, Shortness Of Breath and Hallucinations           Piperacillin-tazobactam Rash    Sulfa (sulfonamide antibiotics) Nausea And Vomiting and Rash    Opioids - morphine analogues     Hydrocodone-acetaminophen Itching       Current Outpatient Medications   Medication Sig    clonazepam (KLONOPIN) 1 MG tablet Take 1 mg by mouth 2 (two) times daily as needed.      cyanocobalamin 1,000 mcg/mL injection 1,000 mcg twice a week.    fluconazole (DIFLUCAN) 150 MG Tab Take 1 tablet (150 mg total) by mouth once daily.    HYDROcodone-acetaminophen (NORCO)  mg per tablet Take 1 tablet by mouth every 6 (six) hours as needed for Pain.    ibuprofen (ADVIL,MOTRIN) 800 MG tablet Take 1 tablet (800 mg total) by mouth every 8 (eight) hours.     LIDOcaine (LIDODERM) 5 % Place 1 patch onto the skin once daily. Remove & Discard patch within 12 hours or as directed by MD    losartan (COZAAR) 100 MG tablet Take 100 mg by mouth.    miconazole (MICONAZOLE-7) 2 % vaginal cream Place 1 applicator vaginally every evening.    miconazole (MICOTIN) 100 mg vaginal suppository Place 1 suppository (100 mg total) vaginally every evening.    ondansetron (ZOFRAN-ODT) 4 MG TbDL Dissolve 2 tablets (8 mg total) by mouth every 6 (six) hours as needed (nausea).    oxyCODONE (ROXICODONE) 15 MG Tab Take 1 tablet (15 mg total) by mouth every 6 (six) hours as needed.    pantoprazole (PROTONIX) 40 MG tablet Take 1 tablet (40 mg total) by mouth 2 (two) times daily before meals.    progesterone (PROMETRIUM) 100 MG capsule Take 400 mg by mouth every evening.    promethazine (PHENERGAN) 25 MG tablet Take 1 tablet (25 mg total) by mouth every 6 (six) hours as needed for Nausea.    scopolamine (TRANSDERM-SCOP) 1.3-1.5 mg (1 mg over 3 days) Place 1 patch onto the skin Every 3 (three) days.    venlafaxine (EFFEXOR) 100 MG Tab Take 150 mg by mouth Daily.     No current facility-administered medications for this visit.     Facility-Administered Medications Ordered in Other Visits   Medication    LIDOcaine (PF) 10 mg/ml (1%) injection 10 mg       REVIEW OF SYSTEMS:    GENERAL:  No weight loss, malaise or fevers.  HEENT:  Negative for frequent or significant headaches.  NECK:  Negative for lumps, goiter, pain and significant neck swelling.  RESPIRATORY:  Negative for cough, wheezing or shortness of breath.  CARDIOVASCULAR:  Negative for chest pain, leg swelling or palpitations.  GI:  Negative for abdominal discomfort, blood in stools or black stools or change in bowel habits.  MUSCULOSKELETAL:  See HPI.  SKIN:  Negative for lesions, rash, and itching.  PSYCH:  Negative for sleep disturbance, mood disorder and recent psychosocial stressors.  HEMATOLOGY/LYMPHOLOGY:  Negative for prolonged bleeding,  "bruising easily or swollen nodes.  NEURO:   No history of headaches, syncope, paralysis, seizures or tremors.  All other reviewed and negative other than HPI.    OBJECTIVE:    BP (!) 150/100   Pulse (!) 117   Temp 98.2 °F (36.8 °C)   Resp 18   Ht 5' 7" (1.702 m)   Wt 120 kg (264 lb 8.8 oz)   LMP 08/07/2012   SpO2 100%   BMI 41.43 kg/m²     PHYSICAL EXAMINATION:    General appearance: Well appearing, in no acute distress, alert and oriented x3.  Psych:  Mood and affect appropriate.  Skin: Skin color, texture, turgor normal, no rashes or lesions, in both upper and lower body.  Head/face:  Normocephalic, atraumatic. No palpable lymph nodes.  Neck:  No pain with neck flexion, extension, or lateral flexion.   Cor: RRR  Pulm: CTA  GI:  Soft and TTP around stoma on the right as well as , less so, on the left abdominal wall.   Back: Normal range of motion without pain reproduction.  Extremities: Peripheral joint ROM is full and pain free without obvious instability or laxity in all four extremities. No deformities, edema, or skin discoloration. Good capillary refill.  Musculoskeletal: No atrophy or tone abnormalities are noted.  Neuro: Bilateral upper and lower extremity coordination and muscle stretch reflexes are physiologic and symmetric.  Plantar response are downgoing. No loss of sensation is noted.  Gait: normal.    ASSESSMENT: 38 y.o. year old female with neuropathic somatic, post surgical abdominal wall pain      1. Neuralgia and neuritis  Procedure Order to Pain Management      2. Abdominal pain, unspecified abdominal location  Ambulatory referral/consult to Pain Clinic    Procedure Order to Pain Management      3. Chronic pain disorder              PLAN:   - TAP block bilateral, scheduled  - RTC 2 weeks post injection      The above plan and management options were discussed at length with patient. Patient is in agreement with the above and verbalized understanding. It will be communicated with the " referring physician via electronic record, fax, or mail.    Isidoro Rivas  01/08/2024    I spent a total of 30 minutes on the day of the visit.  This includes face to face time and non-face to face time preparing to see the patient by reviewing previous labs/imaging, obtaining and/or reviewing separately obtained history, documenting clinical information in the electronic or other health record, independently interpreting results and communicating results to the patient/family/caregiver.    Juan Healy

## 2024-01-09 ENCOUNTER — PATIENT MESSAGE (OUTPATIENT)
Dept: PAIN MEDICINE | Facility: OTHER | Age: 39
End: 2024-01-09
Payer: COMMERCIAL

## 2024-01-09 DIAGNOSIS — M79.2 NEURALGIA AND NEURITIS: Primary | ICD-10-CM

## 2024-01-09 DIAGNOSIS — R10.9 ABDOMINAL PAIN, UNSPECIFIED ABDOMINAL LOCATION: ICD-10-CM

## 2024-01-11 ENCOUNTER — PATIENT MESSAGE (OUTPATIENT)
Dept: SURGERY | Facility: CLINIC | Age: 39
End: 2024-01-11
Payer: COMMERCIAL

## 2024-01-11 DIAGNOSIS — G89.18 POST-OP PAIN: ICD-10-CM

## 2024-01-11 RX ORDER — HYDROCODONE BITARTRATE AND ACETAMINOPHEN 10; 325 MG/1; MG/1
1 TABLET ORAL EVERY 6 HOURS PRN
Qty: 28 TABLET | Refills: 0 | Status: SHIPPED | OUTPATIENT
Start: 2024-01-11 | End: 2024-01-12 | Stop reason: SDUPTHER

## 2024-01-12 DIAGNOSIS — G89.18 POST-OP PAIN: ICD-10-CM

## 2024-01-12 RX ORDER — HYDROCODONE BITARTRATE AND ACETAMINOPHEN 10; 325 MG/1; MG/1
1 TABLET ORAL EVERY 6 HOURS PRN
Qty: 28 TABLET | Refills: 0 | Status: SHIPPED | OUTPATIENT
Start: 2024-01-12 | End: 2024-01-19 | Stop reason: SDUPTHER

## 2024-01-12 NOTE — TELEPHONE ENCOUNTER
----- Message from Meg Irene sent at 1/12/2024 10:25 AM CST -----  Regarding: Refill  Contact: pt [phone]  Pt calling to request HYDROcodone-acetaminophen (NORCO)  mg per tablet To be call into a different pharmacy from Public. Pls call in new prescription to location below.         [id number] [place] [address] AT SEC [address] [facility] [address] [address]  Phone: [phone] Fax: [phone]

## 2024-01-19 DIAGNOSIS — G89.18 POST-OP PAIN: ICD-10-CM

## 2024-01-19 RX ORDER — PROMETHAZINE HYDROCHLORIDE 25 MG/1
25 TABLET ORAL EVERY 6 HOURS PRN
Qty: 60 TABLET | Refills: 0 | Status: SHIPPED | OUTPATIENT
Start: 2024-01-19

## 2024-01-19 RX ORDER — HYDROCODONE BITARTRATE AND ACETAMINOPHEN 10; 325 MG/1; MG/1
1 TABLET ORAL EVERY 6 HOURS PRN
Qty: 28 TABLET | Refills: 0 | Status: SHIPPED | OUTPATIENT
Start: 2024-01-19 | End: 2024-01-25 | Stop reason: SDUPTHER

## 2024-01-25 ENCOUNTER — PATIENT MESSAGE (OUTPATIENT)
Dept: SURGERY | Facility: CLINIC | Age: 39
End: 2024-01-25
Payer: COMMERCIAL

## 2024-01-25 DIAGNOSIS — G89.18 POST-OP PAIN: ICD-10-CM

## 2024-01-25 RX ORDER — HYDROCODONE BITARTRATE AND ACETAMINOPHEN 10; 325 MG/1; MG/1
1 TABLET ORAL EVERY 6 HOURS PRN
Qty: 28 TABLET | Refills: 0 | Status: SHIPPED | OUTPATIENT
Start: 2024-01-25

## 2024-01-31 ENCOUNTER — PATIENT MESSAGE (OUTPATIENT)
Dept: PAIN MEDICINE | Facility: OTHER | Age: 39
End: 2024-01-31
Payer: COMMERCIAL

## 2024-02-08 DIAGNOSIS — G89.18 POST-OP PAIN: Primary | ICD-10-CM

## 2024-02-08 RX ORDER — HYDROCODONE BITARTRATE AND ACETAMINOPHEN 5; 325 MG/1; MG/1
1 TABLET ORAL EVERY 6 HOURS PRN
Qty: 28 TABLET | Refills: 0 | Status: SHIPPED | OUTPATIENT
Start: 2024-02-08

## 2024-02-17 ENCOUNTER — TELEPHONE (OUTPATIENT)
Dept: ENDOSCOPY | Facility: HOSPITAL | Age: 39
End: 2024-02-17
Payer: COMMERCIAL

## 2024-02-17 NOTE — TELEPHONE ENCOUNTER
Called pt to schedule EGD procedure. Pt was very upset and stated she will not be scheduling at [facility] and did not want to be called anymore. Order cancelled at this time.

## 2024-05-23 ENCOUNTER — PATIENT MESSAGE (OUTPATIENT)
Dept: SURGERY | Facility: CLINIC | Age: 39
End: 2024-05-23
Payer: COMMERCIAL

## 2024-06-09 ENCOUNTER — PATIENT MESSAGE (OUTPATIENT)
Dept: SURGERY | Facility: CLINIC | Age: 39
End: 2024-06-09
Payer: COMMERCIAL

## 2024-06-10 DIAGNOSIS — K81.9 CHOLECYSTITIS: Primary | ICD-10-CM

## 2024-06-17 ENCOUNTER — TELEPHONE (OUTPATIENT)
Dept: SURGERY | Facility: CLINIC | Age: 39
End: 2024-06-17
Payer: COMMERCIAL

## 2024-06-27 ENCOUNTER — PATIENT MESSAGE (OUTPATIENT)
Dept: HEMATOLOGY/ONCOLOGY | Facility: CLINIC | Age: 39
End: 2024-06-27
Payer: COMMERCIAL

## 2024-06-27 ENCOUNTER — TELEPHONE (OUTPATIENT)
Dept: SURGERY | Facility: CLINIC | Age: 39
End: 2024-06-27
Payer: COMMERCIAL

## 2024-09-19 ENCOUNTER — HOSPITAL ENCOUNTER (EMERGENCY)
Facility: HOSPITAL | Age: 39
Discharge: HOME | End: 2024-09-19
Attending: EMERGENCY MEDICINE
Payer: COMMERCIAL

## 2024-09-19 VITALS
TEMPERATURE: 98 F | DIASTOLIC BLOOD PRESSURE: 66 MMHG | HEART RATE: 87 BPM | WEIGHT: 220 LBS | OXYGEN SATURATION: 100 % | BODY MASS INDEX: 34.53 KG/M2 | HEIGHT: 67 IN | RESPIRATION RATE: 20 BRPM | SYSTOLIC BLOOD PRESSURE: 132 MMHG

## 2024-09-19 DIAGNOSIS — R10.9 ABDOMINAL PAIN, UNSPECIFIED ABDOMINAL LOCATION: Primary | ICD-10-CM

## 2024-09-19 LAB
ALBUMIN SERPL BCP-MCNC: 3.9 G/DL (ref 3.5–5.2)
ALP SERPL-CCNC: 44 U/L (ref 55–135)
ALT SERPL W/O P-5'-P-CCNC: 21 U/L (ref 10–44)
ANION GAP SERPL CALC-SCNC: 7 MMOL/L (ref 8–16)
AST SERPL-CCNC: 17 U/L (ref 10–40)
BASOPHILS # BLD AUTO: 0.05 K/UL (ref 0–0.2)
BASOPHILS NFR BLD: 1.1 % (ref 0–1.9)
BILIRUB SERPL-MCNC: 0.6 MG/DL (ref 0.1–1)
BILIRUB UR QL STRIP: NEGATIVE
BUN SERPL-MCNC: 16 MG/DL (ref 6–20)
BUN SERPL-MCNC: 18 MG/DL (ref 6–30)
CALCIUM SERPL-MCNC: 9.3 MG/DL (ref 8.7–10.5)
CHLORIDE SERPL-SCNC: 109 MMOL/L (ref 95–110)
CHLORIDE SERPL-SCNC: 110 MMOL/L (ref 95–110)
CLARITY UR REFRACT.AUTO: CLEAR
CO2 SERPL-SCNC: 22 MMOL/L (ref 23–29)
COLOR UR AUTO: YELLOW
CREAT SERPL-MCNC: 0.9 MG/DL (ref 0.5–1.4)
CREAT SERPL-MCNC: 0.9 MG/DL (ref 0.5–1.4)
DIFFERENTIAL METHOD BLD: ABNORMAL
EOSINOPHIL # BLD AUTO: 0 K/UL (ref 0–0.5)
EOSINOPHIL NFR BLD: 0.4 % (ref 0–8)
ERYTHROCYTE [DISTWIDTH] IN BLOOD BY AUTOMATED COUNT: 12.6 % (ref 11.5–14.5)
EST. GFR  (NO RACE VARIABLE): >60 ML/MIN/1.73 M^2
GLUCOSE SERPL-MCNC: 154 MG/DL (ref 70–110)
GLUCOSE SERPL-MCNC: 160 MG/DL (ref 70–110)
GLUCOSE UR QL STRIP: ABNORMAL
HCT VFR BLD AUTO: 39.4 % (ref 37–48.5)
HCT VFR BLD CALC: 39 %PCV (ref 36–54)
HGB BLD-MCNC: 12.5 G/DL (ref 12–16)
HGB UR QL STRIP: NEGATIVE
IMM GRANULOCYTES # BLD AUTO: 0.01 K/UL (ref 0–0.04)
IMM GRANULOCYTES NFR BLD AUTO: 0.2 % (ref 0–0.5)
KETONES UR QL STRIP: NEGATIVE
LEUKOCYTE ESTERASE UR QL STRIP: NEGATIVE
LIPASE SERPL-CCNC: 15 U/L (ref 4–60)
LYMPHOCYTES # BLD AUTO: 0.8 K/UL (ref 1–4.8)
LYMPHOCYTES NFR BLD: 15.9 % (ref 18–48)
MCH RBC QN AUTO: 30.6 PG (ref 27–31)
MCHC RBC AUTO-ENTMCNC: 31.7 G/DL (ref 32–36)
MCV RBC AUTO: 96 FL (ref 82–98)
MONOCYTES # BLD AUTO: 0.4 K/UL (ref 0.3–1)
MONOCYTES NFR BLD: 7.4 % (ref 4–15)
NEUTROPHILS # BLD AUTO: 3.5 K/UL (ref 1.8–7.7)
NEUTROPHILS NFR BLD: 75 % (ref 38–73)
NITRITE UR QL STRIP: NEGATIVE
NRBC BLD-RTO: 0 /100 WBC
PH UR STRIP: 6 [PH] (ref 5–8)
PLATELET # BLD AUTO: 206 K/UL (ref 150–450)
PMV BLD AUTO: 11 FL (ref 9.2–12.9)
POC IONIZED CALCIUM: 1.17 MMOL/L (ref 1.06–1.42)
POC TCO2 (MEASURED): 21 MMOL/L (ref 23–29)
POTASSIUM BLD-SCNC: 4.7 MMOL/L (ref 3.5–5.1)
POTASSIUM SERPL-SCNC: 4.7 MMOL/L (ref 3.5–5.1)
PROT SERPL-MCNC: 6.7 G/DL (ref 6–8.4)
PROT UR QL STRIP: ABNORMAL
RBC # BLD AUTO: 4.09 M/UL (ref 4–5.4)
SAMPLE: ABNORMAL
SODIUM BLD-SCNC: 139 MMOL/L (ref 136–145)
SODIUM SERPL-SCNC: 138 MMOL/L (ref 136–145)
SP GR UR STRIP: 1.03 (ref 1–1.03)
URN SPEC COLLECT METH UR: ABNORMAL
WBC # BLD AUTO: 4.72 K/UL (ref 3.9–12.7)

## 2024-09-19 PROCEDURE — 85025 COMPLETE CBC W/AUTO DIFF WBC: CPT | Performed by: EMERGENCY MEDICINE

## 2024-09-19 PROCEDURE — 80053 COMPREHEN METABOLIC PANEL: CPT | Performed by: EMERGENCY MEDICINE

## 2024-09-19 PROCEDURE — 96375 TX/PRO/DX INJ NEW DRUG ADDON: CPT

## 2024-09-19 PROCEDURE — 80047 BASIC METABLC PNL IONIZED CA: CPT

## 2024-09-19 PROCEDURE — 96361 HYDRATE IV INFUSION ADD-ON: CPT

## 2024-09-19 PROCEDURE — 63600175 PHARM REV CODE 636 W HCPCS: Performed by: EMERGENCY MEDICINE

## 2024-09-19 PROCEDURE — 99285 EMERGENCY DEPT VISIT HI MDM: CPT | Mod: 25

## 2024-09-19 PROCEDURE — 25000003 PHARM REV CODE 250: Performed by: EMERGENCY MEDICINE

## 2024-09-19 PROCEDURE — 82330 ASSAY OF CALCIUM: CPT

## 2024-09-19 PROCEDURE — 96365 THER/PROPH/DIAG IV INF INIT: CPT | Mod: 59

## 2024-09-19 PROCEDURE — 81003 URINALYSIS AUTO W/O SCOPE: CPT | Performed by: EMERGENCY MEDICINE

## 2024-09-19 PROCEDURE — 83690 ASSAY OF LIPASE: CPT | Performed by: EMERGENCY MEDICINE

## 2024-09-19 PROCEDURE — 25500020 PHARM REV CODE 255: Performed by: EMERGENCY MEDICINE

## 2024-09-19 RX ORDER — HYDROMORPHONE HYDROCHLORIDE 1 MG/ML
1 INJECTION, SOLUTION INTRAMUSCULAR; INTRAVENOUS; SUBCUTANEOUS
Status: COMPLETED | OUTPATIENT
Start: 2024-09-19 | End: 2024-09-19

## 2024-09-19 RX ADMIN — PROMETHAZINE HYDROCHLORIDE 12.5 MG: 25 INJECTION INTRAMUSCULAR; INTRAVENOUS at 02:09

## 2024-09-19 RX ADMIN — HYDROMORPHONE HYDROCHLORIDE 1 MG: 1 INJECTION, SOLUTION INTRAMUSCULAR; INTRAVENOUS; SUBCUTANEOUS at 01:09

## 2024-09-19 RX ADMIN — SODIUM CHLORIDE 1000 ML: 9 INJECTION, SOLUTION INTRAVENOUS at 03:09

## 2024-09-19 RX ADMIN — IOHEXOL 75 ML: 350 INJECTION, SOLUTION INTRAVENOUS at 02:09

## 2024-09-19 RX ADMIN — SODIUM CHLORIDE 1000 ML: 9 INJECTION, SOLUTION INTRAVENOUS at 01:09
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
## 2024-09-19 NOTE — ED NOTES
I-STAT Chem-8+ Results:   Value Reference Range   Sodium 139 136-145 mmol/L   Potassium  4.7 3.5-5.1 mmol/L   Chloride 110  mmol/L   Ionized Calcium 1.17 1.06-1.42 mmol/L   CO2 (measured) 21 23-29 mmol/L   Glucose 160  mg/dL   BUN 18 6-30 mg/dL   Creatinine 0.9 0.5-1.4 mg/dL   Hematocrit 39 36-54%

## 2024-09-19 NOTE — ED TRIAGE NOTES
Georgette Abrams, an 39 y.o. female presents to the ED via POV with abd pain, rectal pain, and urinary incontinence. Pt has ileostomy in RU. Pt states she had an appointment at [facility] today which she cancelled to go to ED for pain control. Pt states she is from [place] but comes here for care. Denies CP, SOB, HA, fevers.      Chief Complaint   Patient presents with    Abdominal Pain     Pt c/o abdominal pain and rectal pain.  States she is incontinent with blood in urine and ostomy bag.      Review of patient's allergies indicates:   Allergen Reactions    Levofloxacin Nausea And Vomiting and Rash    Morphine Anaphylaxis, Hives, Shortness Of Breath and Hallucinations           Piperacillin-tazobactam Rash    Sulfa (sulfonamide antibiotics) Nausea And Vomiting and Rash    Opioids - morphine analogues     Hydrocodone-acetaminophen Itching     Past Medical History:   Diagnosis Date    Anxiety     Familial polyposis     S/P total colectomy

## 2024-10-07 ENCOUNTER — PATIENT MESSAGE (OUTPATIENT)
Dept: PAIN MEDICINE | Facility: CLINIC | Age: 39
End: 2024-10-07
Payer: COMMERCIAL

## 2024-10-09 ENCOUNTER — TELEPHONE (OUTPATIENT)
Dept: PAIN MEDICINE | Facility: CLINIC | Age: 39
End: 2024-10-09
Payer: COMMERCIAL

## 2025-04-09 ENCOUNTER — PATIENT MESSAGE (OUTPATIENT)
Dept: HEMATOLOGY/ONCOLOGY | Facility: CLINIC | Age: 40
End: 2025-04-09
Payer: COMMERCIAL

## 2025-04-09 DIAGNOSIS — R10.9 RIGHT SIDED ABDOMINAL PAIN: Primary | ICD-10-CM

## (undated) DEVICE — CLOSURE SKIN STERI STRIP 1/2X4

## (undated) DEVICE — SUT COATED VICRYL 4/0 27IN

## (undated) DEVICE — TIP YANKAUERS BULB NO VENT

## (undated) DEVICE — TRAY CATH FOL SIL URIMTR 16FR

## (undated) DEVICE — Device

## (undated) DEVICE — NDL BOX COUNTER

## (undated) DEVICE — SUT 0 18IN COATED VICRYL V

## (undated) DEVICE — KIT PREVENA PLUS

## (undated) DEVICE — SUT 3-0 VICRYL SH CR/8 18

## (undated) DEVICE — BOWL STERILE LARGE 32OZ

## (undated) DEVICE — SEE MEDLINE ITEM 156902

## (undated) DEVICE — LUBRICANT SURGILUBE 2 OZ

## (undated) DEVICE — CUTTER PROXIMATE BLUE 75MM

## (undated) DEVICE — SUT 0 8-18 IN CTD VICRYL

## (undated) DEVICE — ELECTRODE REM PLYHSV RETURN 9

## (undated) DEVICE — BELLOW CANN HEMOBLAST 1.65GR

## (undated) DEVICE — DRESSING ADH ISLAND 3.6 X 14

## (undated) DEVICE — DRAPE ABDOMINAL TIBURON 14X11

## (undated) DEVICE — DRAPE INCISE IOBAN 2 23X17IN

## (undated) DEVICE — DRAPE CORETEMP FLD WRM 56X62IN